# Patient Record
Sex: FEMALE | Race: WHITE | NOT HISPANIC OR LATINO | Employment: OTHER | URBAN - METROPOLITAN AREA
[De-identification: names, ages, dates, MRNs, and addresses within clinical notes are randomized per-mention and may not be internally consistent; named-entity substitution may affect disease eponyms.]

---

## 2017-01-09 RX ORDER — METOPROLOL TARTRATE 50 MG/1
TABLET ORAL
Qty: 90 TABLET | Refills: 3 | Status: SHIPPED | OUTPATIENT
Start: 2017-01-09 | End: 2018-02-02 | Stop reason: SDUPTHER

## 2017-01-11 ENCOUNTER — LAB VISIT (OUTPATIENT)
Dept: LAB | Facility: HOSPITAL | Age: 82
End: 2017-01-11
Attending: INTERNAL MEDICINE
Payer: MEDICARE

## 2017-01-11 DIAGNOSIS — Z79.01 ANTICOAGULATION MONITORING BY PHARMACIST: ICD-10-CM

## 2017-01-11 DIAGNOSIS — I48.91 ATRIAL FIBRILLATION: ICD-10-CM

## 2017-01-11 LAB
INR PPP: 2.1
PROTHROMBIN TIME: 20.9 SEC

## 2017-01-11 PROCEDURE — 85610 PROTHROMBIN TIME: CPT

## 2017-01-11 PROCEDURE — 36415 COLL VENOUS BLD VENIPUNCTURE: CPT | Mod: PO

## 2017-01-12 ENCOUNTER — ANTI-COAG VISIT (OUTPATIENT)
Dept: CARDIOLOGY | Facility: CLINIC | Age: 82
End: 2017-01-12

## 2017-01-12 DIAGNOSIS — Z79.01 ANTICOAGULATION MONITORING BY PHARMACIST: ICD-10-CM

## 2017-01-12 RX ORDER — VERAPAMIL HYDROCHLORIDE 120 MG/1
TABLET, FILM COATED, EXTENDED RELEASE ORAL
Qty: 90 TABLET | Refills: 3 | Status: SHIPPED | OUTPATIENT
Start: 2017-01-12 | End: 2018-02-02 | Stop reason: SDUPTHER

## 2017-01-19 RX ORDER — LEVOTHYROXINE SODIUM 25 UG/1
TABLET ORAL
Qty: 90 TABLET | Refills: 3 | Status: SHIPPED | OUTPATIENT
Start: 2017-01-19 | End: 2017-03-03

## 2017-02-03 DIAGNOSIS — E11.9 TYPE 2 DIABETES MELLITUS WITHOUT COMPLICATION: ICD-10-CM

## 2017-02-08 ENCOUNTER — PATIENT MESSAGE (OUTPATIENT)
Dept: CARDIOLOGY | Facility: CLINIC | Age: 82
End: 2017-02-08

## 2017-02-08 ENCOUNTER — TELEPHONE (OUTPATIENT)
Dept: ELECTROPHYSIOLOGY | Facility: CLINIC | Age: 82
End: 2017-02-08

## 2017-02-09 ENCOUNTER — LAB VISIT (OUTPATIENT)
Dept: LAB | Facility: HOSPITAL | Age: 82
End: 2017-02-09
Attending: FAMILY MEDICINE
Payer: MEDICARE

## 2017-02-09 DIAGNOSIS — I48.91 ATRIAL FIBRILLATION: ICD-10-CM

## 2017-02-09 DIAGNOSIS — Z79.01 ANTICOAGULATION MONITORING BY PHARMACIST: ICD-10-CM

## 2017-02-09 LAB
INR PPP: 1.6
PROTHROMBIN TIME: 16.2 SEC

## 2017-02-09 PROCEDURE — 85610 PROTHROMBIN TIME: CPT

## 2017-02-09 PROCEDURE — 36415 COLL VENOUS BLD VENIPUNCTURE: CPT | Mod: PO

## 2017-02-10 ENCOUNTER — ANTI-COAG VISIT (OUTPATIENT)
Dept: CARDIOLOGY | Facility: CLINIC | Age: 82
End: 2017-02-10

## 2017-02-10 DIAGNOSIS — Z79.01 ANTICOAGULATION MONITORING BY PHARMACIST: ICD-10-CM

## 2017-02-15 ENCOUNTER — PATIENT MESSAGE (OUTPATIENT)
Dept: FAMILY MEDICINE | Facility: CLINIC | Age: 82
End: 2017-02-15

## 2017-02-22 ENCOUNTER — HOSPITAL ENCOUNTER (OUTPATIENT)
Dept: RADIOLOGY | Facility: HOSPITAL | Age: 82
Discharge: HOME | End: 2017-02-22
Attending: NURSE PRACTITIONER
Payer: MEDICARE

## 2017-02-22 DIAGNOSIS — R92.8 ABNORMAL MAMMOGRAM: ICD-10-CM

## 2017-02-22 DIAGNOSIS — R92.8 ABNORMAL MAMMOGRAM OF RIGHT BREAST: ICD-10-CM

## 2017-02-22 DIAGNOSIS — D05.12 DUCTAL CARCINOMA IN SITU (DCIS) OF LEFT BREAST: ICD-10-CM

## 2017-02-22 PROCEDURE — 76642 ULTRASOUND BREAST LIMITED: CPT | Mod: TC,PO,RT

## 2017-02-22 PROCEDURE — 77065 DX MAMMO INCL CAD UNI: CPT | Mod: 26,,, | Performed by: RADIOLOGY

## 2017-02-22 PROCEDURE — 77061 BREAST TOMOSYNTHESIS UNI: CPT | Mod: 26,,, | Performed by: RADIOLOGY

## 2017-02-22 PROCEDURE — 77065 DX MAMMO INCL CAD UNI: CPT | Mod: TC,RT

## 2017-02-22 PROCEDURE — 77061 BREAST TOMOSYNTHESIS UNI: CPT | Mod: TC,RT

## 2017-02-22 PROCEDURE — 76642 ULTRASOUND BREAST LIMITED: CPT | Mod: 26,RT,, | Performed by: RADIOLOGY

## 2017-02-23 ENCOUNTER — ANTI-COAG VISIT (OUTPATIENT)
Dept: CARDIOLOGY | Facility: CLINIC | Age: 82
End: 2017-02-23

## 2017-02-23 DIAGNOSIS — Z79.01 ANTICOAGULATION MONITORING BY PHARMACIST: ICD-10-CM

## 2017-02-24 ENCOUNTER — TELEPHONE (OUTPATIENT)
Dept: OPHTHALMOLOGY | Facility: CLINIC | Age: 82
End: 2017-02-24

## 2017-02-24 DIAGNOSIS — I48.91 ATRIAL FIBRILLATION, UNSPECIFIED TYPE: ICD-10-CM

## 2017-02-24 DIAGNOSIS — Z95.0 CARDIAC PACEMAKER IN SITU: Primary | ICD-10-CM

## 2017-02-24 NOTE — TELEPHONE ENCOUNTER
----- Message from Umm Valencia sent at 2/24/2017  9:00 AM CST -----  Contact: Son n  - Jann Pastrana  The next available appointment is not until 03/21/2017. The patient's eyes needs to be checked to be approved for driving privileges with the DMV Department.  A form has to be filled out.  Please call [phone].  Thank you

## 2017-02-25 ENCOUNTER — OFFICE VISIT (OUTPATIENT)
Dept: OPTOMETRY | Facility: CLINIC | Age: 82
End: 2017-02-25
Payer: MEDICARE

## 2017-02-25 DIAGNOSIS — E11.9 TYPE 2 DIABETES MELLITUS WITHOUT RETINOPATHY: Primary | ICD-10-CM

## 2017-02-25 DIAGNOSIS — H52.4 MYOPIA WITH ASTIGMATISM AND PRESBYOPIA, BILATERAL: ICD-10-CM

## 2017-02-25 DIAGNOSIS — H35.363 DEGENERATIVE RETINAL DRUSEN OF BOTH EYES: ICD-10-CM

## 2017-02-25 DIAGNOSIS — H52.13 MYOPIA WITH ASTIGMATISM AND PRESBYOPIA, BILATERAL: ICD-10-CM

## 2017-02-25 DIAGNOSIS — H52.203 MYOPIA WITH ASTIGMATISM AND PRESBYOPIA, BILATERAL: ICD-10-CM

## 2017-02-25 PROCEDURE — 99999 PR PBB SHADOW E&M-EST. PATIENT-LVL II: CPT | Mod: PBBFAC,,, | Performed by: OPTOMETRIST

## 2017-02-25 PROCEDURE — 92015 DETERMINE REFRACTIVE STATE: CPT | Mod: S$GLB,,, | Performed by: OPTOMETRIST

## 2017-02-25 PROCEDURE — 99499 UNLISTED E&M SERVICE: CPT | Mod: S$GLB,,, | Performed by: OPTOMETRIST

## 2017-02-25 PROCEDURE — 92014 COMPRE OPH EXAM EST PT 1/>: CPT | Mod: S$GLB,,, | Performed by: OPTOMETRIST

## 2017-02-25 NOTE — PROGRESS NOTES
HPI     Eye Problem    Additional comments: Needs drivers license form filled ouy           Comments   No complaints  No eye drops  Used rx readers       Last edited by Praveen Nava, OD on 2/25/2017  8:52 AM. (History)        ROS     Negative for: Constitutional, Gastrointestinal, Neurological, Skin,   Genitourinary, Musculoskeletal, HENT, Endocrine, Cardiovascular, Eyes,   Respiratory, Psychiatric, Allergic/Imm, Heme/Lymph    Last edited by Praveen Nava, OD on 2/25/2017  8:33 AM. (History)        Assessment /Plan     For exam results, see Encounter Report.    Type 2 diabetes mellitus without retinopathy    Degenerative retinal drusen of both eyes    Myopia with astigmatism and presbyopia, bilateral      1. No diabetic retinopathy, no csme. Return in 1 year for dilated eye exam.  2. Monitor condition. Patient to report any changes. RTC 1 year recheck.  3. Spec Rx given. Different lens options discussed with patient. RTC 1 year full exam.

## 2017-03-03 ENCOUNTER — CLINICAL SUPPORT (OUTPATIENT)
Dept: ELECTROPHYSIOLOGY | Facility: CLINIC | Age: 82
End: 2017-03-03
Payer: MEDICARE

## 2017-03-03 ENCOUNTER — OFFICE VISIT (OUTPATIENT)
Dept: FAMILY MEDICINE | Facility: CLINIC | Age: 82
End: 2017-03-03
Payer: MEDICARE

## 2017-03-03 VITALS
BODY MASS INDEX: 22.31 KG/M2 | DIASTOLIC BLOOD PRESSURE: 70 MMHG | HEART RATE: 59 BPM | SYSTOLIC BLOOD PRESSURE: 137 MMHG | TEMPERATURE: 98 F | WEIGHT: 118.19 LBS | HEIGHT: 61 IN

## 2017-03-03 DIAGNOSIS — R29.6 RECURRENT FALLS: ICD-10-CM

## 2017-03-03 DIAGNOSIS — E11.40 TYPE 2 DIABETES MELLITUS WITH DIABETIC NEUROPATHY, WITHOUT LONG-TERM CURRENT USE OF INSULIN: ICD-10-CM

## 2017-03-03 DIAGNOSIS — R42 DIZZINESS: ICD-10-CM

## 2017-03-03 DIAGNOSIS — E11.22 DIABETES MELLITUS WITH STAGE 4 CHRONIC KIDNEY DISEASE GFR 15-29: ICD-10-CM

## 2017-03-03 DIAGNOSIS — Z97.4 HEARING AID WORN: ICD-10-CM

## 2017-03-03 DIAGNOSIS — N18.4 DIABETES MELLITUS WITH STAGE 4 CHRONIC KIDNEY DISEASE GFR 15-29: ICD-10-CM

## 2017-03-03 DIAGNOSIS — F33.1 MAJOR DEPRESSIVE DISORDER, RECURRENT EPISODE, MODERATE: ICD-10-CM

## 2017-03-03 DIAGNOSIS — N25.81 SECONDARY HYPERPARATHYROIDISM OF RENAL ORIGIN: ICD-10-CM

## 2017-03-03 DIAGNOSIS — Z91.89 DRIVING SAFETY ISSUE: ICD-10-CM

## 2017-03-03 DIAGNOSIS — I15.2 HYPERTENSION COMPLICATING DIABETES: ICD-10-CM

## 2017-03-03 DIAGNOSIS — E11.59 HYPERTENSION COMPLICATING DIABETES: ICD-10-CM

## 2017-03-03 DIAGNOSIS — I48.91 ATRIAL FIBRILLATION: ICD-10-CM

## 2017-03-03 DIAGNOSIS — Z95.0 CARDIAC PACEMAKER IN SITU: ICD-10-CM

## 2017-03-03 DIAGNOSIS — I48.91 ATRIAL FIBRILLATION, UNSPECIFIED TYPE: Chronic | ICD-10-CM

## 2017-03-03 DIAGNOSIS — R41.3 MEMORY LOSS OR IMPAIRMENT: Primary | ICD-10-CM

## 2017-03-03 PROCEDURE — 3078F DIAST BP <80 MM HG: CPT | Mod: S$GLB,,, | Performed by: FAMILY MEDICINE

## 2017-03-03 PROCEDURE — 99214 OFFICE O/P EST MOD 30 MIN: CPT | Mod: S$GLB,,, | Performed by: FAMILY MEDICINE

## 2017-03-03 PROCEDURE — 1159F MED LIST DOCD IN RCRD: CPT | Mod: S$GLB,,, | Performed by: FAMILY MEDICINE

## 2017-03-03 PROCEDURE — 1157F ADVNC CARE PLAN IN RCRD: CPT | Mod: S$GLB,,, | Performed by: FAMILY MEDICINE

## 2017-03-03 PROCEDURE — 93293 PM PHONE R-STRIP DEVICE EVAL: CPT | Mod: S$GLB,,, | Performed by: INTERNAL MEDICINE

## 2017-03-03 PROCEDURE — 99499 UNLISTED E&M SERVICE: CPT | Mod: S$GLB,,, | Performed by: FAMILY MEDICINE

## 2017-03-03 PROCEDURE — 1126F AMNT PAIN NOTED NONE PRSNT: CPT | Mod: S$GLB,,, | Performed by: FAMILY MEDICINE

## 2017-03-03 PROCEDURE — 3075F SYST BP GE 130 - 139MM HG: CPT | Mod: S$GLB,,, | Performed by: FAMILY MEDICINE

## 2017-03-03 PROCEDURE — 99999 PR PBB SHADOW E&M-EST. PATIENT-LVL III: CPT | Mod: PBBFAC,,, | Performed by: FAMILY MEDICINE

## 2017-03-03 NOTE — PROGRESS NOTES
Patient came in today with a form from the Department of Motor Vehicles regarding her physical and mental health as to whether not she could try.  She already saw the ophthalmologist and was cleared with regards to her vision.  She flooded back in August.  Lives with  who has medical issues.  Has been staying with children.  The children took car keys away from her because I'm concerned regarding her ability to drive.  She has significant memory issues which apparently have been progressive.  She has complained of dizziness in the past though she denies currently.  She's had falls intermittently though seems to be doing better now with using walker at home.  Previous hip replacement surgery.  She has atrial fibrillation followed by cardiology.  Hypertension and diabetes.  She has stage IV chronic kidney disease with secondary hyperparathyroidism followed by nephrology.  She does have hearing aids which work well enough.  She has depression currently controlled.  She apparently was given some type of medication for her memory in the past, but felt it made her worse and discontinued.  Her  takes care of the bills.  She no longer cooks because she was forgetting the recipes.  She apparently does eat meals that her children bring in for her.  Patient believes that she would be okay to drive.    Past Medical History:  Past Medical History:   Diagnosis Date    *Atrial fibrillation 4/19/2012    *Atrial flutter 4/19/2012    Allergy     Ankle fracture 4/19/2012    Anticoagulant long-term use     Anxiety     Arthritis     Bacterial pneumonia, unspecified 12/7/2012    Breast cancer     LEFT    Cataract     CKD (chronic kidney disease), stage III 5/11/2012    Depression 4/19/2012    Diabetes mellitus with stage 3 chronic kidney disease 2/1/2016    HEARING LOSS     wears hearing aides    Hip fracture, right 4/19/2012    HLD (hyperlipidemia) 4/19/2012    HTN (hypertension) 4/19/2012     Hypothyroidism 4/19/2012    LBBB (left bundle branch block) 10/19/2012    Osteopenia 4/19/2012    Osteoporosis     Otitis media     Pacemaker 11/2012    yo/chantell    Secondary hyperparathyroidism of renal origin     Simple renal cyst     Sinus node dysfunction     Urinary incontinence 4/19/2012     Past Surgical History:   Procedure Laterality Date    APPENDECTOMY      BREAST LUMPECTOMY Left     CARDIAC PACEMAKER PLACEMENT  12/3/12    Sinus node dysfunction    COLONOSCOPY      EYE SURGERY Bilateral     PHACO/IOL    FRACTURE SURGERY Right     ankle    HYSTERECTOMY      total    MASTECTOMY Left     TONSILLECTOMY      TOTAL HIP ARTHROPLASTY Right      Social History     Social History    Marital status:      Spouse name: N/A    Number of children: N/A    Years of education: N/A     Occupational History    Not on file.     Social History Main Topics    Smoking status: Never Smoker    Smokeless tobacco: Never Used    Alcohol use No    Drug use: No    Sexual activity: Not Currently     Other Topics Concern    Not on file     Social History Narrative     Family History   Problem Relation Age of Onset    Hypertension Mother     Heart disease Father     Coronary artery disease Brother     Coronary artery disease Brother     COPD Brother      Review of patient's allergies indicates:   Allergen Reactions    Amlodipine Edema     Pt stated this medication made her legs swell    Alendronate sodium Other (See Comments)     Reaction: Esophageal bleeding    Fosamax [alendronate] Other (See Comments)     Reaction: Esophageal bleeding  diarrhea    Sorbitol      Diarrhea     Augmentin [amoxicillin-pot clavulanate] Hives and Rash     Current Outpatient Prescriptions on File Prior to Visit   Medication Sig Dispense Refill    ACCU-CHEK SHELDON PLUS TEST STRP Strp CHECK TWICE A DAY  200 strip 4    benazepril (LOTENSIN) 20 MG tablet Take 1 tablet (20 mg total) by mouth once daily. 90 tablet 3     cyanocobalamin, vitamin B-12, 1,000 mcg TbSR One tab po daily 1 each 0    ERGOCALCIFEROL, VITAMIN D2, (VITAMIN D ORAL) Take by mouth once daily.      glipiZIDE (GLUCOTROL) 2.5 MG TR24 TAKE 1 TABLET BY MOUTH EVERY DAY WITH BREAKFAST 30 tablet 5    hydrochlorothiazide (HYDRODIURIL) 25 MG tablet TAKE 1 TABLET DAILY 90 tablet 3    levothyroxine (SYNTHROID) 75 MCG tablet Take 1 tablet (75 mcg total) by mouth once daily. 30 tablet 11    metoprolol tartrate (LOPRESSOR) 50 MG tablet TAKE 1/2 TABLET TWICE DAILY 90 tablet 3    mirtazapine (REMERON) 45 MG tablet Take 45 mg by mouth every evening.      MYRBETRIQ 50 mg Tb24 TAKE 1 TABLET ONE TIME DAILY 90 tablet 1    PYRIDOXINE HCL (VITAMIN B-6 ORAL) Take by mouth once daily.      trazodone (DESYREL) 50 MG tablet Take 50 mg by mouth nightly as needed.       venlafaxine (EFFEXOR XR) 150 MG Cp24 Take 150 mg by mouth.      verapamil (CALAN-SR) 120 MG CR tablet TAKE 1 TABLET EVERY NIGHT 90 tablet 3    warfarin (COUMADIN) 5 MG tablet TAKE 1 AND 1/2 TABLETS (7.5MG TOTAL) DAILY AS DIRECTED BY COUMADIN CLINIC (Patient taking differently: TAKE 1 AND 1/2 TABLETS (7.5MG TOTAL) DAILY AS DIRECTED BY COUMADIN CLINIC and Tuesday 5 mg only) 135 tablet 3    [DISCONTINUED] glipiZIDE (GLUCOTROL) 2.5 MG TR24 Take 1 tablet (2.5 mg total) by mouth daily with breakfast. 90 tablet 3    [DISCONTINUED] levothyroxine (SYNTHROID) 25 MCG tablet TAKE 1 TABLET ONE TIME DAILY 90 tablet 3    [DISCONTINUED] venlafaxine (EFFEXOR-XR) 150 MG Cp24 TAKE 1 CAPSULE (150 MG TOTAL) BY MOUTH DAILY.  1     No current facility-administered medications on file prior to visit.            ROS:  GENERAL: No fever, chills.  Positive weight loss   CARDIOVASCULAR: Denies chest pain, PND, orthopnea.  She does get some mild dyspnea on exertion-chronic  ABDOMEN: Appetite fine. Denies diarrhea, abdominal pain, hematemesis or blood in stool.  URINARY: No flank pain, dysuria or hematuria.      OBJECTIVE:     Vitals:  "   03/03/17 1032   BP: 137/70   Pulse: (!) 59   Temp: 98 °F (36.7 °C)   Weight: 53.6 kg (118 lb 2.7 oz)   Height: 5' 1" (1.549 m)     Wt Readings from Last 3 Encounters:   03/03/17 53.6 kg (118 lb 2.7 oz)   11/29/16 58.7 kg (129 lb 6.6 oz)   09/21/16 61.3 kg (135 lb 2.3 oz)     APPEARANCE: Well nourished, well developed, in no acute distress.    HEAD: Normocephalic.  Atraumatic.  No sinus tenderness.  EYES:   Right eye: Pupil reactive.  Conjunctiva clear.    Left eye: Pupil reactive.  Conjunctiva clear.     EOMI.    EARS: TM's intact. Light reflex normal. No retraction or perforation.    NOSE:  clear.  MOUTH & THROAT:  No pharyngeal erythema or exudate. No lesions.  NECK: Supple. No bruits.  No JVD.  No cervical lymphadenopathy.  No thyromegaly.    CHEST: Breath sounds clear bilaterally.  Normal respiratory effort  CARDIOVASCULAR: Normal rate.  Regular rhythm.  No murmurs.  No rub.  No gallops.  ABDOMEN: Bowel sounds normal.  Soft.  No tenderness.  No organomegaly.  PERIPHERAL VASCULAR: No cyanosis.  No clubbing.  No edema.  NEUROLOGIC: No focal weakness.  She is able to get up on the exam table with assistance.  She's able to walk slowly with her cane.  She does have decreased sensation in her feet to monofilament testing.  She has onychomycosis noted.  No foot lesions noted.  Pulses intact in the feet.  MENTAL STATUS: Alert.  Oriented x 3.  Mini-Mental Status exam 21/30        Diagnoses and all orders for this visit:    Memory loss or impairment    Driving safety issue    Diabetes mellitus with stage 4 chronic kidney disease GFR 15-29    Atrial fibrillation, unspecified type    Hypertension complicating diabetes    Secondary hyperparathyroidism of renal origin    Major depressive disorder, recurrent episode, moderate    Recurrent falls    Dizziness    Hearing aid worn    Type 2 diabetes mellitus with diabetic neuropathy, without long-term current use of insulin      Advised patient that I do have concerns " regarding her ability to drive due to medical conditions as well as her  Worsening memory loss and apparent neuropathy with loss of some of her protective sensation in the feet..  We completed the form for her today, but I recommended against driving for now on the form.  We'll have her see the neurologist and she'll schedule follow-up appointment with her cardiologist as well.

## 2017-03-03 NOTE — MR AVS SNAPSHOT
[facility]  [address] [address] [address]  Phone: [phone]  Fax: [phone]                  Kassi Skelton   3/3/2017 10:20 AM   Office Visit    Description:  Female : 1932   Provider:  Mor Cook MD   Department:  [facility]                To Do List           Future Appointments        Provider Department Dept Phone    3/3/2017 2:20 PM TELEPHONE CHECK, [address] - Arrhythmia [phone]    3/8/2017 10:10 AM LABORATORY, [place] [facility] [phone]    3/15/2017 8:20 AM Mor Cook MD [facility] [phone]    3/15/2017 10:10 AM LABORATORY, [place] [facility] [phone]    2017 12:45 PM Jaime Swanson MD [facility] Cardiology [phone]      Goals (5 Years of Data)     Record home blood pressure       [facility]     [facility] On Call Nurse Care Line -  Assistance  Registered nurses in the [facility] provide clinical advisement, health education, appointment booking, and other advisory services.  Call for this free service at [phone].             Medications           Message regarding Medications     Verify the changes and/or additions to your medication regime listed below are the same as discussed with your clinician today.  If any of these changes or additions are incorrect, please notify your healthcare provider.             Verify that the below list of medications is an accurate representation of the medications you are currently taking.  If none reported, the list may be blank. If incorrect, please contact your healthcare provider. Carry this list with you in case of emergency.           Current Medications     ACCU-CHEK SHELDON PLUS TEST STRP Strp CHECK TWICE A DAY     benazepril (LOTENSIN) 20 MG tablet Take 1 tablet (20 mg total) by mouth once daily.    cyanocobalamin, vitamin B-12, 1,000 mcg TbSR One tab po  "daily    ERGOCALCIFEROL, VITAMIN D2, (VITAMIN D ORAL) Take by mouth once daily.    glipiZIDE (GLUCOTROL) 2.5 MG TR24 TAKE 1 TABLET BY MOUTH EVERY DAY WITH BREAKFAST    hydrochlorothiazide (HYDRODIURIL) 25 MG tablet TAKE 1 TABLET DAILY    levothyroxine (SYNTHROID) 75 MCG tablet Take 1 tablet (75 mcg total) by mouth once daily.    metoprolol tartrate (LOPRESSOR) 50 MG tablet TAKE 1/2 TABLET TWICE DAILY    mirtazapine (REMERON) 45 MG tablet Take 45 mg by mouth every evening.    MYRBETRIQ 50 mg Tb24 TAKE 1 TABLET ONE TIME DAILY    PYRIDOXINE HCL (VITAMIN B-6 ORAL) Take by mouth once daily.    trazodone (DESYREL) 50 MG tablet Take 50 mg by mouth nightly as needed.     venlafaxine (EFFEXOR XR) 150 MG Cp24 Take 150 mg by mouth.    verapamil (CALAN-SR) 120 MG CR tablet TAKE 1 TABLET EVERY NIGHT    warfarin (COUMADIN) 5 MG tablet TAKE 1 AND 1/2 TABLETS (7.5MG TOTAL) DAILY AS DIRECTED BY COUMADIN CLINIC           Clinical Reference Information           Your Vitals Were     BP Pulse Temp Height Weight BMI    137/70 59 98 °F (36.7 °C) 5' 1" (1.549 m) 53.6 kg (118 lb 2.7 oz) 22.33 kg/m2      Blood Pressure          Most Recent Value    BP  137/70      Allergies as of 3/3/2017     Amlodipine    Alendronate Sodium    Fosamax [Alendronate]    Sorbitol    Augmentin [Amoxicillin-pot Clavulanate]      Immunizations Administered on Date of Encounter - 3/3/2017     None      Language Assistance Services     ATTENTION: Language assistance services are available, free of charge. Please call [phone].      ATENCIÓN: Si albert garciasmarco a, tiene a leone disposición servicios gratuitos de asistencia lingüística. Llkayla al [phone].     [facility] Ý: N?u b?n nói Ti?ng Vi?t, có các d?ch v? h? tr? ngôn ng? mi?n phí dành cho b?n. G?i s? [phone].         [facility] complies with applicable Federal civil rights laws and does not discriminate on the basis of race, color, national origin, age, disability, or sex.        "

## 2017-03-08 ENCOUNTER — LAB VISIT (OUTPATIENT)
Dept: LAB | Facility: HOSPITAL | Age: 82
End: 2017-03-08
Attending: FAMILY MEDICINE
Payer: MEDICARE

## 2017-03-08 DIAGNOSIS — I48.91 ATRIAL FIBRILLATION: ICD-10-CM

## 2017-03-08 DIAGNOSIS — Z79.01 ANTICOAGULATION MONITORING BY PHARMACIST: ICD-10-CM

## 2017-03-08 LAB
INR PPP: 1.7
PROTHROMBIN TIME: 16.8 SEC

## 2017-03-08 PROCEDURE — 36415 COLL VENOUS BLD VENIPUNCTURE: CPT | Mod: PO

## 2017-03-08 PROCEDURE — 85610 PROTHROMBIN TIME: CPT

## 2017-03-09 ENCOUNTER — ANTI-COAG VISIT (OUTPATIENT)
Dept: CARDIOLOGY | Facility: CLINIC | Age: 82
End: 2017-03-09

## 2017-03-09 DIAGNOSIS — Z79.01 ANTICOAGULATION MONITORING BY PHARMACIST: ICD-10-CM

## 2017-03-15 ENCOUNTER — LAB VISIT (OUTPATIENT)
Dept: LAB | Facility: HOSPITAL | Age: 82
End: 2017-03-15
Attending: NURSE PRACTITIONER
Payer: MEDICARE

## 2017-03-15 ENCOUNTER — OFFICE VISIT (OUTPATIENT)
Dept: FAMILY MEDICINE | Facility: CLINIC | Age: 82
End: 2017-03-15
Payer: MEDICARE

## 2017-03-15 VITALS
HEART RATE: 60 BPM | WEIGHT: 121.38 LBS | SYSTOLIC BLOOD PRESSURE: 125 MMHG | DIASTOLIC BLOOD PRESSURE: 65 MMHG | BODY MASS INDEX: 22.93 KG/M2

## 2017-03-15 DIAGNOSIS — R41.3 MEMORY LOSS OR IMPAIRMENT: ICD-10-CM

## 2017-03-15 DIAGNOSIS — E11.9 TYPE 2 DIABETES MELLITUS WITHOUT COMPLICATION: ICD-10-CM

## 2017-03-15 DIAGNOSIS — E11.22 DIABETES MELLITUS WITH STAGE 4 CHRONIC KIDNEY DISEASE GFR 15-29: ICD-10-CM

## 2017-03-15 DIAGNOSIS — I48.91 ATRIAL FIBRILLATION, UNSPECIFIED TYPE: ICD-10-CM

## 2017-03-15 DIAGNOSIS — R63.4 WEIGHT LOSS: Primary | ICD-10-CM

## 2017-03-15 DIAGNOSIS — N18.4 DIABETES MELLITUS WITH STAGE 4 CHRONIC KIDNEY DISEASE GFR 15-29: ICD-10-CM

## 2017-03-15 DIAGNOSIS — R73.09 HEMOGLOBIN A1C ABOVE REFERENCE RANGE: ICD-10-CM

## 2017-03-15 DIAGNOSIS — Z91.89 DRIVING SAFETY ISSUE: ICD-10-CM

## 2017-03-15 DIAGNOSIS — E11.59 HYPERTENSION COMPLICATING DIABETES: ICD-10-CM

## 2017-03-15 DIAGNOSIS — E03.9 HYPOTHYROIDISM: ICD-10-CM

## 2017-03-15 DIAGNOSIS — I15.2 HYPERTENSION COMPLICATING DIABETES: ICD-10-CM

## 2017-03-15 LAB
CHOLEST/HDLC SERPL: 3.1 {RATIO}
HDL/CHOLESTEROL RATIO: 31.8 %
HDLC SERPL-MCNC: 233 MG/DL
HDLC SERPL-MCNC: 74 MG/DL
LDLC SERPL CALC-MCNC: 137.6 MG/DL
NONHDLC SERPL-MCNC: 159 MG/DL
TRIGL SERPL-MCNC: 107 MG/DL
TSH SERPL DL<=0.005 MIU/L-ACNC: 1.24 UIU/ML

## 2017-03-15 PROCEDURE — 3078F DIAST BP <80 MM HG: CPT | Mod: S$GLB,,, | Performed by: FAMILY MEDICINE

## 2017-03-15 PROCEDURE — 36415 COLL VENOUS BLD VENIPUNCTURE: CPT | Mod: PO

## 2017-03-15 PROCEDURE — 1126F AMNT PAIN NOTED NONE PRSNT: CPT | Mod: S$GLB,,, | Performed by: FAMILY MEDICINE

## 2017-03-15 PROCEDURE — 83036 HEMOGLOBIN GLYCOSYLATED A1C: CPT

## 2017-03-15 PROCEDURE — 1159F MED LIST DOCD IN RCRD: CPT | Mod: S$GLB,,, | Performed by: FAMILY MEDICINE

## 2017-03-15 PROCEDURE — 99999 PR PBB SHADOW E&M-EST. PATIENT-LVL III: CPT | Mod: PBBFAC,,, | Performed by: FAMILY MEDICINE

## 2017-03-15 PROCEDURE — 1157F ADVNC CARE PLAN IN RCRD: CPT | Mod: S$GLB,,, | Performed by: FAMILY MEDICINE

## 2017-03-15 PROCEDURE — 99499 UNLISTED E&M SERVICE: CPT | Mod: S$GLB,,, | Performed by: FAMILY MEDICINE

## 2017-03-15 PROCEDURE — 99214 OFFICE O/P EST MOD 30 MIN: CPT | Mod: S$GLB,,, | Performed by: FAMILY MEDICINE

## 2017-03-15 PROCEDURE — 1160F RVW MEDS BY RX/DR IN RCRD: CPT | Mod: S$GLB,,, | Performed by: FAMILY MEDICINE

## 2017-03-15 PROCEDURE — 3074F SYST BP LT 130 MM HG: CPT | Mod: S$GLB,,, | Performed by: FAMILY MEDICINE

## 2017-03-15 PROCEDURE — 80061 LIPID PANEL: CPT

## 2017-03-15 PROCEDURE — 84443 ASSAY THYROID STIM HORMONE: CPT

## 2017-03-15 NOTE — MR AVS SNAPSHOT
[facility]  [address] [address] [address]  Phone: [phone]  Fax: [phone]                  Kassi Skelton   3/15/2017 8:20 AM   Office Visit    Description:  Female : 1932   Provider:  Mor Cook MD   Department:  [facility]           Reason for Visit     Annual Exam           Diagnoses this Visit        Comments    Weight loss    -  Primary            To Do List           Future Appointments        Provider Department Dept Phone    3/15/2017 10:10 AM LABORATORY, [place] [facility] [phone]    3/22/2017 9:55 AM LABORATORY, [place] [facility] [phone]    2017 12:45 PM Jaime Swanson MD [place] - Cardiology [phone]    2017 10:00 AM Zahra Davila MD [facility] - Neurology [phone]    2017 2:00 PM Star Freeman NP [facility] Hematology [phone]      Goals (5 Years of Data)     Record home blood pressure       [facility]     [facility] Care [facility] -  Assistance  Registered nurses in the [facility] provide clinical advisement, health education, appointment booking, and other advisory services.  Call for this free service at [phone].             Medications           Message regarding Medications     Verify the changes and/or additions to your medication regime listed below are the same as discussed with your clinician today.  If any of these changes or additions are incorrect, please notify your healthcare provider.        STOP taking these medications     trazodone (DESYREL) 50 MG tablet Take 50 mg by mouth nightly as needed.            Verify that the below list of medications is an accurate representation of the medications you are currently taking.  If none reported, the list may be blank. If incorrect, please contact your healthcare provider. Carry this list with you in case of emergency.           Current  Medications     ACCU-CHEK SHELDON PLUS TEST STRP Strp CHECK TWICE A DAY     benazepril (LOTENSIN) 20 MG tablet Take 1 tablet (20 mg total) by mouth once daily.    cyanocobalamin, vitamin B-12, 1,000 mcg TbSR One tab po daily    ERGOCALCIFEROL, VITAMIN D2, (VITAMIN D ORAL) Take by mouth once daily.    glipiZIDE (GLUCOTROL) 2.5 MG TR24 TAKE 1 TABLET BY MOUTH EVERY DAY WITH BREAKFAST    hydrochlorothiazide (HYDRODIURIL) 25 MG tablet TAKE 1 TABLET DAILY    levothyroxine (SYNTHROID) 75 MCG tablet Take 1 tablet (75 mcg total) by mouth once daily.    metoprolol tartrate (LOPRESSOR) 50 MG tablet TAKE 1/2 TABLET TWICE DAILY    mirtazapine (REMERON) 45 MG tablet Take 45 mg by mouth every evening.    MYRBETRIQ 50 mg Tb24 TAKE 1 TABLET ONE TIME DAILY    PYRIDOXINE HCL (VITAMIN B-6 ORAL) Take by mouth once daily.    venlafaxine (EFFEXOR XR) 150 MG Cp24 Take 150 mg by mouth.    verapamil (CALAN-SR) 120 MG CR tablet TAKE 1 TABLET EVERY NIGHT    warfarin (COUMADIN) 5 MG tablet TAKE 1 AND 1/2 TABLETS (7.5MG TOTAL) DAILY AS DIRECTED BY COUMADIN CLINIC           Clinical Reference Information           Your Vitals Were     BP Pulse Weight BMI       125/65 60 55 kg (121 lb 5.8 oz) 22.93 kg/m2       Blood Pressure          Most Recent Value    BP  125/65      Allergies as of 3/15/2017     Amlodipine    Alendronate Sodium    Fosamax [Alendronate]    Sorbitol    Augmentin [Amoxicillin-pot Clavulanate]      Immunizations Administered on Date of Encounter - 3/15/2017     None      Language Assistance Services     ATTENTION: Language assistance services are available, free of charge. Please call [phone].      ATENCIÓN: Si albert garciasmarco a, tiene a leone disposición servicios gratuitos de asistencia lingüística. Llame al [phone].     [facility] Ý: N?u b?n nói Ti?ng Vi?t, có các d?ch v? h? tr? ngôn ng? mi?n phí dành cho b?n. G?i s? [phone].         [facility] complies with applicable Federal civil rights laws and does not  discriminate on the basis of race, color, national origin, age, disability, or sex.

## 2017-03-16 ENCOUNTER — TELEPHONE (OUTPATIENT)
Dept: FAMILY MEDICINE | Facility: CLINIC | Age: 82
End: 2017-03-16

## 2017-03-16 DIAGNOSIS — E11.69 COMBINED HYPERLIPIDEMIA ASSOCIATED WITH TYPE 2 DIABETES MELLITUS: Primary | ICD-10-CM

## 2017-03-16 DIAGNOSIS — E78.2 COMBINED HYPERLIPIDEMIA ASSOCIATED WITH TYPE 2 DIABETES MELLITUS: Primary | ICD-10-CM

## 2017-03-16 LAB
ESTIMATED AVG GLUCOSE: 131 MG/DL
HBA1C MFR BLD HPLC: 6.2 %
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
## 2017-03-23 ENCOUNTER — ANTI-COAG VISIT (OUTPATIENT)
Dept: CARDIOLOGY | Facility: CLINIC | Age: 82
End: 2017-03-23

## 2017-03-23 ENCOUNTER — LAB VISIT (OUTPATIENT)
Dept: LAB | Facility: HOSPITAL | Age: 82
End: 2017-03-23
Attending: INTERNAL MEDICINE
Payer: MEDICARE

## 2017-03-23 DIAGNOSIS — Z79.01 ANTICOAGULATION MONITORING BY PHARMACIST: ICD-10-CM

## 2017-03-23 DIAGNOSIS — I48.91 ATRIAL FIBRILLATION: ICD-10-CM

## 2017-03-23 LAB
INR PPP: 2.4
PROTHROMBIN TIME: 23.7 SEC

## 2017-03-23 PROCEDURE — 85610 PROTHROMBIN TIME: CPT

## 2017-03-23 PROCEDURE — 36415 COLL VENOUS BLD VENIPUNCTURE: CPT | Mod: PO

## 2017-04-06 ENCOUNTER — LAB VISIT (OUTPATIENT)
Dept: LAB | Facility: HOSPITAL | Age: 82
End: 2017-04-06
Attending: INTERNAL MEDICINE
Payer: MEDICARE

## 2017-04-06 DIAGNOSIS — E78.5 HYPERLIPIDEMIA, UNSPECIFIED HYPERLIPIDEMIA TYPE: Primary | ICD-10-CM

## 2017-04-06 DIAGNOSIS — I48.91 ATRIAL FIBRILLATION: ICD-10-CM

## 2017-04-06 DIAGNOSIS — Z79.01 ANTICOAGULATION MONITORING BY PHARMACIST: ICD-10-CM

## 2017-04-06 LAB
INR PPP: 3.2
PROTHROMBIN TIME: 32.1 SEC

## 2017-04-06 PROCEDURE — 36415 COLL VENOUS BLD VENIPUNCTURE: CPT | Mod: PO

## 2017-04-06 PROCEDURE — 85610 PROTHROMBIN TIME: CPT

## 2017-04-06 NOTE — TELEPHONE ENCOUNTER
----- Message from Mor Cook MD sent at 4/2/2017  9:20 PM CDT -----  Cholesterol is elevated.  Recommend start pravastatin 40 mg daily.  Repeat lipid panel, ALT in 3 months. My nurse will contact you to arrange.  Thanks,  Dr. Cook    Results released on [facility]

## 2017-04-07 ENCOUNTER — ANTI-COAG VISIT (OUTPATIENT)
Dept: CARDIOLOGY | Facility: CLINIC | Age: 82
End: 2017-04-07

## 2017-04-07 DIAGNOSIS — Z79.01 ANTICOAGULATION MONITORING BY PHARMACIST: ICD-10-CM

## 2017-04-07 RX ORDER — PRAVASTATIN SODIUM 40 MG/1
40 TABLET ORAL DAILY
Qty: 90 TABLET | Refills: 3 | Status: SHIPPED | OUTPATIENT
Start: 2017-04-07 | End: 2017-04-12 | Stop reason: SDUPTHER

## 2017-04-11 ENCOUNTER — CLINICAL SUPPORT (OUTPATIENT)
Dept: CARDIOLOGY | Facility: CLINIC | Age: 82
End: 2017-04-11
Payer: MEDICARE

## 2017-04-11 ENCOUNTER — PATIENT MESSAGE (OUTPATIENT)
Dept: FAMILY MEDICINE | Facility: CLINIC | Age: 82
End: 2017-04-11

## 2017-04-11 ENCOUNTER — OFFICE VISIT (OUTPATIENT)
Dept: CARDIOLOGY | Facility: CLINIC | Age: 82
End: 2017-04-11
Payer: MEDICARE

## 2017-04-11 VITALS
SYSTOLIC BLOOD PRESSURE: 141 MMHG | WEIGHT: 121.25 LBS | DIASTOLIC BLOOD PRESSURE: 65 MMHG | HEIGHT: 62 IN | HEART RATE: 62 BPM | BODY MASS INDEX: 22.31 KG/M2

## 2017-04-11 DIAGNOSIS — Z95.0 CARDIAC PACEMAKER IN SITU: ICD-10-CM

## 2017-04-11 DIAGNOSIS — Z95.0 CARDIAC PACEMAKER IN SITU: Chronic | ICD-10-CM

## 2017-04-11 DIAGNOSIS — E11.59 HYPERTENSION COMPLICATING DIABETES: ICD-10-CM

## 2017-04-11 DIAGNOSIS — I12.9 BENIGN HYPERTENSIVE KIDNEY DISEASE WITH CHRONIC KIDNEY DISEASE, STAGE 1-4 OR UNSPECIFIED CHRONIC KIDNEY DISEASE: ICD-10-CM

## 2017-04-11 DIAGNOSIS — I48.0 PAROXYSMAL ATRIAL FIBRILLATION: Primary | Chronic | ICD-10-CM

## 2017-04-11 DIAGNOSIS — I15.2 HYPERTENSION COMPLICATING DIABETES: ICD-10-CM

## 2017-04-11 DIAGNOSIS — E78.5 HYPERLIPIDEMIA, UNSPECIFIED HYPERLIPIDEMIA TYPE: ICD-10-CM

## 2017-04-11 DIAGNOSIS — I48.91 ATRIAL FIBRILLATION, UNSPECIFIED TYPE: ICD-10-CM

## 2017-04-11 DIAGNOSIS — I49.5 SINUS NODE DYSFUNCTION: ICD-10-CM

## 2017-04-11 PROCEDURE — 3077F SYST BP >= 140 MM HG: CPT | Mod: S$GLB,,, | Performed by: INTERNAL MEDICINE

## 2017-04-11 PROCEDURE — 99499 UNLISTED E&M SERVICE: CPT | Mod: S$GLB,,, | Performed by: INTERNAL MEDICINE

## 2017-04-11 PROCEDURE — 3078F DIAST BP <80 MM HG: CPT | Mod: S$GLB,,, | Performed by: INTERNAL MEDICINE

## 2017-04-11 PROCEDURE — 93280 PM DEVICE PROGR EVAL DUAL: CPT | Mod: S$GLB,,, | Performed by: INTERNAL MEDICINE

## 2017-04-11 PROCEDURE — 1159F MED LIST DOCD IN RCRD: CPT | Mod: S$GLB,,, | Performed by: INTERNAL MEDICINE

## 2017-04-11 PROCEDURE — 99213 OFFICE O/P EST LOW 20 MIN: CPT | Mod: S$GLB,,, | Performed by: INTERNAL MEDICINE

## 2017-04-11 PROCEDURE — 99999 PR PBB SHADOW E&M-EST. PATIENT-LVL II: CPT | Mod: PBBFAC,,, | Performed by: INTERNAL MEDICINE

## 2017-04-11 PROCEDURE — 1157F ADVNC CARE PLAN IN RCRD: CPT | Mod: S$GLB,,, | Performed by: INTERNAL MEDICINE

## 2017-04-11 PROCEDURE — 1126F AMNT PAIN NOTED NONE PRSNT: CPT | Mod: S$GLB,,, | Performed by: INTERNAL MEDICINE

## 2017-04-11 PROCEDURE — 1160F RVW MEDS BY RX/DR IN RCRD: CPT | Mod: S$GLB,,, | Performed by: INTERNAL MEDICINE

## 2017-04-11 NOTE — PROGRESS NOTES
Subjective:    Patient ID:  Kassi Skelton is a 84 y.o. female who presents for follow-up of PPM    HPI  She comes with no complaints, no chest pain, no shortness of breath  FC II    Review of Systems   Constitution: Negative for decreased appetite, weakness, malaise/fatigue, weight gain and weight loss.   Cardiovascular: Negative for chest pain, dyspnea on exertion, leg swelling, palpitations and syncope.   Respiratory: Negative for cough and shortness of breath.    Gastrointestinal: Negative.    All other systems reviewed and are negative.       Objective:    Physical Exam   Constitutional: She is oriented to person, place, and time. She appears well-developed and well-nourished.   HENT:   Head: Normocephalic.   Eyes: Pupils are equal, round, and reactive to light.   Neck: Normal range of motion. Neck supple. No JVD present. Carotid bruit is not present. No thyromegaly present.   Cardiovascular: Normal rate, regular rhythm, normal heart sounds, intact distal pulses and normal pulses.  PMI is not displaced.  Exam reveals no gallop.    No murmur heard.  Pulmonary/Chest: Effort normal and breath sounds normal.   Abdominal: Soft. Normal appearance. She exhibits no mass. There is no hepatosplenomegaly. There is no tenderness.   Musculoskeletal: Normal range of motion. She exhibits no edema.   Neurological: She is alert and oriented to person, place, and time. She has normal strength and normal reflexes. No sensory deficit.   Skin: Skin is warm and intact.   Psychiatric: She has a normal mood and affect.   Nursing note and vitals reviewed.        Assessment:       1. Paroxysmal atrial fibrillation    2. Hypertension complicating diabetes    3. Sinus node dysfunction    4. Benign hypertensive kidney disease with chronic kidney disease, stage 1-4 or unspecified chronic kidney disease    5. Cardiac pacemaker in situ         Plan:     Continue all cardiac medications  Regular exercise program  1 yr f/u

## 2017-04-12 RX ORDER — PRAVASTATIN SODIUM 40 MG/1
40 TABLET ORAL DAILY
Qty: 90 TABLET | Refills: 3 | Status: SHIPPED | OUTPATIENT
Start: 2017-04-12 | End: 2018-03-30 | Stop reason: SDUPTHER

## 2017-04-19 RX ORDER — TRAZODONE HYDROCHLORIDE 50 MG/1
25-50 TABLET ORAL NIGHTLY PRN
Qty: 30 TABLET | Refills: 5 | Status: SHIPPED | OUTPATIENT
Start: 2017-04-19 | End: 2017-08-09 | Stop reason: ALTCHOICE

## 2017-04-21 ENCOUNTER — OFFICE VISIT (OUTPATIENT)
Dept: NEUROLOGY | Facility: CLINIC | Age: 82
End: 2017-04-21
Payer: MEDICARE

## 2017-04-21 VITALS
DIASTOLIC BLOOD PRESSURE: 66 MMHG | HEIGHT: 61 IN | BODY MASS INDEX: 22.6 KG/M2 | WEIGHT: 119.69 LBS | HEART RATE: 60 BPM | SYSTOLIC BLOOD PRESSURE: 104 MMHG

## 2017-04-21 DIAGNOSIS — G31.84 MCI (MILD COGNITIVE IMPAIRMENT): Primary | ICD-10-CM

## 2017-04-21 DIAGNOSIS — R41.3 MEMORY LOSS: ICD-10-CM

## 2017-04-21 PROCEDURE — 99205 OFFICE O/P NEW HI 60 MIN: CPT | Mod: S$GLB,,, | Performed by: PSYCHIATRY & NEUROLOGY

## 2017-04-21 PROCEDURE — 1160F RVW MEDS BY RX/DR IN RCRD: CPT | Mod: S$GLB,,, | Performed by: PSYCHIATRY & NEUROLOGY

## 2017-04-21 PROCEDURE — 1126F AMNT PAIN NOTED NONE PRSNT: CPT | Mod: S$GLB,,, | Performed by: PSYCHIATRY & NEUROLOGY

## 2017-04-21 PROCEDURE — 99499 UNLISTED E&M SERVICE: CPT | Mod: S$GLB,,, | Performed by: PSYCHIATRY & NEUROLOGY

## 2017-04-21 PROCEDURE — 99999 PR PBB SHADOW E&M-EST. PATIENT-LVL III: CPT | Mod: PBBFAC,,, | Performed by: PSYCHIATRY & NEUROLOGY

## 2017-04-21 PROCEDURE — 1159F MED LIST DOCD IN RCRD: CPT | Mod: S$GLB,,, | Performed by: PSYCHIATRY & NEUROLOGY

## 2017-04-21 PROCEDURE — 3078F DIAST BP <80 MM HG: CPT | Mod: S$GLB,,, | Performed by: PSYCHIATRY & NEUROLOGY

## 2017-04-21 PROCEDURE — 3074F SYST BP LT 130 MM HG: CPT | Mod: S$GLB,,, | Performed by: PSYCHIATRY & NEUROLOGY

## 2017-04-21 RX ORDER — DONEPEZIL HYDROCHLORIDE 5 MG/1
5 TABLET, FILM COATED ORAL NIGHTLY
Qty: 30 TABLET | Refills: 1 | Status: SHIPPED | OUTPATIENT
Start: 2017-04-21 | End: 2017-05-17

## 2017-04-21 NOTE — MR AVS SNAPSHOT
[facility]  [address] [facility] [address]  Phone: [phone]  Fax: [phone]                  Kassi Skelton   2017 10:00 AM   Office Visit    Description:  Female : 1932   Provider:  Zahra Davila MD   Department:  [facility]           Reason for Visit     Memory Loss           Diagnoses this Visit        Comments    MCI (mild cognitive impairment)    -  Primary     Memory loss                To Do List           Future Appointments        Provider Department Dept Phone    2017 9:10 AM LABORATORY, [place] [facility] [phone]    2017 11:20 AM Zahra Davila MD [facility] - Neurology [phone]    2017 2:00 PM Star Freeman NP [facility] - Hematology [phone]    6/15/2017 9:00 AM Mor Cook MD [place] [facility] - Family Medicine [phone]    2017 2:20 PM TELEPHONE CHECK, [address]EMAK[address] - Arrhythmia [phone]      Goals (5 Years of Data)     Record home blood pressure       Follow-Up and Disposition     Return in about 1 month (around 2017).       These Medications        Disp Refills Start End    donepezil (ARICEPT) 5 MG tablet 30 tablet 1 2017    Take 1 tablet (5 mg total) by mouth every evening. - Oral    Pharmacy: [id number] - [place] - [address] [place] [address] #: [phone]         [facility]     [facility] Nurse Care Line -  Assistance  Unless otherwise directed by your provider, please contact [facility], our nurse care line that is available for  assistance.     Registered nurses in the [facility] provide: appointment scheduling, clinical advisement, health education, and other advisory services.  Call: [phone] (toll free)               Medications           Message regarding Medications     Verify the changes and/or additions to your medication regime listed below are the same as discussed with your  clinician today.  If any of these changes or additions are incorrect, please notify your healthcare provider.        START taking these NEW medications        Refills    donepezil (ARICEPT) 5 MG tablet 1    Sig: Take 1 tablet (5 mg total) by mouth every evening.    Class: Normal    Route: Oral           Verify that the below list of medications is an accurate representation of the medications you are currently taking.  If none reported, the list may be blank. If incorrect, please contact your healthcare provider. Carry this list with you in case of emergency.           Current Medications     ACCU-CHEK SHELDON PLUS TEST STRP Strp CHECK TWICE A DAY     benazepril (LOTENSIN) 20 MG tablet Take 1 tablet (20 mg total) by mouth once daily.    cyanocobalamin, vitamin B-12, 1,000 mcg TbSR One tab po daily    ERGOCALCIFEROL, VITAMIN D2, (VITAMIN D ORAL) Take by mouth once daily.    glipiZIDE (GLUCOTROL) 2.5 MG TR24 TAKE 1 TABLET BY MOUTH EVERY DAY WITH BREAKFAST    hydrochlorothiazide (HYDRODIURIL) 25 MG tablet TAKE 1 TABLET DAILY    levothyroxine (SYNTHROID) 75 MCG tablet Take 1 tablet (75 mcg total) by mouth once daily.    metoprolol tartrate (LOPRESSOR) 50 MG tablet TAKE 1/2 TABLET TWICE DAILY    mirtazapine (REMERON) 45 MG tablet Take 45 mg by mouth every evening.    MYRBETRIQ 50 mg Tb24 TAKE 1 TABLET ONE TIME DAILY    pravastatin (PRAVACHOL) 40 MG tablet Take 1 tablet (40 mg total) by mouth once daily.    PYRIDOXINE HCL (VITAMIN B-6 ORAL) Take by mouth once daily.    trazodone (DESYREL) 50 MG tablet Take 0.5-1 tablets (25-50 mg total) by mouth nightly as needed for Insomnia.    venlafaxine (EFFEXOR XR) 150 MG Cp24 Take 75 mg by mouth 2 (two) times daily.     verapamil (CALAN-SR) 120 MG CR tablet TAKE 1 TABLET EVERY NIGHT    warfarin (COUMADIN) 5 MG tablet TAKE 1 AND 1/2 TABLETS (7.5MG TOTAL) DAILY AS DIRECTED BY COUMADIN CLINIC    donepezil (ARICEPT) 5 MG tablet Take 1 tablet (5 mg total) by mouth every evening.          "  Clinical Reference Information           Your Vitals Were     BP Pulse Height Weight BMI    104/66 60 5' 1" (1.549 m) 54.3 kg (119 lb 11.4 oz) 22.62 kg/m2      Blood Pressure          Most Recent Value    BP  104/66      Allergies as of 4/21/2017     Amlodipine    Alendronate Sodium    Fosamax [Alendronate]    Sorbitol    Augmentin [Amoxicillin-pot Clavulanate]      Immunizations Administered on Date of Encounter - 4/21/2017     None      Orders Placed During Today's Visit     Future Labs/Procedures Expected by Expires    Methylmalonic acid, serum  4/21/2017 6/20/2018    Vitamin B12  4/21/2017 6/20/2018      Language Assistance Services     ATTENTION: Language assistance services are available, free of charge. Please call [phone].      ATENCIÓN: Si albert comer, tiene a leone disposición servicios gratuitos de asistencia lingüística. Llame al [phone].     ABHISHEK Ý: N?u b?n nói Ti?ng Vi?t, có các d?ch v? h? tr? ngôn ng? mi?n phí dành cho b?n. G?i s? [phone].         [facility] complies with applicable Federal civil rights laws and does not discriminate on the basis of race, color, national origin, age, disability, or sex.        "
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
## 2017-04-21 NOTE — LETTER
April 21, 2017      Mor Cook MD  [address] [facility] [address]           [facility]  [address] [place] [address]  Phone: [phone]  Fax: [phone]          Patient: Kassi Skelton   MR Number: [id number]   YOB: 1932   Date of Visit: 4/21/2017       Dear Dr. Mor Cook:    Thank you for referring Kassi Skelton to me for evaluation. Attached you will find relevant portions of my assessment and plan of care.    If you have questions, please do not hesitate to call me. I look forward to following Kassi Skelton along with you.    Sincerely,    Zahra Davila MD    Enclosure  CC:  No Recipients    If you would like to receive this communication electronically, please contact [email or web address] or [phone] to request more information on Google Link access.    For providers and/or their staff who would like to refer a patient to [facility], please contact us through our one-stop-shop provider referral line, [facility], at [phone].    If you feel you have received this communication in error or would no longer like to receive these types of communications, please e-mail [email or web address]

## 2017-04-25 ENCOUNTER — LAB VISIT (OUTPATIENT)
Dept: LAB | Facility: HOSPITAL | Age: 82
End: 2017-04-25
Attending: INTERNAL MEDICINE
Payer: MEDICARE

## 2017-04-25 DIAGNOSIS — G31.84 MCI (MILD COGNITIVE IMPAIRMENT): ICD-10-CM

## 2017-04-25 DIAGNOSIS — R41.3 MEMORY LOSS: ICD-10-CM

## 2017-04-25 DIAGNOSIS — Z79.01 ANTICOAGULATION MONITORING BY PHARMACIST: ICD-10-CM

## 2017-04-25 DIAGNOSIS — I48.91 ATRIAL FIBRILLATION: ICD-10-CM

## 2017-04-25 LAB
INR PPP: 3.2
PROTHROMBIN TIME: 32.4 SEC
VIT B12 SERPL-MCNC: 551 PG/ML

## 2017-04-25 PROCEDURE — 85610 PROTHROMBIN TIME: CPT

## 2017-04-25 PROCEDURE — 82607 VITAMIN B-12: CPT

## 2017-04-25 PROCEDURE — 83921 ORGANIC ACID SINGLE QUANT: CPT

## 2017-04-25 PROCEDURE — 36415 COLL VENOUS BLD VENIPUNCTURE: CPT | Mod: PO

## 2017-04-26 ENCOUNTER — ANTI-COAG VISIT (OUTPATIENT)
Dept: CARDIOLOGY | Facility: CLINIC | Age: 82
End: 2017-04-26

## 2017-04-26 DIAGNOSIS — Z79.01 ANTICOAGULATION MONITORING BY PHARMACIST: ICD-10-CM

## 2017-04-29 LAB — METHYLMALONATE SERPL-SCNC: 0.31 UMOL/L

## 2017-05-02 ENCOUNTER — TELEPHONE (OUTPATIENT)
Dept: NEUROLOGY | Facility: CLINIC | Age: 82
End: 2017-05-02

## 2017-05-06 RX ORDER — MIRABEGRON 50 MG/1
TABLET, FILM COATED, EXTENDED RELEASE ORAL
Qty: 90 TABLET | Refills: 3 | Status: SHIPPED | OUTPATIENT
Start: 2017-05-06 | End: 2017-08-28 | Stop reason: SDUPTHER

## 2017-05-17 ENCOUNTER — OFFICE VISIT (OUTPATIENT)
Dept: NEUROLOGY | Facility: CLINIC | Age: 82
End: 2017-05-17
Payer: MEDICARE

## 2017-05-17 VITALS
DIASTOLIC BLOOD PRESSURE: 70 MMHG | WEIGHT: 120.38 LBS | BODY MASS INDEX: 22.73 KG/M2 | SYSTOLIC BLOOD PRESSURE: 140 MMHG | HEIGHT: 61 IN | HEART RATE: 56 BPM

## 2017-05-17 DIAGNOSIS — R41.3 MEMORY LOSS: Primary | ICD-10-CM

## 2017-05-17 DIAGNOSIS — G31.84 MCI (MILD COGNITIVE IMPAIRMENT): ICD-10-CM

## 2017-05-17 PROCEDURE — 3078F DIAST BP <80 MM HG: CPT | Mod: S$GLB,,, | Performed by: PSYCHIATRY & NEUROLOGY

## 2017-05-17 PROCEDURE — 99215 OFFICE O/P EST HI 40 MIN: CPT | Mod: S$GLB,,, | Performed by: PSYCHIATRY & NEUROLOGY

## 2017-05-17 PROCEDURE — 1126F AMNT PAIN NOTED NONE PRSNT: CPT | Mod: S$GLB,,, | Performed by: PSYCHIATRY & NEUROLOGY

## 2017-05-17 PROCEDURE — 1159F MED LIST DOCD IN RCRD: CPT | Mod: S$GLB,,, | Performed by: PSYCHIATRY & NEUROLOGY

## 2017-05-17 PROCEDURE — 99499 UNLISTED E&M SERVICE: CPT | Mod: S$GLB,,, | Performed by: PSYCHIATRY & NEUROLOGY

## 2017-05-17 PROCEDURE — 1160F RVW MEDS BY RX/DR IN RCRD: CPT | Mod: S$GLB,,, | Performed by: PSYCHIATRY & NEUROLOGY

## 2017-05-17 PROCEDURE — 99999 PR PBB SHADOW E&M-EST. PATIENT-LVL III: CPT | Mod: PBBFAC,,, | Performed by: PSYCHIATRY & NEUROLOGY

## 2017-05-17 PROCEDURE — 3077F SYST BP >= 140 MM HG: CPT | Mod: S$GLB,,, | Performed by: PSYCHIATRY & NEUROLOGY

## 2017-05-17 RX ORDER — DONEPEZIL HYDROCHLORIDE 10 MG/1
10 TABLET, FILM COATED ORAL NIGHTLY
Qty: 30 TABLET | Refills: 11 | Status: SHIPPED | OUTPATIENT
Start: 2017-05-17 | End: 2017-05-17 | Stop reason: SDUPTHER

## 2017-05-17 RX ORDER — DONEPEZIL HYDROCHLORIDE 10 MG/1
10 TABLET, FILM COATED ORAL NIGHTLY
Qty: 90 TABLET | Refills: 3 | Status: SHIPPED | OUTPATIENT
Start: 2017-05-17 | End: 2017-05-23

## 2017-05-17 NOTE — MR AVS SNAPSHOT
[facility]  [address] [facility] [address]  Phone: [phone]  Fax: [phone]                  Kassi Skelton   2017 11:20 AM   Office Visit    Description:  Female : 1932   Provider:  Zahra Davila MD   Department:  [facility]           Reason for Visit     Follow-up                To Do List           Future Appointments        Provider Department Dept Phone    2017 10:15 AM LABORATORY, [place] [facility] [phone]    2017 2:00 PM Star Freeman NP [facility] Hematology [phone]    6/15/2017 9:00 AM Mor Cook MD [facility] Family Medicine [phone]    2017 2:20 PM TELEPHONE CHECK, PACEMAKER [address] - Arrhythmia [phone]    2017 8:20 AM LABORATORY, [place] [facility] [phone]      Goals (5 Years of Data)     Record home blood pressure       Follow-Up and Disposition     Return in about 6 months (around 2017).       These Medications        Disp Refills Start End    donepezil (ARICEPT) 10 MG tablet 90 tablet 3 2017    Take 1 tablet (10 mg total) by mouth every evening. - Oral    Pharmacy: [facility] - [address] Ph #: [phone]         [facility]     [facility] Nurse Care Line -  Assistance  Unless otherwise directed by your provider, please contact [facility], our nurse care line that is available for  assistance.     Registered nurses in the [facility] provide: appointment scheduling, clinical advisement, health education, and other advisory services.  Call: [phone] (toll free)               Medications           Message regarding Medications     Verify the changes and/or additions to your medication regime listed below are the same as discussed with your clinician today.  If any of these changes or additions are incorrect, please notify  your healthcare provider.        START taking these NEW medications        Refills    donepezil (ARICEPT) 10 MG tablet 3    Sig: Take 1 tablet (10 mg total) by mouth every evening.    Class: Normal    Route: Oral           Verify that the below list of medications is an accurate representation of the medications you are currently taking.  If none reported, the list may be blank. If incorrect, please contact your healthcare provider. Carry this list with you in case of emergency.           Current Medications     ACCU-CHEK SHELDON PLUS TEST STRP Strp CHECK TWICE A DAY     benazepril (LOTENSIN) 20 MG tablet Take 1 tablet (20 mg total) by mouth once daily.    cyanocobalamin, vitamin B-12, 1,000 mcg TbSR One tab po daily    ERGOCALCIFEROL, VITAMIN D2, (VITAMIN D ORAL) Take by mouth once daily.    glipiZIDE (GLUCOTROL) 2.5 MG TR24 TAKE 1 TABLET BY MOUTH EVERY DAY WITH BREAKFAST    hydrochlorothiazide (HYDRODIURIL) 25 MG tablet TAKE 1 TABLET DAILY    levothyroxine (SYNTHROID) 75 MCG tablet Take 1 tablet (75 mcg total) by mouth once daily.    metoprolol tartrate (LOPRESSOR) 50 MG tablet TAKE 1/2 TABLET TWICE DAILY    mirtazapine (REMERON) 45 MG tablet Take 45 mg by mouth every evening.    MYRBETRIQ 50 mg Tb24 TAKE 1 TABLET ONE TIME DAILY    pravastatin (PRAVACHOL) 40 MG tablet Take 1 tablet (40 mg total) by mouth once daily.    PYRIDOXINE HCL (VITAMIN B-6 ORAL) Take by mouth once daily.    trazodone (DESYREL) 50 MG tablet Take 0.5-1 tablets (25-50 mg total) by mouth nightly as needed for Insomnia.    venlafaxine (EFFEXOR XR) 150 MG Cp24 Take 75 mg by mouth 2 (two) times daily.     verapamil (CALAN-SR) 120 MG CR tablet TAKE 1 TABLET EVERY NIGHT    warfarin (COUMADIN) 5 MG tablet TAKE 1 AND 1/2 TABLETS (7.5MG TOTAL) DAILY AS DIRECTED BY COUMADIN CLINIC    donepezil (ARICEPT) 10 MG tablet Take 1 tablet (10 mg total) by mouth every evening.           Clinical Reference Information           Your Vitals Were     BP Pulse Height  "Weight BMI    140/70 56 5' 1" (1.549 m) 54.6 kg (120 lb 5.9 oz) 22.74 kg/m2      Blood Pressure          Most Recent Value    BP  (!)  140/70      Allergies as of 5/17/2017     Amlodipine    Alendronate Sodium    Fosamax [Alendronate]    Sorbitol    Augmentin [Amoxicillin-pot Clavulanate]      Immunizations Administered on Date of Encounter - 5/17/2017     None      Language Assistance Services     ATTENTION: Language assistance services are available, free of charge. Please call [phone].      ATENCIÓN: Si habla nahomi, tiene a leone disposición servicios gratuitos de asistencia lingüística. Llame al [phone].     CHÚ Ý: N?u b?n nói Ti?ng Vi?t, có các d?ch v? h? tr? ngôn ng? mi?n phí dành cho b?n. G?i s? [phone].         [facility] complies with applicable Federal civil rights laws and does not discriminate on the basis of race, color, national origin, age, disability, or sex.        "
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
## 2017-05-18 ENCOUNTER — LAB VISIT (OUTPATIENT)
Dept: LAB | Facility: HOSPITAL | Age: 82
End: 2017-05-18
Attending: INTERNAL MEDICINE
Payer: MEDICARE

## 2017-05-18 DIAGNOSIS — Z79.01 ANTICOAGULATION MONITORING BY PHARMACIST: ICD-10-CM

## 2017-05-18 DIAGNOSIS — I48.91 ATRIAL FIBRILLATION: ICD-10-CM

## 2017-05-18 LAB
INR PPP: 2.6
PROTHROMBIN TIME: 26.3 SEC

## 2017-05-18 PROCEDURE — 36415 COLL VENOUS BLD VENIPUNCTURE: CPT | Mod: PO

## 2017-05-18 PROCEDURE — 85610 PROTHROMBIN TIME: CPT

## 2017-05-19 ENCOUNTER — ANTI-COAG VISIT (OUTPATIENT)
Dept: CARDIOLOGY | Facility: CLINIC | Age: 82
End: 2017-05-19

## 2017-05-19 DIAGNOSIS — Z79.01 ANTICOAGULATION MONITORING BY PHARMACIST: ICD-10-CM

## 2017-05-23 DIAGNOSIS — R41.3 MEMORY LOSS: ICD-10-CM

## 2017-05-23 DIAGNOSIS — G31.84 MCI (MILD COGNITIVE IMPAIRMENT): ICD-10-CM

## 2017-05-23 RX ORDER — DONEPEZIL HYDROCHLORIDE 5 MG/1
TABLET, FILM COATED ORAL
Qty: 30 TABLET | Refills: 0 | OUTPATIENT
Start: 2017-05-23

## 2017-05-23 RX ORDER — DONEPEZIL HYDROCHLORIDE 10 MG/1
10 TABLET, FILM COATED ORAL NIGHTLY
Qty: 30 TABLET | Refills: 11 | Status: SHIPPED | OUTPATIENT
Start: 2017-05-23 | End: 2017-05-26 | Stop reason: SDUPTHER

## 2017-05-24 ENCOUNTER — PATIENT MESSAGE (OUTPATIENT)
Dept: NEUROLOGY | Facility: CLINIC | Age: 82
End: 2017-05-24

## 2017-05-25 ENCOUNTER — TELEPHONE (OUTPATIENT)
Dept: NEUROLOGY | Facility: CLINIC | Age: 82
End: 2017-05-25

## 2017-05-26 RX ORDER — DONEPEZIL HYDROCHLORIDE 10 MG/1
10 TABLET, FILM COATED ORAL NIGHTLY
Qty: 30 TABLET | Refills: 11 | Status: SHIPPED | OUTPATIENT
Start: 2017-05-26 | End: 2017-06-15

## 2017-06-08 RX ORDER — WARFARIN SODIUM 5 MG/1
TABLET ORAL
Qty: 150 TABLET | Refills: 1 | Status: SHIPPED | OUTPATIENT
Start: 2017-06-08 | End: 2017-08-09

## 2017-06-14 RX ORDER — LEVOTHYROXINE SODIUM 75 UG/1
75 TABLET ORAL DAILY
Qty: 90 TABLET | Refills: 2 | Status: SHIPPED | OUTPATIENT
Start: 2017-06-14 | End: 2017-07-25 | Stop reason: SDUPTHER

## 2017-06-15 ENCOUNTER — LAB VISIT (OUTPATIENT)
Dept: LAB | Facility: HOSPITAL | Age: 82
End: 2017-06-15
Attending: FAMILY MEDICINE
Payer: MEDICARE

## 2017-06-15 ENCOUNTER — OFFICE VISIT (OUTPATIENT)
Dept: FAMILY MEDICINE | Facility: CLINIC | Age: 82
End: 2017-06-15
Payer: MEDICARE

## 2017-06-15 VITALS
SYSTOLIC BLOOD PRESSURE: 138 MMHG | DIASTOLIC BLOOD PRESSURE: 58 MMHG | HEART RATE: 60 BPM | BODY MASS INDEX: 23.25 KG/M2 | WEIGHT: 123.13 LBS | TEMPERATURE: 98 F | HEIGHT: 61 IN

## 2017-06-15 DIAGNOSIS — F33.1 MAJOR DEPRESSIVE DISORDER, RECURRENT EPISODE, MODERATE: ICD-10-CM

## 2017-06-15 DIAGNOSIS — N18.4 DIABETES MELLITUS WITH STAGE 4 CHRONIC KIDNEY DISEASE GFR 15-29: ICD-10-CM

## 2017-06-15 DIAGNOSIS — E11.22 DIABETES MELLITUS WITH STAGE 4 CHRONIC KIDNEY DISEASE GFR 15-29: ICD-10-CM

## 2017-06-15 DIAGNOSIS — Z79.01 ANTICOAGULATION MONITORING BY PHARMACIST: ICD-10-CM

## 2017-06-15 DIAGNOSIS — G31.84 MILD COGNITIVE IMPAIRMENT WITH MEMORY LOSS: Primary | ICD-10-CM

## 2017-06-15 DIAGNOSIS — I48.91 ATRIAL FIBRILLATION: ICD-10-CM

## 2017-06-15 DIAGNOSIS — L98.9 SKIN LESION: ICD-10-CM

## 2017-06-15 DIAGNOSIS — R63.4 WEIGHT LOSS: ICD-10-CM

## 2017-06-15 LAB
INR PPP: 1.9
PROTHROMBIN TIME: 19 SEC

## 2017-06-15 PROCEDURE — 99214 OFFICE O/P EST MOD 30 MIN: CPT | Mod: S$GLB,,, | Performed by: FAMILY MEDICINE

## 2017-06-15 PROCEDURE — 99499 UNLISTED E&M SERVICE: CPT | Mod: S$GLB,,, | Performed by: FAMILY MEDICINE

## 2017-06-15 PROCEDURE — 1159F MED LIST DOCD IN RCRD: CPT | Mod: S$GLB,,, | Performed by: FAMILY MEDICINE

## 2017-06-15 PROCEDURE — 99999 PR PBB SHADOW E&M-EST. PATIENT-LVL IV: CPT | Mod: PBBFAC,,, | Performed by: FAMILY MEDICINE

## 2017-06-15 RX ORDER — DONEPEZIL HYDROCHLORIDE 5 MG/1
5 TABLET, FILM COATED ORAL NIGHTLY
Qty: 30 TABLET | Refills: 0 | Status: SHIPPED | OUTPATIENT
Start: 2017-06-15 | End: 2017-08-31 | Stop reason: SDUPTHER

## 2017-06-15 NOTE — PROGRESS NOTES
Patient presents follow-up.  She did see the neurologist who felt she had mild cognitive impairment versus dementia.  She was started on Aricept.  She tolerated the 5 mg dose well, but did have one nightmare when she increased to 10 mg so she stopped taking the medication.  However, at the same time she had run out of her Effexor may have had withdrawal with this.  Weight stable since last visit.  Prior to this had noted some weight loss.  She has poor dentition with all of her teeth broken, but does not have dentures because she needs oral surgery to have this done.  She also complains of a skin lesion at the right forearm present for a few months not resolving    Lab Results   Component Value Date    HGBA1C 6.2 03/15/2017         Past Medical History:  Past Medical History:   Diagnosis Date    *Atrial fibrillation 4/19/2012    *Atrial flutter 4/19/2012    Allergy     Ankle fracture 4/19/2012    Anticoagulant long-term use     Anxiety     Arthritis     Bacterial pneumonia, unspecified 12/7/2012    Breast cancer     LEFT    Cataract     CKD (chronic kidney disease), stage III 5/11/2012    Depression 4/19/2012    Diabetes mellitus with stage 3 chronic kidney disease 2/1/2016    HEARING LOSS     wears hearing aides    Hip fracture, right 4/19/2012    HLD (hyperlipidemia) 4/19/2012    HTN (hypertension) 4/19/2012    Hypothyroidism 4/19/2012    LBBB (left bundle branch block) 10/19/2012    Mild cognitive impairment with memory loss 6/15/2017    Osteopenia 4/19/2012    Osteoporosis     Otitis media     Pacemaker 11/2012    yo/chantell    Secondary hyperparathyroidism of renal origin     Simple renal cyst     Sinus node dysfunction     Urinary incontinence 4/19/2012     Past Surgical History:   Procedure Laterality Date    APPENDECTOMY      BREAST LUMPECTOMY Left     CARDIAC PACEMAKER PLACEMENT  12/3/12    Sinus node dysfunction    COLONOSCOPY      EYE SURGERY Bilateral     PHACO/IOL     FRACTURE SURGERY Right     ankle    HYSTERECTOMY      total    MASTECTOMY Left     TONSILLECTOMY      TOTAL HIP ARTHROPLASTY Right      Social History     Social History    Marital status:      Spouse name: N/A    Number of children: N/A    Years of education: N/A     Occupational History    Not on file.     Social History Main Topics    Smoking status: Never Smoker    Smokeless tobacco: Never Used    Alcohol use No    Drug use: No    Sexual activity: Not Currently     Other Topics Concern    Not on file     Social History Narrative    No narrative on file     Family History   Problem Relation Age of Onset    Hypertension Mother     Heart disease Father     Coronary artery disease Brother     Coronary artery disease Brother     COPD Brother      Review of patient's allergies indicates:   Allergen Reactions    Amlodipine Edema     Pt stated this medication made her legs swell    Alendronate sodium Other (See Comments)     Reaction: Esophageal bleeding    Fosamax [alendronate] Other (See Comments)     Reaction: Esophageal bleeding  diarrhea    Sorbitol      Diarrhea     Augmentin [amoxicillin-pot clavulanate] Hives and Rash     Current Outpatient Prescriptions on File Prior to Visit   Medication Sig Dispense Refill    ACCU-CHEK SHELDON PLUS TEST STRP Strp CHECK TWICE A DAY  200 strip 4    benazepril (LOTENSIN) 20 MG tablet Take 1 tablet (20 mg total) by mouth once daily. 90 tablet 3    cyanocobalamin, vitamin B-12, 1,000 mcg TbSR One tab po daily 1 each 0    ERGOCALCIFEROL, VITAMIN D2, (VITAMIN D ORAL) Take by mouth once daily.      glipiZIDE (GLUCOTROL) 2.5 MG TR24 TAKE 1 TABLET BY MOUTH EVERY DAY WITH BREAKFAST 30 tablet 5    hydrochlorothiazide (HYDRODIURIL) 25 MG tablet TAKE 1 TABLET DAILY 90 tablet 3    levothyroxine (SYNTHROID) 75 MCG tablet Take 1 tablet (75 mcg total) by mouth once daily. 90 tablet 2    metoprolol tartrate (LOPRESSOR) 50 MG tablet TAKE 1/2 TABLET TWICE  "DAILY 90 tablet 3    mirtazapine (REMERON) 45 MG tablet Take 45 mg by mouth every evening.      MYRBETRIQ 50 mg Tb24 TAKE 1 TABLET ONE TIME DAILY 90 tablet 3    pravastatin (PRAVACHOL) 40 MG tablet Take 1 tablet (40 mg total) by mouth once daily. 90 tablet 3    PYRIDOXINE HCL (VITAMIN B-6 ORAL) Take by mouth once daily.      trazodone (DESYREL) 50 MG tablet Take 0.5-1 tablets (25-50 mg total) by mouth nightly as needed for Insomnia. 30 tablet 5    venlafaxine (EFFEXOR XR) 150 MG Cp24 Take 150 mg by mouth once daily.      verapamil (CALAN-SR) 120 MG CR tablet TAKE 1 TABLET EVERY NIGHT 90 tablet 3    warfarin (COUMADIN) 5 MG tablet Take as directed by Coumadin clinic 150 tablet 1    [DISCONTINUED] donepezil (ARICEPT) 10 MG tablet Take 1 tablet (10 mg total) by mouth every evening. 30 tablet 11     No current facility-administered medications on file prior to visit.            ROS:  GENERAL: No fever, chills.     CARDIOVASCULAR: Denies chest pain, PND, orthopnea.  She does get some mild dyspnea on exertion-chronic  ABDOMEN: Appetite fine. Denies diarrhea, abdominal pain, hematemesis or blood in stool.  URINARY: No flank pain, dysuria or hematuria.    OBJECTIVE:     Vitals:    06/15/17 0838 06/15/17 0902   BP: (!) 142/60 (!) 138/58   Pulse: 60    Temp: 98.4 °F (36.9 °C)    Weight: 55.8 kg (123 lb 2 oz)    Height: 5' 1" (1.549 m)      Wt Readings from Last 3 Encounters:   06/15/17 55.8 kg (123 lb 2 oz)   05/17/17 54.6 kg (120 lb 5.9 oz)   04/21/17 54.3 kg (119 lb 11.4 oz)     APPEARANCE: Well nourished, well developed, in no acute distress.    HEAD: Normocephalic.  Atraumatic.  No sinus tenderness.  EYES:   Right eye: Pupil reactive.  Conjunctiva clear.    Left eye: Pupil reactive.  Conjunctiva clear.    Both fundi:  Grossly normal to nondilated exam. EOMI.    EARS: TM's intact. Light reflex normal. No retraction or perforation.    NOSE:  clear.  MOUTH & THROAT:  No pharyngeal erythema or exudate. No " lesions.  NECK: Supple. No bruits.  No JVD.  No cervical lymphadenopathy.  No thyromegaly.    CHEST: Breath sounds clear bilaterally.  Normal respiratory effort  CARDIOVASCULAR: Normal rate.  Regular rhythm.  No murmurs.  No rub.  No gallops.  ABDOMEN: Bowel sounds normal.  Soft.  No tenderness.  No organomegaly.  PERIPHERAL VASCULAR: No cyanosis.  No clubbing.  No edema.  She has raised erythematous nodule with crusting at the right forearm as below            Diagnoses and all orders for this visit:    Mild cognitive impairment with memory loss    Weight loss    Diabetes mellitus with stage 4 chronic kidney disease GFR 15-29    Other orders  -     donepezil (ARICEPT) 5 MG tablet; Take 1 tablet (5 mg total) by mouth every evening. For 1 month then start 10 mg pills      We'll have her restart the Aricept.  If she has recurrent issues consider other medication with her neurologist.  Keep follow-up with her nephrologist.  Follow-up laboratory in September hemoglobin A1c

## 2017-06-16 ENCOUNTER — ANTI-COAG VISIT (OUTPATIENT)
Dept: CARDIOLOGY | Facility: CLINIC | Age: 82
End: 2017-06-16

## 2017-06-16 DIAGNOSIS — Z79.01 ANTICOAGULATION MONITORING BY PHARMACIST: ICD-10-CM

## 2017-06-30 ENCOUNTER — HOSPITAL ENCOUNTER (OUTPATIENT)
Dept: RADIOLOGY | Facility: HOSPITAL | Age: 82
Discharge: HOME | End: 2017-06-30
Attending: FAMILY MEDICINE
Payer: MEDICARE

## 2017-06-30 ENCOUNTER — OFFICE VISIT (OUTPATIENT)
Dept: FAMILY MEDICINE | Facility: CLINIC | Age: 82
End: 2017-06-30
Payer: MEDICARE

## 2017-06-30 VITALS
HEART RATE: 60 BPM | HEIGHT: 61 IN | SYSTOLIC BLOOD PRESSURE: 118 MMHG | TEMPERATURE: 99 F | DIASTOLIC BLOOD PRESSURE: 56 MMHG

## 2017-06-30 DIAGNOSIS — Z96.641 HISTORY OF RIGHT HIP REPLACEMENT: ICD-10-CM

## 2017-06-30 DIAGNOSIS — E11.22 DIABETES MELLITUS WITH STAGE 4 CHRONIC KIDNEY DISEASE GFR 15-29: ICD-10-CM

## 2017-06-30 DIAGNOSIS — N18.4 DIABETES MELLITUS WITH STAGE 4 CHRONIC KIDNEY DISEASE GFR 15-29: ICD-10-CM

## 2017-06-30 DIAGNOSIS — M25.551 HIP PAIN, ACUTE, RIGHT: ICD-10-CM
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
## 2017-06-30 DIAGNOSIS — M25.551 HIP PAIN, ACUTE, RIGHT: Primary | ICD-10-CM

## 2017-06-30 PROCEDURE — 1126F AMNT PAIN NOTED NONE PRSNT: CPT | Mod: S$GLB,,, | Performed by: FAMILY MEDICINE

## 2017-06-30 PROCEDURE — 1159F MED LIST DOCD IN RCRD: CPT | Mod: S$GLB,,, | Performed by: FAMILY MEDICINE

## 2017-06-30 PROCEDURE — 99499 UNLISTED E&M SERVICE: CPT | Mod: S$GLB,,, | Performed by: FAMILY MEDICINE

## 2017-06-30 PROCEDURE — 99214 OFFICE O/P EST MOD 30 MIN: CPT | Mod: S$GLB,,, | Performed by: FAMILY MEDICINE

## 2017-06-30 PROCEDURE — 73502 X-RAY EXAM HIP UNI 2-3 VIEWS: CPT | Mod: TC,PO,RT

## 2017-06-30 PROCEDURE — 99999 PR PBB SHADOW E&M-EST. PATIENT-LVL V: CPT | Mod: PBBFAC,,, | Performed by: FAMILY MEDICINE

## 2017-06-30 PROCEDURE — 73502 X-RAY EXAM HIP UNI 2-3 VIEWS: CPT | Mod: 26,RT,, | Performed by: RADIOLOGY

## 2017-06-30 RX ORDER — ACETAMINOPHEN 325 MG/1
650 TABLET ORAL EVERY 6 HOURS PRN
Refills: 0 | COMMUNITY
Start: 2017-06-30 | End: 2017-12-06

## 2017-06-30 NOTE — PROGRESS NOTES
Patient states that she had a fall wall working in her garden this past week.  Since then she's fallen several other times.  She was not using her walker or cane.  She developed some pain at the right groin area after one of the falls.  Appears the secondary falls are related to this.  She is able to bear weight, but difficulty with ambulation.  No current treatment.  She did have a previous hip fracture with hip replacement on the right.  This was approximately 10 years ago.  She denies other injury.  No focal weakness.  No syncope.  She is on anticoagulation, but states did not injure herself otherwise.    Past Medical History:  Past Medical History:   Diagnosis Date    *Atrial fibrillation 4/19/2012    *Atrial flutter 4/19/2012    Allergy     Ankle fracture 4/19/2012    Anticoagulant long-term use     Anxiety     Arthritis     Bacterial pneumonia, unspecified 12/7/2012    Breast cancer     LEFT    Cataract     CKD (chronic kidney disease), stage III 5/11/2012    Depression 4/19/2012    Diabetes mellitus with stage 3 chronic kidney disease 2/1/2016    HEARING LOSS     wears hearing aides    Hip fracture, right 4/19/2012    HLD (hyperlipidemia) 4/19/2012    HTN (hypertension) 4/19/2012    Hypothyroidism 4/19/2012    LBBB (left bundle branch block) 10/19/2012    Mild cognitive impairment with memory loss 6/15/2017    Osteopenia 4/19/2012    Osteoporosis     Otitis media     Pacemaker 11/2012    oy/chantell    Secondary hyperparathyroidism of renal origin     Simple renal cyst     Sinus node dysfunction     Urinary incontinence 4/19/2012     Past Surgical History:   Procedure Laterality Date    APPENDECTOMY      BREAST LUMPECTOMY Left     CARDIAC PACEMAKER PLACEMENT  12/3/12    Sinus node dysfunction    COLONOSCOPY      EYE SURGERY Bilateral     PHACO/IOL    FRACTURE SURGERY Right     ankle    HYSTERECTOMY      total    MASTECTOMY Left     TONSILLECTOMY      TOTAL HIP ARTHROPLASTY  Right      Social History     Social History    Marital status:      Spouse name: N/A    Number of children: N/A    Years of education: N/A     Occupational History    Not on file.     Social History Main Topics    Smoking status: Never Smoker    Smokeless tobacco: Never Used    Alcohol use No    Drug use: No    Sexual activity: Not Currently     Other Topics Concern    Not on file     Social History Narrative    No narrative on file     Family History   Problem Relation Age of Onset    Hypertension Mother     Heart disease Father     Coronary artery disease Brother     Coronary artery disease Brother     COPD Brother      Review of patient's allergies indicates:   Allergen Reactions    Amlodipine Edema     Pt stated this medication made her legs swell    Alendronate sodium Other (See Comments)     Reaction: Esophageal bleeding    Fosamax [alendronate] Other (See Comments)     Reaction: Esophageal bleeding  diarrhea    Sorbitol      Diarrhea     Augmentin [amoxicillin-pot clavulanate] Hives and Rash     Current Outpatient Prescriptions on File Prior to Visit   Medication Sig Dispense Refill    ACCU-CHEK SHELDON PLUS TEST STRP Strp CHECK TWICE A DAY  200 strip 4    benazepril (LOTENSIN) 20 MG tablet Take 1 tablet (20 mg total) by mouth once daily. 90 tablet 3    cyanocobalamin, vitamin B-12, 1,000 mcg TbSR One tab po daily 1 each 0    donepezil (ARICEPT) 5 MG tablet Take 1 tablet (5 mg total) by mouth every evening. For 1 month then start 10 mg pills 30 tablet 0    ERGOCALCIFEROL, VITAMIN D2, (VITAMIN D ORAL) Take by mouth once daily.      glipiZIDE (GLUCOTROL) 2.5 MG TR24 TAKE 1 TABLET BY MOUTH EVERY DAY WITH BREAKFAST 30 tablet 5    hydrochlorothiazide (HYDRODIURIL) 25 MG tablet TAKE 1 TABLET DAILY 90 tablet 3    levothyroxine (SYNTHROID) 75 MCG tablet Take 1 tablet (75 mcg total) by mouth once daily. 90 tablet 2    metoprolol tartrate (LOPRESSOR) 50 MG tablet TAKE 1/2 TABLET  "TWICE DAILY 90 tablet 3    mirtazapine (REMERON) 45 MG tablet Take 45 mg by mouth every evening.      MYRBETRIQ 50 mg Tb24 TAKE 1 TABLET ONE TIME DAILY 90 tablet 3    pravastatin (PRAVACHOL) 40 MG tablet Take 1 tablet (40 mg total) by mouth once daily. 90 tablet 3    PYRIDOXINE HCL (VITAMIN B-6 ORAL) Take by mouth once daily.      trazodone (DESYREL) 50 MG tablet Take 0.5-1 tablets (25-50 mg total) by mouth nightly as needed for Insomnia. 30 tablet 5    venlafaxine (EFFEXOR XR) 150 MG Cp24 Take 150 mg by mouth once daily.      verapamil (CALAN-SR) 120 MG CR tablet TAKE 1 TABLET EVERY NIGHT 90 tablet 3    warfarin (COUMADIN) 5 MG tablet Take as directed by Coumadin clinic 150 tablet 1     No current facility-administered medications on file prior to visit.            ROS:  GENERAL: No fever, chills,  or significant weight changes.   CARDIOVASCULAR: Denies chest pain, PND, orthopnea or reduced exercise tolerance.  ABDOMEN: Appetite fine. Denies diarrhea, abdominal pain, hematemesis or blood in stool.  URINARY: No flank pain, dysuria or hematuria.      OBJECTIVE:     Vitals:    06/30/17 1351   BP: (!) 118/56   Pulse: 60   Temp: 98.5 °F (36.9 °C)   Height: 5' 1" (1.549 m)     Wt Readings from Last 3 Encounters:   06/15/17 55.8 kg (123 lb 2 oz)   05/17/17 54.6 kg (120 lb 5.9 oz)   04/21/17 54.3 kg (119 lb 11.4 oz)     APPEARANCE: Well nourished, well developed, in no acute distress.    HEAD: Normocephalic.  Atraumatic.  No sinus tenderness.  EYES:   Right eye: Pupil reactive.  Conjunctiva clear.    Left eye: Pupil reactive.  Conjunctiva clear.     EOMI.    EARS: TM's intact. Light reflex normal. No retraction or perforation.    NOSE:  clear.  MOUTH & THROAT:  No pharyngeal erythema or exudate. No lesions.  NECK: Supple. No bruits.  No JVD.  No cervical lymphadenopathy.  No thyromegaly.    CHEST: Breath sounds clear bilaterally.  Normal respiratory effort  CARDIOVASCULAR: Normal rate.  Regular rhythm.  No murmurs. "  No rub.  No gallops.  ABDOMEN: Bowel sounds normal.  Soft.  No tenderness.  No organomegaly.  PERIPHERAL VASCULAR: No cyanosis.  No clubbing.  No edema.  NEUROLOGIC: No focal findings.  MENTAL STATUS: Alert.  Oriented x 3.  Right hip with mild decreased range of motion.  Not particularly tender at the groin though this is the area where she has her symptoms.  She does have some discomfort with extremes of internal and external rotation.  She is able to bear weight and walk a few steps    AP pelvis and two views right hip.    Findings: The bones are diffusely osteopenic.  There are postoperative changes of right total hip replacement.  Prosthesis position and alignment are satisfactory without radiographic evidence for loosening or other complicating process.  Left hip joint space is maintained.  Degenerative changes in lower lumbar spine.  No acute fracture or dislocation.   Impression      As above.      Electronically signed by: DALE JOHNSON MD  Date: 06/30/17  Time: 14:35      Kassi was seen today for fall and leg pain.    Diagnoses and all orders for this visit:    Hip pain, acute, right  -     X-Ray Hip 2 or 3 views Right; Future  -     Ambulatory referral to Orthopedics  -     Ambulatory Referral to Physical/Occupational Therapy    Fall, initial encounter  -     X-Ray Hip 2 or 3 views Right; Future  -     Ambulatory referral to Orthopedics  -     Ambulatory Referral to Physical/Occupational Therapy    History of right hip replacement  -     X-Ray Hip 2 or 3 views Right; Future  -     Ambulatory referral to Orthopedics  -     Ambulatory Referral to Physical/Occupational Therapy    Diabetes mellitus with stage 4 chronic kidney disease GFR 15-29    Other orders  -     acetaminophen (TYLENOL) 325 MG tablet; Take 2 tablets (650 mg total) by mouth every 6 (six) hours as needed for Pain.      Encouraged her to use her walker whenever she is getting up to walk around.  Avoid NSAIDs due to chronic kidney disease  with diabetes.  Follow-up if recurrent falls

## 2017-07-03 ENCOUNTER — TELEPHONE (OUTPATIENT)
Dept: FAMILY MEDICINE | Facility: CLINIC | Age: 82
End: 2017-07-03

## 2017-07-03 DIAGNOSIS — E11.22 DIABETES MELLITUS WITH STAGE 4 CHRONIC KIDNEY DISEASE GFR 15-29: ICD-10-CM

## 2017-07-03 DIAGNOSIS — M25.551 HIP PAIN, ACUTE, RIGHT: Primary | ICD-10-CM

## 2017-07-03 DIAGNOSIS — E11.40 TYPE 2 DIABETES MELLITUS WITH DIABETIC NEUROPATHY, WITHOUT LONG-TERM CURRENT USE OF INSULIN: ICD-10-CM

## 2017-07-03 DIAGNOSIS — G31.84 MILD COGNITIVE IMPAIRMENT WITH MEMORY LOSS: ICD-10-CM

## 2017-07-03 DIAGNOSIS — N18.4 DIABETES MELLITUS WITH STAGE 4 CHRONIC KIDNEY DISEASE GFR 15-29: ICD-10-CM

## 2017-07-03 DIAGNOSIS — I48.91 ATRIAL FIBRILLATION, UNSPECIFIED TYPE: Chronic | ICD-10-CM

## 2017-07-03 DIAGNOSIS — I15.2 HYPERTENSION COMPLICATING DIABETES: ICD-10-CM

## 2017-07-03 DIAGNOSIS — F33.1 MAJOR DEPRESSIVE DISORDER, RECURRENT EPISODE, MODERATE: ICD-10-CM

## 2017-07-03 DIAGNOSIS — E11.59 HYPERTENSION COMPLICATING DIABETES: ICD-10-CM

## 2017-07-03 NOTE — TELEPHONE ENCOUNTER
----- Message from Kelley Garcia sent at 7/3/2017  4:02 PM CDT -----  Contact: Patient  Patient called to speak with the nurse about her physical therapy. She stated she wants home therapy and she needs the orders to be changed. She can be contacted at [phone].    Thanks,  Kelley

## 2017-07-03 NOTE — TELEPHONE ENCOUNTER
----- Message from Kajal Menchaca sent at 7/3/2017  2:43 PM CDT -----  Contact: pt  Calling to get help to set up home health.

## 2017-07-06 ENCOUNTER — LAB VISIT (OUTPATIENT)
Dept: LAB | Facility: HOSPITAL | Age: 82
End: 2017-07-06
Attending: INTERNAL MEDICINE
Payer: MEDICARE

## 2017-07-06 DIAGNOSIS — Z79.01 ANTICOAGULATION MONITORING BY PHARMACIST: ICD-10-CM

## 2017-07-06 DIAGNOSIS — I48.91 ATRIAL FIBRILLATION: ICD-10-CM

## 2017-07-06 LAB
INR PPP: 2
PROTHROMBIN TIME: 19.6 SEC

## 2017-07-06 PROCEDURE — 85610 PROTHROMBIN TIME: CPT

## 2017-07-06 PROCEDURE — 36415 COLL VENOUS BLD VENIPUNCTURE: CPT | Mod: PO

## 2017-07-07 ENCOUNTER — TELEPHONE (OUTPATIENT)
Dept: FAMILY MEDICINE | Facility: CLINIC | Age: 82
End: 2017-07-07

## 2017-07-07 ENCOUNTER — ANTI-COAG VISIT (OUTPATIENT)
Dept: CARDIOLOGY | Facility: CLINIC | Age: 82
End: 2017-07-07

## 2017-07-07 DIAGNOSIS — I48.91 ATRIAL FIBRILLATION, UNSPECIFIED TYPE: ICD-10-CM

## 2017-07-07 DIAGNOSIS — Z79.01 ANTICOAGULATION MONITORING BY PHARMACIST: Primary | ICD-10-CM

## 2017-07-07 NOTE — PROGRESS NOTES
Spoke with pt gave dosing and next inr check date pt voiced understanding.   Can orders for pt inr be put in to be linked to appt

## 2017-07-07 NOTE — TELEPHONE ENCOUNTER
----- Message from Luz Avendano sent at 7/7/2017  4:11 PM CDT -----  Contact: Patient   Patient returned call, Please call her at [phone].    Thanks  td

## 2017-07-13 ENCOUNTER — CLINICAL SUPPORT (OUTPATIENT)
Dept: ELECTROPHYSIOLOGY | Facility: CLINIC | Age: 82
End: 2017-07-13
Payer: MEDICARE

## 2017-07-13 DIAGNOSIS — I48.91 ATRIAL FIBRILLATION: Chronic | ICD-10-CM

## 2017-07-13 DIAGNOSIS — I49.5 SINUS NODE DYSFUNCTION: ICD-10-CM

## 2017-07-13 DIAGNOSIS — Z95.0 CARDIAC PACEMAKER IN SITU: Primary | ICD-10-CM

## 2017-07-13 PROCEDURE — 93293 PM PHONE R-STRIP DEVICE EVAL: CPT | Mod: S$GLB,,, | Performed by: INTERNAL MEDICINE

## 2017-07-14 ENCOUNTER — LAB VISIT (OUTPATIENT)
Dept: LAB | Facility: HOSPITAL | Age: 82
End: 2017-07-14
Attending: FAMILY MEDICINE
Payer: MEDICARE

## 2017-07-14 DIAGNOSIS — E78.5 HYPERLIPIDEMIA, UNSPECIFIED HYPERLIPIDEMIA TYPE: ICD-10-CM

## 2017-07-14 LAB
ALT SERPL W/O P-5'-P-CCNC: 11 U/L
CHOLEST/HDLC SERPL: 2.4 {RATIO}
HDL/CHOLESTEROL RATIO: 41.9 %
HDLC SERPL-MCNC: 191 MG/DL
HDLC SERPL-MCNC: 80 MG/DL
LDLC SERPL CALC-MCNC: 95.2 MG/DL
NONHDLC SERPL-MCNC: 111 MG/DL
TRIGL SERPL-MCNC: 79 MG/DL

## 2017-07-14 PROCEDURE — 84460 ALANINE AMINO (ALT) (SGPT): CPT

## 2017-07-14 PROCEDURE — 36415 COLL VENOUS BLD VENIPUNCTURE: CPT | Mod: PO

## 2017-07-14 PROCEDURE — 80061 LIPID PANEL: CPT

## 2017-07-19 ENCOUNTER — TELEPHONE (OUTPATIENT)
Dept: FAMILY MEDICINE | Facility: CLINIC | Age: 82
End: 2017-07-19

## 2017-07-19 RX ORDER — GLIPIZIDE 2.5 MG/1
TABLET, EXTENDED RELEASE ORAL
Qty: 90 TABLET | Refills: 3 | Status: SHIPPED | OUTPATIENT
Start: 2017-07-19 | End: 2018-06-29 | Stop reason: SDUPTHER

## 2017-07-19 NOTE — TELEPHONE ENCOUNTER
Per HH nurse, patient eating a lot of sugar free products and having diarrhea.  Loose stool, watery, 3-4 times a day, off and on for past few days.  Afebrile, no recent antibiotics, please advise, patient wants med prescribed. Also wants increase in bladder medication Myrbetriq,

## 2017-07-19 NOTE — TELEPHONE ENCOUNTER
----- Message from Dolly Joyner sent at 7/19/2017 10:28 AM CDT -----  Contact: [facility]   [facility] is calling in regards to pt having diarrhea wants to know if something can be called in please. Also  Pt  wants to know if mycetriq dosage can be increased as well  Call back number is [phone]      ..  [facility]/pharm[facility] #[id number] - [place] - [address]  [place] [address]  Phone: [phone] Fax: [phone]

## 2017-07-19 NOTE — TELEPHONE ENCOUNTER
Recommend Imodium over-the-counter as directed with follow-up appointment if symptoms not improving.    She is already on the maximum dose of Myrbetriq

## 2017-07-25 ENCOUNTER — PATIENT MESSAGE (OUTPATIENT)
Dept: FAMILY MEDICINE | Facility: CLINIC | Age: 82
End: 2017-07-25

## 2017-07-25 RX ORDER — LEVOTHYROXINE SODIUM 75 UG/1
75 TABLET ORAL DAILY
Qty: 90 TABLET | Refills: 2 | Status: SHIPPED | OUTPATIENT
Start: 2017-07-25 | End: 2018-02-16

## 2017-07-26 ENCOUNTER — TELEPHONE (OUTPATIENT)
Dept: FAMILY MEDICINE | Facility: CLINIC | Age: 82
End: 2017-07-26

## 2017-07-26 ENCOUNTER — TELEPHONE (OUTPATIENT)
Dept: NEPHROLOGY | Facility: CLINIC | Age: 82
End: 2017-07-26

## 2017-07-26 DIAGNOSIS — N18.9 CHRONIC KIDNEY DISEASE, UNSPECIFIED: Primary | ICD-10-CM

## 2017-07-26 NOTE — TELEPHONE ENCOUNTER
----- Message from Go Nunez sent at 7/26/2017  4:01 PM CDT -----  Tova Hogue (Daughter) is requesting a call from nurse to review pt labs and questions.               Please call Tova Hogue (Daughter) back at [phone]

## 2017-07-26 NOTE — TELEPHONE ENCOUNTER
I spoke with Juliann and advised will link Dr. Kang labs to INR lab appointment already scheduled for 8.3.17.      In trying to help consolidate some lab appointments  for the patient I also linked her HA1C originally scheduled for 9.7.17 to the August 3rd appointment, before I found the note from Bertha that it should be repeated in 6 months.  (Last A1C   In 3.15.17 was  6.2).    They were also inquiring about repeat fasting labs for lipid panel, but I told her I didn't see an order.      I spoke with Kaley about the A1C and Lipid panel and will forward this message to her to be addressed. Thanks for your help.

## 2017-07-26 NOTE — TELEPHONE ENCOUNTER
----- Message from Umm Valencia sent at 7/26/2017 10:16 AM CDT -----  Contact: Daughter - Tova Hogue  States that the patient has an appointment for 08/09/2017 and would like to know if Dr Kang would want some labs ordered.  If so, can you please put the orders in the system and call the Tova back at [phone].  Thank you

## 2017-08-03 ENCOUNTER — LAB VISIT (OUTPATIENT)
Dept: LAB | Facility: HOSPITAL | Age: 82
End: 2017-08-03
Attending: FAMILY MEDICINE
Payer: MEDICARE

## 2017-08-03 DIAGNOSIS — N18.9 CHRONIC KIDNEY DISEASE, UNSPECIFIED: ICD-10-CM

## 2017-08-03 DIAGNOSIS — Z79.01 ANTICOAGULATION MONITORING BY PHARMACIST: ICD-10-CM

## 2017-08-03 DIAGNOSIS — I48.91 ATRIAL FIBRILLATION, UNSPECIFIED TYPE: ICD-10-CM

## 2017-08-03 LAB
ALBUMIN SERPL BCP-MCNC: 3.8 G/DL
ANION GAP SERPL CALC-SCNC: 10 MMOL/L
BUN SERPL-MCNC: 41 MG/DL
CALCIUM SERPL-MCNC: 9.3 MG/DL
CHLORIDE SERPL-SCNC: 99 MMOL/L
CO2 SERPL-SCNC: 25 MMOL/L
CREAT SERPL-MCNC: 1.7 MG/DL
EST. GFR  (AFRICAN AMERICAN): 31.3 ML/MIN/1.73 M^2
EST. GFR  (NON AFRICAN AMERICAN): 27.1 ML/MIN/1.73 M^2
GLUCOSE SERPL-MCNC: 76 MG/DL
INR PPP: 1.8
PHOSPHATE SERPL-MCNC: 4.2 MG/DL
POTASSIUM SERPL-SCNC: 5 MMOL/L
PROTHROMBIN TIME: 18.1 SEC
PTH-INTACT SERPL-MCNC: 142 PG/ML
SODIUM SERPL-SCNC: 134 MMOL/L

## 2017-08-03 PROCEDURE — 80069 RENAL FUNCTION PANEL: CPT

## 2017-08-03 PROCEDURE — 83970 ASSAY OF PARATHORMONE: CPT

## 2017-08-03 PROCEDURE — 85610 PROTHROMBIN TIME: CPT

## 2017-08-03 PROCEDURE — 36415 COLL VENOUS BLD VENIPUNCTURE: CPT | Mod: PO

## 2017-08-04 ENCOUNTER — TELEPHONE (OUTPATIENT)
Dept: CARDIOLOGY | Facility: CLINIC | Age: 82
End: 2017-08-04

## 2017-08-04 ENCOUNTER — ANTI-COAG VISIT (OUTPATIENT)
Dept: CARDIOLOGY | Facility: CLINIC | Age: 82
End: 2017-08-04

## 2017-08-04 DIAGNOSIS — Z79.01 ANTICOAGULATION MONITORING BY PHARMACIST: ICD-10-CM

## 2017-08-04 NOTE — TELEPHONE ENCOUNTER
----- Message from Sandra Freeman sent at 8/4/2017 10:43 AM CDT -----  Contact: Pt   States she's calling rg her coumadin results from her call this morning from nurse and can be reached at [phone]//thanks/dbfaby

## 2017-08-04 NOTE — PROGRESS NOTES
Spoke with pt gave dosing and next inr check date pt voiced understanding.   Pt reports she has been having some bruising

## 2017-08-09 ENCOUNTER — INITIAL CONSULT (OUTPATIENT)
Dept: DERMATOLOGY | Facility: CLINIC | Age: 82
End: 2017-08-09
Payer: MEDICARE

## 2017-08-09 ENCOUNTER — OFFICE VISIT (OUTPATIENT)
Dept: NEPHROLOGY | Facility: CLINIC | Age: 82
End: 2017-08-09
Payer: MEDICARE

## 2017-08-09 VITALS
WEIGHT: 122.13 LBS | DIASTOLIC BLOOD PRESSURE: 70 MMHG | BODY MASS INDEX: 23.03 KG/M2 | BODY MASS INDEX: 23.06 KG/M2 | HEIGHT: 61 IN | WEIGHT: 122 LBS | HEART RATE: 60 BPM | HEIGHT: 61 IN | SYSTOLIC BLOOD PRESSURE: 152 MMHG | OXYGEN SATURATION: 98 %

## 2017-08-09 DIAGNOSIS — I12.9 BENIGN HYPERTENSIVE KIDNEY DISEASE WITH CHRONIC KIDNEY DISEASE, STAGE 1-4 OR UNSPECIFIED CHRONIC KIDNEY DISEASE: ICD-10-CM

## 2017-08-09 DIAGNOSIS — N25.81 SECONDARY RENAL HYPERPARATHYROIDISM: ICD-10-CM

## 2017-08-09 DIAGNOSIS — L85.8 KERATOACANTHOMA: Primary | ICD-10-CM

## 2017-08-09 DIAGNOSIS — N18.30 CKD (CHRONIC KIDNEY DISEASE), STAGE III: Primary | ICD-10-CM

## 2017-08-09 DIAGNOSIS — N28.1 CYST OF KIDNEY, ACQUIRED: ICD-10-CM

## 2017-08-09 DIAGNOSIS — N39.45 CONTINUOUS LEAKAGE OF URINE: ICD-10-CM

## 2017-08-09 PROCEDURE — 1159F MED LIST DOCD IN RCRD: CPT | Mod: S$GLB,,, | Performed by: DERMATOLOGY

## 2017-08-09 PROCEDURE — 3077F SYST BP >= 140 MM HG: CPT | Mod: S$GLB,,, | Performed by: INTERNAL MEDICINE

## 2017-08-09 PROCEDURE — 99999 PR PBB SHADOW E&M-EST. PATIENT-LVL III: CPT | Mod: PBBFAC,,, | Performed by: INTERNAL MEDICINE

## 2017-08-09 PROCEDURE — 3008F BODY MASS INDEX DOCD: CPT | Mod: S$GLB,,, | Performed by: DERMATOLOGY

## 2017-08-09 PROCEDURE — 99202 OFFICE O/P NEW SF 15 MIN: CPT | Mod: S$GLB,,, | Performed by: DERMATOLOGY

## 2017-08-09 PROCEDURE — 3078F DIAST BP <80 MM HG: CPT | Mod: S$GLB,,, | Performed by: DERMATOLOGY

## 2017-08-09 PROCEDURE — 99214 OFFICE O/P EST MOD 30 MIN: CPT | Mod: S$GLB,,, | Performed by: INTERNAL MEDICINE

## 2017-08-09 PROCEDURE — 3078F DIAST BP <80 MM HG: CPT | Mod: S$GLB,,, | Performed by: INTERNAL MEDICINE

## 2017-08-09 PROCEDURE — 3077F SYST BP >= 140 MM HG: CPT | Mod: S$GLB,,, | Performed by: DERMATOLOGY

## 2017-08-09 PROCEDURE — 99999 PR PBB SHADOW E&M-EST. PATIENT-LVL II: CPT | Mod: PBBFAC,,, | Performed by: DERMATOLOGY

## 2017-08-09 PROCEDURE — 99499 UNLISTED E&M SERVICE: CPT | Mod: S$GLB,,, | Performed by: INTERNAL MEDICINE

## 2017-08-09 PROCEDURE — 1159F MED LIST DOCD IN RCRD: CPT | Mod: S$GLB,,, | Performed by: INTERNAL MEDICINE

## 2017-08-09 PROCEDURE — 1126F AMNT PAIN NOTED NONE PRSNT: CPT | Mod: S$GLB,,, | Performed by: DERMATOLOGY

## 2017-08-09 PROCEDURE — 1125F AMNT PAIN NOTED PAIN PRSNT: CPT | Mod: S$GLB,,, | Performed by: INTERNAL MEDICINE

## 2017-08-09 RX ORDER — WARFARIN SODIUM 5 MG/1
5 TABLET ORAL
COMMUNITY
End: 2017-10-13

## 2017-08-09 NOTE — PROGRESS NOTES
Subjective:       Patient ID:  Kassi Skelton is a 85 y.o. female who presents for   Chief Complaint   Patient presents with    Lesion     Pt here for initial visit. Last seen by a dermatologist many years ago   Lesion on right forearm for 4 months- tender to touch- using neosporin    No phx of skin cancer  No fhx of skin cancer  Pt takes coumadin 5mg             Review of Systems   Skin: Negative for rash, daily sunscreen use, activity-related sunscreen use and recent sunburn.   Hematologic/Lymphatic: Bruises/bleeds easily.        Objective:    Physical Exam   Constitutional: She appears well-developed and well-nourished. No distress.   Eyes: Lids are normal.  No conjunctival no injection.   Neurological: She is alert and oriented to person, place, and time. She is not disoriented.   Psychiatric: She has a normal mood and affect.   Skin:   Areas Examined (abnormalities noted in diagram):   Head / Face Inspection Performed  Neck Inspection Performed  RUE Inspected  LUE Inspection Performed              Diagram Legend     Erythematous scaling macule/papule c/w actinic keratosis       Vascular papule c/w angioma      Pigmented verrucoid papule/plaque c/w seborrheic keratosis      Yellow umbilicated papule c/w sebaceous hyperplasia      Irregularly shaped tan macule c/w lentigo     1-2 mm smooth white papules consistent with Milia      Movable subcutaneous cyst with punctum c/w epidermal inclusion cyst      Subcutaneous movable cyst c/w pilar cyst      Firm pink to brown papule c/w dermatofibroma      Pedunculated fleshy papule(s) c/w skin tag(s)      Evenly pigmented macule c/w junctional nevus     Mildly variegated pigmented, slightly irregular-bordered macule c/w mildly atypical nevus      Flesh colored to evenly pigmented papule c/w intradermal nevus       Pink pearly papule/plaque c/w basal cell carcinoma      Erythematous hyperkeratotic cursted plaque c/w SCC      Surgical scar with no sign of skin  cancer recurrence      Open and closed comedones      Inflammatory papules and pustules      Verrucoid papule consistent consistent with wart     Erythematous eczematous patches and plaques     Dystrophic onycholytic nail with subungual debris c/w onychomycosis     Umbilicated papule    Erythematous-base heme-crusted tan verrucoid plaque consistent with inflamed seborrheic keratosis     Erythematous Silvery Scaling Plaque c/w Psoriasis     See annotation      Assessment / Plan:        Keratoacanthoma    highly suggestive SCC  Plan for excisional biopsy Monday  Ok to continue coumadin  + pacemaker  SR in 2 weeks         Return in about 5 days (around 8/14/2017).

## 2017-08-09 NOTE — LETTER
August 9, 2017      Mor Cook MD  [id number] [facility] [address]           [facility]  [address] [facility] [address] [place] [address]  Phone: [phone]  Fax: [phone]          Patient: Kassi Skelton   MR Number: [id number]   YOB: 1932   Date of Visit: 8/9/2017       Dear Dr. Mor Cook:    Thank you for referring Kassi Skelton to me for evaluation. Attached you will find relevant portions of my assessment and plan of care.    If you have questions, please do not hesitate to call me. I look forward to following Kassi Skelton along with you.    Sincerely,    Sheryl Wolff MD    Enclosure  CC:  No Recipients    If you would like to receive this communication electronically, please contact [email or web address] or [phone] to request more information on [employer] Link access.    For providers and/or their staff who would like to refer a patient to [facility], please contact us through our one-stop-shop provider referral line, [facility] [place], at [phone].    If you feel you have received this communication in error or would no longer like to receive these types of communications, please e-mail [email or web address]

## 2017-08-09 NOTE — PROGRESS NOTES
"Subjective:       Patient ID: Kassi Skelton is a 85 y.o. White female who presents for return patient evaluation for chronic renal failure.    She is having trouble holding her urine now and her medication is not helping any longer.  There have been no recent illnesses, hospitalizations or procedures.        Review of Systems   Constitutional: Negative for appetite change, chills and fever.   Eyes: Negative for visual disturbance.   Respiratory: Negative for cough and shortness of breath.    Cardiovascular: Negative for chest pain and leg swelling.   Gastrointestinal: Negative for abdominal pain, diarrhea, nausea and vomiting.   Genitourinary: Positive for difficulty urinating (she cannot hold her urine). Negative for dysuria and hematuria.   Musculoskeletal: Positive for arthralgias and gait problem. Negative for myalgias.   Skin: Negative for rash.   Neurological: Negative for headaches.   Psychiatric/Behavioral: Negative for sleep disturbance.       The past medical, family and social histories were reviewed for this encounter.     BP (!) 152/70   Pulse 60   Ht 5' 1" (1.549 m)   Wt 55.4 kg (122 lb 2.2 oz)   SpO2 98%   BMI 23.08 kg/m²     Objective:      Physical Exam   Constitutional: She appears well-developed and well-nourished. No distress.   HENT:   Head: Normocephalic and atraumatic.   Eyes: Conjunctivae are normal. No scleral icterus.   Neck: Normal range of motion. No JVD present.   Cardiovascular: Normal rate, regular rhythm and normal heart sounds.  Exam reveals no gallop and no friction rub.    No murmur heard.  Pulmonary/Chest: Effort normal and breath sounds normal. No respiratory distress. She has no wheezes.   Abdominal: Soft. Bowel sounds are normal. She exhibits no distension. There is no tenderness.   Musculoskeletal: She exhibits no edema.   Skin: Skin is warm and dry. No rash noted.   Psychiatric: She has a normal mood and affect.   Vitals reviewed.      Assessment:       1. CKD " (chronic kidney disease), stage III    2. Benign hypertensive kidney disease with chronic kidney disease, stage 1-4 or unspecified chronic kidney disease    3. Cyst of kidney, acquired    4. Secondary renal hyperparathyroidism        Plan:   Return to clinic in 4 months in the afternoon please.  Labs for next visit include rp, pth.  Baseline creatinine is 1.1-1.5 since 2010.  She is not back to her baseline but is still in the upper 1.5-2.0 range.  Her medications were reviewed.  She wishes to continue conservative care.  PTH is 142 with a calcium of 9.3.  Continue current medications as prescribed and reviewed.   Blood pressure is controlled on the current regimen.  I will send a referral per her request to Urology to evaluate her change in urinary symptoms.

## 2017-08-10 ENCOUNTER — OFFICE VISIT (OUTPATIENT)
Dept: UROLOGY | Facility: CLINIC | Age: 82
End: 2017-08-10
Payer: MEDICARE

## 2017-08-10 VITALS
HEIGHT: 61 IN | DIASTOLIC BLOOD PRESSURE: 58 MMHG | BODY MASS INDEX: 23.47 KG/M2 | HEART RATE: 59 BPM | WEIGHT: 124.31 LBS | SYSTOLIC BLOOD PRESSURE: 157 MMHG

## 2017-08-10 DIAGNOSIS — R39.15 URINARY URGENCY: ICD-10-CM

## 2017-08-10 DIAGNOSIS — R35.0 INCREASED URINARY FREQUENCY: ICD-10-CM

## 2017-08-10 DIAGNOSIS — N39.41 URGE INCONTINENCE: Primary | ICD-10-CM

## 2017-08-10 PROCEDURE — 99204 OFFICE O/P NEW MOD 45 MIN: CPT | Mod: S$GLB,,, | Performed by: UROLOGY

## 2017-08-10 PROCEDURE — 99999 PR PBB SHADOW E&M-EST. PATIENT-LVL III: CPT | Mod: PBBFAC,,, | Performed by: UROLOGY

## 2017-08-10 PROCEDURE — 3077F SYST BP >= 140 MM HG: CPT | Mod: S$GLB,,, | Performed by: UROLOGY

## 2017-08-10 PROCEDURE — 1159F MED LIST DOCD IN RCRD: CPT | Mod: S$GLB,,, | Performed by: UROLOGY

## 2017-08-10 PROCEDURE — 3078F DIAST BP <80 MM HG: CPT | Mod: S$GLB,,, | Performed by: UROLOGY

## 2017-08-10 PROCEDURE — 1125F AMNT PAIN NOTED PAIN PRSNT: CPT | Mod: S$GLB,,, | Performed by: UROLOGY

## 2017-08-10 RX ORDER — OXYBUTYNIN CHLORIDE 5 MG/1
5 TABLET, EXTENDED RELEASE ORAL DAILY
Qty: 30 TABLET | Refills: 11 | Status: SHIPPED | OUTPATIENT
Start: 2017-08-10 | End: 2017-08-10 | Stop reason: SDUPTHER

## 2017-08-10 RX ORDER — OXYBUTYNIN CHLORIDE 5 MG/1
5 TABLET, EXTENDED RELEASE ORAL DAILY
Qty: 90 TABLET | Refills: 3 | Status: SHIPPED | OUTPATIENT
Start: 2017-08-10 | End: 2018-01-03

## 2017-08-10 NOTE — LETTER
August 10, 2017      Errol Kang MD  [address] [facility] [address]  [address]  [facility] [address]           [facility]  [address] [facility] [address] [place] [address]  Phone: [phone]          Patient: Kassi Skelton   MR Number: [id number]   YOB: 1932   Date of Visit: 8/10/2017       Dear Dr. Errol Kang:    Thank you for referring Kassi Skelton to me for evaluation. Attached you will find relevant portions of my assessment and plan of care.    If you have questions, please do not hesitate to call me. I look forward to following Kassi Skelton along with you.    Sincerely,    RUPAL Louis MD    Enclosure  CC:  No Recipients    If you would like to receive this communication electronically, please contact [email or web address] or [phone] to request more information on [employer] Link access.    For providers and/or their staff who would like to refer a patient to [facility], please contact us through our one-stop-shop provider referral line, [facility], at [phone].    If you feel you have received this communication in error or would no longer like to receive these types of communications, please e-mail [email or web address]

## 2017-08-10 NOTE — PROGRESS NOTES
Subjective:       Patient ID: Kassi Skelton is a 85 y.o. female.    Chief Complaint: Urinary Incontinence    HPI     85 year old with incontinence beginning years ago.  She is wearing multiple pads per day.  She complains of urgency and urge incontinence.  She denies dysuria and gross hematuria and she rarely gets urinary tract infections.  She is currently taking Myrbetriq 50mg but she feels it is no longer effective.  She has previously been treated by Dr. Santana in [place] and had some sort of procedure but the family knows no details.  At night she does well,  She is dry at night and wakes only one time.    Urine dipstick shows negative for nitrites, glucose, protein, positive for trace leukocytes, trace blood.  post void residual 0    Past Medical History:   Diagnosis Date    *Atrial fibrillation 4/19/2012    *Atrial flutter 4/19/2012    Allergy     Ankle fracture 4/19/2012    Anticoagulant long-term use     Anxiety     Arthritis     Bacterial pneumonia, unspecified 12/7/2012    Breast cancer     LEFT    Cataract     CKD (chronic kidney disease), stage III 5/11/2012    Followed by Dr. Errol Kang    Depression 4/19/2012    Diabetes mellitus with stage 3 chronic kidney disease 2/1/2016    HEARING LOSS     wears hearing aides    Hip fracture, right 4/19/2012    HLD (hyperlipidemia) 4/19/2012    HTN (hypertension) 4/19/2012    Hypothyroidism 4/19/2012    LBBB (left bundle branch block) 10/19/2012    Mild cognitive impairment with memory loss 6/15/2017    Osteopenia 4/19/2012    Osteoporosis     Otitis media     Pacemaker 11/2012    yo/chantell    Secondary hyperparathyroidism of renal origin     Simple renal cyst     Sinus node dysfunction     Urinary incontinence 4/19/2012     Past Surgical History:   Procedure Laterality Date    APPENDECTOMY      BREAST LUMPECTOMY Left     CARDIAC PACEMAKER PLACEMENT  12/3/12    Sinus node dysfunction    COLONOSCOPY      EYE SURGERY  Bilateral     PHACO/IOL    FRACTURE SURGERY Right     ankle    HYSTERECTOMY      total    MASTECTOMY Left     TONSILLECTOMY      TOTAL HIP ARTHROPLASTY Right        Current Outpatient Prescriptions:     ACCU-CHEK SHELDON PLUS TEST STRP Strp, CHECK TWICE A DAY , Disp: 200 strip, Rfl: 4    acetaminophen (TYLENOL) 325 MG tablet, Take 2 tablets (650 mg total) by mouth every 6 (six) hours as needed for Pain., Disp: , Rfl: 0    benazepril (LOTENSIN) 20 MG tablet, Take 1 tablet (20 mg total) by mouth once daily., Disp: 90 tablet, Rfl: 3    cyanocobalamin, vitamin B-12, 1,000 mcg TbSR, One tab po daily, Disp: 1 each, Rfl: 0    donepezil (ARICEPT) 5 MG tablet, Take 1 tablet (5 mg total) by mouth every evening. For 1 month then start 10 mg pills (Patient taking differently: Take 10 mg by mouth every evening. For 1 month then start 10 mg pills), Disp: 30 tablet, Rfl: 0    ERGOCALCIFEROL, VITAMIN D2, (VITAMIN D ORAL), Take by mouth once daily., Disp: , Rfl:     glipiZIDE (GLUCOTROL) 2.5 MG TR24, TAKE 1 TABLET EVERY DAY WITH BREAKFAST, Disp: 90 tablet, Rfl: 3    hydrochlorothiazide (HYDRODIURIL) 25 MG tablet, TAKE 1 TABLET DAILY, Disp: 90 tablet, Rfl: 3    levothyroxine (SYNTHROID) 75 MCG tablet, Take 1 tablet (75 mcg total) by mouth once daily., Disp: 90 tablet, Rfl: 2    metoprolol tartrate (LOPRESSOR) 50 MG tablet, TAKE 1/2 TABLET TWICE DAILY, Disp: 90 tablet, Rfl: 3    mirtazapine (REMERON) 45 MG tablet, Take 45 mg by mouth every evening., Disp: , Rfl:     MYRBETRIQ 50 mg Tb24, TAKE 1 TABLET ONE TIME DAILY, Disp: 90 tablet, Rfl: 3    pravastatin (PRAVACHOL) 40 MG tablet, Take 1 tablet (40 mg total) by mouth once daily., Disp: 90 tablet, Rfl: 3    venlafaxine (EFFEXOR XR) 150 MG Cp24, Take 150 mg by mouth once daily., Disp: , Rfl:     verapamil (CALAN-SR) 120 MG CR tablet, TAKE 1 TABLET EVERY NIGHT, Disp: 90 tablet, Rfl: 3    warfarin (COUMADIN) 5 MG tablet, Take 5 mg by mouth. As direct by coumadin clinic,  Disp: , Rfl:     oxybutynin (DITROPAN-XL) 5 MG TR24, Take 1 tablet (5 mg total) by mouth once daily., Disp: 90 tablet, Rfl: 3    PYRIDOXINE HCL (VITAMIN B-6 ORAL), Take by mouth once daily., Disp: , Rfl:     Review of Systems   Constitutional: Negative for fever.   Eyes: Negative for visual disturbance.   Respiratory: Negative for shortness of breath.    Cardiovascular: Negative for chest pain.   Gastrointestinal: Negative for nausea.   Genitourinary: Negative for dysuria, flank pain and hematuria.   Musculoskeletal: Negative for gait problem.   Skin: Negative for rash.   Neurological: Negative for seizures.   Psychiatric/Behavioral: Negative for confusion.       Objective:      Physical Exam   Constitutional: She is oriented to person, place, and time. She appears well-developed and well-nourished.   HENT:   Head: Normocephalic.   Eyes: Conjunctivae and EOM are normal.   Neck: Normal range of motion.   Cardiovascular: Normal rate.    Pulmonary/Chest: Effort normal.   Abdominal: Soft. She exhibits no distension and no mass. There is no tenderness.   Genitourinary:   Genitourinary Comments: Bladder non-tender and nondistended  No CVA tenderness   Musculoskeletal: She exhibits no edema.   Neurological: She is alert and oriented to person, place, and time.   Skin: Skin is warm and dry. No rash noted. No erythema.   Psychiatric: She has a normal mood and affect. Her behavior is normal.   Vitals reviewed.      Assessment:       1. Urge incontinence    2. Increased urinary frequency    3. Urinary urgency        Plan:       Urge incontinence  -     POCT URINE DIPSTICK WITHOUT MICROSCOPE    Increased urinary frequency    Urinary urgency    Other orders  -     Discontinue: oxybutynin (DITROPAN-XL) 5 MG TR24; Take 1 tablet (5 mg total) by mouth once daily.  Dispense: 30 tablet; Refill: 11  -     oxybutynin (DITROPAN-XL) 5 MG TR24; Take 1 tablet (5 mg total) by mouth once daily.  Dispense: 90 tablet; Refill: 3      Add  oxybutynin.  Continue Myrbetriq.  RTC 3 months

## 2017-08-14 ENCOUNTER — PROCEDURE VISIT (OUTPATIENT)
Dept: DERMATOLOGY | Facility: CLINIC | Age: 82
End: 2017-08-14
Payer: MEDICARE

## 2017-08-14 VITALS — HEIGHT: 61 IN | WEIGHT: 124 LBS | BODY MASS INDEX: 23.41 KG/M2 | RESPIRATION RATE: 16 BRPM

## 2017-08-14 DIAGNOSIS — C44.622 SQUAMOUS CELL CARCINOMA OF ARM, RIGHT: Primary | ICD-10-CM

## 2017-08-14 PROCEDURE — 99499 UNLISTED E&M SERVICE: CPT | Mod: S$GLB,,, | Performed by: DERMATOLOGY

## 2017-08-14 PROCEDURE — 88305 TISSUE EXAM BY PATHOLOGIST: CPT | Performed by: PATHOLOGY

## 2017-08-14 PROCEDURE — 11603 EXC TR-EXT MAL+MARG 2.1-3 CM: CPT | Mod: 51,S$GLB,, | Performed by: DERMATOLOGY

## 2017-08-14 PROCEDURE — 12032 INTMD RPR S/A/T/EXT 2.6-7.5: CPT | Mod: S$GLB,,, | Performed by: DERMATOLOGY

## 2017-08-14 NOTE — PROGRESS NOTES
Subjective:       Patient ID:  Kassi Skelton is a 85 y.o. female who presents for   Chief Complaint   Patient presents with    Follow-up     Excision suspected SCC  R forearm           Review of Systems   Skin: Positive for dry skin. Negative for sensitivity to antibiotic ointment, sensitivity to bandage adhesive and tendency to form keloidal scars.   Hematologic/Lymphatic: Bruises/bleeds easily.        Objective:    Physical Exam       Diagram Legend     Erythematous scaling macule/papule c/w actinic keratosis       Vascular papule c/w angioma      Pigmented verrucoid papule/plaque c/w seborrheic keratosis      Yellow umbilicated papule c/w sebaceous hyperplasia      Irregularly shaped tan macule c/w lentigo     1-2 mm smooth white papules consistent with Milia      Movable subcutaneous cyst with punctum c/w epidermal inclusion cyst      Subcutaneous movable cyst c/w pilar cyst      Firm pink to brown papule c/w dermatofibroma      Pedunculated fleshy papule(s) c/w skin tag(s)      Evenly pigmented macule c/w junctional nevus     Mildly variegated pigmented, slightly irregular-bordered macule c/w mildly atypical nevus      Flesh colored to evenly pigmented papule c/w intradermal nevus       Pink pearly papule/plaque c/w basal cell carcinoma      Erythematous hyperkeratotic cursted plaque c/w SCC      Surgical scar with no sign of skin cancer recurrence      Open and closed comedones      Inflammatory papules and pustules      Verrucoid papule consistent consistent with wart     Erythematous eczematous patches and plaques     Dystrophic onycholytic nail with subungual debris c/w onychomycosis     Umbilicated papule    Erythematous-base heme-crusted tan verrucoid plaque consistent with inflamed seborrheic keratosis     Erythematous Silvery Scaling Plaque c/w Psoriasis     See annotation      Assessment / Plan:      Pathology Orders:      Normal Orders This Visit    Tissue Specimen To Pathology, Dermatology      Questions:    Directional Terms:  Other(comment)    Clinical information:  scc vs other s/p excision check margins    Specific Site:  right forearm        Squamous cell carcinoma of arm, right  -     Tissue Specimen To Pathology, Dermatology    PROCEDURE: Elliptical excision with intermediate layered repair in order to decrease dead space and close large gap.    ANESTHETIC: 12 cc 1% Xylocaine with Epinephrine 1:100,000, buffered    SURGEON: Sheryl Wolff MD      ASSISTANTS: Olimpia Avery MA      PREOPERATIVE DIAGNOSIS:  Squamous Cell Carcinoma    POSTOPERATIVE DIAGNOSIS:  Same as preoperative diagnosis    PATHOLOGIC DIAGNOSIS: Pending    LOCATION: right forearm    INITIAL LESION SIZE: 1.8 cm    EXCISED DIAMETER: 2.6 cm    PREPARATION: The diagnosis, procedure, alternatives, benefits and risks, including but not limited to: infection, bleeding/bruising, drug reactions, pain, scar or cosmetic defect, local sensation disturbances, wound dehiscence (separation of wound edges after sutures removed) and/or recurrence of present condition were explained to the patient. The patient elected to proceed.  Patient's identity was verified using 2 patient identifiers and the side and site was verified.  Time out period with surgeon, assistant and patient in surgical suite was taken.    PROCEDURE: The location noted above was prepped and draped by Olimpia Avery MA in the usual sterile fashion. The area was anesthetized by myself. Lesional tissue was carefully marked with at least 4 mm margins of clinically normal skin in all directions. A fusiform elliptical excision was done with #15 blade carried down completely through the dermis into the deep subcutaneous tissues to the level of the non-muscle fascia, and dissection was carried out in that plane.  Electrocoagulation was used to obtain hemostasis. Blood loss was minimal. The wound was then approximated in a layered fashion with subcutaneous and intradermal sutures  of 4.0 Monocryl, approximately 5 in number, and the wound was then superficially closed with single running sutures of 4.0 Prolene.    The patient tolerated the procedure well.    The area was cleaned and dressed appropriately- including mastisol and steri strips- and the patient was given wound care instructions, as well as an appointment for follow-up evaluation.    LENGTH OF REPAIR: 3.6 cm             Return in about 2 weeks (around 8/28/2017) for nurse visit for suture removal.

## 2017-08-15 ENCOUNTER — TELEPHONE (OUTPATIENT)
Dept: UROLOGY | Facility: CLINIC | Age: 82
End: 2017-08-15

## 2017-08-15 NOTE — TELEPHONE ENCOUNTER
patient is having increased confusion and issues with her memory while taking the Oxybutynin. Instructed home health nurse to have patient stop the medication and messaged Dr. Louis to change the medication.

## 2017-08-15 NOTE — TELEPHONE ENCOUNTER
----- Message from Keisha Grimes sent at 8/15/2017  1:47 PM CDT -----  Charisma delvalle [facility] calling concerning new bladder medication given causing patient increased confusion/please call back at [phone] to advise.

## 2017-08-17 NOTE — TELEPHONE ENCOUNTER
Already taking Myrbetriq.  If oxybutynin caused confusion, I would not recommend any other anticholinergics.

## 2017-08-17 NOTE — TELEPHONE ENCOUNTER
A call was placed to [facility] with no success, v/m is full. Another call was placed to [facility] for her to call us back for updates.

## 2017-08-18 ENCOUNTER — TELEPHONE (OUTPATIENT)
Dept: DERMATOLOGY | Facility: CLINIC | Age: 82
End: 2017-08-18

## 2017-08-18 NOTE — TELEPHONE ENCOUNTER
"----- Message from Sheryl Wolff MD sent at 8/17/2017  2:17 PM CDT -----  Please call with results. Lesion was a SCC, completely excised. No further treatment. F/u for suture removal.     FINAL PATHOLOGIC DIAGNOSIS  1. Skin, right forearm, excision:  - INVASIVE SQUAMOUS CELL CARCINOMA, WELL-DIFFERENTIATED, CRATERIFORM  (KERATOACANTHOMATOUS TYPE).  - ALL MARGINS ARE NEGATIVE.  MICROSCOPIC DESCRIPTION: Sections show a crateriform squamous proliferation exhibiting minimal atypia.  Diagnosed by: Tyson Brand M.D.  (Electronically Signed: 2017-08-17 14:13:20)  Gross Description  C # [id number]  Received in formalin in the specimen container labeled with the patient's name, "R forearm", is a tan-gray skin  ellipse measuring 2.9 x 1.8 x 0.8 cm. The ellipse was previously oriented by a double stitch on one of the tips. I  will assume that the double stitch on the tip is a 12:00. On the skin surface there is a centrally located tan-gray  nodule measuring 1.5 cm in circumference and 0.6 cm in height, within 0.3 cm in nearest resection margin. On  the center of the nodule there is a tan-brown flat macule measuring 0.7 cm in circumference. No other lesions are  grossly identified. The resection margin is inked in black from 12:00 to 3:00 to 9:00 and inked in blue from 3:00 to  6:00 to 9:00. The specimen was serially sectioned from 12:00 to 6:00 to reveal discolored tan-gray to tan-brown  solid cut surfaces. The specimen was entirely submitted as follows:  A. Tips (12:00 and 6:00)  B E. Ellipse body  Milagro Ortiz  Page 1  "

## 2017-08-24 ENCOUNTER — PATIENT MESSAGE (OUTPATIENT)
Dept: DERMATOLOGY | Facility: CLINIC | Age: 82
End: 2017-08-24

## 2017-08-28 ENCOUNTER — PATIENT MESSAGE (OUTPATIENT)
Dept: FAMILY MEDICINE | Facility: CLINIC | Age: 82
End: 2017-08-28

## 2017-08-31 ENCOUNTER — LAB VISIT (OUTPATIENT)
Dept: LAB | Facility: HOSPITAL | Age: 82
End: 2017-08-31
Attending: FAMILY MEDICINE
Payer: MEDICARE

## 2017-08-31 ENCOUNTER — OFFICE VISIT (OUTPATIENT)
Dept: FAMILY MEDICINE | Facility: CLINIC | Age: 82
End: 2017-08-31
Payer: MEDICARE

## 2017-08-31 VITALS
BODY MASS INDEX: 23.68 KG/M2 | HEIGHT: 61 IN | TEMPERATURE: 98 F | DIASTOLIC BLOOD PRESSURE: 80 MMHG | WEIGHT: 125.44 LBS | HEART RATE: 58 BPM | SYSTOLIC BLOOD PRESSURE: 138 MMHG

## 2017-08-31 DIAGNOSIS — I48.91 ATRIAL FIBRILLATION, UNSPECIFIED TYPE: ICD-10-CM

## 2017-08-31 DIAGNOSIS — G31.84 MILD COGNITIVE IMPAIRMENT WITH MEMORY LOSS: Primary | ICD-10-CM

## 2017-08-31 DIAGNOSIS — E78.2 COMBINED HYPERLIPIDEMIA ASSOCIATED WITH TYPE 2 DIABETES MELLITUS: ICD-10-CM

## 2017-08-31 DIAGNOSIS — Z79.01 ANTICOAGULATION MONITORING BY PHARMACIST: ICD-10-CM

## 2017-08-31 DIAGNOSIS — E11.69 COMBINED HYPERLIPIDEMIA ASSOCIATED WITH TYPE 2 DIABETES MELLITUS: ICD-10-CM

## 2017-08-31 DIAGNOSIS — C44.92 KERATOACANTHOMA TYPE SQUAMOUS CELL CARCINOMA OF SKIN: ICD-10-CM

## 2017-08-31 LAB
ESTIMATED AVG GLUCOSE: 123 MG/DL
HBA1C MFR BLD HPLC: 5.9 %
INR PPP: 1.8
PROTHROMBIN TIME: 18.1 SEC

## 2017-08-31 PROCEDURE — 1159F MED LIST DOCD IN RCRD: CPT | Mod: S$GLB,,, | Performed by: FAMILY MEDICINE

## 2017-08-31 PROCEDURE — 99213 OFFICE O/P EST LOW 20 MIN: CPT | Mod: S$GLB,,, | Performed by: FAMILY MEDICINE

## 2017-08-31 PROCEDURE — 85610 PROTHROMBIN TIME: CPT

## 2017-08-31 PROCEDURE — 3075F SYST BP GE 130 - 139MM HG: CPT | Mod: S$GLB,,, | Performed by: FAMILY MEDICINE

## 2017-08-31 PROCEDURE — 99999 PR PBB SHADOW E&M-EST. PATIENT-LVL IV: CPT | Mod: PBBFAC,,, | Performed by: FAMILY MEDICINE

## 2017-08-31 PROCEDURE — 3008F BODY MASS INDEX DOCD: CPT | Mod: S$GLB,,, | Performed by: FAMILY MEDICINE

## 2017-08-31 PROCEDURE — 3079F DIAST BP 80-89 MM HG: CPT | Mod: S$GLB,,, | Performed by: FAMILY MEDICINE

## 2017-08-31 PROCEDURE — 83036 HEMOGLOBIN GLYCOSYLATED A1C: CPT

## 2017-08-31 RX ORDER — DONEPEZIL HYDROCHLORIDE 5 MG/1
5 TABLET, FILM COATED ORAL NIGHTLY
Qty: 30 TABLET | Refills: 5 | Status: SHIPPED | OUTPATIENT
Start: 2017-08-31 | End: 2017-10-11 | Stop reason: SDUPTHER

## 2017-09-01 NOTE — PROGRESS NOTES
Mild cognitive impairment.  She seemed to have side effects after we increased Aricept and went back to lower dose.  Seems to be tolerating this well.  She also wanted me to remove sutures from recent excision of keratoacanthoma done by dermatology at the right forearm.  She had to cancel her appointment with them to have this done.  The lesion was completely excised.    Past Medical History:  Past Medical History:   Diagnosis Date    *Atrial fibrillation 4/19/2012    *Atrial flutter 4/19/2012    Allergy     Ankle fracture 4/19/2012    Anticoagulant long-term use     Anxiety     Arthritis     Bacterial pneumonia, unspecified 12/7/2012    Breast cancer     LEFT    Cataract     CKD (chronic kidney disease), stage III 5/11/2012    Followed by Dr. 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    LBBB (left bundle branch block) 10/19/2012    Mild cognitive impairment with memory loss 6/15/2017    Osteopenia 4/19/2012    Osteoporosis     Otitis media     Pacemaker 11/2012    yo/chantell    Secondary hyperparathyroidism of renal origin     Simple renal cyst     Sinus node dysfunction     Urinary incontinence 4/19/2012     Past Surgical History:   Procedure Laterality Date    APPENDECTOMY      BREAST LUMPECTOMY Left     CARDIAC PACEMAKER PLACEMENT  12/3/12    Sinus node dysfunction    COLONOSCOPY      EYE SURGERY Bilateral     PHACO/IOL    FRACTURE SURGERY Right     ankle    HYSTERECTOMY      total    MASTECTOMY Left     TONSILLECTOMY      TOTAL HIP ARTHROPLASTY Right      Social History     Social History    Marital status:      Spouse name: N/A    Number of children: N/A    Years of  education: N/A     Occupational History    Not on file.     Social History Main Topics    Smoking status: Never Smoker    Smokeless tobacco: Never Used    Alcohol use No    Drug use: No    Sexual activity: Not Currently     Other Topics Concern    Not on file     Social History Narrative    No narrative on file     Family History   Problem Relation Age of Onset    Hypertension Mother     Heart disease Father     Coronary artery disease Brother     Coronary artery disease Brother     COPD Brother      Review of patient's allergies indicates:   Allergen Reactions    Amlodipine Edema     Pt stated this medication made her legs swell    Alendronate sodium Other (See Comments)     Reaction: Esophageal bleeding    Fosamax [alendronate] Other (See Comments)     Reaction: Esophageal bleeding  diarrhea    Sorbitol      Diarrhea     Augmentin [amoxicillin-pot clavulanate] Hives and Rash     Current Outpatient Prescriptions on File Prior to Visit   Medication Sig Dispense Refill    ACCU-CHEK SHELDON PLUS TEST STRP Strp CHECK TWICE A DAY  200 strip 4    acetaminophen (TYLENOL) 325 MG tablet Take 2 tablets (650 mg total) by mouth every 6 (six) hours as needed for Pain.  0    benazepril (LOTENSIN) 20 MG tablet Take 1 tablet (20 mg total) by mouth once daily. 90 tablet 3    cyanocobalamin, vitamin B-12, 1,000 mcg TbSR One tab po daily 1 each 0    ERGOCALCIFEROL, VITAMIN D2, (VITAMIN D ORAL) Take by mouth once daily.      glipiZIDE (GLUCOTROL) 2.5 MG TR24 TAKE 1 TABLET EVERY DAY WITH BREAKFAST 90 tablet 3    hydrochlorothiazide (HYDRODIURIL) 25 MG tablet TAKE 1 TABLET DAILY 90 tablet 3    levothyroxine (SYNTHROID) 75 MCG tablet Take 1 tablet (75 mcg total) by mouth once daily. 90 tablet 2    metoprolol tartrate (LOPRESSOR) 50 MG tablet TAKE 1/2 TABLET TWICE DAILY 90 tablet 3    mirabegron (MYRBETRIQ) 50 mg Tb24 Take 1 tablet (50 mg total) by mouth once daily. 90 tablet 1    mirtazapine (REMERON) 45 MG  "tablet Take 45 mg by mouth every evening.      oxybutynin (DITROPAN-XL) 5 MG TR24 Take 1 tablet (5 mg total) by mouth once daily. 90 tablet 3    pravastatin (PRAVACHOL) 40 MG tablet Take 1 tablet (40 mg total) by mouth once daily. 90 tablet 3    PYRIDOXINE HCL (VITAMIN B-6 ORAL) Take by mouth once daily.      venlafaxine (EFFEXOR XR) 150 MG Cp24 Take 150 mg by mouth once daily.      verapamil (CALAN-SR) 120 MG CR tablet TAKE 1 TABLET EVERY NIGHT 90 tablet 3    warfarin (COUMADIN) 5 MG tablet Take 5 mg by mouth. As direct by coumadin clinic      [DISCONTINUED] donepezil (ARICEPT) 5 MG tablet Take 1 tablet (5 mg total) by mouth every evening. For 1 month then start 10 mg pills (Patient taking differently: Take 10 mg by mouth every evening. For 1 month then start 10 mg pills) 30 tablet 0     No current facility-administered medications on file prior to visit.            OBJECTIVE:     Vitals:    08/31/17 1059   BP: 138/80   Pulse: (!) 58   Temp: 98.3 °F (36.8 °C)   TempSrc: Oral   Weight: 56.9 kg (125 lb 7.1 oz)   Height: 5' 1" (1.549 m)     Wt Readings from Last 3 Encounters:   08/31/17 56.9 kg (125 lb 7.1 oz)   08/14/17 56.2 kg (124 lb)   08/10/17 56.4 kg (124 lb 5.4 oz)     APPEARANCE: Well nourished, well developed, in no acute distress.    MENTAL STATUS: Alert.  Oriented x 3.  Healing excision site right forearm.  Suture removed dressing placed.    Kassi was seen today for suture / staple removal.    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     pending hemoglobin A1c today    "

## 2017-09-14 ENCOUNTER — LAB VISIT (OUTPATIENT)
Dept: LAB | Facility: HOSPITAL | Age: 82
End: 2017-09-14
Attending: INTERNAL MEDICINE
Payer: MEDICARE

## 2017-09-14 DIAGNOSIS — Z79.01 ANTICOAGULATION MONITORING BY PHARMACIST: ICD-10-CM

## 2017-09-14 DIAGNOSIS — I48.91 ATRIAL FIBRILLATION, UNSPECIFIED TYPE: ICD-10-CM

## 2017-09-14 LAB
INR PPP: 5.2
PROTHROMBIN TIME: 53 SEC

## 2017-09-14 PROCEDURE — 36415 COLL VENOUS BLD VENIPUNCTURE: CPT | Mod: PO

## 2017-09-14 PROCEDURE — 85610 PROTHROMBIN TIME: CPT

## 2017-09-15 ENCOUNTER — ANTI-COAG VISIT (OUTPATIENT)
Dept: CARDIOLOGY | Facility: CLINIC | Age: 82
End: 2017-09-15

## 2017-09-15 DIAGNOSIS — Z79.01 ANTICOAGULATION MONITORING BY PHARMACIST: ICD-10-CM

## 2017-09-16 DIAGNOSIS — Z95.0 CARDIAC PACEMAKER IN SITU: Primary | ICD-10-CM

## 2017-09-16 DIAGNOSIS — I49.5 SINUS NODE DYSFUNCTION: ICD-10-CM

## 2017-09-18 ENCOUNTER — LAB VISIT (OUTPATIENT)
Dept: LAB | Facility: HOSPITAL | Age: 82
End: 2017-09-18
Attending: INTERNAL MEDICINE
Payer: MEDICARE

## 2017-09-18 DIAGNOSIS — Z79.01 ANTICOAGULATION MONITORING BY PHARMACIST: ICD-10-CM

## 2017-09-18 DIAGNOSIS — I48.91 ATRIAL FIBRILLATION, UNSPECIFIED TYPE: ICD-10-CM

## 2017-09-18 LAB
INR PPP: 1.5
PROTHROMBIN TIME: 15.5 SEC

## 2017-09-18 PROCEDURE — 85610 PROTHROMBIN TIME: CPT

## 2017-09-18 PROCEDURE — 36415 COLL VENOUS BLD VENIPUNCTURE: CPT | Mod: PO

## 2017-09-25 ENCOUNTER — LAB VISIT (OUTPATIENT)
Dept: LAB | Facility: HOSPITAL | Age: 82
End: 2017-09-25
Attending: INTERNAL MEDICINE
Payer: MEDICARE

## 2017-09-25 DIAGNOSIS — I48.91 ATRIAL FIBRILLATION, UNSPECIFIED TYPE: ICD-10-CM

## 2017-09-25 DIAGNOSIS — Z79.01 ANTICOAGULATION MONITORING BY PHARMACIST: ICD-10-CM

## 2017-09-25 LAB
INR PPP: 4.3
PROTHROMBIN TIME: 43.3 SEC

## 2017-09-25 PROCEDURE — 85610 PROTHROMBIN TIME: CPT

## 2017-09-25 PROCEDURE — 36415 COLL VENOUS BLD VENIPUNCTURE: CPT | Mod: PO

## 2017-09-26 ENCOUNTER — ANTI-COAG VISIT (OUTPATIENT)
Dept: CARDIOLOGY | Facility: CLINIC | Age: 82
End: 2017-09-26

## 2017-09-26 DIAGNOSIS — Z79.01 ANTICOAGULATION MONITORING BY PHARMACIST: ICD-10-CM

## 2017-10-02 ENCOUNTER — LAB VISIT (OUTPATIENT)
Dept: LAB | Facility: HOSPITAL | Age: 82
End: 2017-10-02
Attending: INTERNAL MEDICINE
Payer: MEDICARE

## 2017-10-02 DIAGNOSIS — Z79.01 ANTICOAGULATION MONITORING BY PHARMACIST: ICD-10-CM

## 2017-10-02 DIAGNOSIS — I48.91 ATRIAL FIBRILLATION, UNSPECIFIED TYPE: ICD-10-CM

## 2017-10-02 LAB
INR PPP: 3.7
PROTHROMBIN TIME: 36.9 SEC

## 2017-10-02 PROCEDURE — 36415 COLL VENOUS BLD VENIPUNCTURE: CPT | Mod: PO

## 2017-10-02 PROCEDURE — 85610 PROTHROMBIN TIME: CPT

## 2017-10-03 ENCOUNTER — ANTI-COAG VISIT (OUTPATIENT)
Dept: CARDIOLOGY | Facility: CLINIC | Age: 82
End: 2017-10-03

## 2017-10-03 DIAGNOSIS — Z79.01 ANTICOAGULATION MONITORING BY PHARMACIST: ICD-10-CM

## 2017-10-10 ENCOUNTER — PATIENT MESSAGE (OUTPATIENT)
Dept: FAMILY MEDICINE | Facility: CLINIC | Age: 82
End: 2017-10-10

## 2017-10-11 ENCOUNTER — TELEPHONE (OUTPATIENT)
Dept: FAMILY MEDICINE | Facility: CLINIC | Age: 82
End: 2017-10-11

## 2017-10-11 ENCOUNTER — OFFICE VISIT (OUTPATIENT)
Dept: FAMILY MEDICINE | Facility: CLINIC | Age: 82
End: 2017-10-11
Payer: MEDICARE

## 2017-10-11 ENCOUNTER — LAB VISIT (OUTPATIENT)
Dept: LAB | Facility: HOSPITAL | Age: 82
End: 2017-10-11
Attending: NURSE PRACTITIONER
Payer: MEDICARE

## 2017-10-11 VITALS
HEART RATE: 66 BPM | SYSTOLIC BLOOD PRESSURE: 138 MMHG | HEIGHT: 61 IN | DIASTOLIC BLOOD PRESSURE: 62 MMHG | WEIGHT: 127 LBS | BODY MASS INDEX: 23.98 KG/M2

## 2017-10-11 DIAGNOSIS — I48.91 ATRIAL FIBRILLATION, UNSPECIFIED TYPE: Chronic | ICD-10-CM

## 2017-10-11 DIAGNOSIS — F33.1 MAJOR DEPRESSIVE DISORDER, RECURRENT EPISODE, MODERATE: ICD-10-CM

## 2017-10-11 DIAGNOSIS — I49.5 SINUS NODE DYSFUNCTION: ICD-10-CM

## 2017-10-11 DIAGNOSIS — D05.12 DUCTAL CARCINOMA IN SITU (DCIS) OF LEFT BREAST: ICD-10-CM

## 2017-10-11 DIAGNOSIS — I95.1 ORTHOSTATIC HYPOTENSION: ICD-10-CM

## 2017-10-11 DIAGNOSIS — Z95.0 CARDIAC PACEMAKER IN SITU: Chronic | ICD-10-CM

## 2017-10-11 DIAGNOSIS — E11.69 COMBINED HYPERLIPIDEMIA ASSOCIATED WITH TYPE 2 DIABETES MELLITUS: ICD-10-CM

## 2017-10-11 DIAGNOSIS — N25.81 SECONDARY HYPERPARATHYROIDISM OF RENAL ORIGIN: ICD-10-CM

## 2017-10-11 DIAGNOSIS — Z23 IMMUNIZATION DUE: Primary | ICD-10-CM

## 2017-10-11 DIAGNOSIS — E11.22 DIABETES MELLITUS WITH STAGE 4 CHRONIC KIDNEY DISEASE GFR 15-29: ICD-10-CM

## 2017-10-11 DIAGNOSIS — E78.2 COMBINED HYPERLIPIDEMIA ASSOCIATED WITH TYPE 2 DIABETES MELLITUS: ICD-10-CM

## 2017-10-11 DIAGNOSIS — G31.84 MILD COGNITIVE IMPAIRMENT WITH MEMORY LOSS: ICD-10-CM

## 2017-10-11 DIAGNOSIS — Z79.01 ANTICOAGULATION MONITORING BY PHARMACIST: Chronic | ICD-10-CM

## 2017-10-11 DIAGNOSIS — I12.9 BENIGN HYPERTENSIVE KIDNEY DISEASE WITH CHRONIC KIDNEY DISEASE STAGE I THROUGH STAGE IV, OR UNSPECIFIED: ICD-10-CM

## 2017-10-11 DIAGNOSIS — Z97.4 HEARING AID WORN: ICD-10-CM

## 2017-10-11 DIAGNOSIS — Z79.01 ANTICOAGULATION MONITORING BY PHARMACIST: ICD-10-CM

## 2017-10-11 DIAGNOSIS — E03.9 HYPOTHYROIDISM, UNSPECIFIED TYPE: Chronic | ICD-10-CM

## 2017-10-11 DIAGNOSIS — N39.46 URINARY INCONTINENCE, MIXED: Chronic | ICD-10-CM

## 2017-10-11 DIAGNOSIS — Z95.0 PACEMAKER: ICD-10-CM

## 2017-10-11 DIAGNOSIS — N18.4 DIABETES MELLITUS WITH STAGE 4 CHRONIC KIDNEY DISEASE GFR 15-29: ICD-10-CM

## 2017-10-11 DIAGNOSIS — I15.2 HYPERTENSION COMPLICATING DIABETES: ICD-10-CM

## 2017-10-11 DIAGNOSIS — Z86.79 HISTORY OF ATRIAL FLUTTER: ICD-10-CM

## 2017-10-11 DIAGNOSIS — N28.1 CYST OF KIDNEY, ACQUIRED: ICD-10-CM

## 2017-10-11 DIAGNOSIS — I44.7 LBBB (LEFT BUNDLE BRANCH BLOCK): ICD-10-CM

## 2017-10-11 DIAGNOSIS — M81.0 OSTEOPOROSIS, UNSPECIFIED OSTEOPOROSIS TYPE, UNSPECIFIED PATHOLOGICAL FRACTURE PRESENCE: ICD-10-CM

## 2017-10-11 DIAGNOSIS — E11.59 HYPERTENSION COMPLICATING DIABETES: ICD-10-CM

## 2017-10-11 DIAGNOSIS — E11.40 TYPE 2 DIABETES MELLITUS WITH DIABETIC NEUROPATHY, WITHOUT LONG-TERM CURRENT USE OF INSULIN: ICD-10-CM

## 2017-10-11 DIAGNOSIS — I48.91 ATRIAL FIBRILLATION, UNSPECIFIED TYPE: ICD-10-CM

## 2017-10-11 DIAGNOSIS — C44.92 KERATOACANTHOMA TYPE SQUAMOUS CELL CARCINOMA OF SKIN: ICD-10-CM

## 2017-10-11 LAB
INR PPP: 2.3
PROTHROMBIN TIME: 23.5 SEC

## 2017-10-11 PROCEDURE — 90662 IIV NO PRSV INCREASED AG IM: CPT | Mod: S$GLB,,, | Performed by: NURSE PRACTITIONER

## 2017-10-11 PROCEDURE — 99999 PR PBB SHADOW E&M-EST. PATIENT-LVL IV: CPT | Mod: PBBFAC,,, | Performed by: NURSE PRACTITIONER

## 2017-10-11 PROCEDURE — 36415 COLL VENOUS BLD VENIPUNCTURE: CPT | Mod: PO

## 2017-10-11 PROCEDURE — 99499 UNLISTED E&M SERVICE: CPT | Mod: S$GLB,,, | Performed by: NURSE PRACTITIONER

## 2017-10-11 PROCEDURE — G0008 ADMIN INFLUENZA VIRUS VAC: HCPCS | Mod: S$GLB,,, | Performed by: NURSE PRACTITIONER

## 2017-10-11 PROCEDURE — 96160 PT-FOCUSED HLTH RISK ASSMT: CPT | Mod: S$GLB,,, | Performed by: NURSE PRACTITIONER

## 2017-10-11 PROCEDURE — 99499 UNLISTED E&M SERVICE: CPT | Mod: S$GLB,,, | Performed by: FAMILY MEDICINE

## 2017-10-11 PROCEDURE — 85610 PROTHROMBIN TIME: CPT

## 2017-10-11 RX ORDER — DONEPEZIL HYDROCHLORIDE 5 MG/1
5 TABLET, FILM COATED ORAL NIGHTLY
Qty: 90 TABLET | Refills: 1 | Status: SHIPPED | OUTPATIENT
Start: 2017-10-11 | End: 2018-02-08 | Stop reason: SDUPTHER

## 2017-10-11 NOTE — ASSESSMENT & PLAN NOTE
Stable and controlled on Coumadin  Continue medication as prescribed  Continue current treatment plan as previously prescribed with your PCP, cardiologist, and [facility]madin Clinic

## 2017-10-11 NOTE — ASSESSMENT & PLAN NOTE
Stable and controlled on Synthroid  Continue medication as prescribed  Continue current treatment plan as previously prescribed with your PCP

## 2017-10-11 NOTE — ASSESSMENT & PLAN NOTE
Stable and controlled on Myrbetriq, Ditropan-XL  Continue medication as prescribed  Continue current treatment plan as previously prescribed with your PCP and urologist, Dr. Louis

## 2017-10-11 NOTE — ASSESSMENT & PLAN NOTE
Stable and controlled on Aricept  Continue medication as prescribed  Continue current treatment plan as previously prescribed with your PCP and neurologist, Dr. Davila

## 2017-10-11 NOTE — ASSESSMENT & PLAN NOTE
Stable   Continue current treatment plan as previously prescribed with your PCP and nephrologist, Dr. Kang

## 2017-10-11 NOTE — PROGRESS NOTES
"Kassi Skelton presented for a  Medicare AWV and comprehensive Health Risk Assessment today. The following components were reviewed and updated:    · Medical history  · Family History  · Social history  · Allergies and Current Medications  · Health Risk Assessment  · Health Maintenance  · Care Team     ** See Completed Assessments for Annual Wellness Visit within the encounter summary.**       The following assessments were completed:  · Living Situation  · CAGE  · Depression Screening  · Timed Get Up and Go  · Whisper Test  · Cognitive Function Screening  · Nutrition Screening  · ADL Screening  · PAQ Screening    Vitals:    10/11/17 1408   BP: 138/62   Pulse: 66   Weight: 57.6 kg (127 lb)   Height: 5' 1" (1.549 m)     Body mass index is 24 kg/m².  Physical Exam   Constitutional: She is oriented to person, place, and time. She appears well-developed and well-nourished.   HENT:   Head: Normocephalic.   Eyes: Pupils are equal, round, and reactive to light.   Cardiovascular: Normal rate, regular rhythm and normal heart sounds.    Pulses:       Dorsalis pedis pulses are 2+ on the right side, and 2+ on the left side.        Posterior tibial pulses are 2+ on the right side, and 2+ on the left side.   Pulmonary/Chest: Effort normal and breath sounds normal. No respiratory distress. She has no wheezes. She has no rales.   Musculoskeletal:        Right foot: There is normal range of motion and no deformity.        Left foot: There is normal range of motion and no deformity.   Feet:   Right Foot:   Protective Sensation: 10 sites tested. 1 site sensed.  Skin Integrity: Negative for ulcer, blister, skin breakdown, erythema, warmth, callus or dry skin.   Left Foot:   Protective Sensation: 10 sites tested. 2 sites sensed.   Skin Integrity: Negative for ulcer, blister, skin breakdown, erythema, warmth, callus or dry skin.   Neurological: She is alert and oriented to person, place, and time.   Skin: Skin is warm and dry. No " rash noted.        Psychiatric: She has a normal mood and affect. Her behavior is normal. Judgment and thought content normal.   Vitals reviewed.        Diagnoses and health risks identified today and associated recommendations/orders:    Mild cognitive impairment with memory loss  Stable and controlled on Aricept  Continue medication as prescribed  Continue current treatment plan as previously prescribed with your PCP and neurologist, Dr. Davila      Major depressive disorder, recurrent episode, moderate  Stable and controlled on Effexor XR  Continue medication as prescribed  Continue current treatment plan as previously prescribed with your PCP      Hearing aid worn  Continue current treatment plan as previously prescribed with your PCP      Atrial fibrillation  Stable and controlled on Lotensin, HCTZ, Lopressor, and verapamil  Continue medication as prescribed  Continue current treatment plan as previously prescribed with your PCP and cardiologist, Dr. Swanson      Cardiac pacemaker in situ  Stable and controlled on Lotensin, HCTZ, Lopressor, and verapamil  Continue medication as prescribed  Continue current treatment plan as previously prescribed with your PCP and cardiologist, Dr. Swanson    Combined hyperlipidemia associated with type 2 diabetes mellitus  Stable and controlled on pravastatin  Continue medication as prescribed  Continue current treatment plan as previously prescribed with your PCP and cardiologist, Dr. Swanson      LBBB (left bundle branch block)  Stable and controlled on Lotensin, HCTZ, Lopressor, and verapamil  Continue medication as prescribed  Continue current treatment plan as previously prescribed with your PCP and cardiologist, Dr. Swanson    Sinus node dysfunction  Stable and controlled on Lotensin, HCTZ, Lopressor, and verapamil  Continue medication as prescribed  Continue current treatment plan as previously prescribed with your PCP and cardiologist,   Kiki    Pacemaker  Stable and controlled on Lotensin, HCTZ, Lopressor, and verapamil  Continue medication as prescribed  Continue current treatment plan as previously prescribed with your PCP and cardiologist, Dr. Swanson    Orthostatic hypotension  Stable and controlled   Continue current treatment plan as previously prescribed with your PCP and cardiologist, Dr. Swanson      Hypertension complicating diabetes  Stable and controlled on Lotensin, HCTZ, Lopressor, and verapamil  Continue medication as prescribed  Continue current treatment plan as previously prescribed with your PCP and cardiologist, Dr. Swanson    Urinary incontinence, mixed  Stable and controlled on Myrbetriq, Ditropan-XL  Continue medication as prescribed  Continue current treatment plan as previously prescribed with your PCP and urologist, Dr. Louis      Benign hypertensive kidney disease with chronic kidney disease  Stable and controlled and taking verapamil  Continue medication as prescribed  Continue current treatment plan as previously prescribed with your PCP and nephrologist, Dr. Kang      Cyst of kidney, acquired  Stable   Continue current treatment plan as previously prescribed with your PCP and nephrologist, Dr. 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 Wolff      Hypothyroidism  Stable and controlled on  Synthroid  Continue medication as prescribed  Continue current treatment plan as previously prescribed with your PCP      Diabetes mellitus with stage 4 chronic kidney disease GFR 15-29  Stable and controlled on glipizide  Continue medication as prescribed  Continue current treatment plan as previously prescribed with your PCP      Secondary hyperparathyroidism of renal origin  Continue current treatment plan as previously prescribed with your PCP and Dr. Kang      Diabetic neuropathy, type II diabetes mellitus  Stable   Continue current treatment plan as previously prescribed with your PCP      History of atrial flutter  Stable and controlled on Lotensin, HCTZ, Lopressor, and verapamil-Pacemaker in situ  Continue medication as prescribed  Continue current treatment plan as previously prescribed with your PCP and cardiologist, Dr. Swanson      Provided Kassi with a 5-10 year written screening schedule and personal prevention plan. Recommendations were developed using the USPSTF age appropriate recommendations. Education, counseling, and referrals were provided as needed. After Visit Summary printed and given to patient which includes a list of additional screenings\tests needed.    Return if symptoms worsen or fail to improve.    Francisco Swenson NP

## 2017-10-11 NOTE — ASSESSMENT & PLAN NOTE
Stable and controlled on glipizide  Continue medication as prescribed  Continue current treatment plan as previously prescribed with your PCP

## 2017-10-11 NOTE — ASSESSMENT & PLAN NOTE
Stable and controlled   Continue current treatment plan as previously prescribed with your PCP and cardiologist, Dr. Swanson

## 2017-10-11 NOTE — ASSESSMENT & PLAN NOTE
Stable and controlled on Lotensin, HCTZ, Lopressor, and verapamil-Pacemaker in situ  Continue medication as prescribed  Continue current treatment plan as previously prescribed with your PCP and cardiologist, Dr. Swansno

## 2017-10-11 NOTE — ASSESSMENT & PLAN NOTE
Stable   Continue current treatment plan as previously prescribed with your PCP and dermatologist, Dr. Wolff

## 2017-10-11 NOTE — ASSESSMENT & PLAN NOTE
Stable and controlled on Lotensin, HCTZ, Lopressor, and verapamil  Continue medication as prescribed  Continue current treatment plan as previously prescribed with your PCP and cardiologist, Dr. Sawnson

## 2017-10-11 NOTE — ASSESSMENT & PLAN NOTE
S/p left mastectomy  Stable   Continue current treatment plan as previously prescribed with your PCP and hem/onc, Dr. Rain

## 2017-10-11 NOTE — ASSESSMENT & PLAN NOTE
Stable and controlled on Lotensin, HCTZ, Lopressor, and verapamil  Continue medication as prescribed  Continue current treatment plan as previously prescribed with your PCP and cardiologist, Dr. Swanson

## 2017-10-11 NOTE — ASSESSMENT & PLAN NOTE
Stable and controlled on pravastatin  Continue medication as prescribed  Continue current treatment plan as previously prescribed with your PCP and cardiologist, Dr. Swanson

## 2017-10-11 NOTE — ASSESSMENT & PLAN NOTE
Stable and controlled and taking verapamil  Continue medication as prescribed  Continue current treatment plan as previously prescribed with your PCP and nephrologist, Dr. Kang

## 2017-10-11 NOTE — ASSESSMENT & PLAN NOTE
Stable and controlled on Effexor XR  Continue medication as prescribed  Continue current treatment plan as previously prescribed with your PCP

## 2017-10-12 ENCOUNTER — ANTI-COAG VISIT (OUTPATIENT)
Dept: CARDIOLOGY | Facility: CLINIC | Age: 82
End: 2017-10-12

## 2017-10-12 DIAGNOSIS — Z79.01 ANTICOAGULATION MONITORING BY PHARMACIST: ICD-10-CM

## 2017-10-13 ENCOUNTER — CLINICAL SUPPORT (OUTPATIENT)
Dept: CARDIOLOGY | Facility: CLINIC | Age: 82
End: 2017-10-13
Payer: MEDICARE

## 2017-10-13 ENCOUNTER — OFFICE VISIT (OUTPATIENT)
Dept: CARDIOLOGY | Facility: CLINIC | Age: 82
End: 2017-10-13
Payer: MEDICARE

## 2017-10-13 VITALS
DIASTOLIC BLOOD PRESSURE: 70 MMHG | HEIGHT: 61 IN | HEART RATE: 52 BPM | BODY MASS INDEX: 23.9 KG/M2 | SYSTOLIC BLOOD PRESSURE: 148 MMHG | WEIGHT: 126.56 LBS

## 2017-10-13 DIAGNOSIS — Z95.0 CARDIAC PACEMAKER IN SITU: Primary | ICD-10-CM

## 2017-10-13 DIAGNOSIS — I49.5 SINUS NODE DYSFUNCTION: ICD-10-CM

## 2017-10-13 DIAGNOSIS — Z95.0 CARDIAC PACEMAKER IN SITU: ICD-10-CM

## 2017-10-13 DIAGNOSIS — I48.0 PAROXYSMAL ATRIAL FIBRILLATION: Primary | Chronic | ICD-10-CM

## 2017-10-13 DIAGNOSIS — E78.5 HYPERLIPIDEMIA, UNSPECIFIED HYPERLIPIDEMIA TYPE: ICD-10-CM

## 2017-10-13 DIAGNOSIS — Z95.0 CARDIAC PACEMAKER IN SITU: Chronic | ICD-10-CM

## 2017-10-13 DIAGNOSIS — I44.7 LBBB (LEFT BUNDLE BRANCH BLOCK): ICD-10-CM

## 2017-10-13 PROCEDURE — 93280 PM DEVICE PROGR EVAL DUAL: CPT | Mod: S$GLB,,, | Performed by: INTERNAL MEDICINE

## 2017-10-13 PROCEDURE — 99499 UNLISTED E&M SERVICE: CPT | Mod: S$GLB,,, | Performed by: INTERNAL MEDICINE

## 2017-10-13 PROCEDURE — 99999 PR PBB SHADOW E&M-EST. PATIENT-LVL II: CPT | Mod: PBBFAC,,, | Performed by: INTERNAL MEDICINE

## 2017-10-13 PROCEDURE — 99214 OFFICE O/P EST MOD 30 MIN: CPT | Mod: S$GLB,,, | Performed by: INTERNAL MEDICINE

## 2017-10-13 NOTE — PROGRESS NOTES
Subjective:    Patient ID:  Kassi Skelton is a 85 y.o. female who presents for follow-up of PAF    HPI  She comes with no complaints, no chest pain, no shortness of breath  Wants to switch to NOAC    Review of Systems   Constitution: Negative for decreased appetite, weakness, malaise/fatigue, weight gain and weight loss.   Cardiovascular: Negative for chest pain, dyspnea on exertion, leg swelling, palpitations and syncope.   Respiratory: Negative for cough and shortness of breath.    Gastrointestinal: Negative.    All other systems reviewed and are negative.       Objective:    Physical Exam   Constitutional: She is oriented to person, place, and time. She appears well-developed and well-nourished.   HENT:   Head: Normocephalic.   Eyes: Pupils are equal, round, and reactive to light.   Neck: Normal range of motion. Neck supple. No JVD present. Carotid bruit is not present. No thyromegaly present.   Cardiovascular: Normal rate, regular rhythm, normal heart sounds, intact distal pulses and normal pulses.  PMI is not displaced.  Exam reveals no gallop.    No murmur heard.  Pulmonary/Chest: Effort normal and breath sounds normal.   Abdominal: Soft. Normal appearance. She exhibits no mass. There is no hepatosplenomegaly. There is no tenderness.   Musculoskeletal: Normal range of motion. She exhibits no edema.   Neurological: She is alert and oriented to person, place, and time. She has normal strength and normal reflexes. No sensory deficit.   Skin: Skin is warm and intact.   Psychiatric: She has a normal mood and affect.   Nursing note and vitals reviewed.        Assessment:       1. Paroxysmal atrial fibrillation    2. Cardiac pacemaker in situ    3. LBBB (left bundle branch block)    4. Hyperlipidemia, unspecified hyperlipidemia type         Plan:   Stop coumadin  Start eliquis 2.5 bid  Continue all other cardiac medications  9 m f/u

## 2017-10-16 ENCOUNTER — OFFICE VISIT (OUTPATIENT)
Dept: UROLOGY | Facility: CLINIC | Age: 82
End: 2017-10-16
Payer: MEDICARE

## 2017-10-16 VITALS
HEART RATE: 60 BPM | DIASTOLIC BLOOD PRESSURE: 66 MMHG | WEIGHT: 125.69 LBS | SYSTOLIC BLOOD PRESSURE: 159 MMHG | BODY MASS INDEX: 23.73 KG/M2 | HEIGHT: 61 IN

## 2017-10-16 DIAGNOSIS — E11.9 TYPE 2 DIABETES MELLITUS WITHOUT COMPLICATION, WITHOUT LONG-TERM CURRENT USE OF INSULIN: Primary | ICD-10-CM

## 2017-10-16 DIAGNOSIS — R32 URINARY INCONTINENCE IN FEMALE: Primary | ICD-10-CM

## 2017-10-16 LAB
BILIRUB SERPL-MCNC: NORMAL MG/DL
BLOOD URINE, POC: NORMAL
COLOR, POC UA: YELLOW
GLUCOSE UR QL STRIP: NORMAL
KETONES UR QL STRIP: NORMAL
LEUKOCYTE ESTERASE URINE, POC: NORMAL
NITRITE, POC UA: NORMAL
PH, POC UA: 5
PROTEIN, POC: NORMAL
SPECIFIC GRAVITY, POC UA: 1.01
UROBILINOGEN, POC UA: NORMAL

## 2017-10-16 PROCEDURE — 99999 PR PBB SHADOW E&M-EST. PATIENT-LVL III: CPT | Mod: PBBFAC,,, | Performed by: UROLOGY

## 2017-10-16 PROCEDURE — 99213 OFFICE O/P EST LOW 20 MIN: CPT | Mod: 25,S$GLB,, | Performed by: UROLOGY

## 2017-10-16 PROCEDURE — 81002 URINALYSIS NONAUTO W/O SCOPE: CPT | Mod: S$GLB,,, | Performed by: UROLOGY

## 2017-10-16 RX ORDER — ISOPROPYL ALCOHOL 70 ML/100ML
1 SWAB TOPICAL
Qty: 200 EACH | Status: SHIPPED | OUTPATIENT
Start: 2017-10-16 | End: 2017-10-19 | Stop reason: SDUPTHER

## 2017-10-16 RX ORDER — LANCETS
EACH MISCELLANEOUS
Qty: 200 EACH | Refills: 0 | Status: SHIPPED | OUTPATIENT
Start: 2017-10-16 | End: 2017-10-19 | Stop reason: SDUPTHER

## 2017-10-16 RX ORDER — ISOPROPYL ALCOHOL 70 ML/100ML
1 SWAB TOPICAL
COMMUNITY
End: 2017-10-16 | Stop reason: SDUPTHER

## 2017-10-16 NOTE — PROGRESS NOTES
Subjective:       Patient ID: Kassi Skelton is a 85 y.o. female.    Chief Complaint: Follow-up    HPI     85 year old with urinary frequency, urgency and urge incontinence.  She is taking Myrbetriq 50mg and we added oxybutynin 5 mg ER for the last 3 months.  She feels there has been an improvement in her symptoms.  She is using less pads.  She is overall satisfied for now.    Urine dipstick shows negative for all components.  post void residual 0 ml    Review of Systems   Constitutional: Negative for fever.   Genitourinary: Negative for dysuria and hematuria.       Objective:      Physical Exam   Constitutional: She is oriented to person, place, and time. She appears well-developed and well-nourished.   Pulmonary/Chest: Effort normal. No respiratory distress.   Neurological: She is alert and oriented to person, place, and time.   Skin: Skin is warm.   Psychiatric: She has a normal mood and affect. Her behavior is normal.   Vitals reviewed.      Assessment:       1. Urinary incontinence in female        Plan:       Urinary incontinence in female  -     POCT URINE DIPSTICK WITHOUT MICROSCOPE    Other orders  -     mirabegron (MYRBETRIQ) 50 mg Tb24; Take 1 tablet (50 mg total) by mouth once daily.  Dispense: 90 tablet; Refill: 3      Continue Current meds.  RTC 12 months

## 2017-10-19 DIAGNOSIS — E11.9 TYPE 2 DIABETES MELLITUS WITHOUT COMPLICATION, WITHOUT LONG-TERM CURRENT USE OF INSULIN: ICD-10-CM

## 2017-10-19 RX ORDER — LANCETS
EACH MISCELLANEOUS
Qty: 200 EACH | Refills: 3 | Status: SHIPPED | OUTPATIENT
Start: 2017-10-19 | End: 2018-08-29 | Stop reason: SDUPTHER

## 2017-10-19 RX ORDER — ISOPROPYL ALCOHOL 70 ML/100ML
1 SWAB TOPICAL
Qty: 200 EACH | Refills: 3 | Status: SHIPPED | OUTPATIENT
Start: 2017-10-19 | End: 2018-08-29 | Stop reason: SDUPTHER

## 2017-10-19 NOTE — TELEPHONE ENCOUNTER
Needs supplies sent to [facility], not [facility]      ----- Message from Trish Monet sent at 10/19/2017  8:56 AM CDT -----  Contact: Jaime hobbs/[facility]   He's calling to get a authorization for pts diabetic supplies; AcuCheck test strips, AcuCheck soft click lansets, BD single use swaps, and AcuCheck solutions, please advise [phone]

## 2017-10-19 NOTE — TELEPHONE ENCOUNTER
----- Message from Trish Monet sent at 10/19/2017  8:56 AM CDT -----  Contact: Jaime hobbs/[facility]   He's calling to get a authorization for pts diabetic supplies; AcuCheck test strips, AcuCheck soft click lansets, BD single use swaps, and AcuCheck solutions, please advise [phone]

## 2017-10-22 RX ORDER — HYDROCHLOROTHIAZIDE 25 MG/1
TABLET ORAL
Qty: 90 TABLET | Refills: 3 | Status: SHIPPED | OUTPATIENT
Start: 2017-10-22 | End: 2018-08-29 | Stop reason: SDUPTHER

## 2017-11-01 ENCOUNTER — ANTI-COAG VISIT (OUTPATIENT)
Dept: CARDIOLOGY | Facility: CLINIC | Age: 82
End: 2017-11-01

## 2017-11-01 DIAGNOSIS — Z79.01 ANTICOAGULATION MONITORING BY PHARMACIST: ICD-10-CM

## 2017-11-02 ENCOUNTER — PATIENT OUTREACH (OUTPATIENT)
Dept: ADMINISTRATIVE | Facility: HOSPITAL | Age: 82
End: 2017-11-02

## 2017-11-07 ENCOUNTER — CLINICAL SUPPORT (OUTPATIENT)
Dept: FAMILY MEDICINE | Facility: CLINIC | Age: 82
End: 2017-11-07
Payer: MEDICARE

## 2017-11-07 VITALS — HEART RATE: 59 BPM | SYSTOLIC BLOOD PRESSURE: 127 MMHG | DIASTOLIC BLOOD PRESSURE: 59 MMHG

## 2017-11-07 DIAGNOSIS — I15.2 HYPERTENSION COMPLICATING DIABETES: Primary | ICD-10-CM

## 2017-11-07 DIAGNOSIS — E11.59 HYPERTENSION COMPLICATING DIABETES: Primary | ICD-10-CM

## 2017-11-07 PROCEDURE — 99499 UNLISTED E&M SERVICE: CPT | Mod: S$GLB,,, | Performed by: FAMILY MEDICINE

## 2017-11-07 PROCEDURE — 99999 PR PBB SHADOW E&M-EST. PATIENT-LVL III: CPT | Mod: PBBFAC,,,

## 2017-11-12 ENCOUNTER — PATIENT MESSAGE (OUTPATIENT)
Dept: CARDIOLOGY | Facility: CLINIC | Age: 82
End: 2017-11-12

## 2017-11-20 RX ORDER — APIXABAN 2.5 MG/1
TABLET, FILM COATED ORAL
Qty: 30 TABLET | Refills: 0 | Status: SHIPPED | OUTPATIENT
Start: 2017-11-20 | End: 2017-11-21 | Stop reason: SDUPTHER

## 2017-11-21 NOTE — TELEPHONE ENCOUNTER
----- Message from Marty Garcia sent at 11/21/2017  3:25 PM CST -----  Contact: Daughter, Kelly Mendoza want to speak with a nurse regarding patient ELIQUIS 2.5 mg please call back at [phone]

## 2017-11-27 RX ORDER — BENAZEPRIL HYDROCHLORIDE 20 MG/1
TABLET ORAL
Qty: 90 TABLET | Refills: 0 | Status: SHIPPED | OUTPATIENT
Start: 2017-11-27 | End: 2018-01-30 | Stop reason: SDUPTHER

## 2017-11-30 ENCOUNTER — LAB VISIT (OUTPATIENT)
Dept: LAB | Facility: HOSPITAL | Age: 82
End: 2017-11-30
Attending: INTERNAL MEDICINE
Payer: MEDICARE

## 2017-11-30 DIAGNOSIS — N28.1 CYST OF KIDNEY, ACQUIRED: ICD-10-CM

## 2017-11-30 DIAGNOSIS — N25.81 SECONDARY RENAL HYPERPARATHYROIDISM: ICD-10-CM

## 2017-11-30 DIAGNOSIS — I12.9 BENIGN HYPERTENSIVE KIDNEY DISEASE WITH CHRONIC KIDNEY DISEASE, STAGE 1-4 OR UNSPECIFIED CHRONIC KIDNEY DISEASE: ICD-10-CM

## 2017-11-30 DIAGNOSIS — N18.30 CKD (CHRONIC KIDNEY DISEASE), STAGE III: ICD-10-CM

## 2017-11-30 LAB
ALBUMIN SERPL BCP-MCNC: 3.4 G/DL
ANION GAP SERPL CALC-SCNC: 10 MMOL/L
BUN SERPL-MCNC: 42 MG/DL
CALCIUM SERPL-MCNC: 9.3 MG/DL
CHLORIDE SERPL-SCNC: 103 MMOL/L
CO2 SERPL-SCNC: 27 MMOL/L
CREAT SERPL-MCNC: 1.9 MG/DL
EST. GFR  (AFRICAN AMERICAN): 27.3 ML/MIN/1.73 M^2
EST. GFR  (NON AFRICAN AMERICAN): 23.7 ML/MIN/1.73 M^2
GLUCOSE SERPL-MCNC: 108 MG/DL
PHOSPHATE SERPL-MCNC: 4.6 MG/DL
POTASSIUM SERPL-SCNC: 4.9 MMOL/L
PTH-INTACT SERPL-MCNC: 126 PG/ML
SODIUM SERPL-SCNC: 140 MMOL/L

## 2017-11-30 PROCEDURE — 83970 ASSAY OF PARATHORMONE: CPT

## 2017-11-30 PROCEDURE — 80069 RENAL FUNCTION PANEL: CPT

## 2017-11-30 PROCEDURE — 36415 COLL VENOUS BLD VENIPUNCTURE: CPT | Mod: PO

## 2017-12-02 ENCOUNTER — NURSE TRIAGE (OUTPATIENT)
Dept: ADMINISTRATIVE | Facility: CLINIC | Age: 82
End: 2017-12-02

## 2017-12-02 NOTE — TELEPHONE ENCOUNTER
Family called re refill. Ran out of eliquis thurs am. [facility] mail order not received. [facility] gave 4 pills recently. [facility] states [facility] not [facility]. Refused offer to call on call MD for add'l refill. Refused offer to call [facility] re refill. Family states that he will talk with sib and call back if refill requested. Request to ask for emerg refill from [facility] for enough med to get through the weekend. Call back with questions.     Reason for Disposition   Caller has NON-URGENT medication question about med that PCP prescribed and triager unable to answer question    Protocols used: ST MEDICATION QUESTION CALL-A-

## 2017-12-04 NOTE — TELEPHONE ENCOUNTER
Spoke with the patient she stated that she received her medication and does not need a refill right now.

## 2017-12-06 ENCOUNTER — OFFICE VISIT (OUTPATIENT)
Dept: NEPHROLOGY | Facility: CLINIC | Age: 82
End: 2017-12-06
Payer: MEDICARE

## 2017-12-06 VITALS
OXYGEN SATURATION: 96 % | HEART RATE: 60 BPM | DIASTOLIC BLOOD PRESSURE: 64 MMHG | WEIGHT: 125 LBS | SYSTOLIC BLOOD PRESSURE: 158 MMHG | BODY MASS INDEX: 23.6 KG/M2 | HEIGHT: 61 IN

## 2017-12-06 DIAGNOSIS — N28.1 CYST OF KIDNEY, ACQUIRED: ICD-10-CM

## 2017-12-06 DIAGNOSIS — N18.30 CKD (CHRONIC KIDNEY DISEASE), STAGE III: Primary | ICD-10-CM

## 2017-12-06 DIAGNOSIS — I12.9 BENIGN HYPERTENSIVE KIDNEY DISEASE WITH CHRONIC KIDNEY DISEASE, STAGE 1-4 OR UNSPECIFIED CHRONIC KIDNEY DISEASE: ICD-10-CM

## 2017-12-06 DIAGNOSIS — N25.81 SECONDARY RENAL HYPERPARATHYROIDISM: ICD-10-CM

## 2017-12-06 PROCEDURE — 99214 OFFICE O/P EST MOD 30 MIN: CPT | Mod: S$GLB,,, | Performed by: INTERNAL MEDICINE

## 2017-12-06 PROCEDURE — 99499 UNLISTED E&M SERVICE: CPT | Mod: S$GLB,,, | Performed by: INTERNAL MEDICINE

## 2017-12-06 PROCEDURE — 99999 PR PBB SHADOW E&M-EST. PATIENT-LVL III: CPT | Mod: PBBFAC,,, | Performed by: INTERNAL MEDICINE

## 2017-12-06 RX ORDER — BLOOD GLUCOSE CONTROL HIGH,LOW
EACH MISCELLANEOUS
COMMUNITY
Start: 2017-10-23 | End: 2017-12-06 | Stop reason: SDUPTHER

## 2017-12-06 NOTE — PROGRESS NOTES
"Subjective:       Patient ID: Kassi Skelton is a 85 y.o. White female who presents for return patient evaluation for chronic renal failure.    Her urinary symptoms are improved now on her jitendra medications.  She has chronic dry mouth with occasional dizziness but this does not seem to have been affected by her medications.  There have been no recent illnesses, hospitalizations or procedures.        Review of Systems   Constitutional: Negative for appetite change, chills and fever.   HENT:        Dry mouth-chronic   Eyes: Negative for visual disturbance.   Respiratory: Negative for cough and shortness of breath.    Cardiovascular: Negative for chest pain and leg swelling.   Gastrointestinal: Negative for abdominal pain, diarrhea, nausea and vomiting.   Genitourinary: Negative for difficulty urinating, dysuria and hematuria.   Musculoskeletal: Positive for arthralgias and gait problem. Negative for myalgias.   Skin: Negative for rash.   Neurological: Positive for dizziness (occasional). Negative for headaches.   Psychiatric/Behavioral: Negative for sleep disturbance.       The past medical, family and social histories were reviewed for this encounter.     BP (!) 158/64 (BP Location: Right arm, Patient Position: Sitting)   Pulse 60   Ht 5' 1" (1.549 m)   Wt 56.7 kg (125 lb)   SpO2 96%   BMI 23.62 kg/m²     Objective:      Physical Exam   Constitutional: She appears well-developed and well-nourished. No distress.   HENT:   Head: Normocephalic and atraumatic.   Eyes: Conjunctivae are normal. No scleral icterus.   Neck: Normal range of motion. No JVD present.   Cardiovascular: Normal rate, regular rhythm and normal heart sounds.  Exam reveals no gallop and no friction rub.    No murmur heard.  Pulmonary/Chest: Effort normal and breath sounds normal. No respiratory distress. She has no wheezes.   Abdominal: Soft. Bowel sounds are normal. She exhibits no distension. There is no tenderness.   Musculoskeletal: She " exhibits no edema.   Skin: Skin is warm and dry. No rash noted.   Psychiatric: She has a normal mood and affect.   Vitals reviewed.      Assessment:       1. CKD (chronic kidney disease), stage III    2. Benign hypertensive kidney disease with chronic kidney disease, stage 1-4 or unspecified chronic kidney disease    3. Cyst of kidney, acquired    4. Secondary renal hyperparathyroidism        Plan:   Return to clinic on April 17th in the afternoon please.  Labs for next visit include rp, pth.  Baseline creatinine is 1.1-1.5 since 2010 but is still in the upper 1.5-2.0 range for the past three years.  She wishes to continue conservative care.  PTH is 126 with a calcium of 9.3.  Continue current medications as prescribed and reviewed.   Blood pressure is controlled on the current regimen.

## 2018-01-03 ENCOUNTER — OFFICE VISIT (OUTPATIENT)
Dept: FAMILY MEDICINE | Facility: CLINIC | Age: 83
End: 2018-01-03
Payer: MEDICARE

## 2018-01-03 ENCOUNTER — TELEPHONE (OUTPATIENT)
Dept: FAMILY MEDICINE | Facility: CLINIC | Age: 83
End: 2018-01-03

## 2018-01-03 ENCOUNTER — HOSPITAL ENCOUNTER (OUTPATIENT)
Dept: RADIOLOGY | Facility: HOSPITAL | Age: 83
Discharge: HOME | End: 2018-01-03
Attending: FAMILY MEDICINE
Payer: MEDICARE

## 2018-01-03 VITALS
WEIGHT: 129 LBS | HEIGHT: 61 IN | DIASTOLIC BLOOD PRESSURE: 70 MMHG | TEMPERATURE: 98 F | BODY MASS INDEX: 24.35 KG/M2 | HEART RATE: 59 BPM | SYSTOLIC BLOOD PRESSURE: 119 MMHG

## 2018-01-03 DIAGNOSIS — E11.22 DIABETES MELLITUS WITH STAGE 4 CHRONIC KIDNEY DISEASE GFR 15-29: ICD-10-CM

## 2018-01-03 DIAGNOSIS — E03.9 HYPOTHYROIDISM, UNSPECIFIED TYPE: Chronic | ICD-10-CM

## 2018-01-03 DIAGNOSIS — I15.2 HYPERTENSION ASSOCIATED WITH DIABETES: ICD-10-CM

## 2018-01-03 DIAGNOSIS — Z95.0 PACEMAKER: ICD-10-CM

## 2018-01-03 DIAGNOSIS — E11.59 HYPERTENSION ASSOCIATED WITH DIABETES: ICD-10-CM

## 2018-01-03 DIAGNOSIS — N25.81 SECONDARY HYPERPARATHYROIDISM OF RENAL ORIGIN: ICD-10-CM

## 2018-01-03 DIAGNOSIS — N18.4 DIABETES MELLITUS WITH STAGE 4 CHRONIC KIDNEY DISEASE GFR 15-29: ICD-10-CM

## 2018-01-03 DIAGNOSIS — I48.91 ATRIAL FIBRILLATION, UNSPECIFIED TYPE: Chronic | ICD-10-CM

## 2018-01-03 DIAGNOSIS — M19.049 HAND ARTHRITIS: ICD-10-CM

## 2018-01-03 DIAGNOSIS — F33.1 MAJOR DEPRESSIVE DISORDER, RECURRENT EPISODE, MODERATE: ICD-10-CM

## 2018-01-03 DIAGNOSIS — I49.5 SINUS NODE DYSFUNCTION: ICD-10-CM

## 2018-01-03 DIAGNOSIS — M19.049 HAND ARTHRITIS: Primary | ICD-10-CM

## 2018-01-03 PROCEDURE — 73130 X-RAY EXAM OF HAND: CPT | Mod: 26,50,, | Performed by: RADIOLOGY

## 2018-01-03 PROCEDURE — 99214 OFFICE O/P EST MOD 30 MIN: CPT | Mod: S$GLB,,, | Performed by: FAMILY MEDICINE

## 2018-01-03 PROCEDURE — 73130 X-RAY EXAM OF HAND: CPT | Mod: 50,TC,PO

## 2018-01-03 PROCEDURE — 99499 UNLISTED E&M SERVICE: CPT | Mod: S$GLB,,, | Performed by: FAMILY MEDICINE

## 2018-01-03 PROCEDURE — 99999 PR PBB SHADOW E&M-EST. PATIENT-LVL IV: CPT | Mod: PBBFAC,,, | Performed by: FAMILY MEDICINE

## 2018-01-03 RX ORDER — METHYLPREDNISOLONE 4 MG/1
TABLET ORAL
Qty: 21 TABLET | Refills: 0 | Status: SHIPPED | OUTPATIENT
Start: 2018-01-03 | End: 2018-01-24

## 2018-01-03 RX ORDER — ACETAMINOPHEN 325 MG/1
650 TABLET ORAL EVERY 6 HOURS PRN
Refills: 0 | COMMUNITY
Start: 2018-01-03 | End: 2019-03-26

## 2018-01-03 NOTE — TELEPHONE ENCOUNTER
----- Message from Sandra Freeman sent at 1/3/2018 11:28 AM CST -----  Contact: pt   States she's calling to be worked in to see someone today for pain/swelling right index finger. Pt disconnected the call but the number on file to be reached is [phone]//thanks/dbw

## 2018-01-04 NOTE — PROGRESS NOTES
"Patient presents with a complaint of 3-4 days of increased swelling and tenderness decreased range of motion at the right third DIP joint.  She has significant osteoarthritis on previous imaging.  She denies any injury.  No current treatment.  Not able to take NSAIDs due to chronic kidney disease stage IV with diabetes.  Sees nephrology regarding this as well as secondary hyperparathyroidism.  Blood pressure is controlled.  Sees cardiology regarding atrial fibrillation and has pacemaker due to previous sinus node dysfunction.  Depression well controlled with current medication.  Hypothyroidism controlled.    Diabetes Management Status    Statin: Taking  ACE/ARB: Taking    Screening or Prevention Patient's value Goal Complete/Controlled?   HgA1C Testing and Control   Lab Results   Component Value Date    HGBA1C 5.9 (H) 08/31/2017      Annually/Less than 8% Yes   Lipid profile : 07/14/2017 Annually Yes   LDL control Lab Results   Component Value Date    LDLCALC 95.2 07/14/2017    Annually/Less than 100 mg/dl  Yes   Nephropathy screening No results found for: LABMICR  Lab Results   Component Value Date    PROTEINUA Negative 02/01/2016    Annually No   Blood pressure BP Readings from Last 1 Encounters:   01/03/18 119/70    Less than 140/90 Yes   Dilated retinal exam : 02/25/2017 Annually Yes   Foot exam   : 10/11/2017 Annually Yes         ROS:  GENERAL: No fever, chills,  or significant weight changes.   CARDIOVASCULAR: Denies chest pain.  ABDOMEN: Appetite fine. Denies diarrhea, abdominal pain, hematemesis or blood in stool.  URINARY: No flank pain, dysuria or hematuria.      OBJECTIVE:     Vitals:    01/03/18 1352   BP: 119/70   Pulse: (!) 59   Temp: 98.3 °F (36.8 °C)   Weight: 58.5 kg (128 lb 15.5 oz)   Height: 5' 1" (1.549 m)     Wt Readings from Last 3 Encounters:   01/03/18 58.5 kg (128 lb 15.5 oz)   12/06/17 56.7 kg (125 lb)   10/16/17 57 kg (125 lb 10.6 oz)     APPEARANCE: Well nourished, well developed, in no " acute distress.    HEAD: Normocephalic.  Atraumatic.    EYES:   Right eye: Pupil reactive.  Conjunctiva clear.    Left eye: Pupil reactive.  Conjunctiva clear.     EOMI.    NECK: Supple.    CHEST: Breath sounds clear bilaterally.  Normal respiratory effort  CARDIOVASCULAR: Normal rate.  Regular rhythm.  No murmurs.  No rub.  No gallops.  PERIPHERAL VASCULAR: No cyanosis.  No clubbing.  No edema.  MENTAL STATUS: Alert.  Oriented x 3.  She has some swelling and tenderness with decreased range of motion at the right index finger DIP joint.  She has multiple other IP joints with some swelling and some  deformity.        Kassi was seen today for hand pain.    Diagnoses and all orders for this visit:    Hand arthritis  -     X-Ray Hand 3 View Bilateral; Future  -     Ambulatory referral to Rheumatology    Diabetes mellitus with stage 4 chronic kidney disease GFR 15-29  -     Hemoglobin A1c; Standing    Secondary hyperparathyroidism of renal origin    Hypertension associated with diabetes    Atrial fibrillation, unspecified type    Pacemaker    Sinus node dysfunction    Major depressive disorder, recurrent episode, moderate    Hypothyroidism, unspecified type    Other orders  -     methylPREDNISolone (MEDROL DOSEPACK) 4 mg tablet; follow package directions  -     acetaminophen (TYLENOL) 325 MG tablet; Take 2 tablets (650 mg total) by mouth every 6 (six) hours as needed for Pain or Temperature greater than.      Check hemoglobin A1c and TSH in mid February.  Keep follow-up with specialists as she is doing.

## 2018-01-12 ENCOUNTER — PATIENT MESSAGE (OUTPATIENT)
Dept: FAMILY MEDICINE | Facility: CLINIC | Age: 83
End: 2018-01-12

## 2018-01-16 ENCOUNTER — TELEPHONE (OUTPATIENT)
Dept: FAMILY MEDICINE | Facility: CLINIC | Age: 83
End: 2018-01-16

## 2018-01-16 ENCOUNTER — TELEPHONE (OUTPATIENT)
Dept: RHEUMATOLOGY | Facility: CLINIC | Age: 83
End: 2018-01-16

## 2018-01-16 NOTE — TELEPHONE ENCOUNTER
----- Message from Yuly Payne LPN sent at 1/16/2018  1:10 PM CST -----      ----- Message -----  From: Bruna Hart LPN  Sent: 1/16/2018  12:10 PM  To: Joey Wilks    Patient has not been seen by rheumatology yet, but is complaining of hand pain increasing in spite of recent steroids. Patient asked for our office to contact yours for another appointment or steroid injection. She is on our wait list.     Sincerely,  Bruna Hart LPN  Rheumatology/[place]

## 2018-01-16 NOTE — TELEPHONE ENCOUNTER
----- Message from Stephanie Artis sent at 1/16/2018 11:03 AM CST -----  Patient states that she need to see the doctor this week stating that her hand is getting worse.  Patient is scheduled for 5-15 as a new patient.  Please call patient at [phone].    Patient is notified she is on our wait list for a sooner appointment. As a favor to the patient, PCP office is notified that her hand pain is increasing in spite of recent oral steroids. CAMI

## 2018-01-16 NOTE — TELEPHONE ENCOUNTER
See if we can get her something sooner in [place] or with [place] rheumatology.  Otherwise follow-up appointment with me so we can look at it again

## 2018-01-24 ENCOUNTER — INITIAL CONSULT (OUTPATIENT)
Dept: RHEUMATOLOGY | Facility: CLINIC | Age: 83
End: 2018-01-24
Payer: MEDICARE

## 2018-01-24 VITALS
DIASTOLIC BLOOD PRESSURE: 70 MMHG | HEIGHT: 61 IN | WEIGHT: 127.75 LBS | HEART RATE: 64 BPM | BODY MASS INDEX: 24.12 KG/M2 | SYSTOLIC BLOOD PRESSURE: 120 MMHG

## 2018-01-24 DIAGNOSIS — M19.91 PRIMARY OSTEOARTHRITIS, UNSPECIFIED SITE: Primary | ICD-10-CM

## 2018-01-24 DIAGNOSIS — M87.180 OSTEONECROSIS DUE TO DRUGS, JAW: ICD-10-CM

## 2018-01-24 PROCEDURE — 1126F AMNT PAIN NOTED NONE PRSNT: CPT | Mod: S$GLB,,, | Performed by: INTERNAL MEDICINE

## 2018-01-24 PROCEDURE — 99204 OFFICE O/P NEW MOD 45 MIN: CPT | Mod: S$GLB,,, | Performed by: INTERNAL MEDICINE

## 2018-01-24 PROCEDURE — 99499 UNLISTED E&M SERVICE: CPT | Mod: S$GLB,,, | Performed by: INTERNAL MEDICINE

## 2018-01-24 PROCEDURE — 99999 PR PBB SHADOW E&M-EST. PATIENT-LVL III: CPT | Mod: PBBFAC,,, | Performed by: INTERNAL MEDICINE

## 2018-01-24 PROCEDURE — 1159F MED LIST DOCD IN RCRD: CPT | Mod: S$GLB,,, | Performed by: INTERNAL MEDICINE

## 2018-01-24 PROCEDURE — 3008F BODY MASS INDEX DOCD: CPT | Mod: S$GLB,,, | Performed by: INTERNAL MEDICINE

## 2018-01-24 RX ORDER — METHYLPREDNISOLONE 4 MG/1
TABLET ORAL
Qty: 1 PACKAGE | Refills: 1 | Status: SHIPPED | OUTPATIENT
Start: 2018-01-24 | End: 2018-07-17

## 2018-01-24 RX ORDER — OXYBUTYNIN CHLORIDE 5 MG/1
5 TABLET ORAL DAILY
COMMUNITY
End: 2020-02-12

## 2018-01-24 ASSESSMENT — ROUTINE ASSESSMENT OF PATIENT INDEX DATA (RAPID3)
PATIENT GLOBAL ASSESSMENT SCORE: 8
PSYCHOLOGICAL DISTRESS SCORE: 2.2
PAIN SCORE: 5
MDHAQ FUNCTION SCORE: .3
TOTAL RAPID3 SCORE: 4.67

## 2018-01-24 NOTE — PATIENT INSTRUCTIONS
Tylenol 2 tablets of 325mg Tylenol ok to do this 2-3 times daily (every 8 hours)    Medrol is the steroid is for flares of joints with swelling.

## 2018-01-24 NOTE — PROGRESS NOTES
Subjective:          Chief Complaint: Kassi Skelton is a 85 y.o. female who had concerns including Disease Management.    HPI:    Patient is an 85-year-old female she has a history of CK D unable to take any nonsteroidal anti-inflammatories.  She is had a worsening swelling and pain of her hands tenderness seen with her primary care physician on January 3 this year showed no prior injury no similar previous episodes.  Is is a history of atrial fibrillation and a permanent pacemaker.    The Medrol dose pack as well as Tylenol with her PCP  Imaging did show advanced CMC arthritis as well as changes consistent with erosive osteoarthritis.  No mention of chondrocalcinosis.  They've been relatively stable in the left hand from a prior imaging from 2013 on the right did show some progression I've reviewed these myself.    REVIEW OF SYSTEMS:    Review of Systems   Constitutional: Negative for fever, malaise/fatigue and weight loss.   HENT: Negative for sore throat.    Eyes: Negative for double vision, photophobia and redness.   Respiratory: Negative for cough, shortness of breath and wheezing.    Cardiovascular: Negative for chest pain, palpitations and orthopnea.   Gastrointestinal: Negative for abdominal pain, constipation and diarrhea.   Genitourinary: Negative for dysuria, hematuria and urgency.   Musculoskeletal: Negative for back pain, joint pain and myalgias.   Skin: Negative for rash.   Neurological: Negative for dizziness, tingling, focal weakness and headaches.   Endo/Heme/Allergies: Does not bruise/bleed easily.   Psychiatric/Behavioral: Negative for depression, hallucinations and suicidal ideas.               Objective:            Past Medical History:   Diagnosis Date    *Atrial fibrillation 4/19/2012    *Atrial flutter 4/19/2012    Allergy     Ankle fracture 4/19/2012    Anticoagulant long-term use     Anxiety     Arthritis     Bacterial pneumonia, unspecified 12/7/2012    Breast cancer      LEFT    Cataract     CKD (chronic kidney disease), stage III 5/11/2012    Followed by Dr. 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    LBBB (left bundle branch block) 10/19/2012    Mild cognitive impairment with memory loss 6/15/2017    Osteopenia 4/19/2012    Osteoporosis     Otitis media     Pacemaker 11/2012    yo/chantell    Secondary hyperparathyroidism of renal origin     Simple renal cyst     Sinus node dysfunction     Urinary incontinence 4/19/2012     Family History   Problem Relation Age of Onset    Hypertension Mother     Heart disease Father     Coronary artery disease Brother     Coronary artery disease Brother     COPD Brother     Mitral valve prolapse Daughter     Peripheral vascular disease Son      Social History   Substance Use Topics    Smoking status: Never Smoker    Smokeless tobacco: Never Used    Alcohol use No         Current Outpatient Prescriptions on File Prior to Visit   Medication Sig Dispense Refill    acetaminophen (TYLENOL) 325 MG tablet Take 2 tablets (650 mg total) by mouth every 6 (six) hours as needed for Pain or Temperature greater than.  0    alcohol swabs PadM Apply 1 each topically as needed. 200 each 3    apixaban (ELIQUIS) 2.5 mg Tab Take 1 tablet (2.5 mg total) by mouth 2 (two) times daily. 180 tablet 3    benazepril (LOTENSIN) 20 MG tablet TAKE 1 TABLET ONE TIME DAILY 90 tablet 0    blood glucose control, high Soln Use as directed, for acucheck glucometer 3 each 3    blood glucose control, low Soln Use as directed, for acucheck glucometer 3 each 3    blood sugar diagnostic (ACCU-CHEK SHELDON PLUS TEST STRP) Strp Test glucose once daily 200 strip 3     cyanocobalamin, vitamin B-12, 1,000 mcg TbSR One tab po daily 1 each 0    donepezil (ARICEPT) 5 MG tablet Take 1 tablet (5 mg total) by mouth every evening. 90 tablet 1    glipiZIDE (GLUCOTROL) 2.5 MG TR24 TAKE 1 TABLET EVERY DAY WITH BREAKFAST 90 tablet 3    hydrochlorothiazide (HYDRODIURIL) 25 MG tablet TAKE 1 TABLET DAILY 90 tablet 3    levothyroxine (SYNTHROID) 75 MCG tablet Take 1 tablet (75 mcg total) by mouth once daily. 90 tablet 2    metoprolol tartrate (LOPRESSOR) 50 MG tablet TAKE 1/2 TABLET TWICE DAILY 90 tablet 3    mirabegron (MYRBETRIQ) 50 mg Tb24 Take 1 tablet (50 mg total) by mouth once daily. 90 tablet 3    mirtazapine (REMERON) 45 MG tablet Take 45 mg by mouth every evening.      pravastatin (PRAVACHOL) 40 MG tablet Take 1 tablet (40 mg total) by mouth once daily. 90 tablet 3    venlafaxine (EFFEXOR XR) 150 MG Cp24 Take 150 mg by mouth once daily.      verapamil (CALAN-SR) 120 MG CR tablet TAKE 1 TABLET EVERY NIGHT 90 tablet 3    ERGOCALCIFEROL, VITAMIN D2, (VITAMIN D ORAL) Take by mouth once daily.      lancets (ACCU-CHEK SOFTCLIX LANCETS) Misc CHECK TWICE A  each 3    [DISCONTINUED] methylPREDNISolone (MEDROL DOSEPACK) 4 mg tablet follow package directions 21 tablet 0     No current facility-administered medications on file prior to visit.        Vitals:    01/24/18 0942   BP: 120/70   Pulse: 64       Physical Exam:    Physical Exam   Constitutional: She is oriented to person, place, and time. She appears well-developed and well-nourished.   HENT:   Head: Normocephalic and atraumatic.   Mouth/Throat: Oropharynx is clear and moist.   Eyes: EOM are normal. Pupils are equal, round, and reactive to light.   Neck: Normal range of motion.   Cardiovascular: Normal rate, regular rhythm and normal heart sounds.    Pulmonary/Chest: Effort normal and breath sounds normal.   Musculoskeletal:        Right shoulder: She exhibits normal range of motion, no tenderness and no swelling.         Left shoulder: She exhibits normal range of motion, no tenderness and no swelling.        Right elbow: She exhibits normal range of motion and no swelling. No tenderness found.        Left elbow: She exhibits normal range of motion and no swelling. No tenderness found.        Right wrist: She exhibits normal range of motion, no tenderness and no swelling.        Left wrist: She exhibits normal range of motion, no tenderness and no swelling.        Right knee: She exhibits normal range of motion and no swelling. No tenderness found.        Left knee: She exhibits normal range of motion and no swelling. No tenderness found.        Right hand: She exhibits normal range of motion, no tenderness and no swelling.        Left hand: She exhibits normal range of motion, no tenderness and no swelling.        Right foot: There is normal range of motion, no tenderness and no swelling.        Left foot: There is normal range of motion, no tenderness and no swelling.   Neurological: She is alert and oriented to person, place, and time.   Skin: Skin is warm and dry.   Psychiatric: She has a normal mood and affect. Her behavior is normal.             Assessment:       Encounter Diagnoses   Name Primary?   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 high risk for any antiresorptive.     Other orders  -     methylPREDNISolone (MEDROL DOSEPACK) 4 mg tablet; use as directed  Dispense: 1 Package; Refill: 1      EOA   -agree with previous assessment. Currently with flare medrol. Discussed natural progression of OA and management strategies including exercises, medrol and tylenol PRN pain or flares.   Contraindication for anti-resorptive therapy.     Follow-up if symptoms worsen or fail to improve.        45min consultation with greater than 50% spent in counseling, chart  review and coordination of care. All questions answered.

## 2018-01-24 NOTE — LETTER
February 1, 2018      Mor Cook MD  [id number] [facility] [id number]           [place] [facility] [address] [facility] [address] [place] [address]  Phone: [phone]  Fax: [phone]          Patient: Kassi Skelton   MR Number: [id number]   YOB: 1932   Date of Visit: 1/24/2018       Dear Dr. Mor Cook:    Thank you for referring Kassi Skelton to me for evaluation. Attached you will find relevant portions of my assessment and plan of care.    If you have questions, please do not hesitate to call me. I look forward to following Kassi Skelton along with you.    Sincerely,    Nasim Stover  CC:  No Recipients    If you would like to receive this communication electronically, please contact [email or web address] or [phone] to request more information on [employer] Link access.    For providers and/or their staff who would like to refer a patient to [facility], please contact us through our one-stop-shop provider referral line, Shruti Riggs, at [phone].    If you feel you have received this communication in error or would no longer like to receive these types of communications, please e-mail [email or web address]

## 2018-01-30 RX ORDER — BENAZEPRIL HYDROCHLORIDE 20 MG/1
TABLET ORAL
Qty: 90 TABLET | Refills: 3 | Status: SHIPPED | OUTPATIENT
Start: 2018-01-30 | End: 2019-02-22 | Stop reason: SDUPTHER

## 2018-02-05 RX ORDER — METOPROLOL TARTRATE 50 MG/1
TABLET ORAL
Qty: 90 TABLET | Refills: 3 | Status: SHIPPED | OUTPATIENT
Start: 2018-02-05 | End: 2019-02-22 | Stop reason: SDUPTHER

## 2018-02-05 RX ORDER — VERAPAMIL HYDROCHLORIDE 120 MG/1
TABLET, FILM COATED, EXTENDED RELEASE ORAL
Qty: 90 TABLET | Refills: 3 | Status: SHIPPED | OUTPATIENT
Start: 2018-02-05 | End: 2018-02-08 | Stop reason: SDUPTHER

## 2018-02-08 RX ORDER — VERAPAMIL HYDROCHLORIDE 120 MG/1
120 TABLET, FILM COATED, EXTENDED RELEASE ORAL NIGHTLY
Qty: 90 TABLET | Refills: 0 | Status: SHIPPED | OUTPATIENT
Start: 2018-02-08 | End: 2019-02-22 | Stop reason: SDUPTHER

## 2018-02-08 RX ORDER — DONEPEZIL HYDROCHLORIDE 5 MG/1
5 TABLET, FILM COATED ORAL NIGHTLY
Qty: 90 TABLET | Refills: 3 | Status: SHIPPED | OUTPATIENT
Start: 2018-02-08 | End: 2018-09-29 | Stop reason: SDUPTHER

## 2018-02-15 ENCOUNTER — LAB VISIT (OUTPATIENT)
Dept: LAB | Facility: HOSPITAL | Age: 83
End: 2018-02-15
Attending: FAMILY MEDICINE
Payer: MEDICARE

## 2018-02-15 DIAGNOSIS — N18.4 DIABETES MELLITUS WITH STAGE 4 CHRONIC KIDNEY DISEASE GFR 15-29: ICD-10-CM

## 2018-02-15 DIAGNOSIS — E11.22 DIABETES MELLITUS WITH STAGE 4 CHRONIC KIDNEY DISEASE GFR 15-29: ICD-10-CM

## 2018-02-15 DIAGNOSIS — E03.9 HYPOTHYROIDISM: ICD-10-CM

## 2018-02-15 LAB
T4 FREE SERPL-MCNC: 1.03 NG/DL
TSH SERPL DL<=0.005 MIU/L-ACNC: 0.05 UIU/ML

## 2018-02-15 PROCEDURE — 36415 COLL VENOUS BLD VENIPUNCTURE: CPT | Mod: PO

## 2018-02-15 PROCEDURE — 84443 ASSAY THYROID STIM HORMONE: CPT

## 2018-02-15 PROCEDURE — 83036 HEMOGLOBIN GLYCOSYLATED A1C: CPT

## 2018-02-15 PROCEDURE — 84439 ASSAY OF FREE THYROXINE: CPT

## 2018-02-16 DIAGNOSIS — E11.69 COMBINED HYPERLIPIDEMIA ASSOCIATED WITH TYPE 2 DIABETES MELLITUS: ICD-10-CM

## 2018-02-16 DIAGNOSIS — E03.9 HYPOTHYROIDISM, UNSPECIFIED TYPE: Primary | ICD-10-CM

## 2018-02-16 DIAGNOSIS — E78.2 COMBINED HYPERLIPIDEMIA ASSOCIATED WITH TYPE 2 DIABETES MELLITUS: ICD-10-CM

## 2018-02-16 LAB
ESTIMATED AVG GLUCOSE: 128 MG/DL
HBA1C MFR BLD HPLC: 6.1 %

## 2018-02-16 RX ORDER — LEVOTHYROXINE SODIUM 50 UG/1
50 TABLET ORAL DAILY
Qty: 90 TABLET | Refills: 0 | Status: SHIPPED | OUTPATIENT
Start: 2018-02-16 | End: 2018-04-02 | Stop reason: SDUPTHER

## 2018-03-01 ENCOUNTER — HOSPITAL ENCOUNTER (OUTPATIENT)
Dept: RADIOLOGY | Facility: HOSPITAL | Age: 83
Discharge: HOME | End: 2018-03-01
Attending: FAMILY MEDICINE
Payer: MEDICARE

## 2018-03-01 VITALS — BODY MASS INDEX: 24.15 KG/M2 | WEIGHT: 127.88 LBS | HEIGHT: 61 IN

## 2018-03-01 DIAGNOSIS — Z85.3 HISTORY OF BREAST CANCER: ICD-10-CM

## 2018-03-01 DIAGNOSIS — Z12.39 ENCOUNTER FOR SPECIAL SCREENING EXAMINATION FOR NEOPLASM OF BREAST: ICD-10-CM

## 2018-03-01 PROCEDURE — 77061 BREAST TOMOSYNTHESIS UNI: CPT | Mod: 26,,, | Performed by: RADIOLOGY

## 2018-03-01 PROCEDURE — 77065 DX MAMMO INCL CAD UNI: CPT | Mod: TC,PO

## 2018-03-01 PROCEDURE — 77061 BREAST TOMOSYNTHESIS UNI: CPT | Mod: TC,PO

## 2018-03-01 PROCEDURE — 77065 DX MAMMO INCL CAD UNI: CPT | Mod: 26,,, | Performed by: RADIOLOGY

## 2018-03-30 DIAGNOSIS — E78.5 HYPERLIPIDEMIA, UNSPECIFIED HYPERLIPIDEMIA TYPE: ICD-10-CM

## 2018-04-02 RX ORDER — PRAVASTATIN SODIUM 40 MG/1
TABLET ORAL
Qty: 90 TABLET | Refills: 0 | Status: SHIPPED | OUTPATIENT
Start: 2018-04-02 | End: 2018-06-29 | Stop reason: SDUPTHER

## 2018-04-02 RX ORDER — LEVOTHYROXINE SODIUM 50 UG/1
50 TABLET ORAL DAILY
Qty: 90 TABLET | Refills: 0 | Status: SHIPPED | OUTPATIENT
Start: 2018-04-02 | End: 2018-04-05 | Stop reason: SDUPTHER

## 2018-04-05 RX ORDER — LEVOTHYROXINE SODIUM 50 UG/1
50 TABLET ORAL DAILY
Qty: 90 TABLET | Refills: 3 | Status: SHIPPED | OUTPATIENT
Start: 2018-04-05 | End: 2019-01-14 | Stop reason: SDUPTHER

## 2018-04-23 ENCOUNTER — OFFICE VISIT (OUTPATIENT)
Dept: CARDIOLOGY | Facility: CLINIC | Age: 83
End: 2018-04-23
Payer: MEDICARE

## 2018-04-23 ENCOUNTER — CLINICAL SUPPORT (OUTPATIENT)
Dept: CARDIOLOGY | Facility: CLINIC | Age: 83
End: 2018-04-23
Attending: INTERNAL MEDICINE
Payer: MEDICARE

## 2018-04-23 ENCOUNTER — OFFICE VISIT (OUTPATIENT)
Dept: NEPHROLOGY | Facility: CLINIC | Age: 83
End: 2018-04-23
Payer: MEDICARE

## 2018-04-23 VITALS
BODY MASS INDEX: 24.69 KG/M2 | OXYGEN SATURATION: 97 % | SYSTOLIC BLOOD PRESSURE: 130 MMHG | HEART RATE: 60 BPM | DIASTOLIC BLOOD PRESSURE: 66 MMHG | HEIGHT: 61 IN | WEIGHT: 130.75 LBS

## 2018-04-23 VITALS
HEART RATE: 59 BPM | WEIGHT: 130 LBS | BODY MASS INDEX: 24.55 KG/M2 | HEIGHT: 61 IN | SYSTOLIC BLOOD PRESSURE: 149 MMHG | DIASTOLIC BLOOD PRESSURE: 65 MMHG | RESPIRATION RATE: 16 BRPM

## 2018-04-23 DIAGNOSIS — E78.5 HYPERLIPIDEMIA, UNSPECIFIED HYPERLIPIDEMIA TYPE: ICD-10-CM

## 2018-04-23 DIAGNOSIS — Z95.0 CARDIAC PACEMAKER IN SITU: ICD-10-CM

## 2018-04-23 DIAGNOSIS — I44.7 LBBB (LEFT BUNDLE BRANCH BLOCK): ICD-10-CM

## 2018-04-23 DIAGNOSIS — Z95.0 PACEMAKER: ICD-10-CM

## 2018-04-23 DIAGNOSIS — N18.30 CKD (CHRONIC KIDNEY DISEASE), STAGE III: Primary | ICD-10-CM

## 2018-04-23 DIAGNOSIS — Z86.79 HISTORY OF ATRIAL FLUTTER: Primary | ICD-10-CM

## 2018-04-23 DIAGNOSIS — Z95.0 CARDIAC PACEMAKER IN SITU: Primary | ICD-10-CM

## 2018-04-23 DIAGNOSIS — N25.81 SECONDARY RENAL HYPERPARATHYROIDISM: ICD-10-CM

## 2018-04-23 DIAGNOSIS — I49.5 SINUS NODE DYSFUNCTION: ICD-10-CM

## 2018-04-23 DIAGNOSIS — N28.1 CYST OF KIDNEY, ACQUIRED: ICD-10-CM

## 2018-04-23 DIAGNOSIS — I12.9 BENIGN HYPERTENSIVE KIDNEY DISEASE WITH CHRONIC KIDNEY DISEASE, STAGE 1-4 OR UNSPECIFIED CHRONIC KIDNEY DISEASE: ICD-10-CM

## 2018-04-23 PROCEDURE — 99214 OFFICE O/P EST MOD 30 MIN: CPT | Mod: S$GLB,,, | Performed by: INTERNAL MEDICINE

## 2018-04-23 PROCEDURE — 93280 PM DEVICE PROGR EVAL DUAL: CPT | Mod: S$GLB,,, | Performed by: INTERNAL MEDICINE

## 2018-04-23 PROCEDURE — 99499 UNLISTED E&M SERVICE: CPT | Mod: S$GLB,,, | Performed by: INTERNAL MEDICINE

## 2018-04-23 PROCEDURE — 99999 PR PBB SHADOW E&M-EST. PATIENT-LVL III: CPT | Mod: PBBFAC,,, | Performed by: INTERNAL MEDICINE

## 2018-04-23 NOTE — PROGRESS NOTES
"Subjective:       Patient ID: Kassi Skelton is a 86 y.o. White female who presents for return patient evaluation for chronic renal failure.    She has no uremic or urinary symptoms and is in her usual state of health.  There have been no recent illnesses, hospitalizations or procedures.        Review of Systems   Constitutional: Negative for appetite change, chills and fever.   HENT:        Dry mouth-chronic   Eyes: Negative for visual disturbance.   Respiratory: Negative for cough and shortness of breath.    Cardiovascular: Negative for chest pain and leg swelling.   Gastrointestinal: Negative for abdominal pain, diarrhea, nausea and vomiting.   Genitourinary: Negative for difficulty urinating, dysuria and hematuria.   Musculoskeletal: Positive for arthralgias and gait problem. Negative for myalgias.   Skin: Negative for rash.   Neurological: Negative for dizziness and headaches.   Psychiatric/Behavioral: Negative for sleep disturbance.       The past medical, family and social histories were reviewed for this encounter.     /66   Pulse 60   Ht 5' 0.98" (1.549 m)   Wt 59.3 kg (130 lb 11.7 oz)   SpO2 97%   BMI 24.72 kg/m²     Objective:      Physical Exam   Constitutional: She appears well-developed and well-nourished. No distress.   HENT:   Head: Normocephalic and atraumatic.   Eyes: Conjunctivae are normal. No scleral icterus.   Neck: Normal range of motion. No JVD present.   Cardiovascular: Normal rate, regular rhythm and normal heart sounds.  Exam reveals no gallop and no friction rub.    No murmur heard.  Pulmonary/Chest: Effort normal and breath sounds normal. No respiratory distress. She has no wheezes.   Abdominal: Soft. Bowel sounds are normal. She exhibits no distension. There is no tenderness.   Musculoskeletal: She exhibits no edema.   Skin: Skin is warm and dry. No rash noted.   Psychiatric: She has a normal mood and affect.   Vitals reviewed.      Assessment:       1. CKD (chronic " kidney disease), stage III    2. Benign hypertensive kidney disease with chronic kidney disease, stage 1-4 or unspecified chronic kidney disease    3. Secondary renal hyperparathyroidism    4. Cyst of kidney, acquired        Plan:   Return to clinic in 6 months in the afternoon please.  Labs for next visit include rp, pth, upc.  Baseline creatinine is 1.1-1.5 since 2010 but is still in the upper 1.5-2.0 range for the past three years.  She wishes to continue conservative care.  PTH is 126 with a calcium of 9.3.  Continue current medications as prescribed and reviewed.   Blood pressure is controlled on the current regimen.  Her daughter Tova's cell is [phone].

## 2018-04-23 NOTE — PROGRESS NOTES
Subjective:    Patient ID:  Kassi Skelton is a 86 y.o. female who presents for follow-up of Atrial Fibrillation      HPI  She comes with no complaints, no chest pain, no shortness of breath  FC II    Review of Systems   Constitution: Negative for decreased appetite, weakness, malaise/fatigue, weight gain and weight loss.   Cardiovascular: Negative for chest pain, dyspnea on exertion, leg swelling, palpitations and syncope.   Respiratory: Negative for cough and shortness of breath.    Gastrointestinal: Negative.    All other systems reviewed and are negative.       Objective:    Physical Exam   Constitutional: She is oriented to person, place, and time. She appears well-developed and well-nourished.   HENT:   Head: Normocephalic.   Eyes: Pupils are equal, round, and reactive to light.   Neck: Normal range of motion. Neck supple. No JVD present. Carotid bruit is not present. No thyromegaly present.   Cardiovascular: Normal rate, regular rhythm, normal heart sounds, intact distal pulses and normal pulses.  PMI is not displaced.  Exam reveals no gallop.    No murmur heard.  Pulmonary/Chest: Effort normal and breath sounds normal.   Abdominal: Soft. Normal appearance. She exhibits no mass. There is no hepatosplenomegaly. There is no tenderness.   Musculoskeletal: Normal range of motion. She exhibits no edema.   Neurological: She is alert and oriented to person, place, and time. She has normal strength and normal reflexes. No sensory deficit.   Skin: Skin is warm and intact.   Psychiatric: She has a normal mood and affect.   Nursing note and vitals reviewed.        Assessment:       1. History of atrial flutter    2. LBBB (left bundle branch block)    3. Pacemaker    4. Hyperlipidemia, unspecified hyperlipidemia type         Plan:     Continue all cardiac medications  Regular exercise program  1 yr f/u

## 2018-04-25 ENCOUNTER — LAB VISIT (OUTPATIENT)
Dept: LAB | Facility: HOSPITAL | Age: 83
End: 2018-04-25
Attending: FAMILY MEDICINE
Payer: MEDICARE

## 2018-04-25 DIAGNOSIS — E03.9 HYPOTHYROIDISM: ICD-10-CM

## 2018-04-25 DIAGNOSIS — N18.30 CKD (CHRONIC KIDNEY DISEASE), STAGE III: ICD-10-CM

## 2018-04-25 LAB
ALBUMIN SERPL BCP-MCNC: 3.8 G/DL
ANION GAP SERPL CALC-SCNC: 6 MMOL/L
BUN SERPL-MCNC: 21 MG/DL
CALCIUM SERPL-MCNC: 9.5 MG/DL
CHLORIDE SERPL-SCNC: 100 MMOL/L
CO2 SERPL-SCNC: 31 MMOL/L
CREAT SERPL-MCNC: 1.5 MG/DL
EST. GFR  (AFRICAN AMERICAN): 36.1 ML/MIN/1.73 M^2
EST. GFR  (NON AFRICAN AMERICAN): 31.3 ML/MIN/1.73 M^2
GLUCOSE SERPL-MCNC: 95 MG/DL
PHOSPHATE SERPL-MCNC: 4.4 MG/DL
POTASSIUM SERPL-SCNC: 5 MMOL/L
SODIUM SERPL-SCNC: 137 MMOL/L
TSH SERPL DL<=0.005 MIU/L-ACNC: 1.49 UIU/ML

## 2018-04-25 PROCEDURE — 36415 COLL VENOUS BLD VENIPUNCTURE: CPT | Mod: PO

## 2018-04-25 PROCEDURE — 84443 ASSAY THYROID STIM HORMONE: CPT

## 2018-04-25 PROCEDURE — 80069 RENAL FUNCTION PANEL: CPT

## 2018-06-18 ENCOUNTER — PES CALL (OUTPATIENT)
Dept: ADMINISTRATIVE | Facility: CLINIC | Age: 83
End: 2018-06-18

## 2018-06-29 DIAGNOSIS — E78.5 HYPERLIPIDEMIA, UNSPECIFIED HYPERLIPIDEMIA TYPE: ICD-10-CM

## 2018-06-29 RX ORDER — PRAVASTATIN SODIUM 40 MG/1
TABLET ORAL
Qty: 90 TABLET | Refills: 1 | Status: SHIPPED | OUTPATIENT
Start: 2018-06-29 | End: 2019-02-22 | Stop reason: SDUPTHER

## 2018-07-02 RX ORDER — GLIPIZIDE 2.5 MG/1
TABLET, EXTENDED RELEASE ORAL
Qty: 90 TABLET | Refills: 0 | Status: SHIPPED | OUTPATIENT
Start: 2018-07-02 | End: 2018-09-11 | Stop reason: SDUPTHER

## 2018-07-17 ENCOUNTER — OFFICE VISIT (OUTPATIENT)
Dept: FAMILY MEDICINE | Facility: CLINIC | Age: 83
End: 2018-07-17
Payer: MEDICARE

## 2018-07-17 VITALS
SYSTOLIC BLOOD PRESSURE: 126 MMHG | HEART RATE: 60 BPM | TEMPERATURE: 98 F | HEIGHT: 61 IN | BODY MASS INDEX: 25.11 KG/M2 | WEIGHT: 133 LBS | DIASTOLIC BLOOD PRESSURE: 61 MMHG

## 2018-07-17 DIAGNOSIS — Z95.0 PACEMAKER: ICD-10-CM

## 2018-07-17 DIAGNOSIS — I44.7 LBBB (LEFT BUNDLE BRANCH BLOCK): ICD-10-CM

## 2018-07-17 DIAGNOSIS — Z86.79 HISTORY OF ATRIAL FLUTTER: ICD-10-CM

## 2018-07-17 DIAGNOSIS — I48.91 ATRIAL FIBRILLATION, UNSPECIFIED TYPE: Chronic | ICD-10-CM

## 2018-07-17 DIAGNOSIS — N39.46 URINARY INCONTINENCE, MIXED: Chronic | ICD-10-CM

## 2018-07-17 DIAGNOSIS — E11.40 TYPE 2 DIABETES MELLITUS WITH DIABETIC NEUROPATHY, WITHOUT LONG-TERM CURRENT USE OF INSULIN: ICD-10-CM

## 2018-07-17 DIAGNOSIS — E11.59 HYPERTENSION ASSOCIATED WITH DIABETES: ICD-10-CM

## 2018-07-17 DIAGNOSIS — E78.2 COMBINED HYPERLIPIDEMIA ASSOCIATED WITH TYPE 2 DIABETES MELLITUS: ICD-10-CM

## 2018-07-17 DIAGNOSIS — G31.84 MILD COGNITIVE IMPAIRMENT WITH MEMORY LOSS: ICD-10-CM

## 2018-07-17 DIAGNOSIS — M19.91 PRIMARY OSTEOARTHRITIS, UNSPECIFIED SITE: ICD-10-CM

## 2018-07-17 DIAGNOSIS — N28.1 CYST OF KIDNEY, ACQUIRED: ICD-10-CM

## 2018-07-17 DIAGNOSIS — N25.81 SECONDARY HYPERPARATHYROIDISM OF RENAL ORIGIN: ICD-10-CM

## 2018-07-17 DIAGNOSIS — N18.4 DIABETES MELLITUS WITH STAGE 4 CHRONIC KIDNEY DISEASE GFR 15-29: ICD-10-CM

## 2018-07-17 DIAGNOSIS — F33.1 MAJOR DEPRESSIVE DISORDER, RECURRENT EPISODE, MODERATE: ICD-10-CM

## 2018-07-17 DIAGNOSIS — I95.1 ORTHOSTATIC HYPOTENSION: ICD-10-CM

## 2018-07-17 DIAGNOSIS — Z95.0 CARDIAC PACEMAKER IN SITU: Chronic | ICD-10-CM

## 2018-07-17 DIAGNOSIS — I15.2 HYPERTENSION ASSOCIATED WITH DIABETES: ICD-10-CM

## 2018-07-17 DIAGNOSIS — M81.0 OSTEOPOROSIS, UNSPECIFIED OSTEOPOROSIS TYPE, UNSPECIFIED PATHOLOGICAL FRACTURE PRESENCE: ICD-10-CM

## 2018-07-17 DIAGNOSIS — I49.5 SINUS NODE DYSFUNCTION: ICD-10-CM

## 2018-07-17 DIAGNOSIS — E11.69 COMBINED HYPERLIPIDEMIA ASSOCIATED WITH TYPE 2 DIABETES MELLITUS: ICD-10-CM

## 2018-07-17 DIAGNOSIS — C44.92 KERATOACANTHOMA TYPE SQUAMOUS CELL CARCINOMA OF SKIN: ICD-10-CM

## 2018-07-17 DIAGNOSIS — E11.22 DIABETES MELLITUS WITH STAGE 4 CHRONIC KIDNEY DISEASE GFR 15-29: ICD-10-CM

## 2018-07-17 DIAGNOSIS — E03.9 HYPOTHYROIDISM, UNSPECIFIED TYPE: Chronic | ICD-10-CM

## 2018-07-17 DIAGNOSIS — Z97.4 HEARING AID WORN: ICD-10-CM

## 2018-07-17 DIAGNOSIS — D05.12 DUCTAL CARCINOMA IN SITU (DCIS) OF LEFT BREAST: ICD-10-CM

## 2018-07-17 DIAGNOSIS — I12.9 BENIGN HYPERTENSIVE KIDNEY DISEASE WITH CHRONIC KIDNEY DISEASE STAGE I THROUGH STAGE IV, OR UNSPECIFIED: ICD-10-CM

## 2018-07-17 PROCEDURE — 99999 PR PBB SHADOW E&M-EST. PATIENT-LVL III: CPT | Mod: PBBFAC,,, | Performed by: NURSE PRACTITIONER

## 2018-07-17 PROCEDURE — G0439 PPPS, SUBSEQ VISIT: HCPCS | Mod: S$GLB,,, | Performed by: NURSE PRACTITIONER

## 2018-07-17 RX ORDER — BLOOD GLUCOSE CONTROL HIGH,LOW
EACH MISCELLANEOUS
COMMUNITY
Start: 2018-05-22 | End: 2019-01-15 | Stop reason: SDUPTHER

## 2018-07-17 RX ORDER — VENLAFAXINE HYDROCHLORIDE 150 MG/1
150 CAPSULE, EXTENDED RELEASE ORAL DAILY
Qty: 90 CAPSULE | Refills: 1 | Status: SHIPPED | OUTPATIENT
Start: 2018-07-17 | End: 2019-02-19 | Stop reason: SDUPTHER

## 2018-07-17 NOTE — ASSESSMENT & PLAN NOTE
Stable  Continue medication as prescribed  Continue current treatment plan as previously prescribed with your PCP and rheumatology, Dr. Wallace

## 2018-07-17 NOTE — ASSESSMENT & PLAN NOTE
Stable and controlled, taking verapamil  Continue medication as prescribed  Continue current treatment plan as previously prescribed with your PCP and nephrologist, Dr. Kang

## 2018-07-17 NOTE — PROGRESS NOTES
"Kassi Skelton presented for a  Medicare AWV and comprehensive Health Risk Assessment today. The following components were reviewed and updated:    · Medical history  · Family History  · Social history  · Allergies and Current Medications  · Health Risk Assessment  · Health Maintenance  · Care Team     ** See Completed Assessments for Annual Wellness Visit within the encounter summary.**       The following assessments were completed:  · Living Situation  · CAGE  · Depression Screening  · Timed Get Up and Go  · Whisper Test  · Cognitive Function Screening  · Nutrition Screening  · ADL Screening  · PAQ Screening    Vitals:    07/17/18 1408   BP: 126/61   Pulse: 60   Temp: 98.2 °F (36.8 °C)   TempSrc: Oral   Weight: 60.3 kg (133 lb)   Height: 5' 1" (1.549 m)     Body mass index is 25.13 kg/m².  Physical Exam   Constitutional: She is oriented to person, place, and time. She appears well-developed and well-nourished.   HENT:   Head: Normocephalic.   Eyes: Pupils are equal, round, and reactive to light.   Neck: Normal range of motion. Neck supple.   Cardiovascular: Normal rate, regular rhythm and normal heart sounds.    Pulmonary/Chest: Effort normal and breath sounds normal. No respiratory distress. She has no decreased breath sounds. She has no wheezes. She has no rhonchi. She has no rales.   Neurological: She is alert and oriented to person, place, and time. GCS eye subscore is 4. GCS verbal subscore is 5. GCS motor subscore is 6.   Skin: Skin is warm and dry. No rash noted.   Psychiatric: She has a normal mood and affect. Her speech is normal and behavior is normal. Judgment and thought content normal. Cognition and memory are impaired.   Vitals reviewed.        Diagnoses and health risks identified today and associated recommendations/orders:    Mild cognitive impairment with memory loss  Stable and controlled on Aricept  Continue medication as prescribed  Continue current treatment plan as previously " prescribed with your PCP and neurologist, Dr. Davila      Major depressive disorder, recurrent episode, moderate  Stable and controlled on Effexor XR  Continue medication as prescribed  Continue current treatment plan as previously prescribed with your PCP      Hearing aid worn  Continue current treatment plan as previously prescribed with your PCP      Atrial fibrillation  Stable and controlled on Lotensin, HCTZ, Lopressor, Eliquis, and verapamil  Continue medication as prescribed  Continue current treatment plan as previously prescribed with your PCP and cardiologist, Dr. Swanson      Cardiac pacemaker in situ  Stable and controlled on Lotensin, HCTZ, Lopressor, Eliquis, and verapamil  Continue medication as prescribed  Continue current treatment plan as previously prescribed with your PCP and cardiologist, Dr. Swanson    History of atrial flutter  Stable and controlled on Lotensin, HCTZ, Lopressor, Eliquis, and verapamil-Pacemaker in situ  Continue medication as prescribed  Continue current treatment plan as previously prescribed with your PCP and cardiologist, Dr. Swanson    Combined hyperlipidemia associated with type 2 diabetes mellitus  Stable and controlled on pravastatin  Due for lipid panel  Continue medication as prescribed  Continue current treatment plan as previously prescribed with your PCP and cardiologist, Dr. Swanson      LBBB (left bundle branch block)  Stable and controlled on Lotensin, HCTZ, Lopressor, Eliquis, and verapamil  Continue medication as prescribed  Continue current treatment plan as previously prescribed with your PCP and cardiologist, Dr. Swanson    Sinus node dysfunction  Stable and controlled on Lotensin, HCTZ, Lopressor, Eliquis, and verapamil  Continue medication as prescribed  Continue current treatment plan as previously prescribed with your PCP and cardiologist, Dr. Swanson    Pacemaker  Stable and controlled on Lotensin, HCTZ, Lopressor, Eliquis, and  verapamil  Continue medication as prescribed  Continue current treatment plan as previously prescribed with your PCP and cardiologist, Dr. Swanson    Orthostatic hypotension  Stable and controlled   Continue medication as prescribed  Continue current treatment plan as previously prescribed with your PCP and cardiologist, Dr. Swanson    Hypertension associated with diabetes  Stable and controlled on Lotensin, HCTZ, Lopressor, and verapamil  Continue medication as prescribed  Continue current treatment plan as previously prescribed with your PCP and cardiologist, Dr. Swanson    Urinary incontinence, mixed  Stable and controlled on Myrbetriq and Ditropan-XL  Continue medication as prescribed  Continue current treatment plan as previously prescribed with your PCP and urologist, Dr. Louis      Benign hypertensive kidney disease with chronic kidney disease  Stable and controlled, taking verapamil  Continue medication as prescribed  Continue current treatment plan as previously prescribed with your PCP and nephrologist, Dr. Kang      Cyst of kidney, acquired  Stable   Continue medication as prescribed  Continue current treatment plan as previously prescribed with your PCP and nephrologist, Dr. Kang    DCIS (ductal carcinoma in situ)  Stable s/p right mastectomy  Continue current treatment plan as previously prescribed with your PCP and hem/onc, Dr. 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 Wolff      Hypothyroidism  Stable and controlled on Synthroid  Continue medication as prescribed  Continue current treatment plan as previously prescribed with your PCP      Diabetes mellitus with stage 4 chronic kidney disease GFR 15-29  Stable and controlled on glipizide  Continue medication as prescribed  Continue current treatment plan as previously prescribed with your PCP      Secondary  hyperparathyroidism of renal origin  Continue current treatment plan as previously prescribed with your PCP and Dr. Kang      Diabetic neuropathy, type II diabetes mellitus  Continue current treatment plan as previously prescribed with your PCP      Osteoporosis  Stable   Continue current treatment plan as previously prescribed with your PCP      Primary osteoarthritis  Stable  Continue medication as prescribed  Continue current treatment plan as previously prescribed with your PCP and rheumatology, Dr. Wallace      Patient is scheduled to have her labs drawn next month.  She has agreed to an eye exam.  Provided Kassi with a 5-10 year written screening schedule and personal prevention plan. Recommendations were developed using the USPSTF age appropriate recommendations. Education, counseling, and referrals were provided as needed. After Visit Summary printed and given to patient which includes a list of additional screenings\tests needed.    Follow-up if symptoms worsen or fail to improve.    Francisco Swenson NP

## 2018-07-17 NOTE — ASSESSMENT & PLAN NOTE
Stable and controlled on Lotensin, HCTZ, Lopressor, Eliquis, and verapamil  Continue medication as prescribed  Continue current treatment plan as previously prescribed with your PCP and cardiologist, Dr. Swanson

## 2018-07-17 NOTE — ASSESSMENT & PLAN NOTE
Stable and controlled on Lotensin, HCTZ, Lopressor, Eliquis, and verapamil-Pacemaker in situ  Continue medication as prescribed  Continue current treatment plan as previously prescribed with your PCP and cardiologist, Dr. Swanson

## 2018-07-17 NOTE — ASSESSMENT & PLAN NOTE
Stable and controlled   Continue medication as prescribed  Continue current treatment plan as previously prescribed with your PCP and cardiologist, Dr. Swanson

## 2018-07-17 NOTE — ASSESSMENT & PLAN NOTE
Stable and controlled on pravastatin  Due for lipid panel  Continue medication as prescribed  Continue current treatment plan as previously prescribed with your PCP and cardiologist, Dr. Swanson

## 2018-07-17 NOTE — ASSESSMENT & PLAN NOTE
Stable   Continue medication as prescribed  Continue current treatment plan as previously prescribed with your PCP and nephrologist, Dr. Kang

## 2018-07-17 NOTE — ASSESSMENT & PLAN NOTE
Stable s/p right mastectomy  Continue current treatment plan as previously prescribed with your PCP and hem/onc, Dr. Rain

## 2018-07-17 NOTE — ASSESSMENT & PLAN NOTE
Stable and controlled on Myrbetriq and Ditropan-XL  Continue medication as prescribed  Continue current treatment plan as previously prescribed with your PCP and urologist, Dr. Louis

## 2018-08-01 ENCOUNTER — OFFICE VISIT (OUTPATIENT)
Dept: FAMILY MEDICINE | Facility: CLINIC | Age: 83
End: 2018-08-01
Payer: MEDICARE

## 2018-08-01 ENCOUNTER — HOSPITAL ENCOUNTER (OUTPATIENT)
Dept: RADIOLOGY | Facility: HOSPITAL | Age: 83
Discharge: HOME | End: 2018-08-01
Attending: NURSE PRACTITIONER
Payer: MEDICARE

## 2018-08-01 VITALS
DIASTOLIC BLOOD PRESSURE: 60 MMHG | WEIGHT: 129 LBS | TEMPERATURE: 98 F | HEIGHT: 61 IN | BODY MASS INDEX: 24.35 KG/M2 | SYSTOLIC BLOOD PRESSURE: 105 MMHG | HEART RATE: 59 BPM

## 2018-08-01 DIAGNOSIS — M79.641 RIGHT HAND PAIN: ICD-10-CM

## 2018-08-01 DIAGNOSIS — M79.641 RIGHT HAND PAIN: Primary | ICD-10-CM

## 2018-08-01 PROCEDURE — 73130 X-RAY EXAM OF HAND: CPT | Mod: 26,RT,, | Performed by: RADIOLOGY

## 2018-08-01 PROCEDURE — 73130 X-RAY EXAM OF HAND: CPT | Mod: TC,PO,RT

## 2018-08-01 PROCEDURE — 99213 OFFICE O/P EST LOW 20 MIN: CPT | Mod: S$GLB,,, | Performed by: NURSE PRACTITIONER

## 2018-08-01 PROCEDURE — 99999 PR PBB SHADOW E&M-EST. PATIENT-LVL V: CPT | Mod: PBBFAC,,, | Performed by: NURSE PRACTITIONER

## 2018-08-01 NOTE — PROGRESS NOTES
"Subjective:       Patient ID: Kassi Skelton is a 86 y.o. female.    Chief Complaint: Edema (right hand )    Hand Pain    The incident occurred 12 to 24 hours ago (pt states felt "pop" to 3rd knuckle of right hand while washing dishes yesterday). The incident occurred at home. The injury mechanism is unknown. The pain is present in the right hand. The quality of the pain is described as aching. The pain does not radiate. The pain is moderate. The pain has been intermittent since the incident. Pertinent negatives include no chest pain, muscle weakness, numbness or tingling. The symptoms are aggravated by palpation and movement. She has tried acetaminophen for the symptoms. The treatment provided moderate (Declines ultram at present) relief.     Past Medical History:   Diagnosis Date    *Atrial fibrillation 4/19/2012    *Atrial flutter 4/19/2012    Allergy     Ankle fracture 4/19/2012    Anticoagulant long-term use     Anxiety     Arthritis     Bacterial pneumonia, unspecified 12/7/2012    Breast cancer     LEFT    Cataract     CKD (chronic kidney disease), stage III 5/11/2012    Followed by Dr. 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     Social History Main Topics    Smoking status: Never Smoker    Smokeless tobacco: Never Used    Alcohol use No    Drug use: No    Sexual activity: Not Currently     Social History Narrative    No narrative on file     Past Surgical History:   Procedure Laterality Date    APPENDECTOMY      BREAST LUMPECTOMY Right 10/21/2015    CARDIAC PACEMAKER PLACEMENT  12/3/12    Sinus node dysfunction    COLONOSCOPY      excision of squamous cell carcinoma Right 2017    EYE SURGERY Bilateral     PHACO/IOL    FRACTURE SURGERY Right     ankle    HYSTERECTOMY      total 1970's    MASTECTOMY Right 11/2015    OOPHORECTOMY      TONSILLECTOMY      TOTAL HIP ARTHROPLASTY Right 2007       Review of Systems   Constitutional: Negative.    HENT: Negative.    Eyes: Negative.    Respiratory: Negative.    Cardiovascular: Negative.  Negative for chest pain.   Gastrointestinal: Negative.    Endocrine: Negative.    Genitourinary: Negative.    Musculoskeletal:        Right hand pain   Skin: Negative.    Allergic/Immunologic: Negative.    Neurological: Negative.  Negative for tingling and numbness.   Psychiatric/Behavioral: Negative.        Objective:      Physical Exam   Constitutional: She is oriented to person, place, and time. She appears well-developed and well-nourished.   HENT:   Head: Normocephalic.   Right Ear: External ear normal.   Left Ear: External ear normal.   Nose: Nose normal.   Mouth/Throat: Oropharynx is clear and moist.   Eyes: Conjunctivae are normal. Pupils are equal, round, and reactive to light.   Neck: Normal range of motion. Neck supple.   Cardiovascular: Normal rate, regular rhythm and normal heart sounds.    Pulmonary/Chest: Effort normal and breath sounds normal.   Abdominal: Soft. Bowel sounds are normal.   Musculoskeletal: Normal range of motion.        Hands:  Neurological: She is alert and oriented to  person, place, and time.   Skin: Skin is warm and dry. Capillary refill takes 2 to 3 seconds.   Psychiatric: She has a normal mood and affect. Her behavior is normal. Judgment and thought content normal.   Nursing note and vitals reviewed.      Assessment:       1. Right hand pain        Plan:           Kassi was seen today for edema.    Diagnoses and all orders for this visit:    Right hand pain  -     X-Ray Hand Complete Right; Future  Tylenol OTC as directed  Report to ER immediately if symptoms worsen

## 2018-08-14 ENCOUNTER — OFFICE VISIT (OUTPATIENT)
Dept: PODIATRY | Facility: CLINIC | Age: 83
End: 2018-08-14
Payer: MEDICARE

## 2018-08-14 VITALS
HEIGHT: 61 IN | SYSTOLIC BLOOD PRESSURE: 138 MMHG | HEART RATE: 61 BPM | BODY MASS INDEX: 24.47 KG/M2 | DIASTOLIC BLOOD PRESSURE: 62 MMHG | WEIGHT: 129.63 LBS

## 2018-08-14 DIAGNOSIS — L84 PRE-ULCERATIVE CALLUSES: ICD-10-CM

## 2018-08-14 DIAGNOSIS — B35.1 ONYCHOMYCOSIS DUE TO DERMATOPHYTE: ICD-10-CM

## 2018-08-14 DIAGNOSIS — E11.49 TYPE II DIABETES MELLITUS WITH NEUROLOGICAL MANIFESTATIONS: Primary | ICD-10-CM

## 2018-08-14 PROCEDURE — 11721 DEBRIDE NAIL 6 OR MORE: CPT | Mod: 59,Q9,S$GLB, | Performed by: PODIATRIST

## 2018-08-14 PROCEDURE — 99213 OFFICE O/P EST LOW 20 MIN: CPT | Mod: 25,S$GLB,, | Performed by: PODIATRIST

## 2018-08-14 PROCEDURE — 99999 PR PBB SHADOW E&M-EST. PATIENT-LVL IV: CPT | Mod: PBBFAC,,, | Performed by: PODIATRIST

## 2018-08-14 PROCEDURE — 11056 PARNG/CUTG B9 HYPRKR LES 2-4: CPT | Mod: Q9,S$GLB,, | Performed by: PODIATRIST

## 2018-08-14 NOTE — PROGRESS NOTES
Subjective:     Patient ID: Kassi Skelton is a 86 y.o. female.    Chief Complaint: Diabetic Foot Exam (JUSTINE Cook 1/3/2018 )    Kassi is a 86 y.o. female who presents to the clinic for evaluation and treatment of high risk feet. 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 The patient's chief complaint is long, thick toenails. This patient has documented high risk feet requiring routine maintenance secondary to diabetes mellitis and those secondary complications of diabetes, as mentioned..    PCP: Mor Cook MD    Date Last Seen by PCP: 8/1/18    Current shoe gear:  Affected Foot: Tennis shoes     Unaffected Foot: Tennis shoes    Hemoglobin A1C   Date Value Ref Range Status   08/01/2018 6.3 (H) 4.0 - 5.6 % Final     Comment:     ADA Screening Guidelines:  5.7-6.4%  Consistent with prediabetes  >or=6.5%  Consistent with diabetes  High levels of fetal hemoglobin interfere with the HbA1C  assay. Heterozygous hemoglobin variants (HbS, HgC, etc)do  not significantly interfere with this assay.   However, presence of  multiple variants may affect accuracy.     02/15/2018 6.1 (H) 4.0 - 5.6 % Final     Comment:     According to ADA guidelines, hemoglobin A1c <7.0% represents  optimal control in non-pregnant diabetic patients. Different  metrics may apply to specific patient populations.   Standards of Medical Care in Diabetes-2016.  For the purpose of screening for the presence of diabetes:  <5.7%     Consistent with the absence of diabetes  5.7-6.4%  Consistent with increasing risk for diabetes   (prediabetes)  >or=6.5%  Consistent with diabetes  Currently, no consensus exists for use of hemoglobin A1c  for diagnosis of diabetes for children.  This Hemoglobin A1c assay has significant interference with fetal   hemoglobin   (HbF). The results are invalid for patients with abnormal amounts of   HbF,   including those with known Hereditary Persistence   of Fetal Hemoglobin. Heterozygous hemoglobin variants (HbAS, HbAC,   HbAD, HbAE, HbA2) do not significantly interfere with this assay;   however, presence of multiple variants in a sample may impact the %   interference.     08/31/2017 5.9 (H) 4.0 - 5.6 % Final     Comment:     According to ADA guidelines, hemoglobin A1c <7.0% represents  optimal control in non-pregnant diabetic patients. Different  metrics may apply to specific patient populations.   Standards of Medical Care in Diabetes-2016.  For the purpose of screening for the presence of diabetes:  <5.7%     Consistent with the absence of diabetes  5.7-6.4%  Consistent with increasing risk for diabetes   (prediabetes)  >or=6.5%  Consistent with diabetes  Currently, no consensus exists for use of hemoglobin A1c  for diagnosis of diabetes for children.  This Hemoglobin A1c assay has significant interference with fetal   hemoglobin   (HbF). The results are invalid for patients with abnormal amounts of   HbF,   including those with known Hereditary Persistence   of Fetal Hemoglobin. Heterozygous hemoglobin variants (HbAS, HbAC,    HbAD, HbAE, HbA2) do not significantly interfere with this assay;   however, presence of multiple variants in a sample may impact the %   interference.         Patient Active Problem List   Diagnosis    History of atrial flutter    Combined hyperlipidemia associated with type 2 diabetes mellitus    Hypothyroidism    Major depressive disorder, recurrent episode, moderate    Osteoporosis    Benign hypertensive kidney disease with chronic kidney disease    Cyst of kidney, acquired    Atrial fibrillation    Anticoagulation monitoring by pharmacist    LBBB (left bundle branch block)    Sinus node dysfunction    Cardiac pacemaker in situ    Urinary incontinence, mixed    Pacemaker    Orthostatic hypotension    DCIS (ductal carcinoma in situ)    Hypertension associated with diabetes    Diabetes mellitus with stage 4 chronic kidney disease GFR 15-29    Dizziness    Secondary hyperparathyroidism of renal origin    Hearing aid worn    Diabetic neuropathy, type II diabetes mellitus    Mild cognitive impairment with memory loss    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    ALCOHOL SWABS PADM    Apply 1 each topically as needed.    APIXABAN (ELIQUIS) 2.5 MG TAB    Take 1 tablet (2.5 mg total) by mouth 2 (two) times daily.    BENAZEPRIL (LOTENSIN) 20 MG TABLET    TAKE 1 TABLET EVERY DAY    BLOOD GLUCOSE CONTROL, HIGH SOLN    Use as directed, for acucheck glucometer    BLOOD GLUCOSE CONTROL, LOW SOLN    Use as directed, for acucheck glucometer    BLOOD SUGAR DIAGNOSTIC (ACCU-CHEK SHELDON PLUS TEST STRP) STRP    Test glucose once daily    CYANOCOBALAMIN, VITAMIN B-12, 1,000 MCG TBSR    One  tab po daily    DONEPEZIL (ARICEPT) 5 MG TABLET    Take 1 tablet (5 mg total) by mouth every evening.    ERGOCALCIFEROL, VITAMIN D2, (VITAMIN D ORAL)    Take by mouth once daily.    GLIPIZIDE (GLUCOTROL) 2.5 MG TR24    TAKE 1 TABLET EVERY DAY WITH BREAKFAST    HYDROCHLOROTHIAZIDE (HYDRODIURIL) 25 MG TABLET    TAKE 1 TABLET DAILY    LANCETS (ACCU-CHEK SOFTCLIX LANCETS) MISC    CHECK TWICE A DAY    LEVOTHYROXINE (SYNTHROID) 50 MCG TABLET    Take 1 tablet (50 mcg total) by mouth once daily.    METOPROLOL TARTRATE (LOPRESSOR) 50 MG TABLET    TAKE 1/2 TABLET TWICE DAILY (LAST REFILL UNTIL CLINIC VISIT MADE)    MIRABEGRON (MYRBETRIQ) 50 MG TB24    Take 1 tablet (50 mg total) by mouth once daily.    MIRTAZAPINE (REMERON) 45 MG TABLET    Take 45 mg by mouth every evening.    OXYBUTYNIN (DITROPAN) 5 MG TAB    Take 5 mg by mouth once daily.    PRAVASTATIN (PRAVACHOL) 40 MG TABLET    TAKE 1 TABLET EVERY DAY    PYRIDOXINE HCL, VITAMIN B6, (VITAMIN B-6 ORAL)    Take by mouth.    VENLAFAXINE (EFFEXOR XR) 150 MG CP24    Take 1 capsule (150 mg total) by mouth once daily.    VERAPAMIL (CALAN-SR) 120 MG CR TABLET    Take 1 tablet (120 mg total) by mouth nightly.       Review of patient's allergies indicates:   Allergen Reactions    Amlodipine Edema     Pt stated this medication made her legs swell    Alendronate sodium Other (See Comments)     Reaction: Esophageal bleeding    Fosamax [alendronate] Other (See Comments)     Reaction: Esophageal bleeding  diarrhea    Sorbitol      Diarrhea     Augmentin [amoxicillin-pot clavulanate] Hives and Rash       Past Surgical History:   Procedure Laterality Date    APPENDECTOMY      BREAST LUMPECTOMY Right 10/21/2015    CARDIAC PACEMAKER PLACEMENT  12/3/12    Sinus node dysfunction    COLONOSCOPY      excision of squamous cell carcinoma Right 2017    EYE SURGERY Bilateral     PHACO/IOL    FRACTURE SURGERY Right     ankle    HYSTERECTOMY      total 1970's    MASTECTOMY Right 11/2015  "   OOPHORECTOMY      TONSILLECTOMY      TOTAL HIP ARTHROPLASTY Right 2007       Family History   Problem Relation Age of Onset    Hypertension Mother     Heart disease Father     Stroke Father         cerebral hemorrhage    Coronary artery disease Brother     Heart disease Brother     Coronary artery disease Brother     COPD Brother     Heart disease Brother     Mitral valve prolapse Daughter     Peripheral vascular disease Son        Social History     Socioeconomic History    Marital status:      Spouse name: Not on file    Number of children: Not on file    Years of education: Not on file    Highest education level: Not on file   Social Needs    Financial resource strain: Not on file    Food insecurity - worry: Not on file    Food insecurity - inability: Not on file    Transportation needs - medical: Not on file    Transportation needs - non-medical: Not on file   Occupational History    Not on file   Tobacco Use    Smoking status: Never Smoker    Smokeless tobacco: Never Used   Substance and Sexual Activity    Alcohol use: No    Drug use: No    Sexual activity: Not Currently   Other Topics Concern    Not on file   Social History Narrative    Not on file       Vitals:    08/14/18 1444   BP: 138/62   Pulse: 61   Weight: 58.8 kg (129 lb 9.6 oz)   Height: 5' 1" (1.549 m)   PainSc: 0-No pain       Hemoglobin A1C   Date Value Ref Range Status   08/01/2018 6.3 (H) 4.0 - 5.6 % Final     Comment:     ADA Screening Guidelines:  5.7-6.4%  Consistent with prediabetes  >or=6.5%  Consistent with diabetes  High levels of fetal hemoglobin interfere with the HbA1C  assay. Heterozygous hemoglobin variants (HbS, HgC, etc)do  not significantly interfere with this assay.   However, presence of multiple variants may affect accuracy.     02/15/2018 6.1 (H) 4.0 - 5.6 % Final     Comment:     According to ADA guidelines, hemoglobin A1c <7.0% represents  optimal control in non-pregnant diabetic " patients. Different  metrics may apply to specific patient populations.   Standards of Medical Care in Diabetes-2016.  For the purpose of screening for the presence of diabetes:  <5.7%     Consistent with the absence of diabetes  5.7-6.4%  Consistent with increasing risk for diabetes   (prediabetes)  >or=6.5%  Consistent with diabetes  Currently, no consensus exists for use of hemoglobin A1c  for diagnosis of diabetes for children.  This Hemoglobin A1c assay has significant interference with fetal   hemoglobin   (HbF). The results are invalid for patients with abnormal amounts of   HbF,   including those with known Hereditary Persistence   of Fetal Hemoglobin. Heterozygous hemoglobin variants (HbAS, HbAC,   HbAD, HbAE, HbA2) do not significantly interfere with this assay;   however, presence of multiple variants in a sample may impact the %   interference.     08/31/2017 5.9 (H) 4.0 - 5.6 % Final     Comment:     According to ADA guidelines, hemoglobin A1c <7.0% represents  optimal control in non-pregnant diabetic patients. Different  metrics may apply to specific patient populations.   Standards of Medical Care in Diabetes-2016.  For the purpose of screening for the presence of diabetes:  <5.7%     Consistent with the absence of diabetes  5.7-6.4%  Consistent with increasing risk for diabetes   (prediabetes)  >or=6.5%  Consistent with diabetes  Currently, no consensus exists for use of hemoglobin A1c  for diagnosis of diabetes for children.  This Hemoglobin A1c assay has significant interference with fetal   hemoglobin   (HbF). The results are invalid for patients with abnormal amounts of   HbF,   including those with known Hereditary Persistence   of Fetal Hemoglobin. Heterozygous hemoglobin variants (HbAS, HbAC,   HbAD, HbAE, HbA2) do not significantly interfere with this assay;   however, presence of multiple variants in a sample may impact the %   interference.         Review of Systems   Constitutional:  Negative for chills and fever.   Respiratory: Negative for shortness of breath.    Cardiovascular: Negative for chest pain, palpitations, orthopnea, claudication and leg swelling.   Gastrointestinal: Negative for diarrhea, nausea and vomiting.   Musculoskeletal: Negative for joint pain.   Skin: Negative for rash.   Neurological: Positive for sensory change. Negative for dizziness, tingling, focal weakness and weakness.   Psychiatric/Behavioral: Negative.              Objective:       PHYSICAL EXAM: Apperance: Alert and orient in no distress,well developed, and with good attention to grooming and body habits  Lower Extremity Exam  VASCULAR: Dorsalis pedis pulses 1/4 bilateral and Posterior Tibial pulses 1/4 bilateral. Capillary fill time <4 seconds bilateral. No edema observed bilateral. Varicosities present bilateral. Skin temperature of the lower extremities is warm to warm, proximal to distal. Hair growth absent bilateral. (--) lymphangitis or (--) cellulitis noted right.  DERMATOLOGICAL: No skin rashes, subcutaneous nodules, lesions, or ulcers observed bilateral. The dorsum surface of the feet are soft and supple.  The plantar aspects of both feet are dry and scaly. Nails 1,2,3,4,5 bilateral elongated, thickened, and discolored with subungual debris. Webspaces 1,2,3,4 clean, dry and without evidence of break in skin integrity bilateral. Thin hyperkeratotic lesions noted to left plantar 1st submetatarsal and bilateral medial hallux.   NEUROLOGICAL: Light touch, sharp-dull, proprioception all present and equal bilaterally.   MUSCULOSKELETAL: Muscle strength is 5/5 for foot inverters, everters, plantarflexors, and dorsiflexors. Muscle tone is normal. Fat pad atrophy noted bilateral.           Assessment:       Encounter Diagnoses   Name Primary?    Type II diabetes mellitus with neurological manifestations Yes    Pre-ulcerative calluses     Onychomycosis due to dermatophyte          Plan:   Type II diabetes  mellitus with neurological manifestations    Pre-ulcerative calluses    Onychomycosis due to dermatophyte      I counseled the patient on her conditions, regarding findings of my examination, my impressions, and usual treatment plan.   Greater than 50% of this visit spent on counseling and coordination of care.  Greater than 15 minutes of a 20 minute appointment spent on education about the diabetic foot, neuropathy, and prevention of limb loss.  Shoe inspection. Diabetic Foot Education. Patient reminded of the importance of good nutrition and blood sugar control to help prevent podiatric complications of diabetes. Patient instructed on proper foot hygeine. We discussed wearing proper shoe gear, daily foot inspections, never walking without protective shoe gear, never putting sharp instruments to feet.    With patient's permission, nails 1-5 bilateral were debrided/trimmed in length and thickness aggressively to their soft tissue attachment mechanically and with electric , removing all offending nail and debris. Patient relates relief following the procedure.  With patient's permission, bilateral callus trimmed in thickness with #15 blade in thickness without incident.   Patient  will continue to monitor the areas daily, inspect feet, wear protective shoe gear when ambulatory, moisturizer to maintain skin integrity. Patient reminded of the importance of good nutrition and blood sugar control to help prevent podiatric complications of diabetes.  Patient to return 3 months or sooner if needed.                 Luzmaria Valle DPM  [facility]

## 2018-08-14 NOTE — LETTER
August 24, 2018      Francisco Swenson NP  [address] [address]           [place]  [address] [address]  Phone: [phone]  Fax: [phone]          Patient: Kassi Skelton   MR Number: [id number]   YOB: 1932   Date of Visit: 8/14/2018       Dear Francisco Swenson:    Thank you for referring Kassi Skelton to me for evaluation. Attached you will find relevant portions of my assessment and plan of care.    If you have questions, please do not hesitate to call me. I look forward to following Kassi Skelton along with you.    Sincerely,    Nick Stover  CC:  No Recipients    If you would like to receive this communication electronically, please contact [email or web address] or [phone] to request more information on [email or web address] Link access.    For providers and/or their staff who would like to refer a patient to [facility], please contact us through our one-stop-shop provider referral line, Shruti Riggs, at [phone].    If you feel you have received this communication in error or would no longer like to receive these types of communications, please e-mail [email or web address]

## 2018-08-29 DIAGNOSIS — E11.9 TYPE 2 DIABETES MELLITUS WITHOUT COMPLICATION, WITHOUT LONG-TERM CURRENT USE OF INSULIN: ICD-10-CM

## 2018-08-29 RX ORDER — LANCETS
EACH MISCELLANEOUS
Qty: 200 EACH | Refills: 3 | Status: SHIPPED | OUTPATIENT
Start: 2018-08-29 | End: 2019-01-15 | Stop reason: SDUPTHER

## 2018-08-29 RX ORDER — BLOOD SUGAR DIAGNOSTIC
STRIP MISCELLANEOUS
Qty: 200 STRIP | Refills: 3 | Status: SHIPPED | OUTPATIENT
Start: 2018-08-29 | End: 2019-01-15 | Stop reason: SDUPTHER

## 2018-08-29 RX ORDER — ISOPROPYL ALCOHOL 0.75 G/1
SWAB TOPICAL
Qty: 1 EACH | Refills: 3 | Status: SHIPPED | OUTPATIENT
Start: 2018-08-29 | End: 2023-06-01 | Stop reason: SDUPTHER

## 2018-08-30 RX ORDER — HYDROCHLOROTHIAZIDE 25 MG/1
TABLET ORAL
Qty: 90 TABLET | Refills: 3 | Status: SHIPPED | OUTPATIENT
Start: 2018-08-30 | End: 2019-09-22 | Stop reason: SDUPTHER

## 2018-09-05 RX ORDER — APIXABAN 2.5 MG/1
TABLET, FILM COATED ORAL
Qty: 180 TABLET | Refills: 3 | Status: SHIPPED | OUTPATIENT
Start: 2018-09-05 | End: 2019-09-22 | Stop reason: SDUPTHER

## 2018-09-11 RX ORDER — GLIPIZIDE 2.5 MG/1
TABLET, EXTENDED RELEASE ORAL
Qty: 90 TABLET | Refills: 3 | Status: SHIPPED | OUTPATIENT
Start: 2018-09-11 | End: 2019-09-22 | Stop reason: SDUPTHER

## 2018-09-13 ENCOUNTER — PATIENT MESSAGE (OUTPATIENT)
Dept: HEMATOLOGY/ONCOLOGY | Facility: CLINIC | Age: 83
End: 2018-09-13

## 2018-10-01 RX ORDER — DONEPEZIL HYDROCHLORIDE 5 MG/1
TABLET, FILM COATED ORAL
Qty: 90 TABLET | Refills: 3 | Status: SHIPPED | OUTPATIENT
Start: 2018-10-01 | End: 2019-09-22 | Stop reason: SDUPTHER

## 2018-10-15 ENCOUNTER — IMMUNIZATION (OUTPATIENT)
Dept: PHARMACY | Facility: CLINIC | Age: 83
End: 2018-10-15
Payer: MEDICARE

## 2018-10-17 ENCOUNTER — LAB VISIT (OUTPATIENT)
Dept: LAB | Facility: HOSPITAL | Age: 83
End: 2018-10-17
Attending: FAMILY MEDICINE
Payer: MEDICARE

## 2018-10-17 DIAGNOSIS — N18.30 CKD (CHRONIC KIDNEY DISEASE), STAGE III: ICD-10-CM

## 2018-10-17 LAB
ALBUMIN SERPL BCP-MCNC: 4.1 G/DL
ANION GAP SERPL CALC-SCNC: 9 MMOL/L
BUN SERPL-MCNC: 58 MG/DL
CALCIUM SERPL-MCNC: 9.4 MG/DL
CHLORIDE SERPL-SCNC: 107 MMOL/L
CO2 SERPL-SCNC: 22 MMOL/L
CREAT SERPL-MCNC: 1.9 MG/DL
EST. GFR  (AFRICAN AMERICAN): 27.1 ML/MIN/1.73 M^2
EST. GFR  (NON AFRICAN AMERICAN): 23.5 ML/MIN/1.73 M^2
GLUCOSE SERPL-MCNC: 126 MG/DL
PHOSPHATE SERPL-MCNC: 4.1 MG/DL
POTASSIUM SERPL-SCNC: 4.7 MMOL/L
PTH-INTACT SERPL-MCNC: 145 PG/ML
SODIUM SERPL-SCNC: 138 MMOL/L

## 2018-10-17 PROCEDURE — 83970 ASSAY OF PARATHORMONE: CPT

## 2018-10-17 PROCEDURE — 36415 COLL VENOUS BLD VENIPUNCTURE: CPT | Mod: PO

## 2018-10-17 PROCEDURE — 80069 RENAL FUNCTION PANEL: CPT

## 2018-10-19 ENCOUNTER — TELEPHONE (OUTPATIENT)
Dept: NEPHROLOGY | Facility: CLINIC | Age: 83
End: 2018-10-19

## 2018-10-19 ENCOUNTER — OFFICE VISIT (OUTPATIENT)
Dept: NEPHROLOGY | Facility: CLINIC | Age: 83
End: 2018-10-19
Payer: MEDICARE

## 2018-10-19 ENCOUNTER — CLINICAL SUPPORT (OUTPATIENT)
Dept: CARDIOLOGY | Facility: CLINIC | Age: 83
End: 2018-10-19
Attending: INTERNAL MEDICINE
Payer: MEDICARE

## 2018-10-19 VITALS
HEART RATE: 60 BPM | WEIGHT: 134.5 LBS | HEIGHT: 61 IN | DIASTOLIC BLOOD PRESSURE: 58 MMHG | SYSTOLIC BLOOD PRESSURE: 128 MMHG | BODY MASS INDEX: 25.39 KG/M2 | OXYGEN SATURATION: 100 %

## 2018-10-19 DIAGNOSIS — Z95.0 CARDIAC PACEMAKER IN SITU: ICD-10-CM

## 2018-10-19 DIAGNOSIS — N28.1 CYST OF KIDNEY, ACQUIRED: ICD-10-CM

## 2018-10-19 DIAGNOSIS — N18.30 CKD (CHRONIC KIDNEY DISEASE), STAGE III: Primary | ICD-10-CM

## 2018-10-19 DIAGNOSIS — Z95.0 CARDIAC PACEMAKER IN SITU: Primary | ICD-10-CM

## 2018-10-19 DIAGNOSIS — I49.5 SINUS NODE DYSFUNCTION: ICD-10-CM

## 2018-10-19 DIAGNOSIS — I12.9 BENIGN HYPERTENSIVE KIDNEY DISEASE WITH CHRONIC KIDNEY DISEASE, STAGE 1-4 OR UNSPECIFIED CHRONIC KIDNEY DISEASE: ICD-10-CM

## 2018-10-19 DIAGNOSIS — N25.81 SECONDARY RENAL HYPERPARATHYROIDISM: ICD-10-CM

## 2018-10-19 PROCEDURE — 93280 PM DEVICE PROGR EVAL DUAL: CPT | Mod: PBBFAC,PO | Performed by: INTERNAL MEDICINE

## 2018-10-19 PROCEDURE — 99213 OFFICE O/P EST LOW 20 MIN: CPT | Mod: PBBFAC,PO | Performed by: INTERNAL MEDICINE

## 2018-10-19 PROCEDURE — 99214 OFFICE O/P EST MOD 30 MIN: CPT | Mod: S$PBB,,, | Performed by: INTERNAL MEDICINE

## 2018-10-19 PROCEDURE — 1101F PT FALLS ASSESS-DOCD LE1/YR: CPT | Mod: CPTII,,, | Performed by: INTERNAL MEDICINE

## 2018-10-19 PROCEDURE — 99999 PR PBB SHADOW E&M-EST. PATIENT-LVL III: CPT | Mod: PBBFAC,,, | Performed by: INTERNAL MEDICINE

## 2018-10-19 NOTE — PROGRESS NOTES
"Subjective:       Patient ID: Kassi Skelton is a 86 y.o. White female who presents for return patient evaluation for chronic renal failure.    She has no uremic or urinary symptoms and is in her usual state of health.  There have been no recent illnesses, hospitalizations or procedures.        Review of Systems   Constitutional: Negative for appetite change, chills and fever.   HENT:        Dry mouth-chronic   Eyes: Negative for visual disturbance.   Respiratory: Negative for cough and shortness of breath.    Cardiovascular: Negative for chest pain and leg swelling.   Gastrointestinal: Negative for abdominal pain, diarrhea, nausea and vomiting.   Genitourinary: Negative for difficulty urinating, dysuria and hematuria.   Musculoskeletal: Positive for arthralgias and gait problem. Negative for myalgias.   Skin: Negative for rash.   Neurological: Negative for dizziness and headaches.   Psychiatric/Behavioral: Negative for sleep disturbance.       The past medical, family and social histories were reviewed for this encounter.     BP (!) 128/58   Pulse 60   Ht 5' 1" (1.549 m)   Wt 61 kg (134 lb 7.7 oz)   SpO2 100%   BMI 25.41 kg/m²     Objective:      Physical Exam   Constitutional: She appears well-developed and well-nourished. No distress.   HENT:   Head: Normocephalic and atraumatic.   Eyes: Conjunctivae are normal. No scleral icterus.   Neck: Normal range of motion. No JVD present.   Cardiovascular: Normal rate, regular rhythm and normal heart sounds. Exam reveals no gallop and no friction rub.   No murmur heard.  Pulmonary/Chest: Effort normal and breath sounds normal. No respiratory distress. She has no wheezes.   Abdominal: Soft. Bowel sounds are normal. She exhibits no distension. There is no tenderness.   Musculoskeletal: She exhibits no edema.   Skin: Skin is warm and dry. No rash noted.   Psychiatric: She has a normal mood and affect.   Vitals reviewed.      Assessment:       1. CKD (chronic " kidney disease), stage III    2. Benign hypertensive kidney disease with chronic kidney disease, stage 1-4 or unspecified chronic kidney disease    3. Secondary renal hyperparathyroidism    4. Cyst of kidney, acquired        Plan:   Return to clinic in 6 months in the afternoon please.  Labs for next visit include rp, pth, upc.  Baseline creatinine is 1.1-1.5 since 2010 but is still in the upper 1.5-2.0 range for the past three years.  She wishes to continue conservative care.  PTH is 145 with a calcium of 9.4.  Continue current medications as prescribed and reviewed.   Blood pressure is controlled on the current regimen.  Her daughter Tova's cell is [phone].

## 2018-10-22 LAB
AV DELAY - FIXED: 170 MSEC
ERI (PPM): 80
IMPEDANCE RA LEAD (NATIVE): 494 OHMS
IMPEDANCE RA LEAD: 449 OHMS
P/R-WAVE RA LEAD: NORMAL MV
PV DELAY - FIXED: 170 MSEC
THRESHOLD MS RA LEAD (NATIVE): 0.5 MS
THRESHOLD MS RA LEAD: 0.5 MS
THRESHOLD V RA LEAD (NATIVE): 0.75 V
THRESHOLD V RA LEAD: 0.5 V

## 2018-11-19 ENCOUNTER — PATIENT MESSAGE (OUTPATIENT)
Dept: NEPHROLOGY | Facility: CLINIC | Age: 83
End: 2018-11-19

## 2018-11-20 ENCOUNTER — OFFICE VISIT (OUTPATIENT)
Dept: PODIATRY | Facility: CLINIC | Age: 83
End: 2018-11-20
Payer: MEDICARE

## 2018-11-20 VITALS
DIASTOLIC BLOOD PRESSURE: 64 MMHG | OXYGEN SATURATION: 97 % | BODY MASS INDEX: 25.68 KG/M2 | SYSTOLIC BLOOD PRESSURE: 132 MMHG | HEIGHT: 61 IN | TEMPERATURE: 98 F | WEIGHT: 136 LBS | HEART RATE: 61 BPM

## 2018-11-20 DIAGNOSIS — B35.1 ONYCHOMYCOSIS DUE TO DERMATOPHYTE: ICD-10-CM

## 2018-11-20 DIAGNOSIS — L84 PRE-ULCERATIVE CALLUSES: ICD-10-CM

## 2018-11-20 DIAGNOSIS — E11.49 TYPE II DIABETES MELLITUS WITH NEUROLOGICAL MANIFESTATIONS: Primary | ICD-10-CM

## 2018-11-20 PROCEDURE — 11721 DEBRIDE NAIL 6 OR MORE: CPT | Mod: 59,Q9,S$GLB, | Performed by: PODIATRIST

## 2018-11-20 PROCEDURE — 11056 PARNG/CUTG B9 HYPRKR LES 2-4: CPT | Mod: Q9,S$GLB,, | Performed by: PODIATRIST

## 2018-11-20 PROCEDURE — 99999 PR PBB SHADOW E&M-EST. PATIENT-LVL III: CPT | Mod: PBBFAC,,, | Performed by: PODIATRIST

## 2018-11-20 PROCEDURE — 99499 UNLISTED E&M SERVICE: CPT | Mod: S$GLB,,, | Performed by: PODIATRIST

## 2018-11-20 NOTE — TELEPHONE ENCOUNTER
I cannot rescheduled the pacemaker appointment.  Do you have anything in the afternoon the week of April 29?

## 2018-12-03 NOTE — PROGRESS NOTES
Subjective:     Patient ID: Kassi Skelton is a 86 y.o. female.    Chief Complaint: Follow-up (3mo) and Nail Care    Kassi is a 86 y.o. female who presents to the clinic for evaluation and treatment of high risk feet. 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 The patient's chief complaint is long, thick toenails. This patient has documented high risk feet requiring routine maintenance secondary to diabetes mellitis and those secondary complications of diabetes, as mentioned..    PCP: Mor Cook MD    Date Last Seen by PCP: 8/1/18    Current shoe gear:  Affected Foot: Tennis shoes     Unaffected Foot: Tennis shoes    Hemoglobin A1C   Date Value Ref Range Status   08/01/2018 6.3 (H) 4.0 - 5.6 % Final     Comment:     ADA Screening Guidelines:  5.7-6.4%  Consistent with prediabetes  >or=6.5%  Consistent with diabetes  High levels of fetal hemoglobin interfere with the HbA1C  assay. Heterozygous hemoglobin variants (HbS, HgC, etc)do  not significantly interfere with this assay.   However, presence of multiple  variants may affect accuracy.     02/15/2018 6.1 (H) 4.0 - 5.6 % Final     Comment:     According to ADA guidelines, hemoglobin A1c <7.0% represents  optimal control in non-pregnant diabetic patients. Different  metrics may apply to specific patient populations.   Standards of Medical Care in Diabetes-2016.  For the purpose of screening for the presence of diabetes:  <5.7%     Consistent with the absence of diabetes  5.7-6.4%  Consistent with increasing risk for diabetes   (prediabetes)  >or=6.5%  Consistent with diabetes  Currently, no consensus exists for use of hemoglobin A1c  for diagnosis of diabetes for children.  This Hemoglobin A1c assay has significant interference with fetal   hemoglobin   (HbF). The results are invalid for patients with abnormal amounts of   HbF,   including those with known Hereditary Persistence   of Fetal Hemoglobin. Heterozygous hemoglobin variants (HbAS, HbAC,   HbAD, HbAE, HbA2) do not significantly interfere with this assay;   however, presence of multiple variants in a sample may impact the %   interference.     08/31/2017 5.9 (H) 4.0 - 5.6 % Final     Comment:     According to ADA guidelines, hemoglobin A1c <7.0% represents  optimal control in non-pregnant diabetic patients. Different  metrics may apply to specific patient populations.   Standards of Medical Care in Diabetes-2016.  For the purpose of screening for the presence of diabetes:  <5.7%     Consistent with the absence of diabetes  5.7-6.4%  Consistent with increasing risk for diabetes   (prediabetes)  >or=6.5%  Consistent with diabetes  Currently, no consensus exists for use of hemoglobin A1c  for diagnosis of diabetes for children.  This Hemoglobin A1c assay has significant interference with fetal   hemoglobin   (HbF). The results are invalid for patients with abnormal amounts of   HbF,   including those with known Hereditary Persistence   of Fetal Hemoglobin. Heterozygous hemoglobin variants (HbAS, HbAC,   HbAD, HbAE,  HbA2) do not significantly interfere with this assay;   however, presence of multiple variants in a sample may impact the %   interference.         Patient Active Problem List   Diagnosis    History of atrial flutter    Combined hyperlipidemia associated with type 2 diabetes mellitus    Hypothyroidism    Major depressive disorder, recurrent episode, moderate    Osteoporosis    Benign hypertensive kidney disease with chronic kidney disease    Cyst of kidney, acquired    Atrial fibrillation    Anticoagulation monitoring by pharmacist    LBBB (left bundle branch block)    Sinus node dysfunction    Cardiac pacemaker in situ    Urinary incontinence, mixed    Pacemaker    Orthostatic hypotension    DCIS (ductal carcinoma in situ)    Hypertension associated with diabetes    Diabetes mellitus with stage 4 chronic kidney disease GFR 15-29    Dizziness    Secondary hyperparathyroidism of renal origin    Hearing aid worn    Diabetic neuropathy, type II diabetes mellitus    Mild cognitive impairment with memory loss    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 If you have any questions, ask your nurse or doctor.               CONTINUE taking these medications    ACCU-CHEK SHELDON CONTROL SOLN Soln  Generic drug:  blood glucose control high,low     ACCU-CHEK SHELDON PLUS TEST STRP Strp  Generic drug:  blood sugar diagnostic  TEST TWO TIMES DAILY     acetaminophen 325 MG tablet  Commonly known as:  TYLENOL  Take 2 tablets (650 mg total) by mouth every 6 (six) hours as needed for Pain or Temperature greater than.     BD ALCOHOL SWABS Padm  Generic drug:  alcohol swabs  USE AS NEEDED     benazepril 20 MG tablet  Commonly known as:  LOTENSIN  TAKE 1 TABLET EVERY DAY     blood glucose control, high Soln  Use as directed, for acucheck glucometer     blood glucose control, low Soln  Use  as directed, for acucheck glucometer     cyanocobalamin (vitamin B-12) 1,000 mcg Tbsr  One tab po daily     donepezil 5 MG tablet  Commonly known as:  ARICEPT  TAKE 1 TABLET EVERY EVENING     ELIQUIS 2.5 mg Tab  Generic drug:  apixaban  TAKE 1 TABLET TWICE DAILY     glipiZIDE 2.5 MG Tr24  Commonly known as:  GLUCOTROL  TAKE 1 TABLET EVERY DAY WITH BREAKFAST     hydroCHLOROthiazide 25 MG tablet  Commonly known as:  HYDRODIURIL  TAKE 1 TABLET EVERY DAY     lancets Misc  Commonly known as:  ACCU-CHEK SOFTCLIX LANCETS  TEST TWO TIMES DAILY     levothyroxine 50 MCG tablet  Commonly known as:  SYNTHROID  Take 1 tablet (50 mcg total) by mouth once daily.     metoprolol tartrate 50 MG tablet  Commonly known as:  LOPRESSOR  TAKE 1/2 TABLET TWICE DAILY (LAST REFILL UNTIL CLINIC VISIT MADE)     mirabegron 50 mg Tb24  Commonly known as:  MYRBETRIQ  Take 1 tablet (50 mg total) by mouth once daily.     mirtazapine 45 MG tablet  Commonly known as:  REMERON     oxybutynin 5 MG Tab  Commonly known as:  DITROPAN     pravastatin 40 MG tablet  Commonly known as:  PRAVACHOL  TAKE 1 TABLET EVERY DAY     venlafaxine 150 MG Cp24  Commonly known as:  EFFEXOR XR  Take 1 capsule (150 mg total) by mouth once daily.     verapamil 120 MG CR tablet  Commonly known as:  CALAN-SR  Take 1 tablet (120 mg total) by mouth nightly.     VITAMIN B-6 ORAL     * VITAMIN D ORAL     * VITAMIN D ORAL         * This list has 2 medication(s) that are the same as other medications prescribed for you. Read the directions carefully, and ask your doctor or other care provider to review them with you.                Review of patient's allergies indicates:   Allergen Reactions    Amlodipine Edema     Pt stated this medication made her legs swell    Alendronate sodium Other (See Comments)     Reaction: Esophageal bleeding    Fosamax [alendronate] Other (See Comments)     Reaction: Esophageal bleeding  diarrhea    Sorbitol      Diarrhea     Augmentin  [amoxicillin-pot clavulanate] Hives and Rash       Past Surgical History:   Procedure Laterality Date    APPENDECTOMY      BIOPSY-SENTINEL NODE Left 10/21/2015    [facility]    BREAST LUMPECTOMY Right 10/21/2015    CARDIAC PACEMAKER PLACEMENT  12/3/12    Sinus node dysfunction    COLONOSCOPY      excision of squamous cell carcinoma Right 2017    EYE SURGERY Bilateral     PHACO/IOL    FRACTURE SURGERY Right     ankle    HYSTERECTOMY      total 1970's    LOCALIZATION-NEEDLE - in Radiology Left 10/21/2015    [facility]    LUMPECTOMY-BREAST Left 10/21/2015    [facility]    MASTECTOMY Right 11/2015    MASTECTOMY Left 11/30/2015    [facility]    OOPHORECTOMY      TONSILLECTOMY      TOTAL HIP ARTHROPLASTY Right 2007       Family History   Problem Relation Age of Onset    Hypertension Mother     Heart disease Father     Stroke Father         cerebral hemorrhage    Coronary artery disease Brother     Heart disease Brother     Coronary artery disease Brother     COPD Brother     Heart disease Brother     Mitral valve prolapse Daughter     Peripheral vascular disease Son        Social History     Socioeconomic History    Marital status:      Spouse name: Not on file    Number of children: Not on file    Years of education: Not on file    Highest education level: Not on file   Social Needs    Financial resource strain: Not on file    Food insecurity - worry: Not on file    Food insecurity - inability: Not on file    Transportation needs - medical: Not on file    Transportation needs - non-medical: Not on file   Occupational History    Not on file   Tobacco Use    Smoking status: Never Smoker    Smokeless tobacco: Never Used   Substance and Sexual Activity    Alcohol use: No    Drug use: No    Sexual activity: Not Currently   Other Topics Concern     "Not on file   Social History Narrative    Not on file       Vitals:    11/20/18 1352   BP: 132/64   Pulse: 61   Temp: 97.8 °F (36.6 °C)   SpO2: 97%   Weight: 61.7 kg (136 lb)   Height: 5' 1" (1.549 m)   PainSc: 0-No pain       Hemoglobin A1C   Date Value Ref Range Status   08/01/2018 6.3 (H) 4.0 - 5.6 % Final     Comment:     ADA Screening Guidelines:  5.7-6.4%  Consistent with prediabetes  >or=6.5%  Consistent with diabetes  High levels of fetal hemoglobin interfere with the HbA1C  assay. Heterozygous hemoglobin variants (HbS, HgC, etc)do  not significantly interfere with this assay.   However, presence of multiple variants may affect accuracy.     02/15/2018 6.1 (H) 4.0 - 5.6 % Final     Comment:     According to ADA guidelines, hemoglobin A1c <7.0% represents  optimal control in non-pregnant diabetic patients. Different  metrics may apply to specific patient populations.   Standards of Medical Care in Diabetes-2016.  For the purpose of screening for the presence of diabetes:  <5.7%     Consistent with the absence of diabetes  5.7-6.4%  Consistent with increasing risk for diabetes   (prediabetes)  >or=6.5%  Consistent with diabetes  Currently, no consensus exists for use of hemoglobin A1c  for diagnosis of diabetes for children.  This Hemoglobin A1c assay has significant interference with fetal   hemoglobin   (HbF). The results are invalid for patients with abnormal amounts of   HbF,   including those with known Hereditary Persistence   of Fetal Hemoglobin. Heterozygous hemoglobin variants (HbAS, HbAC,   HbAD, HbAE, HbA2) do not significantly interfere with this assay;   however, presence of multiple variants in a sample may impact the %   interference.     08/31/2017 5.9 (H) 4.0 - 5.6 % Final     Comment:     According to ADA guidelines, hemoglobin A1c <7.0% represents  optimal control in non-pregnant diabetic patients. Different  metrics may apply to specific patient populations.   Standards of Medical Care in " Diabetes-2016.  For the purpose of screening for the presence of diabetes:  <5.7%     Consistent with the absence of diabetes  5.7-6.4%  Consistent with increasing risk for diabetes   (prediabetes)  >or=6.5%  Consistent with diabetes  Currently, no consensus exists for use of hemoglobin A1c  for diagnosis of diabetes for children.  This Hemoglobin A1c assay has significant interference with fetal   hemoglobin   (HbF). The results are invalid for patients with abnormal amounts of   HbF,   including those with known Hereditary Persistence   of Fetal Hemoglobin. Heterozygous hemoglobin variants (HbAS, HbAC,   HbAD, HbAE, HbA2) do not significantly interfere with this assay;   however, presence of multiple variants in a sample may impact the %   interference.         Review of Systems   Constitutional: Negative for chills and fever.   Respiratory: Negative for shortness of breath.    Cardiovascular: Negative for chest pain, palpitations, orthopnea, claudication and leg swelling.   Gastrointestinal: Negative for diarrhea, nausea and vomiting.   Musculoskeletal: Negative for joint pain.   Skin: Negative for rash.   Neurological: Positive for sensory change. Negative for dizziness, tingling, focal weakness and weakness.   Psychiatric/Behavioral: Negative.              Objective:       PHYSICAL EXAM: Apperance: Alert and orient in no distress,well developed, and with good attention to grooming and body habits  Lower Extremity Exam  VASCULAR: Dorsalis pedis pulses 1/4 bilateral and Posterior Tibial pulses 1/4 bilateral. Capillary fill time <4 seconds bilateral. No edema observed bilateral. Varicosities present bilateral. Skin temperature of the lower extremities is warm to warm, proximal to distal. Hair growth absent bilateral. (--) lymphangitis or (--) cellulitis noted right.  DERMATOLOGICAL: No skin rashes, subcutaneous nodules, lesions, or ulcers observed bilateral. The dorsum surface of the feet are soft and supple.  The  plantar aspects of both feet are dry and scaly. Nails 1,2,3,4,5 bilateral elongated, thickened, and discolored with subungual debris. Webspaces 1,2,3,4 clean, dry and without evidence of break in skin integrity bilateral. Thin hyperkeratotic lesions noted to left plantar 1st submetatarsal and bilateral medial hallux.   NEUROLOGICAL: Light touch, sharp-dull, proprioception all present and equal bilaterally.   MUSCULOSKELETAL: Muscle strength is 5/5 for foot inverters, everters, plantarflexors, and dorsiflexors. Muscle tone is normal. Fat pad atrophy noted bilateral.           Assessment:       Encounter Diagnoses   Name Primary?    Type II diabetes mellitus with neurological manifestations Yes    Pre-ulcerative calluses     Onychomycosis due to dermatophyte          Plan:   Type II diabetes mellitus with neurological manifestations    Pre-ulcerative calluses    Onychomycosis due to dermatophyte      I counseled the patient on her conditions, regarding findings of my examination, my impressions, and usual treatment plan.   Greater than 15 minutes of a 20 minute appointment spent on education about the diabetic foot, neuropathy, and prevention of limb loss.  Shoe inspection. Diabetic Foot Education. Patient reminded of the importance of good nutrition and blood sugar control to help prevent podiatric complications of diabetes. Patient instructed on proper foot hygeine. We discussed wearing proper shoe gear, daily foot inspections, never walking without protective shoe gear, never putting sharp instruments to feet.    With patient's permission, nails 1-5 bilateral were debrided/trimmed in length and thickness aggressively to their soft tissue attachment mechanically and with electric , removing all offending nail and debris. Patient relates relief following the procedure.  With patient's permission, bilateral callus trimmed in thickness with #15 blade in thickness without incident.   Patient  will continue to  monitor the areas daily, inspect feet, wear protective shoe gear when ambulatory, moisturizer to maintain skin integrity. Patient reminded of the importance of good nutrition and blood sugar control to help prevent podiatric complications of diabetes.  Patient to return 3 months or sooner if needed.                 Luzmaria Valle DPM  [facility]

## 2019-01-14 RX ORDER — LEVOTHYROXINE SODIUM 50 UG/1
TABLET ORAL
Qty: 90 TABLET | Refills: 0 | Status: SHIPPED | OUTPATIENT
Start: 2019-01-14 | End: 2019-05-09 | Stop reason: DRUGHIGH

## 2019-01-15 DIAGNOSIS — E11.9 TYPE 2 DIABETES MELLITUS WITHOUT COMPLICATION, WITHOUT LONG-TERM CURRENT USE OF INSULIN: ICD-10-CM

## 2019-01-15 NOTE — TELEPHONE ENCOUNTER
Since she is not on insulin we would need a diagnosis to use to be able to have Medicare cover more than 1 check per day.  This would typically be poor control which she does not have or hypoglycemia which is where she would be getting sugar readings below 80.  Please see if she is having either of these otherwise we would only be able to approve this for once a day.

## 2019-01-16 ENCOUNTER — PATIENT MESSAGE (OUTPATIENT)
Dept: FAMILY MEDICINE | Facility: CLINIC | Age: 84
End: 2019-01-16

## 2019-01-16 RX ORDER — BLOOD GLUCOSE CONTROL HIGH,LOW
EACH MISCELLANEOUS
Qty: 3 EACH | Refills: 3 | Status: SHIPPED | OUTPATIENT
Start: 2019-01-16 | End: 2023-10-11

## 2019-01-16 RX ORDER — LANCETS
EACH MISCELLANEOUS
Qty: 100 EACH | Refills: 3 | Status: SHIPPED | OUTPATIENT
Start: 2019-01-16 | End: 2020-07-17 | Stop reason: SDUPTHER

## 2019-01-16 NOTE — TELEPHONE ENCOUNTER
Spoke to patient, no hypoglycemia, feels her sugars run high sometimes, she will schedule an office visit, in the near future, to discuss.  Informed we would send for once daily and if she comes in and it gets changed, we can resend, verbalized understanding.

## 2019-01-17 ENCOUNTER — PATIENT MESSAGE (OUTPATIENT)
Dept: NEPHROLOGY | Facility: CLINIC | Age: 84
End: 2019-01-17

## 2019-01-17 ENCOUNTER — PATIENT OUTREACH (OUTPATIENT)
Dept: ADMINISTRATIVE | Facility: HOSPITAL | Age: 84
End: 2019-01-17

## 2019-01-17 NOTE — LETTER
January 17, 2019        Kassi Skelton  [address] [address] [address]      Dear Mrs. Skelton,    You have an upcoming appointment with Mor Cook MD on 1/31/19 @ 8:40 am.      Your chart is indicating you may be due for the following and I will be happy to assist you in scheduling any needed appointments:  Health Maintenance Due   Topic    TETANUS VACCINE     Eye Exam      If you have had any of the above done at another facility, please bring the records or information with you so that your record at [facility] will be complete.    We will be happy to assist you with scheduling any necessary appointments or you may contact the [facility] appointment desk at [phone] to schedule at your convenience.     Thank you for choosing [facility] for your healthcare needs,      MOOK Boone  Care Coordination Department  [facility]  [phone]

## 2019-01-18 ENCOUNTER — PATIENT MESSAGE (OUTPATIENT)
Dept: NEPHROLOGY | Facility: CLINIC | Age: 84
End: 2019-01-18

## 2019-01-31 ENCOUNTER — LAB VISIT (OUTPATIENT)
Dept: LAB | Facility: HOSPITAL | Age: 84
End: 2019-01-31
Attending: FAMILY MEDICINE
Payer: MEDICARE

## 2019-01-31 ENCOUNTER — OFFICE VISIT (OUTPATIENT)
Dept: FAMILY MEDICINE | Facility: CLINIC | Age: 84
End: 2019-01-31
Payer: MEDICARE

## 2019-01-31 VITALS
TEMPERATURE: 98 F | HEART RATE: 60 BPM | DIASTOLIC BLOOD PRESSURE: 62 MMHG | BODY MASS INDEX: 26.06 KG/M2 | HEIGHT: 61 IN | SYSTOLIC BLOOD PRESSURE: 130 MMHG | WEIGHT: 138 LBS

## 2019-01-31 DIAGNOSIS — G31.84 MILD COGNITIVE IMPAIRMENT WITH MEMORY LOSS: ICD-10-CM

## 2019-01-31 DIAGNOSIS — F33.1 MAJOR DEPRESSIVE DISORDER, RECURRENT EPISODE, MODERATE: ICD-10-CM

## 2019-01-31 DIAGNOSIS — N18.4 DIABETES MELLITUS WITH STAGE 4 CHRONIC KIDNEY DISEASE GFR 15-29: Primary | ICD-10-CM

## 2019-01-31 DIAGNOSIS — N18.4 DIABETES MELLITUS WITH STAGE 4 CHRONIC KIDNEY DISEASE GFR 15-29: ICD-10-CM

## 2019-01-31 DIAGNOSIS — N25.81 SECONDARY HYPERPARATHYROIDISM OF RENAL ORIGIN: ICD-10-CM

## 2019-01-31 DIAGNOSIS — E11.22 DIABETES MELLITUS WITH STAGE 4 CHRONIC KIDNEY DISEASE GFR 15-29: Primary | ICD-10-CM

## 2019-01-31 DIAGNOSIS — E11.22 DIABETES MELLITUS WITH STAGE 4 CHRONIC KIDNEY DISEASE GFR 15-29: ICD-10-CM

## 2019-01-31 DIAGNOSIS — E03.9 HYPOTHYROIDISM, UNSPECIFIED TYPE: Chronic | ICD-10-CM

## 2019-01-31 DIAGNOSIS — I48.91 ATRIAL FIBRILLATION, UNSPECIFIED TYPE: Chronic | ICD-10-CM

## 2019-01-31 LAB
ESTIMATED AVG GLUCOSE: 143 MG/DL
HBA1C MFR BLD HPLC: 6.6 %

## 2019-01-31 PROCEDURE — 1101F PT FALLS ASSESS-DOCD LE1/YR: CPT | Mod: CPTII,S$GLB,, | Performed by: FAMILY MEDICINE

## 2019-01-31 PROCEDURE — 99999 PR PBB SHADOW E&M-EST. PATIENT-LVL IV: ICD-10-PCS | Mod: PBBFAC,,, | Performed by: FAMILY MEDICINE

## 2019-01-31 PROCEDURE — 99999 PR PBB SHADOW E&M-EST. PATIENT-LVL IV: CPT | Mod: PBBFAC,,, | Performed by: FAMILY MEDICINE

## 2019-01-31 PROCEDURE — 36415 COLL VENOUS BLD VENIPUNCTURE: CPT | Mod: PO

## 2019-01-31 PROCEDURE — 1101F PR PT FALLS ASSESS DOC 0-1 FALLS W/OUT INJ PAST YR: ICD-10-PCS | Mod: CPTII,S$GLB,, | Performed by: FAMILY MEDICINE

## 2019-01-31 PROCEDURE — 83036 HEMOGLOBIN GLYCOSYLATED A1C: CPT

## 2019-01-31 PROCEDURE — 99499 RISK ADDL DX/OHS AUDIT: ICD-10-PCS | Mod: S$GLB,,, | Performed by: FAMILY MEDICINE

## 2019-01-31 PROCEDURE — 99214 OFFICE O/P EST MOD 30 MIN: CPT | Mod: S$GLB,,, | Performed by: FAMILY MEDICINE

## 2019-01-31 PROCEDURE — 99499 UNLISTED E&M SERVICE: CPT | Mod: S$GLB,,, | Performed by: FAMILY MEDICINE

## 2019-01-31 PROCEDURE — 99214 PR OFFICE/OUTPT VISIT, EST, LEVL IV, 30-39 MIN: ICD-10-PCS | Mod: S$GLB,,, | Performed by: FAMILY MEDICINE

## 2019-01-31 NOTE — PROGRESS NOTES
Patient presents for follow-up.  Diabetes acceptably controlled.  Atrial fibrillation followed by Cardiology on anticoagulation.  Depression stable with current medication.  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  Hypothyroidism due for laboratory.    Diabetes Management Status    Statin: Taking  ACE/ARB: Not taking    Screening or Prevention Patient's value Goal Complete/Controlled?   HgA1C Testing and Control   Lab Results   Component Value Date    HGBA1C 6.3 (H) 08/01/2018      Annually/Less than 8% Yes   Lipid profile : 08/01/2018 Annually Yes   LDL control Lab Results   Component Value Date    LDLCALC 101.6 08/01/2018    Annually/Less than 100 mg/dl  No   Nephropathy screening No results found for: LABMICR  Lab Results   Component Value Date    PROTEINUA Negative 02/01/2016    Annually No   Blood pressure BP Readings from Last 1 Encounters:   01/31/19 130/62    Less than 140/90 Yes   Dilated retinal exam : 02/25/2017 Annually Yes   Foot exam   : 08/14/2018 Annually Yes     Past Medical History:  Past Medical History:   Diagnosis Date    *Atrial fibrillation 4/19/2012    *Atrial flutter 4/19/2012    Allergy     Ankle fracture 4/19/2012    Anticoagulant long-term use     Anxiety     Arthritis     Bacterial pneumonia, unspecified 12/7/2012    Breast cancer     LEFT    Cataract     CKD (chronic kidney disease), stage III 5/11/2012    Followed by Dr. 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    BREAST LUMPECTOMY Right 10/21/2015    CARDIAC PACEMAKER PLACEMENT  12/3/12    Sinus node dysfunction    COLONOSCOPY      excision of squamous cell carcinoma Right 2017    EYE SURGERY Bilateral     PHACO/IOL    FRACTURE SURGERY Right     ankle    HYSTERECTOMY      total 1970's    LOCALIZATION-NEEDLE - in Radiology Left 10/21/2015    [facility] OR    LUMPECTOMY-BREAST Left 10/21/2015    Performed [facility]    MASTECTOMY Right 11/2015    MASTECTOMY Left 11/30/2015    [facility]    OOPHORECTOMY      TONSILLECTOMY      TOTAL HIP ARTHROPLASTY Right 2007     Social History     Socioeconomic History    Marital status:      Spouse name: Not on file    Number of children: Not on file    Years of education: Not on file    Highest education level: Not on file   Social Needs    Financial resource strain: Not on file    Food insecurity - worry: Not on file    Food insecurity - inability: Not on file    Transportation needs - medical: Not on file    Transportation needs - non-medical: Not on file   Occupational History    Not on file   Tobacco Use    Smoking status: Never Smoker    Smokeless tobacco: Never Used   Substance and Sexual Activity    Alcohol use: No    Drug use: No    Sexual activity: Not Currently   Other Topics Concern    Not on file   Social History Narrative    Not on file     Family History   Problem Relation Age of Onset    Hypertension Mother     Heart disease Father     Stroke Father          cerebral hemorrhage    Coronary artery disease Brother     Heart disease Brother     Coronary artery disease Brother     COPD Brother     Heart disease Brother     Mitral valve prolapse Daughter     Peripheral vascular disease Son      Review of patient's allergies indicates:   Allergen Reactions    Amlodipine Edema     Pt stated this medication made her legs swell    Alendronate sodium Other (See Comments)     Reaction: Esophageal bleeding    Fosamax [alendronate] Other (See Comments)     Reaction: Esophageal bleeding  diarrhea    Sorbitol      Diarrhea     Augmentin [amoxicillin-pot clavulanate] Hives and Rash     Current Outpatient Medications on File Prior to Visit   Medication Sig Dispense Refill    ACCU-CHEK SHELDON CONTROL SOLN Soln Use as directed 3 each 3    acetaminophen (TYLENOL) 325 MG tablet Take 2 tablets (650 mg total) by mouth every 6 (six) hours as needed for Pain or Temperature greater than.  0    BD ALCOHOL SWABS PadM USE AS NEEDED 1 each 3    benazepril (LOTENSIN) 20 MG tablet TAKE 1 TABLET EVERY DAY 90 tablet 3    blood sugar diagnostic (ACCU-CHEK SHELDON PLUS TEST STRP) Strp Test glucose once daily 100 strip 3    cyanocobalamin, vitamin B-12, 1,000 mcg TbSR One tab po daily 1 each 0    donepezil (ARICEPT) 5 MG tablet TAKE 1 TABLET EVERY EVENING 90 tablet 3    ELIQUIS 2.5 mg Tab TAKE 1 TABLET TWICE DAILY 180 tablet 3    ERGOCALCIFEROL, VITAMIN D2, (VITAMIN D ORAL) Take by mouth once daily.      glipiZIDE (GLUCOTROL) 2.5 MG TR24 TAKE 1 TABLET EVERY DAY WITH BREAKFAST 90 tablet 3    hydroCHLOROthiazide (HYDRODIURIL) 25 MG tablet TAKE 1 TABLET EVERY DAY 90 tablet 3    lancets (ACCU-CHEK SOFTCLIX LANCETS) Misc Test glucose once daily 100 each 3    levothyroxine (SYNTHROID) 50 MCG tablet TAKE 1 TABLET DAILY  90 tablet 0    metoprolol tartrate (LOPRESSOR) 50 MG tablet TAKE 1/2 TABLET TWICE DAILY (LAST REFILL UNTIL CLINIC VISIT MADE) 90 tablet 3    mirabegron (MYRBETRIQ)  "50 mg Tb24 Take 1 tablet (50 mg total) by mouth once daily. 90 tablet 1    mirtazapine (REMERON) 45 MG tablet Take 45 mg by mouth every evening.      oxybutynin (DITROPAN) 5 MG Tab Take 5 mg by mouth once daily.      pravastatin (PRAVACHOL) 40 MG tablet TAKE 1 TABLET EVERY DAY 90 tablet 1    pyridoxine HCl, vitamin B6, (VITAMIN B-6 ORAL) Take by mouth.      venlafaxine (EFFEXOR XR) 150 MG Cp24 Take 1 capsule (150 mg total) by mouth once daily. 90 capsule 1    verapamil (CALAN-SR) 120 MG CR tablet Take 1 tablet (120 mg total) by mouth nightly. 90 tablet 0    [DISCONTINUED] ergocalciferol, vitamin D2, (VITAMIN D ORAL) Take by mouth.       No current facility-administered medications on file prior to visit.            ROS:  GENERAL: No fever, chills,  or significant weight changes.   CARDIOVASCULAR: Denies chest pain, PND, orthopnea or reduced exercise tolerance.  ABDOMEN: Appetite fine. Denies diarrhea, abdominal pain, hematemesis or blood in stool.  URINARY: No flank pain, dysuria or hematuria.      OBJECTIVE:     Vitals:    01/31/19 0843   BP: 130/62   Pulse: 60   Temp: 98.1 °F (36.7 °C)   Weight: 62.6 kg (138 lb)   Height: 5' 1" (1.549 m)     Wt Readings from Last 3 Encounters:   01/31/19 62.6 kg (138 lb)   11/20/18 61.7 kg (136 lb)   10/19/18 61 kg (134 lb 7.7 oz)     APPEARANCE: Well nourished, well developed, in no acute distress.    HEAD: Normocephalic.  Atraumatic.  No sinus tenderness.  EYES:   Right eye: Pupil reactive.  Conjunctiva clear.    Left eye: Pupil reactive.  Conjunctiva clear.    NOSE:  clear.  MOUTH & THROAT:  No pharyngeal erythema or exudate. No lesions.  NECK: Supple. No bruits.  No JVD.  No cervical lymphadenopathy.  No thyromegaly.    CHEST: Breath sounds clear bilaterally.  Normal respiratory effort  CARDIOVASCULAR: Normal rate.  Regular rhythm.  No murmurs.  No rub.  No gallops.  ABDOMEN: Bowel sounds normal.  Soft.  No tenderness.  No organomegaly.  PERIPHERAL VASCULAR: No " cyanosis.  No clubbing.  No edema.  NEUROLOGIC: No focal findings.  MENTAL STATUS: Alert.  Oriented x 3.        Diagnoses and all orders for this visit:    Diabetes mellitus with stage 4 chronic kidney disease GFR 15-29  -     Ambulatory referral to Optometry    Atrial fibrillation, unspecified type    Major depressive disorder, recurrent episode, moderate    Mild cognitive impairment with memory loss    Secondary hyperparathyroidism of renal origin    Hypothyroidism, unspecified type  -     TSH; Future     Continue current medication.  Follow-up laboratory as scheduled with addition of TSH.  Keep follow-up with specialists as she has been doing.

## 2019-02-06 ENCOUNTER — OFFICE VISIT (OUTPATIENT)
Dept: OPTOMETRY | Facility: CLINIC | Age: 84
End: 2019-02-06
Payer: MEDICARE

## 2019-02-06 DIAGNOSIS — H35.3131 NONEXUDATIVE AGE-RELATED MACULAR DEGENERATION, BILATERAL, EARLY DRY STAGE: ICD-10-CM

## 2019-02-06 DIAGNOSIS — E11.9 TYPE 2 DIABETES MELLITUS WITHOUT RETINOPATHY: Primary | ICD-10-CM

## 2019-02-06 DIAGNOSIS — H52.13 MYOPIA WITH ASTIGMATISM AND PRESBYOPIA, BILATERAL: ICD-10-CM

## 2019-02-06 DIAGNOSIS — Z13.5 GLAUCOMA SCREENING: ICD-10-CM

## 2019-02-06 DIAGNOSIS — H52.203 MYOPIA WITH ASTIGMATISM AND PRESBYOPIA, BILATERAL: ICD-10-CM

## 2019-02-06 DIAGNOSIS — H52.4 MYOPIA WITH ASTIGMATISM AND PRESBYOPIA, BILATERAL: ICD-10-CM

## 2019-02-06 DIAGNOSIS — Z96.1 BILATERAL PSEUDOPHAKIA: ICD-10-CM

## 2019-02-06 PROCEDURE — 92014 PR EYE EXAM, EST PATIENT,COMPREHESV: ICD-10-PCS | Mod: S$GLB,,, | Performed by: OPTOMETRIST

## 2019-02-06 PROCEDURE — 92015 DETERMINE REFRACTIVE STATE: CPT | Mod: S$GLB,,, | Performed by: OPTOMETRIST

## 2019-02-06 PROCEDURE — 99999 PR PBB SHADOW E&M-EST. PATIENT-LVL III: ICD-10-PCS | Mod: PBBFAC,,, | Performed by: OPTOMETRIST

## 2019-02-06 PROCEDURE — 92134 POSTERIOR SEGMENT OCT RETINA (OCULAR COHERENCE TOMOGRAPHY)-BOTH EYES: ICD-10-PCS | Mod: S$GLB,,, | Performed by: OPTOMETRIST

## 2019-02-06 PROCEDURE — 92014 COMPRE OPH EXAM EST PT 1/>: CPT | Mod: S$GLB,,, | Performed by: OPTOMETRIST

## 2019-02-06 PROCEDURE — 99499 RISK ADDL DX/OHS AUDIT: ICD-10-PCS | Mod: S$GLB,,, | Performed by: OPTOMETRIST

## 2019-02-06 PROCEDURE — 99999 PR PBB SHADOW E&M-EST. PATIENT-LVL III: CPT | Mod: PBBFAC,,, | Performed by: OPTOMETRIST

## 2019-02-06 PROCEDURE — 99499 UNLISTED E&M SERVICE: CPT | Mod: S$GLB,,, | Performed by: OPTOMETRIST

## 2019-02-06 PROCEDURE — 92134 CPTRZ OPH DX IMG PST SGM RTA: CPT | Mod: S$GLB,,, | Performed by: OPTOMETRIST

## 2019-02-06 PROCEDURE — 92015 PR REFRACTION: ICD-10-PCS | Mod: S$GLB,,, | Performed by: OPTOMETRIST

## 2019-02-06 NOTE — LETTER
February 6, 2019      Mor Cook MD  [id number] [facility] [address]           [place] [facility]  [address] [facility] [address] [place] [address]  Phone: [phone]  Fax: [phone]          Patient: Kassi Skelton   MR Number: [id number]   YOB: 1932   Date of Visit: 2/6/2019       Dear Dr. Mor Cook:    Thank you for referring Kassi Skelton to me for evaluation. Attached you will find relevant portions of my assessment and plan of care.    If you have questions, please do not hesitate to call me. I look forward to following Kassi Skelton along with you.    Sincerely,    JUANIS Morales, OD    Enclosure  CC:  No Recipients    If you would like to receive this communication electronically, please contact [email or web address] or [phone] to request more information on [employer] access.    For providers and/or their staff who would like to refer a patient to [facility], please contact us through our one-stop-shop provider referral line, [facility], at [phone].    If you feel you have received this communication in error or would no longer like to receive these types of communications, please e-mail [email or web address]

## 2019-02-06 NOTE — PATIENT INSTRUCTIONS
"FLASHES / FLOATERS / POSTERIOR VITREOUS DETACHMENT    Call the clinic if you have any further changes in symptoms.  Including:  Increased numbers of floaters or flashing lights, dimness or darkness that moves through or stays constant in your vision, or any pain in the eye (s).            DRY EYES:  Use Over The Counter artificial tears as needed for dry eye symptoms.  Some common brands include:  Systane, Optive, and Refresh.  These drops can be used as frequently as desired, but may be most helpful use during long periods of concentrated work.  For example, reading / working at the computer.  Avoid drops that "get redness out", as these contain medication that may further irritate the eyes.    ALLERGY EYES / SYMPTOMS:    Over the counter medications include--Zaditor and Alaway  Use as directed 1-2 drops daily for symptoms of itching / watering eyes.  These drops will not help for dry eye or exposure symptoms.    Using the Amsler Grid  If you are at risk for vision loss, you may be told to check your eyesight regularly using the Amsler grid. Below is the grid and instructions for using it.         The Amsler grid helps you track changes in your vision.    How to Use the Amsler Grid  1. Use the grid in a well-lighted area.  2. Wear glasses or contact lenses if you usually wear them.  3. Hold the grid at your normal reading distance (about 16 inches).  4. Cover your left eye.  5. With your right eye, look at the dot in the center of the grid.  6. While looking at the dot, notice if any of the lines look wavy, if any lines disappear, or if the boxes change shape.  7. Write down on a piece of paper any vision changes from the last time you used the grid.  8. Now repeat the exercise, this time covering your right eye.  9. Call your doctor right away if you notice any vision changes.  How Often Should I Check My Vision?  Use the Amsler grid as often as your eye doctor suggests. Keep the grid where youll remember to use " it. Call your eye doctor right away if you notice any changes with your eyesight. This includes if your vision improves.  © 0773-0192 Starla Rondon, [address] [place] [address], [place] [address]. All rights reserved. This information is not intended as a substitute for professional medical care. Always follow your healthcare professional's instructions.

## 2019-02-06 NOTE — PROGRESS NOTES
HPI     Routine diabetic eye exam--dle--2/25/17 Brandy    Pt denies any blurred vision. Does not wear glasses very often. Feels lens   needs to be increased. Denies any floaters or flashes. No eye pain. BSL   controlled    Hemoglobin A1C       Date                     Value               Ref Range             Status                01/31/2019               6.6 (H)             4.0 - 5.6 %           Final              Comment:    ADA Screening Guidelines:  5.7-6.4%  Consistent with   prediabetes  >or=6.5%  Consistent with diabetes  High levels of fetal   hemoglobin interfere with the HbA1C  assay. Heterozygous hemoglobin   variants (HbS, HgC, etc)do  not significantly interfere with this assay.     However, presence of multiple variants may affect accuracy.         08/01/2018               6.3 (H)             4.0 - 5.6 %           Final              Comment:    ADA Screening Guidelines:  5.7-6.4%  Consistent with   prediabetes  >or=6.5%  Consistent with diabetes  High levels of fetal   hemoglobin interfere with the HbA1C  assay. Heterozygous hemoglobin   variants (HbS, HgC, etc)do  not significantly interfere with this assay.     However, presence of multiple variants may affect accuracy.         02/15/2018               6.1 (H)             4.0 - 5.6 %           Final              Comment:    According to ADA guidelines, hemoglobin A1c <7.0% represents  optimal   control in non-pregnant diabetic patients. Different  metrics may apply to   specific patient populations.   Standards of Medical Care in   Diabetes-2016.  For the purpose of screening for the presence of   diabetes:  <5.7%     Consistent with the absence of diabetes  5.7-6.4%    Consistent with increasing risk for diabetes   (prediabetes)  >or=6.5%    Consistent with diabetes  Currently, no consensus exists for use of   hemoglobin A1c  for diagnosis of diabetes for children.  This Hemoglobin   A1c assay has significant interference with fetal   hemoglobin    (HbF).   The results are invalid for patients with abnormal amounts of   HbF,     including those with known Hereditary Persistence   of Fetal Hemoglobin.   Heterozygous hemoglobin variants (HbAS, HbAC,   HbAD, HbAE, HbA2) do not   significantly interfere with this assay;   however, presence of multiple   variants in a sample may impact the %   interference.    ----------      Last edited by Polly De Los Santos on 2/6/2019  2:27 PM. (History)        ROS     Positive for: Eyes    Negative for: Constitutional, Gastrointestinal, Neurological, Skin,   Genitourinary, Musculoskeletal, HENT, Endocrine, Cardiovascular,   Respiratory, Psychiatric, Allergic/Imm, Heme/Lymph    Last edited by JUANIS Morales, OD on 2/6/2019  2:36 PM. (History)        Assessment /Plan     For exam results, see Encounter Report.    Type 2 diabetes mellitus without retinopathy    Nonexudative age-related macular degeneration, bilateral, early dry stage  -     Posterior Segment OCT Retina-Both eyes    Glaucoma screening    Bilateral pseudophakia    Myopia with astigmatism and presbyopia, bilateral      1. No ret/ no csme, gave info, control glucose, annual DFE  2. Mild mottling / hard drusen OS>OD  OCT today  Very early hard drusen changes OS>OD  areds or similar  Home amsler  3. Not suspect  4. Stable OU  5. Updated specs rx, gave copy, fill prn  Discussed that new specs improve vs uncorrected, but not grossly different than last glasses    Discussed and educated patient on current findings /plan.  RTC 1 year, prn if any changes / issues

## 2019-02-19 RX ORDER — VENLAFAXINE HYDROCHLORIDE 150 MG/1
CAPSULE, EXTENDED RELEASE ORAL
Qty: 90 CAPSULE | Refills: 1 | Status: SHIPPED | OUTPATIENT
Start: 2019-02-19 | End: 2020-11-17

## 2019-02-20 DIAGNOSIS — E13.9 DIABETES 1.5, MANAGED AS TYPE 2: Primary | ICD-10-CM

## 2019-02-22 DIAGNOSIS — E78.5 HYPERLIPIDEMIA, UNSPECIFIED HYPERLIPIDEMIA TYPE: ICD-10-CM

## 2019-02-22 RX ORDER — METOPROLOL TARTRATE 50 MG/1
TABLET ORAL
Qty: 90 TABLET | Refills: 3 | Status: SHIPPED | OUTPATIENT
Start: 2019-02-22 | End: 2019-10-18 | Stop reason: SDUPTHER

## 2019-02-22 RX ORDER — PRAVASTATIN SODIUM 40 MG/1
TABLET ORAL
Qty: 90 TABLET | Refills: 3 | Status: SHIPPED | OUTPATIENT
Start: 2019-02-22 | End: 2019-10-18 | Stop reason: SDUPTHER

## 2019-02-22 RX ORDER — VERAPAMIL HYDROCHLORIDE 120 MG/1
TABLET, FILM COATED, EXTENDED RELEASE ORAL
Qty: 90 TABLET | Refills: 3 | Status: SHIPPED | OUTPATIENT
Start: 2019-02-22 | End: 2019-10-18 | Stop reason: SDUPTHER

## 2019-02-25 RX ORDER — BENAZEPRIL HYDROCHLORIDE 20 MG/1
TABLET ORAL
Qty: 90 TABLET | Refills: 3 | Status: SHIPPED | OUTPATIENT
Start: 2019-02-25 | End: 2019-10-18 | Stop reason: SDUPTHER

## 2019-03-26 ENCOUNTER — OFFICE VISIT (OUTPATIENT)
Dept: FAMILY MEDICINE | Facility: CLINIC | Age: 84
End: 2019-03-26
Payer: MEDICARE

## 2019-03-26 VITALS
DIASTOLIC BLOOD PRESSURE: 63 MMHG | SYSTOLIC BLOOD PRESSURE: 105 MMHG | BODY MASS INDEX: 25.49 KG/M2 | TEMPERATURE: 98 F | HEIGHT: 61 IN | WEIGHT: 135 LBS | HEART RATE: 60 BPM

## 2019-03-26 DIAGNOSIS — N18.4 DIABETES MELLITUS WITH STAGE 4 CHRONIC KIDNEY DISEASE GFR 15-29: ICD-10-CM

## 2019-03-26 DIAGNOSIS — E11.622 DIABETIC ULCER OF RIGHT LOWER LEG: Primary | ICD-10-CM

## 2019-03-26 DIAGNOSIS — E11.40 TYPE 2 DIABETES MELLITUS WITH DIABETIC NEUROPATHY, WITHOUT LONG-TERM CURRENT USE OF INSULIN: ICD-10-CM

## 2019-03-26 DIAGNOSIS — L97.919 DIABETIC ULCER OF RIGHT LOWER LEG: Primary | ICD-10-CM

## 2019-03-26 DIAGNOSIS — E11.22 DIABETES MELLITUS WITH STAGE 4 CHRONIC KIDNEY DISEASE GFR 15-29: ICD-10-CM

## 2019-03-26 PROCEDURE — 99214 OFFICE O/P EST MOD 30 MIN: CPT | Mod: S$GLB,,, | Performed by: FAMILY MEDICINE

## 2019-03-26 PROCEDURE — 99999 PR PBB SHADOW E&M-EST. PATIENT-LVL IV: CPT | Mod: PBBFAC,,, | Performed by: FAMILY MEDICINE

## 2019-03-26 PROCEDURE — 99214 PR OFFICE/OUTPT VISIT, EST, LEVL IV, 30-39 MIN: ICD-10-PCS | Mod: S$GLB,,, | Performed by: FAMILY MEDICINE

## 2019-03-26 PROCEDURE — 1101F PR PT FALLS ASSESS DOC 0-1 FALLS W/OUT INJ PAST YR: ICD-10-PCS | Mod: CPTII,S$GLB,, | Performed by: FAMILY MEDICINE

## 2019-03-26 PROCEDURE — 1101F PT FALLS ASSESS-DOCD LE1/YR: CPT | Mod: CPTII,S$GLB,, | Performed by: FAMILY MEDICINE

## 2019-03-26 PROCEDURE — 99499 UNLISTED E&M SERVICE: CPT | Mod: S$GLB,,, | Performed by: FAMILY MEDICINE

## 2019-03-26 PROCEDURE — 99499 RISK ADDL DX/OHS AUDIT: ICD-10-PCS | Mod: S$GLB,,, | Performed by: FAMILY MEDICINE

## 2019-03-26 PROCEDURE — 99999 PR PBB SHADOW E&M-EST. PATIENT-LVL IV: ICD-10-PCS | Mod: PBBFAC,,, | Performed by: FAMILY MEDICINE

## 2019-03-26 NOTE — PROGRESS NOTES
The patient possibly scraped her leg about 6 weeks ago when she was walking by brush [place].  Family member noted that the area does not seem to have healed, but is slowly improving.  She does have diabetes and has decreased sensation in the legs.  No fever chills.  No claudication, but limited activity.    Diabetes Management Status    Statin: Taking  ACE/ARB: Taking    Screening or Prevention Patient's value Goal Complete/Controlled?   HgA1C Testing and Control   Lab Results   Component Value Date    HGBA1C 6.6 (H) 01/31/2019      Annually/Less than 8% Yes   Lipid profile : 08/01/2018 Annually Yes   LDL control Lab Results   Component Value Date    LDLCALC 101.6 08/01/2018    Annually/Less than 100 mg/dl  No   Nephropathy screening No results found for: LABMICR  Lab Results   Component Value Date    PROTEINUA Negative 02/01/2016    Annually No   Blood pressure BP Readings from Last 1 Encounters:   03/26/19 105/63    Less than 140/90 Yes   Dilated retinal exam : 02/06/2019 Annually Yes   Foot exam   : 03/26/2019 Annually Yes       Past Medical History:  Past Medical History:   Diagnosis Date    *Atrial fibrillation 4/19/2012    *Atrial flutter 4/19/2012    Allergy     Ankle fracture 4/19/2012    Anticoagulant long-term use     Anxiety     Arthritis     Bacterial pneumonia, unspecified 12/7/2012    Breast cancer     LEFT    Cataract     CKD (chronic kidney disease), stage III 5/11/2012    Followed by Dr. 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    BREAST LUMPECTOMY Right 10/21/2015    CARDIAC PACEMAKER PLACEMENT  12/3/12    Sinus node dysfunction    COLONOSCOPY      excision of squamous cell carcinoma Right 2017    EYE SURGERY Bilateral     PHACO/IOL    FRACTURE SURGERY Right     ankle    HYSTERECTOMY      total 1970's    LOCALIZATION-NEEDLE - in Radiology Left 10/21/2015    [facility] at [facility] OR    LUMPECTOMY-BREAST Left 10/21/2015    Performed by [facility]    MASTECTOMY Right 11/2015    MASTECTOMY Left 11/30/2015    [facility]    OOPHORECTOMY      TONSILLECTOMY      TOTAL HIP ARTHROPLASTY Right 2007     Social History     Socioeconomic History    Marital status:      Spouse name: Not on file    Number of children: Not on file    Years of education: Not on file    Highest education level: Not on file   Occupational History    Not on file   Social Needs    Financial resource strain: Not on file    Food insecurity:     Worry: Not on file     Inability: Not on file    Transportation needs:     Medical: Not on file     Non-medical: Not on file   Tobacco Use    Smoking status: Never Smoker    Smokeless tobacco: Never Used   Substance and Sexual Activity    Alcohol use: No    Drug use: No    Sexual activity: Not Currently   Lifestyle    Physical activity:     Days per week: Not on file     Minutes per session: Not on file    Stress: Not on file   Relationships    Social connections:     Talks on phone: Not on file     Gets together: Not on file     Attends Yarsanism service:  Not on file     Active member of club or organization: Not on file     Attends meetings of clubs or organizations: Not on file     Relationship status: Not on file    Intimate partner violence:     Fear of current or ex partner: Not on file     Emotionally abused: Not on file     Physically abused: Not on file     Forced sexual activity: Not on file   Other Topics Concern    Not on file   Social History Narrative    Not on file     Family History   Problem Relation Age of Onset    Hypertension Mother     Heart disease Father     Stroke Father         cerebral hemorrhage    Coronary artery disease Brother     Heart disease Brother     Coronary artery disease Brother     COPD Brother     Heart disease Brother     Mitral valve prolapse Daughter     Peripheral vascular disease Son      Review of patient's allergies indicates:   Allergen Reactions    Amlodipine Edema     Pt stated this medication made her legs swell    Alendronate sodium Other (See Comments)     Reaction: Esophageal bleeding    Fosamax [alendronate] Other (See Comments)     Reaction: Esophageal bleeding  diarrhea    Sorbitol      Diarrhea     Augmentin [amoxicillin-pot clavulanate] Hives and Rash     Current Outpatient Medications on File Prior to Visit   Medication Sig Dispense Refill    ACCU-CHEK SHELDON CONTROL SOLN Soln Use as directed 3 each 3    BD ALCOHOL SWABS PadM USE AS NEEDED 1 each 3    benazepril (LOTENSIN) 20 MG tablet TAKE 1 TABLET EVERY DAY 90 tablet 3    blood sugar diagnostic (ACCU-CHEK SHELDON PLUS TEST STRP) Strp Test glucose once daily 100 strip 3    cyanocobalamin, vitamin B-12, 1,000 mcg TbSR One tab po daily 1 each 0    donepezil (ARICEPT) 5 MG tablet TAKE 1 TABLET EVERY EVENING 90 tablet 3    ELIQUIS 2.5 mg Tab TAKE 1 TABLET TWICE DAILY 180 tablet 3    ERGOCALCIFEROL, VITAMIN D2, (VITAMIN D ORAL) Take by mouth once daily.      glipiZIDE (GLUCOTROL) 2.5 MG TR24 TAKE 1 TABLET EVERY DAY WITH BREAKFAST 90  "tablet 3    hydroCHLOROthiazide (HYDRODIURIL) 25 MG tablet TAKE 1 TABLET EVERY DAY 90 tablet 3    lancets (ACCU-CHEK SOFTCLIX LANCETS) Misc Test glucose once daily 100 each 3    levothyroxine (SYNTHROID) 50 MCG tablet TAKE 1 TABLET DAILY  90 tablet 0    metoprolol tartrate (LOPRESSOR) 50 MG tablet TAKE 1/2 TABLET TWICE DAILY (LAST REFILL UNTIL CLINIC VISIT MADE) 90 tablet 3    mirtazapine (REMERON) 45 MG tablet Take 45 mg by mouth every evening.      oxybutynin (DITROPAN) 5 MG Tab Take 5 mg by mouth once daily.      pravastatin (PRAVACHOL) 40 MG tablet TAKE 1 TABLET EVERY DAY 90 tablet 3    pyridoxine HCl, vitamin B6, (VITAMIN B-6 ORAL) Take by mouth.      venlafaxine (EFFEXOR-XR) 150 MG Cp24 TAKE 1 CAPSULE ONE TIME DAILY 90 capsule 1    verapamil (CALAN-SR) 120 MG CR tablet TAKE 1 TABLET EVERY NIGHT 90 tablet 3    mirabegron (MYRBETRIQ) 50 mg Tb24 Take 1 tablet (50 mg total) by mouth once daily. 90 tablet 1    [DISCONTINUED] acetaminophen (TYLENOL) 325 MG tablet Take 2 tablets (650 mg total) by mouth every 6 (six) hours as needed for Pain or Temperature greater than.  0     No current facility-administered medications on file prior to visit.            ROS:  GENERAL: No fever, chills,  or significant weight changes.   CARDIOVASCULAR: Denies chest pain, PND, orthopnea or reduced exercise tolerance.  ABDOMEN: Appetite fine. Denies diarrhea, abdominal pain, hematemesis or blood in stool.  URINARY: No flank pain, dysuria or hematuria.    Vitals:    03/26/19 1452   BP: 105/63   Pulse: 60   Temp: 98.1 °F (36.7 °C)   Weight: 61.2 kg (135 lb)   Height: 5' 1" (1.549 m)       Wt Readings from Last 3 Encounters:   03/26/19 61.2 kg (135 lb)   01/31/19 62.6 kg (138 lb)   11/20/18 61.7 kg (136 lb)       APPEARANCE: Well nourished, well developed, in no acute distress.    HEAD: Normocephalic.  Atraumatic.  EYES:   Right eye: Pupil reactive.  Conjunctiva clear.    Left eye: Pupil reactive.  Conjunctiva clear.    NECK: " Supple. No bruits.  No JVD.  No cervical lymphadenopathy.  No thyromegaly.    CHEST: Breath sounds clear bilaterally.  Normal respiratory effort  CARDIOVASCULAR: Normal rate.  Regular rhythm.  No murmurs.  No rub.  No gallops.   No edema.  MENTAL STATUS: Alert.  Oriented x 3.  Protective Sensation (w/ 10 gram monofilament):  Right: Decreased  Left: Decreased    Visual Inspection:  Onychomycosis -  Bilateral    Pedal Pulses:   Right: Diminshed  Left: Diminshed    Posterior tibialis:   Right:Absent  Left: Absent  She has approximately 2 cm scab at the right lower leg laterally.  It does appear to be shrinking in size based on appearance.  There is no fluctuance or tenderness.            Kassi was seen today for sore on leg.    Diagnoses and all orders for this visit:    Diabetic ulcer of right lower leg    Type 2 diabetes mellitus with diabetic neuropathy, without long-term current use of insulin    Diabetes mellitus with stage 4 chronic kidney disease GFR 15-29      Continue current medication.  Keep the area clean with soap and water wash daily.  It does appear to be slowly improving.  She has follow-up scheduled with nurse practitioner in a month.  If not continuing to show improvement or if symptoms worsen consider vascular evaluation at that point.  Follow-up laboratory as already scheduled.

## 2019-04-23 ENCOUNTER — TELEPHONE (OUTPATIENT)
Dept: CARDIOLOGY | Facility: CLINIC | Age: 84
End: 2019-04-23

## 2019-04-23 NOTE — TELEPHONE ENCOUNTER
----- Message from Katelynn Begum sent at 4/23/2019 11:33 AM CDT -----  Contact: Tova daughter  Type: Needs Medical Advice    Who Called:  Tova   Best Call Back Number: [phone]  Additional Information: Tova rescheduled mother's appts from 04/30/19 to 06/05/19. Can you please reschedule pacemaker check for same date and time. Pls cancel appt on 04/30/19. Pls call Tova to adv

## 2019-04-25 ENCOUNTER — OFFICE VISIT (OUTPATIENT)
Dept: FAMILY MEDICINE | Facility: CLINIC | Age: 84
End: 2019-04-25
Payer: MEDICARE

## 2019-04-25 VITALS
HEIGHT: 61 IN | WEIGHT: 135 LBS | BODY MASS INDEX: 25.49 KG/M2 | DIASTOLIC BLOOD PRESSURE: 61 MMHG | HEART RATE: 60 BPM | SYSTOLIC BLOOD PRESSURE: 113 MMHG | TEMPERATURE: 98 F

## 2019-04-25 DIAGNOSIS — E11.69 COMBINED HYPERLIPIDEMIA ASSOCIATED WITH TYPE 2 DIABETES MELLITUS: ICD-10-CM

## 2019-04-25 DIAGNOSIS — E11.40 TYPE 2 DIABETES MELLITUS WITH DIABETIC NEUROPATHY, WITHOUT LONG-TERM CURRENT USE OF INSULIN: ICD-10-CM

## 2019-04-25 DIAGNOSIS — N39.46 URINARY INCONTINENCE, MIXED: Chronic | ICD-10-CM

## 2019-04-25 DIAGNOSIS — E11.22 DIABETES MELLITUS WITH STAGE 4 CHRONIC KIDNEY DISEASE GFR 15-29: ICD-10-CM

## 2019-04-25 DIAGNOSIS — E78.2 COMBINED HYPERLIPIDEMIA ASSOCIATED WITH TYPE 2 DIABETES MELLITUS: ICD-10-CM

## 2019-04-25 DIAGNOSIS — I15.2 HYPERTENSION ASSOCIATED WITH DIABETES: ICD-10-CM

## 2019-04-25 DIAGNOSIS — C44.92 KERATOACANTHOMA TYPE SQUAMOUS CELL CARCINOMA OF SKIN: ICD-10-CM

## 2019-04-25 DIAGNOSIS — M19.91 PRIMARY OSTEOARTHRITIS, UNSPECIFIED SITE: ICD-10-CM

## 2019-04-25 DIAGNOSIS — I95.1 ORTHOSTATIC HYPOTENSION: ICD-10-CM

## 2019-04-25 DIAGNOSIS — M81.0 OSTEOPOROSIS, UNSPECIFIED OSTEOPOROSIS TYPE, UNSPECIFIED PATHOLOGICAL FRACTURE PRESENCE: ICD-10-CM

## 2019-04-25 DIAGNOSIS — D05.12 DUCTAL CARCINOMA IN SITU (DCIS) OF LEFT BREAST: ICD-10-CM

## 2019-04-25 DIAGNOSIS — N28.1 CYST OF KIDNEY, ACQUIRED: ICD-10-CM

## 2019-04-25 DIAGNOSIS — I12.9 BENIGN HYPERTENSIVE KIDNEY DISEASE WITH CHRONIC KIDNEY DISEASE STAGE I THROUGH STAGE IV, OR UNSPECIFIED: ICD-10-CM

## 2019-04-25 DIAGNOSIS — F33.1 MAJOR DEPRESSIVE DISORDER, RECURRENT EPISODE, MODERATE: ICD-10-CM

## 2019-04-25 DIAGNOSIS — Z95.0 CARDIAC PACEMAKER IN SITU: Chronic | ICD-10-CM

## 2019-04-25 DIAGNOSIS — E11.59 HYPERTENSION ASSOCIATED WITH DIABETES: ICD-10-CM

## 2019-04-25 DIAGNOSIS — Z86.79 HISTORY OF ATRIAL FLUTTER: ICD-10-CM

## 2019-04-25 DIAGNOSIS — I48.91 ATRIAL FIBRILLATION, UNSPECIFIED TYPE: Chronic | ICD-10-CM

## 2019-04-25 DIAGNOSIS — I49.5 SINUS NODE DYSFUNCTION: ICD-10-CM

## 2019-04-25 DIAGNOSIS — I44.7 LBBB (LEFT BUNDLE BRANCH BLOCK): ICD-10-CM

## 2019-04-25 DIAGNOSIS — N18.4 DIABETES MELLITUS WITH STAGE 4 CHRONIC KIDNEY DISEASE GFR 15-29: ICD-10-CM

## 2019-04-25 DIAGNOSIS — Z97.4 HEARING AID WORN: ICD-10-CM

## 2019-04-25 DIAGNOSIS — G31.84 MILD COGNITIVE IMPAIRMENT WITH MEMORY LOSS: Primary | ICD-10-CM

## 2019-04-25 DIAGNOSIS — Z79.01 ANTICOAGULATION MONITORING BY PHARMACIST: Chronic | ICD-10-CM

## 2019-04-25 DIAGNOSIS — N25.81 SECONDARY HYPERPARATHYROIDISM OF RENAL ORIGIN: ICD-10-CM

## 2019-04-25 DIAGNOSIS — E03.9 HYPOTHYROIDISM, UNSPECIFIED TYPE: Chronic | ICD-10-CM

## 2019-04-25 PROCEDURE — G0439 PR MEDICARE ANNUAL WELLNESS SUBSEQUENT VISIT: ICD-10-PCS | Mod: S$GLB,,, | Performed by: NURSE PRACTITIONER

## 2019-04-25 PROCEDURE — 99999 PR PBB SHADOW E&M-EST. PATIENT-LVL III: ICD-10-PCS | Mod: PBBFAC,,, | Performed by: NURSE PRACTITIONER

## 2019-04-25 PROCEDURE — G0439 PPPS, SUBSEQ VISIT: HCPCS | Mod: S$GLB,,, | Performed by: NURSE PRACTITIONER

## 2019-04-25 PROCEDURE — 99999 PR PBB SHADOW E&M-EST. PATIENT-LVL III: CPT | Mod: PBBFAC,,, | Performed by: NURSE PRACTITIONER

## 2019-04-29 RX ORDER — MIRABEGRON 50 MG/1
TABLET, FILM COATED, EXTENDED RELEASE ORAL
Qty: 90 TABLET | Refills: 3 | Status: SHIPPED | OUTPATIENT
Start: 2019-04-29 | End: 2019-08-26 | Stop reason: SDUPTHER

## 2019-05-07 ENCOUNTER — OFFICE VISIT (OUTPATIENT)
Dept: PODIATRY | Facility: CLINIC | Age: 84
End: 2019-05-07
Payer: MEDICARE

## 2019-05-07 VITALS
DIASTOLIC BLOOD PRESSURE: 62 MMHG | BODY MASS INDEX: 25.51 KG/M2 | SYSTOLIC BLOOD PRESSURE: 140 MMHG | HEART RATE: 69 BPM | HEIGHT: 61 IN

## 2019-05-07 DIAGNOSIS — T14.8XXA ABRASION OF SKIN: ICD-10-CM

## 2019-05-07 DIAGNOSIS — B35.1 ONYCHOMYCOSIS DUE TO DERMATOPHYTE: ICD-10-CM

## 2019-05-07 DIAGNOSIS — L84 PRE-ULCERATIVE CALLUSES: ICD-10-CM

## 2019-05-07 DIAGNOSIS — E11.49 TYPE II DIABETES MELLITUS WITH NEUROLOGICAL MANIFESTATIONS: Primary | ICD-10-CM

## 2019-05-07 PROCEDURE — 1101F PR PT FALLS ASSESS DOC 0-1 FALLS W/OUT INJ PAST YR: ICD-10-PCS | Mod: CPTII,S$GLB,, | Performed by: PODIATRIST

## 2019-05-07 PROCEDURE — 99213 PR OFFICE/OUTPT VISIT, EST, LEVL III, 20-29 MIN: ICD-10-PCS | Mod: 25,S$GLB,, | Performed by: PODIATRIST

## 2019-05-07 PROCEDURE — 11721 PR DEBRIDEMENT OF NAILS, 6 OR MORE: ICD-10-PCS | Mod: 59,Q9,S$GLB, | Performed by: PODIATRIST

## 2019-05-07 PROCEDURE — 99999 PR PBB SHADOW E&M-EST. PATIENT-LVL III: CPT | Mod: PBBFAC,,, | Performed by: PODIATRIST

## 2019-05-07 PROCEDURE — 99999 PR PBB SHADOW E&M-EST. PATIENT-LVL III: ICD-10-PCS | Mod: PBBFAC,,, | Performed by: PODIATRIST

## 2019-05-07 PROCEDURE — 99499 UNLISTED E&M SERVICE: CPT | Mod: HCNC,S$GLB,, | Performed by: PODIATRIST

## 2019-05-07 PROCEDURE — 11055 PARING/CUTG B9 HYPRKER LES 1: CPT | Mod: Q9,S$GLB,, | Performed by: PODIATRIST

## 2019-05-07 PROCEDURE — 11055 PR TRIM HYPERKERATOTIC SKIN LESION, ONE: ICD-10-PCS | Mod: Q9,S$GLB,, | Performed by: PODIATRIST

## 2019-05-07 PROCEDURE — 99499 RISK ADDL DX/OHS AUDIT: ICD-10-PCS | Mod: HCNC,S$GLB,, | Performed by: PODIATRIST

## 2019-05-07 PROCEDURE — 1101F PT FALLS ASSESS-DOCD LE1/YR: CPT | Mod: CPTII,S$GLB,, | Performed by: PODIATRIST

## 2019-05-07 PROCEDURE — 11721 DEBRIDE NAIL 6 OR MORE: CPT | Mod: 59,Q9,S$GLB, | Performed by: PODIATRIST

## 2019-05-07 PROCEDURE — 99213 OFFICE O/P EST LOW 20 MIN: CPT | Mod: 25,S$GLB,, | Performed by: PODIATRIST

## 2019-05-07 NOTE — PROGRESS NOTES
Subjective:     Patient ID: Kassi Skelton is a 87 y.o. female.    Chief Complaint: Nail Problem (no c/o pain. wears causal shoes w/o socks. diabetic Pt. last seen on 03/29/19 with PCP Dr. Cook.)    Kassi is a 87 y.o. female who presents to the clinic for evaluation and treatment of high risk feet. 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 The patient's chief complaint is long, thick toenails. This patient has documented high risk feet requiring routine maintenance secondary to diabetes mellitis and those secondary complications of diabetes, as mentioned..    PCP: Mor Cook MD    Date Last Seen by PCP: 03/29/19    Current shoe gear:  Affected Foot: Tennis shoes     Unaffected Foot: Tennis shoes    Hemoglobin A1C   Date Value Ref Range Status   01/31/2019 6.6 (H) 4.0 - 5.6 % Final     Comment:     ADA Screening Guidelines:  5.7-6.4%  Consistent with prediabetes  >or=6.5%  Consistent with diabetes  High levels of fetal hemoglobin interfere with the HbA1C  assay. Heterozygous hemoglobin variants (HbS, HgC,  etc)do  not significantly interfere with this assay.   However, presence of multiple variants may affect accuracy.     08/01/2018 6.3 (H) 4.0 - 5.6 % Final     Comment:     ADA Screening Guidelines:  5.7-6.4%  Consistent with prediabetes  >or=6.5%  Consistent with diabetes  High levels of fetal hemoglobin interfere with the HbA1C  assay. Heterozygous hemoglobin variants (HbS, HgC, etc)do  not significantly interfere with this assay.   However, presence of multiple variants may affect accuracy.     02/15/2018 6.1 (H) 4.0 - 5.6 % Final     Comment:     According to ADA guidelines, hemoglobin A1c <7.0% represents  optimal control in non-pregnant diabetic patients. Different  metrics may apply to specific patient populations.   Standards of Medical Care in Diabetes-2016.  For the purpose of screening for the presence of diabetes:  <5.7%     Consistent with the absence of diabetes  5.7-6.4%  Consistent with increasing risk for diabetes   (prediabetes)  >or=6.5%  Consistent with diabetes  Currently, no consensus exists for use of hemoglobin A1c  for diagnosis of diabetes for children.  This Hemoglobin A1c assay has significant interference with fetal   hemoglobin   (HbF). The results are invalid for patients with abnormal amounts of   HbF,   including those with known Hereditary Persistence   of Fetal Hemoglobin. Heterozygous hemoglobin variants (HbAS, HbAC,   HbAD, HbAE, HbA2) do not significantly interfere with this assay;   however, presence of multiple variants in a sample may impact the %   interference.         Patient Active Problem List   Diagnosis    History of atrial flutter    Combined hyperlipidemia associated with type 2 diabetes mellitus    Hypothyroidism    Major depressive disorder, recurrent episode, moderate    Osteoporosis    Benign hypertensive kidney disease with chronic kidney disease    Cyst of kidney, acquired    Atrial fibrillation    Anticoagulation monitoring by pharmacist    BRAYAN  (left bundle branch block)    Sinus node dysfunction    Cardiac pacemaker in situ    Urinary incontinence, mixed    Pacemaker    Orthostatic hypotension    DCIS (ductal carcinoma in situ)    Hypertension associated with diabetes    Diabetes mellitus with stage 4 chronic kidney disease GFR 15-29    Dizziness    Secondary hyperparathyroidism of renal origin    Hearing aid worn    Diabetic neuropathy, type II diabetes mellitus    Mild cognitive impairment with memory loss    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   Current Medications    ACCU-CHEK SHELDON CONTROL SOLN SOLN    Use as directed    BD ALCOHOL SWABS PADM    USE AS NEEDED    BENAZEPRIL (LOTENSIN) 20 MG TABLET    TAKE 1 TABLET EVERY DAY    BLOOD SUGAR DIAGNOSTIC (ACCU-CHEK SHELDON PLUS TEST STRP) STRP    Test glucose once daily    CYANOCOBALAMIN, VITAMIN B-12, 1,000 MCG TBSR    One tab po daily    DONEPEZIL (ARICEPT) 5 MG TABLET    TAKE 1 TABLET EVERY EVENING    ELIQUIS 2.5 MG TAB    TAKE 1 TABLET TWICE DAILY    ERGOCALCIFEROL, VITAMIN D2, (VITAMIN D ORAL)    Take by mouth once daily.    GLIPIZIDE (GLUCOTROL) 2.5 MG TR24    TAKE 1 TABLET EVERY DAY WITH BREAKFAST    HYDROCHLOROTHIAZIDE (HYDRODIURIL) 25 MG TABLET    TAKE 1 TABLET EVERY DAY    LANCETS (ACCU-CHEK SOFTCLIX LANCETS) MISC    Test glucose once daily    METOPROLOL TARTRATE (LOPRESSOR) 50 MG TABLET    TAKE 1/2 TABLET TWICE DAILY (LAST REFILL UNTIL CLINIC VISIT MADE)    MIRTAZAPINE (REMERON) 45 MG TABLET    Take 45 mg by mouth every evening.    MYRBETRIQ 50 MG TB24    TAKE 1 TABLET ONE TIME DAILY    OXYBUTYNIN (DITROPAN) 5 MG TAB    Take 5 mg by mouth once daily.    PRAVASTATIN (PRAVACHOL) 40 MG TABLET    TAKE 1 TABLET EVERY DAY    PYRIDOXINE HCL, VITAMIN B6, (VITAMIN B-6 ORAL)    Take by mouth.     VENLAFAXINE (EFFEXOR-XR) 150 MG CP24    TAKE 1 CAPSULE ONE TIME DAILY    VERAPAMIL (CALAN-SR) 120 MG CR TABLET    TAKE 1 TABLET EVERY NIGHT   Discontinued Medications    LEVOTHYROXINE (SYNTHROID) 50 MCG TABLET    TAKE 1 TABLET DAILY        Review of patient's allergies indicates:   Allergen Reactions    Amlodipine Edema     Pt stated this medication made her legs swell    Alendronate sodium Other (See Comments)     Reaction: Esophageal bleeding    Fosamax [alendronate] Other (See Comments)     Reaction: Esophageal bleeding  diarrhea    Sorbitol      Diarrhea     Augmentin [amoxicillin-pot clavulanate] Hives and Rash       Past Surgical History:   Procedure Laterality Date    APPENDECTOMY      BIOPSY-SENTINEL NODE Left 10/21/2015    Performed [facility] at [facility]    BREAST LUMPECTOMY Right 10/21/2015    CARDIAC PACEMAKER PLACEMENT  12/3/12    Sinus node dysfunction    COLONOSCOPY      excision of squamous cell carcinoma Right 2017    EYE SURGERY Bilateral     PHACO/IOL    FRACTURE SURGERY Right     ankle    HYSTERECTOMY      total 1970's    LOCALIZATION-NEEDLE - in Radiology Left 10/21/2015    Performed [facility] at [facility] OR    LUMPECTOMY-BREAST Left 10/21/2015    Performed [facility] OR    MASTECTOMY Right 11/2015    MASTECTOMY Left 11/30/2015    [facility]    OOPHORECTOMY      TONSILLECTOMY      TOTAL HIP ARTHROPLASTY Right 2007       Family History   Problem Relation Age of Onset    Hypertension Mother     Heart disease Father     Stroke Father         cerebral hemorrhage    Coronary artery disease Brother     Heart disease Brother     Coronary artery disease Brother     COPD Brother     Heart disease Brother     Mitral valve prolapse Daughter     Peripheral vascular disease Son        Social History     Socioeconomic History    Marital status:      Spouse name: Not on file    Number of children:  "Not on file    Years of education: Not on file    Highest education level: Not on file   Occupational History    Not on file   Social Needs    Financial resource strain: Not on file    Food insecurity:     Worry: Not on file     Inability: Not on file    Transportation needs:     Medical: Not on file     Non-medical: Not on file   Tobacco Use    Smoking status: Never Smoker    Smokeless tobacco: Never Used   Substance and Sexual Activity    Alcohol use: No    Drug use: No    Sexual activity: Not Currently   Lifestyle    Physical activity:     Days per week: Not on file     Minutes per session: Not on file    Stress: Not on file   Relationships    Social connections:     Talks on phone: Not on file     Gets together: Not on file     Attends Christianity service: Not on file     Active member of club or organization: Not on file     Attends meetings of clubs or organizations: Not on file     Relationship status: Not on file   Other Topics Concern    Not on file   Social History Narrative    Not on file       Vitals:    05/07/19 1520   BP: (!) 140/62   Pulse: 69   Height: 5' 1" (1.549 m)   PainSc: 0-No pain   PainLoc: Foot       Hemoglobin A1C   Date Value Ref Range Status   01/31/2019 6.6 (H) 4.0 - 5.6 % Final     Comment:     ADA Screening Guidelines:  5.7-6.4%  Consistent with prediabetes  >or=6.5%  Consistent with diabetes  High levels of fetal hemoglobin interfere with the HbA1C  assay. Heterozygous hemoglobin variants (HbS, HgC, etc)do  not significantly interfere with this assay.   However, presence of multiple variants may affect accuracy.     08/01/2018 6.3 (H) 4.0 - 5.6 % Final     Comment:     ADA Screening Guidelines:  5.7-6.4%  Consistent with prediabetes  >or=6.5%  Consistent with diabetes  High levels of fetal hemoglobin interfere with the HbA1C  assay. Heterozygous hemoglobin variants (HbS, HgC, etc)do  not significantly interfere with this assay.   However, presence of multiple variants " may affect accuracy.     02/15/2018 6.1 (H) 4.0 - 5.6 % Final     Comment:     According to ADA guidelines, hemoglobin A1c <7.0% represents  optimal control in non-pregnant diabetic patients. Different  metrics may apply to specific patient populations.   Standards of Medical Care in Diabetes-2016.  For the purpose of screening for the presence of diabetes:  <5.7%     Consistent with the absence of diabetes  5.7-6.4%  Consistent with increasing risk for diabetes   (prediabetes)  >or=6.5%  Consistent with diabetes  Currently, no consensus exists for use of hemoglobin A1c  for diagnosis of diabetes for children.  This Hemoglobin A1c assay has significant interference with fetal   hemoglobin   (HbF). The results are invalid for patients with abnormal amounts of   HbF,   including those with known Hereditary Persistence   of Fetal Hemoglobin. Heterozygous hemoglobin variants (HbAS, HbAC,   HbAD, HbAE, HbA2) do not significantly interfere with this assay;   however, presence of multiple variants in a sample may impact the %   interference.         Review of Systems   Constitutional: Negative for chills and fever.   Respiratory: Negative for shortness of breath.    Cardiovascular: Negative for chest pain, palpitations, orthopnea, claudication and leg swelling.   Gastrointestinal: Negative for diarrhea, nausea and vomiting.   Musculoskeletal: Negative for joint pain.   Skin: Negative for rash.   Neurological: Positive for sensory change. Negative for dizziness, tingling, focal weakness and weakness.   Psychiatric/Behavioral: Negative.              Objective:       PHYSICAL EXAM: Apperance: Alert and orient in no distress,well developed, and with good attention to grooming and body habits  Lower Extremity Exam  VASCULAR: Dorsalis pedis pulses 1/4 bilateral and Posterior Tibial pulses 1/4 bilateral. Capillary fill time <4 seconds bilateral. No edema observed bilateral. Varicosities present bilateral. Skin temperature of the  lower extremities is warm to warm, proximal to distal. Hair growth absent bilateral. (--) lymphangitis or (--) cellulitis noted right.  DERMATOLOGICAL: No skin rashes, subcutaneous nodules, lesions, or ulcers observed bilateral. The dorsum surface of the feet are soft and supple.  The plantar aspects of both feet are dry and scaly. Nails 1,2,3,4,5 bilateral elongated, thickened, and discolored with subungual debris. Webspaces 1,2,3,4 clean, dry and without evidence of break in skin integrity bilateral. Thin hyperkeratotic lesions noted to left plantar 1st submetatarsal.  NEUROLOGICAL: Light touch, sharp-dull, proprioception all present and equal bilaterally.   MUSCULOSKELETAL: Muscle strength is 5/5 for foot inverters, everters, plantarflexors, and dorsiflexors. Muscle tone is normal. Fat pad atrophy noted bilateral.           Assessment:       Encounter Diagnoses   Name Primary?    Type II diabetes mellitus with neurological manifestations Yes    Pre-ulcerative calluses - Left Foot     Onychomycosis due to dermatophyte     Abrasion of skin - Right Foot          Plan:   Type II diabetes mellitus with neurological manifestations    Pre-ulcerative calluses - Left Foot    Onychomycosis due to dermatophyte    Abrasion of skin - Right Foot      I counseled the patient on her conditions, regarding findings of my examination, my impressions, and usual treatment plan.   Greater than 50% of this visit spent on counseling and coordination of care.  Greater than 15 minutes of a 20 minute appointment spent on education about the diabetic foot, neuropathy, and prevention of limb loss.  Shoe inspection. Diabetic Foot Education. Patient reminded of the importance of good nutrition and blood sugar control to help prevent podiatric complications of diabetes. Patient instructed on proper foot hygeine. We discussed wearing proper shoe gear, daily foot inspections, never walking without protective shoe gear, never putting sharp  instruments to feet.    With patient's permission, nails 1-5 bilateral were debrided/trimmed in length and thickness aggressively to their soft tissue attachment mechanically and with electric , removing all offending nail and debris. Patient relates relief following the procedure.  With patient's permission, left callus trimmed in thickness with #15 blade in thickness without incident.   Applied Betadine to right foot skin abrasion and covered with band-aid.   Patient  will continue to monitor the areas daily, inspect feet, wear protective shoe gear when ambulatory, moisturizer to maintain skin integrity. Patient reminded of the importance of good nutrition and blood sugar control to help prevent podiatric complications of diabetes.  Patient to return 3 months or sooner if needed.                 Luzmaria Valle DPM  [facility]

## 2019-05-09 RX ORDER — LEVOTHYROXINE SODIUM 25 UG/1
25 TABLET ORAL DAILY
Qty: 90 TABLET | Refills: 0 | Status: SHIPPED | OUTPATIENT
Start: 2019-05-09 | End: 2019-05-20 | Stop reason: SDUPTHER

## 2019-05-09 NOTE — TELEPHONE ENCOUNTER
MD NAOMY Bright. Staff             Thyroid test Shows hyperthyroidism..  Please decrease Levothyroxine  25 mcg daily.  Recheck TSH in 2 months.     My nurse will contact you to arrange.   Thanks,   Dr. Cook

## 2019-05-19 ENCOUNTER — PATIENT MESSAGE (OUTPATIENT)
Dept: FAMILY MEDICINE | Facility: CLINIC | Age: 84
End: 2019-05-19

## 2019-05-19 NOTE — PROGRESS NOTES
"Kassi Skelton presented for a  Medicare AWV and comprehensive Health Risk Assessment today. The following components were reviewed and updated:    · Medical history  · Family History  · Social history  · Allergies and Current Medications  · Health Risk Assessment  · Health Maintenance  · Care Team     ** See Completed Assessments for Annual Wellness Visit within the encounter summary.**       The following assessments were completed:  · Living Situation  · CAGE  · Depression Screening  · Timed Get Up and Go  · Whisper Test  · Cognitive Function Screening  · Nutrition Screening  · ADL Screening  · PAQ Screening    Vitals:    04/25/19 1255   BP: 113/61   Pulse: 60   Temp: 98.1 °F (36.7 °C)   TempSrc: Oral   Weight: 61.2 kg (135 lb)   Height: 5' 1" (1.549 m)     Body mass index is 25.51 kg/m².  Physical Exam   Constitutional: She is oriented to person, place, and time. She appears well-developed and well-nourished. No distress.   HENT:   Head: Normocephalic and atraumatic.   Eyes: Pupils are equal, round, and reactive to light. EOM are normal.   Neck: Normal range of motion. Neck supple.   Cardiovascular: Normal rate and regular rhythm.   Pulmonary/Chest: Effort normal and breath sounds normal.   Musculoskeletal: Normal range of motion.   Neurological: She is alert and oriented to person, place, and time.   Skin: Skin is warm and dry. No rash noted.   Psychiatric: She has a normal mood and affect. Judgment normal.   Nursing note and vitals reviewed.        Diagnoses and health risks identified today and associated recommendations/orders:    1. Mild cognitive impairment with memory loss  Stable and controlled on Aricept  Continue medication as prescribed  Continue current treatment plan as previously prescribed with your PCP      2. Major depressive disorder, recurrent episode, moderate  Stable and controlled on Effexor  Continue medication as prescribed  Continue current treatment plan as previously prescribed " with your PCP      3. Hearing aid worn  Stable-follow up for worsening symptoms    4. Atrial fibrillation, unspecified type  Stable and controlled on metoprolol, Eliquis  Continue medication as prescribed  Continue current treatment plan as previously prescribed with your PCP      5. Cardiac pacemaker in situ  Stable-routine follow-up monitoring    6. History of atrial flutter  Stable and controlled on metoprolol  Continue medication as prescribed  Continue current treatment plan as previously prescribed with your PCP      7. Combined hyperlipidemia associated with type 2 diabetes mellitus  Stable and controlled on pravastatin  Continue medication as prescribed  Continue current treatment plan as previously prescribed with your PCP      8. LBBB (left bundle branch block)  Stable-routine follow-up with Cardiology    9. Sinus node dysfunction  Stable-routine follow-up with Cardiology    10. Orthostatic hypotension  Stable-follow up for worsening symptoms    11. Hypertension associated with diabetes  Stable and controlled on benazepril, metoprolol  Continue medication as prescribed  Continue current treatment plan as previously prescribed with your PCP      12. Diabetes mellitus with stage 4 chronic kidney disease GFR 15-29  Stable and controlled on glipizide  Continue medication as prescribed  Continue current treatment plan as previously prescribed with your PCP      13. Secondary hyperparathyroidism of renal origin  Stable-routine follow-up and labs    14. Type 2 diabetes mellitus with diabetic neuropathy, without long-term current use of insulin  Stable and controlled on glipizide  Continue medication as prescribed  Continue current treatment plan as previously prescribed with your PCP      15. Hypothyroidism, unspecified type  Stable and controlled on Synthroid  Continue medication as prescribed  Continue current treatment plan as previously prescribed with your PCP      16. Osteoporosis, unspecified osteoporosis  type, unspecified pathological fracture presence  Stable and controlled on calcium and vitamin-D  Continue medication as prescribed  Continue current treatment plan as previously prescribed with your PCP      17. Primary osteoarthritis, unspecified site  Stable-follow up for worsening symptoms    18. 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 Ductal carcinoma in situ (DCIS) of left breast  Stable-routine follow-up with Oncology    20. Anticoagulation monitoring by pharmacist  Stable-routine monitoring    21. Benign hypertensive kidney disease with chronic kidney disease stage I through stage IV, or unspecified  Stable and controlled on verapamil, metoprolol, hydrochlorothiazide, benazepril  Continue medication as prescribed  Continue current treatment plan as previously prescribed with your PCP      22. Urinary incontinence, mixed  Stable and controlled on Myrbetriq  Continue medication as prescribed  Continue current treatment plan as previously prescribed with your PCP      23. Cyst of kidney, acquired  Stable-routine follow-up and monitoring      Provided Kassi with a 5-10 year written screening schedule and personal prevention plan. Recommendations were developed using the USPSTF age appropriate recommendations. Education, counseling, and referrals were provided as needed. After Visit Summary printed and given to patient which includes a list of additional screenings\tests needed.    Follow up for Routine scheduled appointment.    Pascual Perez NP

## 2019-05-20 RX ORDER — LEVOTHYROXINE SODIUM 25 UG/1
25 TABLET ORAL DAILY
Qty: 90 TABLET | Refills: 1 | Status: SHIPPED | OUTPATIENT
Start: 2019-05-20 | End: 2019-09-22 | Stop reason: SDUPTHER

## 2019-05-21 RX ORDER — LEVOTHYROXINE SODIUM 50 UG/1
TABLET ORAL
Qty: 90 TABLET | Refills: 3 | OUTPATIENT
Start: 2019-05-21

## 2019-05-24 ENCOUNTER — PATIENT MESSAGE (OUTPATIENT)
Dept: FAMILY MEDICINE | Facility: CLINIC | Age: 84
End: 2019-05-24

## 2019-06-05 ENCOUNTER — OFFICE VISIT (OUTPATIENT)
Dept: NEPHROLOGY | Facility: CLINIC | Age: 84
End: 2019-06-05
Payer: MEDICARE

## 2019-06-05 ENCOUNTER — CLINICAL SUPPORT (OUTPATIENT)
Dept: CARDIOLOGY | Facility: CLINIC | Age: 84
End: 2019-06-05
Attending: INTERNAL MEDICINE
Payer: MEDICARE

## 2019-06-05 ENCOUNTER — OFFICE VISIT (OUTPATIENT)
Dept: CARDIOLOGY | Facility: CLINIC | Age: 84
End: 2019-06-05
Payer: MEDICARE

## 2019-06-05 VITALS
HEIGHT: 61 IN | DIASTOLIC BLOOD PRESSURE: 64 MMHG | OXYGEN SATURATION: 88 % | HEART RATE: 69 BPM | WEIGHT: 137.38 LBS | BODY MASS INDEX: 25.94 KG/M2 | SYSTOLIC BLOOD PRESSURE: 126 MMHG

## 2019-06-05 VITALS
BODY MASS INDEX: 25.51 KG/M2 | SYSTOLIC BLOOD PRESSURE: 106 MMHG | HEART RATE: 60 BPM | DIASTOLIC BLOOD PRESSURE: 64 MMHG | HEIGHT: 61 IN

## 2019-06-05 DIAGNOSIS — I12.9 BENIGN HYPERTENSIVE KIDNEY DISEASE WITH CHRONIC KIDNEY DISEASE, STAGE 1-4 OR UNSPECIFIED CHRONIC KIDNEY DISEASE: ICD-10-CM

## 2019-06-05 DIAGNOSIS — N25.81 SECONDARY RENAL HYPERPARATHYROIDISM: ICD-10-CM

## 2019-06-05 DIAGNOSIS — N28.1 CYST OF KIDNEY, ACQUIRED: ICD-10-CM

## 2019-06-05 DIAGNOSIS — I49.5 SINUS NODE DYSFUNCTION: ICD-10-CM

## 2019-06-05 DIAGNOSIS — Z95.0 PACEMAKER: ICD-10-CM

## 2019-06-05 DIAGNOSIS — N18.30 CKD (CHRONIC KIDNEY DISEASE), STAGE III: Primary | ICD-10-CM

## 2019-06-05 DIAGNOSIS — I48.91 ATRIAL FIBRILLATION, UNSPECIFIED TYPE: Chronic | ICD-10-CM

## 2019-06-05 DIAGNOSIS — Z95.0 CARDIAC PACEMAKER IN SITU: ICD-10-CM

## 2019-06-05 DIAGNOSIS — I44.7 LBBB (LEFT BUNDLE BRANCH BLOCK): Primary | ICD-10-CM

## 2019-06-05 PROCEDURE — 99999 PR PBB SHADOW E&M-EST. PATIENT-LVL III: ICD-10-PCS | Mod: PBBFAC,,, | Performed by: INTERNAL MEDICINE

## 2019-06-05 PROCEDURE — 99499 UNLISTED E&M SERVICE: CPT | Mod: HCNC,S$GLB,, | Performed by: INTERNAL MEDICINE

## 2019-06-05 PROCEDURE — 99999 PR PBB SHADOW E&M-EST. PATIENT-LVL I: ICD-10-PCS | Mod: PBBFAC,,,

## 2019-06-05 PROCEDURE — 99214 OFFICE O/P EST MOD 30 MIN: CPT | Mod: S$GLB,,, | Performed by: INTERNAL MEDICINE

## 2019-06-05 PROCEDURE — 99999 PR PBB SHADOW E&M-EST. PATIENT-LVL III: CPT | Mod: PBBFAC,,, | Performed by: INTERNAL MEDICINE

## 2019-06-05 PROCEDURE — 99214 PR OFFICE/OUTPT VISIT, EST, LEVL IV, 30-39 MIN: ICD-10-PCS | Mod: S$GLB,,, | Performed by: INTERNAL MEDICINE

## 2019-06-05 PROCEDURE — 99499 RISK ADDL DX/OHS AUDIT: ICD-10-PCS | Mod: HCNC,S$GLB,, | Performed by: INTERNAL MEDICINE

## 2019-06-05 PROCEDURE — 1101F PR PT FALLS ASSESS DOC 0-1 FALLS W/OUT INJ PAST YR: ICD-10-PCS | Mod: CPTII,S$GLB,, | Performed by: INTERNAL MEDICINE

## 2019-06-05 PROCEDURE — 1101F PT FALLS ASSESS-DOCD LE1/YR: CPT | Mod: CPTII,S$GLB,, | Performed by: INTERNAL MEDICINE

## 2019-06-05 PROCEDURE — 99999 PR PBB SHADOW E&M-EST. PATIENT-LVL I: CPT | Mod: PBBFAC,,,

## 2019-06-05 NOTE — PROGRESS NOTES
"Subjective:       Patient ID: Kassi Skelton is a 87 y.o. White female who presents for return patient evaluation for chronic renal failure.    She has no uremic or urinary symptoms and is in her usual state of health.  There have been no recent illnesses, hospitalizations or procedures.        Review of Systems   Constitutional: Negative for appetite change, chills and fever.   HENT:        Dry mouth-chronic   Eyes: Negative for visual disturbance.   Respiratory: Negative for cough and shortness of breath.    Cardiovascular: Negative for chest pain and leg swelling.   Gastrointestinal: Negative for abdominal pain, diarrhea, nausea and vomiting.   Genitourinary: Negative for difficulty urinating, dysuria and hematuria.   Musculoskeletal: Positive for arthralgias and gait problem. Negative for myalgias.   Skin: Negative for rash.   Neurological: Negative for dizziness and headaches.   Psychiatric/Behavioral: Negative for sleep disturbance.       The past medical, family and social histories were reviewed for this encounter.     /64 (BP Location: Right arm, Patient Position: Sitting)   Pulse 69   Ht 5' 1" (1.549 m)   Wt 62.3 kg (137 lb 5.6 oz)   SpO2 (!) 88%   BMI 25.95 kg/m²     Objective:      Physical Exam   Constitutional: She appears well-developed and well-nourished. No distress.   HENT:   Head: Normocephalic and atraumatic.   Eyes: Conjunctivae are normal. No scleral icterus.   Neck: Normal range of motion. No JVD present.   Cardiovascular: Normal rate, regular rhythm and normal heart sounds. Exam reveals no gallop and no friction rub.   No murmur heard.  Pulmonary/Chest: Effort normal and breath sounds normal. No respiratory distress. She has no wheezes.   Abdominal: Soft. Bowel sounds are normal. She exhibits no distension. There is no tenderness.   Musculoskeletal: She exhibits no edema.   Skin: Skin is warm and dry. No rash noted.   Psychiatric: She has a normal mood and affect.   Vitals " reviewed.      Assessment:       1. CKD (chronic kidney disease), stage III    2. Benign hypertensive kidney disease with chronic kidney disease, stage 1-4 or unspecified chronic kidney disease    3. Secondary renal hyperparathyroidism    4. Cyst of kidney, acquired        Plan:   Return to clinic in 6 months in the afternoon please.  Labs for next visit include rp, pth.  Baseline creatinine is 1.1-1.5 since 2010 but is still in the upper 1.5-2.0 range for the past four years.  She wishes to continue conservative care.  PTH is 147 with a calcium of 9.6.  UPC is negative.  Continue current medications as prescribed and reviewed.   Blood pressure is controlled on the current regimen.  Her daughter Tova's cell is [phone].

## 2019-06-05 NOTE — PROGRESS NOTES
Subjective:    Patient ID:  Kassi Skelton is a 87 y.o. female who presents for follow-up of PAF    HPI  She comes with no complaints, no chest pain, no shortness of breath  FC II    Review of Systems   Constitution: Negative for decreased appetite, malaise/fatigue, weight gain and weight loss.   Cardiovascular: Negative for chest pain, dyspnea on exertion, leg swelling, palpitations and syncope.   Respiratory: Negative for cough and shortness of breath.    Gastrointestinal: Negative.    Neurological: Negative for weakness.   All other systems reviewed and are negative.       Objective:      Physical Exam   Constitutional: She is oriented to person, place, and time. She appears well-developed and well-nourished.   HENT:   Head: Normocephalic.   Eyes: Pupils are equal, round, and reactive to light.   Neck: Normal range of motion. Neck supple. No JVD present. Carotid bruit is not present. No thyromegaly present.   Cardiovascular: Normal rate, regular rhythm, normal heart sounds, intact distal pulses and normal pulses. PMI is not displaced. Exam reveals no gallop.   No murmur heard.  Pulmonary/Chest: Effort normal and breath sounds normal.   Abdominal: Soft. Normal appearance. She exhibits no mass. There is no hepatosplenomegaly. There is no tenderness.   Musculoskeletal: Normal range of motion. She exhibits no edema.   Neurological: She is alert and oriented to person, place, and time. She has normal strength and normal reflexes. No sensory deficit.   Skin: Skin is warm and intact.   Psychiatric: She has a normal mood and affect.   Nursing note and vitals reviewed.    PPM check shows brief episodes of NSVT      Assessment:       1. LBBB (left bundle branch block)    2. Atrial fibrillation, unspecified type    3. Pacemaker         Plan:   Echo; call with results  Continue all cardiac medications  Regular exercise program  6 m f/u if echo unchanged

## 2019-06-13 ENCOUNTER — CLINICAL SUPPORT (OUTPATIENT)
Dept: CARDIOLOGY | Facility: CLINIC | Age: 84
End: 2019-06-13
Attending: INTERNAL MEDICINE
Payer: MEDICARE

## 2019-06-13 VITALS
DIASTOLIC BLOOD PRESSURE: 60 MMHG | HEIGHT: 61 IN | SYSTOLIC BLOOD PRESSURE: 120 MMHG | HEART RATE: 60 BPM | WEIGHT: 137 LBS | BODY MASS INDEX: 25.86 KG/M2

## 2019-06-13 DIAGNOSIS — I44.7 LBBB (LEFT BUNDLE BRANCH BLOCK): ICD-10-CM

## 2019-06-13 DIAGNOSIS — Z95.0 PACEMAKER: ICD-10-CM

## 2019-06-13 DIAGNOSIS — I48.91 ATRIAL FIBRILLATION, UNSPECIFIED TYPE: ICD-10-CM

## 2019-06-13 PROCEDURE — 99999 PR PBB SHADOW E&M-EST. PATIENT-LVL II: ICD-10-PCS | Mod: PBBFAC,,,

## 2019-06-13 PROCEDURE — 99999 PR PBB SHADOW E&M-EST. PATIENT-LVL II: CPT | Mod: PBBFAC,,,

## 2019-06-13 PROCEDURE — 93306 TTE W/DOPPLER COMPLETE: CPT | Mod: S$GLB,,, | Performed by: INTERNAL MEDICINE

## 2019-06-13 PROCEDURE — 93306 TRANSTHORACIC ECHO (TTE) COMPLETE (CUPID ONLY): ICD-10-PCS | Mod: S$GLB,,, | Performed by: INTERNAL MEDICINE

## 2019-06-18 LAB
ASCENDING AORTA: 2.22 CM
AV INDEX (PROSTH): 0.77
AV MEAN GRADIENT: 4.85 MMHG
AV PEAK GRADIENT: 9.12 MMHG
AV VALVE AREA: 2.4 CM2
AV VELOCITY RATIO: 0.74
BSA FOR ECHO PROCEDURE: 1.64 M2
CV ECHO LV RWT: 0.46 CM
DOP CALC AO PEAK VEL: 1.51 M/S
DOP CALC AO VTI: 35.6 CM
DOP CALC LVOT AREA: 3.14 CM2
DOP CALC LVOT DIAMETER: 2 CM
DOP CALC LVOT PEAK VEL: 1.12 M/S
DOP CALC LVOT STROKE VOLUME: 85.53 CM3
DOP CALCLVOT PEAK VEL VTI: 27.24 CM
E WAVE DECELERATION TIME: 341.31 MSEC
E/A RATIO: 0.64
E/E' RATIO: 11.82
ECHO LV POSTERIOR WALL: 0.83 CM (ref 0.6–1.1)
FRACTIONAL SHORTENING: 25 % (ref 28–44)
INTERVENTRICULAR SEPTUM: 1.02 CM (ref 0.6–1.1)
IVRT: 0.14 MSEC
LA MAJOR: 5.23 CM
LA MINOR: 5.18 CM
LA WIDTH: 3.52 CM
LEFT ATRIUM SIZE: 4.56 CM
LEFT ATRIUM VOLUME INDEX: 44.1 ML/M2
LEFT ATRIUM VOLUME: 71.01 CM3
LEFT INTERNAL DIMENSION IN SYSTOLE: 2.72 CM (ref 2.1–4)
LEFT VENTRICLE DIASTOLIC VOLUME INDEX: 34.21 ML/M2
LEFT VENTRICLE DIASTOLIC VOLUME: 55.03 ML
LEFT VENTRICLE MASS INDEX: 60.5 G/M2
LEFT VENTRICLE SYSTOLIC VOLUME INDEX: 17.1 ML/M2
LEFT VENTRICLE SYSTOLIC VOLUME: 27.46 ML
LEFT VENTRICULAR INTERNAL DIMENSION IN DIASTOLE: 3.62 CM (ref 3.5–6)
LEFT VENTRICULAR MASS: 97.3 G
LV LATERAL E/E' RATIO: 9.29
LV SEPTAL E/E' RATIO: 16.25
MV PEAK A VEL: 1.01 M/S
MV PEAK E VEL: 0.65 M/S
PISA TR MAX VEL: 2.69 M/S
PULM VEIN S/D RATIO: 1.63
PV PEAK D VEL: 0.38 M/S
PV PEAK S VEL: 0.62 M/S
RA MAJOR: 4.86 CM
RA PRESSURE: 3 MMHG
RA WIDTH: 2.9 CM
RIGHT VENTRICULAR END-DIASTOLIC DIMENSION: 3.85 CM
SINUS: 2.51 CM
STJ: 1.88 CM
TDI LATERAL: 0.07
TDI SEPTAL: 0.04
TDI: 0.06
TR MAX PG: 28.94 MMHG
TRICUSPID ANNULAR PLANE SYSTOLIC EXCURSION: 1.85 CM
TV REST PULMONARY ARTERY PRESSURE: 32 MMHG

## 2019-07-21 ENCOUNTER — PATIENT MESSAGE (OUTPATIENT)
Dept: FAMILY MEDICINE | Facility: CLINIC | Age: 84
End: 2019-07-21

## 2019-07-22 NOTE — TELEPHONE ENCOUNTER
Spoke to daughter, patient will be going into an assisted living facility and has paperwork to complete, advised OV needed, scheduled 7/25/19, lab moved to same date, advised to do before MD visit, fasting.

## 2019-07-25 ENCOUNTER — OFFICE VISIT (OUTPATIENT)
Dept: FAMILY MEDICINE | Facility: CLINIC | Age: 84
End: 2019-07-25
Payer: MEDICARE

## 2019-07-25 ENCOUNTER — LAB VISIT (OUTPATIENT)
Dept: LAB | Facility: HOSPITAL | Age: 84
End: 2019-07-25
Attending: FAMILY MEDICINE
Payer: MEDICARE

## 2019-07-25 VITALS
HEART RATE: 59 BPM | HEIGHT: 61 IN | DIASTOLIC BLOOD PRESSURE: 70 MMHG | TEMPERATURE: 98 F | BODY MASS INDEX: 25.89 KG/M2 | WEIGHT: 137.13 LBS | SYSTOLIC BLOOD PRESSURE: 137 MMHG

## 2019-07-25 DIAGNOSIS — I15.2 HYPERTENSION ASSOCIATED WITH DIABETES: ICD-10-CM

## 2019-07-25 DIAGNOSIS — I49.5 SINUS NODE DYSFUNCTION: ICD-10-CM

## 2019-07-25 DIAGNOSIS — E11.69 COMBINED HYPERLIPIDEMIA ASSOCIATED WITH TYPE 2 DIABETES MELLITUS: ICD-10-CM

## 2019-07-25 DIAGNOSIS — F33.1 MAJOR DEPRESSIVE DISORDER, RECURRENT EPISODE, MODERATE: ICD-10-CM

## 2019-07-25 DIAGNOSIS — M19.91 PRIMARY OSTEOARTHRITIS, UNSPECIFIED SITE: ICD-10-CM

## 2019-07-25 DIAGNOSIS — E03.9 HYPOTHYROIDISM, UNSPECIFIED TYPE: Chronic | ICD-10-CM

## 2019-07-25 DIAGNOSIS — N25.81 SECONDARY HYPERPARATHYROIDISM OF RENAL ORIGIN: ICD-10-CM

## 2019-07-25 DIAGNOSIS — Z11.1 VISIT FOR TB SKIN TEST: ICD-10-CM

## 2019-07-25 DIAGNOSIS — I44.7 LBBB (LEFT BUNDLE BRANCH BLOCK): ICD-10-CM

## 2019-07-25 DIAGNOSIS — D05.12 DUCTAL CARCINOMA IN SITU (DCIS) OF LEFT BREAST: ICD-10-CM

## 2019-07-25 DIAGNOSIS — M81.0 OSTEOPOROSIS, UNSPECIFIED OSTEOPOROSIS TYPE, UNSPECIFIED PATHOLOGICAL FRACTURE PRESENCE: ICD-10-CM

## 2019-07-25 DIAGNOSIS — Z86.79 HISTORY OF ATRIAL FLUTTER: ICD-10-CM

## 2019-07-25 DIAGNOSIS — I12.9 BENIGN HYPERTENSIVE KIDNEY DISEASE WITH CHRONIC KIDNEY DISEASE STAGE I THROUGH STAGE IV, OR UNSPECIFIED: ICD-10-CM

## 2019-07-25 DIAGNOSIS — K08.109 EDENTULOUS: ICD-10-CM

## 2019-07-25 DIAGNOSIS — Z97.4 HEARING AID WORN: ICD-10-CM

## 2019-07-25 DIAGNOSIS — E11.22 DIABETES MELLITUS WITH STAGE 4 CHRONIC KIDNEY DISEASE GFR 15-29: ICD-10-CM

## 2019-07-25 DIAGNOSIS — N18.4 DIABETES MELLITUS WITH STAGE 4 CHRONIC KIDNEY DISEASE GFR 15-29: ICD-10-CM

## 2019-07-25 DIAGNOSIS — E03.9 HYPOTHYROIDISM, UNSPECIFIED TYPE: ICD-10-CM

## 2019-07-25 DIAGNOSIS — E03.9 HYPOTHYROIDISM: ICD-10-CM

## 2019-07-25 DIAGNOSIS — E78.2 COMBINED HYPERLIPIDEMIA ASSOCIATED WITH TYPE 2 DIABETES MELLITUS: ICD-10-CM

## 2019-07-25 DIAGNOSIS — I95.1 ORTHOSTATIC HYPOTENSION: ICD-10-CM

## 2019-07-25 DIAGNOSIS — E11.59 HYPERTENSION ASSOCIATED WITH DIABETES: ICD-10-CM

## 2019-07-25 DIAGNOSIS — Z95.0 PACEMAKER: ICD-10-CM

## 2019-07-25 DIAGNOSIS — E13.9 DIABETES 1.5, MANAGED AS TYPE 2: ICD-10-CM

## 2019-07-25 DIAGNOSIS — E11.40 TYPE 2 DIABETES MELLITUS WITH DIABETIC NEUROPATHY, WITHOUT LONG-TERM CURRENT USE OF INSULIN: ICD-10-CM

## 2019-07-25 DIAGNOSIS — G31.84 MILD COGNITIVE IMPAIRMENT WITH MEMORY LOSS: Primary | ICD-10-CM

## 2019-07-25 LAB
ALT SERPL W/O P-5'-P-CCNC: 9 U/L (ref 10–44)
CHOLEST SERPL-MCNC: 172 MG/DL (ref 120–199)
CHOLEST/HDLC SERPL: 2.7 {RATIO} (ref 2–5)
ESTIMATED AVG GLUCOSE: 157 MG/DL (ref 68–131)
HBA1C MFR BLD HPLC: 7.1 % (ref 4–5.6)
HDLC SERPL-MCNC: 63 MG/DL (ref 40–75)
HDLC SERPL: 36.6 % (ref 20–50)
LDLC SERPL CALC-MCNC: 79.4 MG/DL (ref 63–159)
NONHDLC SERPL-MCNC: 109 MG/DL
TRIGL SERPL-MCNC: 148 MG/DL (ref 30–150)
TSH SERPL DL<=0.005 MIU/L-ACNC: 1.78 UIU/ML (ref 0.4–4)

## 2019-07-25 PROCEDURE — 83036 HEMOGLOBIN GLYCOSYLATED A1C: CPT

## 2019-07-25 PROCEDURE — 1101F PR PT FALLS ASSESS DOC 0-1 FALLS W/OUT INJ PAST YR: ICD-10-PCS | Mod: CPTII,S$GLB,, | Performed by: FAMILY MEDICINE

## 2019-07-25 PROCEDURE — 99999 PR PBB SHADOW E&M-EST. PATIENT-LVL IV: CPT | Mod: PBBFAC,,, | Performed by: FAMILY MEDICINE

## 2019-07-25 PROCEDURE — 1101F PT FALLS ASSESS-DOCD LE1/YR: CPT | Mod: CPTII,S$GLB,, | Performed by: FAMILY MEDICINE

## 2019-07-25 PROCEDURE — 84460 ALANINE AMINO (ALT) (SGPT): CPT

## 2019-07-25 PROCEDURE — 99214 OFFICE O/P EST MOD 30 MIN: CPT | Mod: S$GLB,,, | Performed by: FAMILY MEDICINE

## 2019-07-25 PROCEDURE — 99999 PR PBB SHADOW E&M-EST. PATIENT-LVL IV: ICD-10-PCS | Mod: PBBFAC,,, | Performed by: FAMILY MEDICINE

## 2019-07-25 PROCEDURE — 36415 COLL VENOUS BLD VENIPUNCTURE: CPT | Mod: PO

## 2019-07-25 PROCEDURE — 80061 LIPID PANEL: CPT

## 2019-07-25 PROCEDURE — 99214 PR OFFICE/OUTPT VISIT, EST, LEVL IV, 30-39 MIN: ICD-10-PCS | Mod: S$GLB,,, | Performed by: FAMILY MEDICINE

## 2019-07-25 PROCEDURE — 84443 ASSAY THYROID STIM HORMONE: CPT

## 2019-07-25 NOTE — PROGRESS NOTES
Patient presents needing paperwork for moving to an assisted living facility.  Multiple medical issues as below.  Currently living at home with  who has significant medical issues.  They are both moving to the assisted living. They have a sitter that comes in at home currently.  Daughter with her today.  Medical problems currently stable.  Needs TB skin test.  She is ambulatory with her cane and walker.  She is able to ambulate without this in emergency.  Family members set up her medications for her weekly and she is able to take them on her own.    Diabetes Management Status    Statin: Taking  ACE/ARB: Taking    Screening or Prevention Patient's value Goal Complete/Controlled?   HgA1C Testing and Control   Lab Results   Component Value Date    HGBA1C 6.6 (H) 01/31/2019      Annually/Less than 8% Yes   Lipid profile : 08/01/2018 Annually Yes   LDL control Lab Results   Component Value Date    LDLCALC 101.6 08/01/2018    Annually/Less than 100 mg/dl  No   Nephropathy screening No results found for: LABMICR  Lab Results   Component Value Date    PROTEINUA Negative 02/01/2016    Annually No   Blood pressure BP Readings from Last 1 Encounters:   07/25/19 137/70    Less than 140/90 Yes   Dilated retinal exam : 02/06/2019 Annually Yes   Foot exam   : 05/07/2019 Annually Yes         Diagnoses and all orders for this visit:    Mild cognitive impairment with memory loss    Edentulous    Visit for TB skin test  -     POCT TB Skin Test    Major depressive disorder, recurrent episode, moderate    Hearing aid worn    Pacemaker    Sinus node dysfunction    Orthostatic hypotension    Hypertension associated with diabetes    LBBB (left bundle branch block)    History of atrial flutter    Combined hyperlipidemia associated with type 2 diabetes mellitus    Benign hypertensive kidney disease with chronic kidney disease stage I through stage IV, or unspecified    Ductal carcinoma in situ (DCIS) of left breast    Secondary  hyperparathyroidism of renal origin    Hypothyroidism, unspecified type    Diabetes mellitus with stage 4 chronic kidney disease GFR 15-29    Type 2 diabetes mellitus with diabetic neuropathy, without long-term current use of insulin    Primary osteoarthritis, unspecified site    Osteoporosis, unspecified osteoporosis type, unspecified pathological fracture presence     Good candidate for assisted living.  Forms completed.  Continue current medication continue to use walker.          Past Medical History:  Past Medical History:   Diagnosis Date    *Atrial fibrillation 4/19/2012    *Atrial flutter 4/19/2012    Allergy     Ankle fracture 4/19/2012    Anticoagulant long-term use     Anxiety     Arthritis     Bacterial pneumonia, unspecified 12/7/2012    Breast cancer     LEFT    Cataract     CKD (chronic kidney disease), stage III 5/11/2012    Followed by Dr. 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 8/31/2017    LBBB (left bundle branch block) 10/19/2012    Mild cognitive impairment with memory loss 6/15/2017    Osteopenia 4/19/2012    Osteoporosis     Otitis media     Pacemaker 11/2012    yo/chantell    Secondary hyperparathyroidism of renal origin     Simple renal cyst     Sinus node dysfunction     Urinary incontinence 4/19/2012     Past Surgical History:   Procedure Laterality Date    APPENDECTOMY      BIOPSY-SENTINEL NODE Left 10/21/2015    [facility]    BREAST LUMPECTOMY Right 10/21/2015    CARDIAC PACEMAKER PLACEMENT  12/3/12    Sinus node dysfunction    COLONOSCOPY      excision of squamous cell carcinoma  Right 2017    EYE SURGERY Bilateral     PHACO/IOL    FRACTURE SURGERY Right     ankle    HYSTERECTOMY      total 1970's    LOCALIZATION-NEEDLE - in Radiology Left 10/21/2015    [facility]    LUMPECTOMY-BREAST Left 10/21/2015    [facility]    MASTECTOMY Right 11/2015    MASTECTOMY Left 11/30/2015    [facility]    OOPHORECTOMY      TONSILLECTOMY      TOTAL HIP ARTHROPLASTY Right 2007     Review of patient's allergies indicates:   Allergen Reactions    Amlodipine Edema     Pt stated this medication made her legs swell    Alendronate sodium Other (See Comments)     Reaction: Esophageal bleeding    Fosamax [alendronate] Other (See Comments)     Reaction: Esophageal bleeding  diarrhea    Sorbitol      Diarrhea     Augmentin [amoxicillin-pot clavulanate] Hives and Rash     Current Outpatient Medications on File Prior to Visit   Medication Sig Dispense Refill    ACCU-CHEK SHELDON CONTROL SOLN Soln Use as directed 3 each 3    acetaminophen (TYLENOL EX STR ARTHRITIS PAIN ORAL) Take by mouth.      BD ALCOHOL SWABS PadM USE AS NEEDED 1 each 3    benazepril (LOTENSIN) 20 MG tablet TAKE 1 TABLET EVERY DAY 90 tablet 3    blood sugar diagnostic (ACCU-CHEK SHELDON PLUS TEST STRP) Strp Test glucose once daily 100 strip 3    cyanocobalamin, vitamin B-12, 1,000 mcg TbSR One tab po daily 1 each 0    donepezil (ARICEPT) 5 MG tablet TAKE 1 TABLET EVERY EVENING 90 tablet 3    ELIQUIS 2.5 mg Tab TAKE 1 TABLET TWICE DAILY 180 tablet 3    ERGOCALCIFEROL, VITAMIN D2, (VITAMIN D ORAL) Take by mouth once daily.      glipiZIDE (GLUCOTROL) 2.5 MG TR24 TAKE 1 TABLET EVERY DAY WITH BREAKFAST 90 tablet 3    hydroCHLOROthiazide (HYDRODIURIL) 25 MG tablet TAKE 1 TABLET EVERY DAY 90 tablet 3    lancets (ACCU-CHEK SOFTCLIX LANCETS) Misc Test glucose once daily 100 each 3    levothyroxine (SYNTHROID) 25 MCG tablet Take 1 tablet (25  mcg total) by mouth once daily. 90 tablet 1    metoprolol tartrate (LOPRESSOR) 50 MG tablet TAKE 1/2 TABLET TWICE DAILY (LAST REFILL UNTIL CLINIC VISIT MADE) 90 tablet 3    mirtazapine (REMERON) 45 MG tablet Take 45 mg by mouth every evening.      MYRBETRIQ 50 mg Tb24 TAKE 1 TABLET ONE TIME DAILY 90 tablet 3    oxybutynin (DITROPAN) 5 MG Tab Take 5 mg by mouth once daily.      pravastatin (PRAVACHOL) 40 MG tablet TAKE 1 TABLET EVERY DAY 90 tablet 3    pyridoxine HCl, vitamin B6, (VITAMIN B-6 ORAL) Take by mouth.      venlafaxine (EFFEXOR-XR) 150 MG Cp24 TAKE 1 CAPSULE ONE TIME DAILY 90 capsule 1    verapamil (CALAN-SR) 120 MG CR tablet TAKE 1 TABLET EVERY NIGHT 90 tablet 3    vitamin B comp and vit C no.6 (VITAMIN B COMP WITH VIT C NO.6 ORAL) Take by mouth once daily.       No current facility-administered medications on file prior to visit.      Social History     Socioeconomic History    Marital status:      Spouse name: Not on file    Number of children: Not on file    Years of education: Not on file    Highest education level: Not on file   Occupational History    Not on file   Social Needs    Financial resource strain: Not on file    Food insecurity:     Worry: Not on file     Inability: Not on file    Transportation needs:     Medical: Not on file     Non-medical: Not on file   Tobacco Use    Smoking status: Never Smoker    Smokeless tobacco: Never Used   Substance and Sexual Activity    Alcohol use: No    Drug use: No    Sexual activity: Not Currently   Lifestyle    Physical activity:     Days per week: Not on file     Minutes per session: Not on file    Stress: Not on file   Relationships    Social connections:     Talks on phone: Not on file     Gets together: Not on file     Attends [employer] service: Not on file     Active member of club or organization: Not on file     Attends meetings of clubs or organizations: Not on file     Relationship status: Not on file   Other  "Topics Concern    Not on file   Social History Narrative    Not on file     Family History   Problem Relation Age of Onset    Hypertension Mother     Heart disease Father     Stroke Father         cerebral hemorrhage    Coronary artery disease Brother     Heart disease Brother     Coronary artery disease Brother     COPD Brother     Heart disease Brother     Mitral valve prolapse Daughter     Peripheral vascular disease Son            ROS:  GENERAL: No fever, chills,  or significant weight changes.   CARDIOVASCULAR: Denies chest pain, PND.  ABDOMEN: Appetite fine. Denies diarrhea, abdominal pain, hematemesis or blood in stool.  URINARY: No flank pain, dysuria or hematuria.    Vitals:    07/25/19 0838   BP: 137/70   Pulse: (!) 59   Temp: 98 °F (36.7 °C)   Weight: 62.2 kg (137 lb 2 oz)   Height: 5' 1" (1.549 m)     Wt Readings from Last 3 Encounters:   07/25/19 62.2 kg (137 lb 2 oz)   06/13/19 62.1 kg (137 lb)   06/05/19 62.3 kg (137 lb 5.6 oz)       OBJECTIVE:   APPEARANCE: Well nourished, well developed, in no acute distress.    HEAD: Normocephalic.  Atraumatic.  No sinus tenderness.  EYES:   Right eye: Pupil reactive.  Conjunctiva clear.    Left eye: Pupil reactive.  Conjunctiva clear.  EOMI.    EARS: TM's intact. Light reflex normal. No retraction or perforation.    NOSE:  clear.  MOUTH & THROAT:  No pharyngeal erythema or exudate. No lesions.  NECK: Supple. No bruits.  No JVD.  No cervical lymphadenopathy.  No thyromegaly.    CHEST: Breath sounds clear bilaterally.  Normal respiratory effort  CARDIOVASCULAR: Normal rate.  Regular rhythm.  No murmurs.  No rub.  No gallops.  ABDOMEN: Bowel sounds normal.  Soft.  No tenderness.  No organomegaly.  PERIPHERAL VASCULAR: No cyanosis.  No clubbing.  No edema.  NEUROLOGIC: No focal findings.  Moves all extremities equally.  She is ambulatory slowly without walker  MENTAL STATUS: Alert.  Oriented x 3.        "

## 2019-07-29 ENCOUNTER — CLINICAL SUPPORT (OUTPATIENT)
Dept: FAMILY MEDICINE | Facility: CLINIC | Age: 84
End: 2019-07-29
Payer: MEDICARE

## 2019-07-29 DIAGNOSIS — Z11.1 ENCOUNTER FOR PPD TEST: Primary | ICD-10-CM

## 2019-07-29 PROCEDURE — 86580 POCT TB SKIN TEST: ICD-10-PCS | Mod: S$GLB,,, | Performed by: FAMILY MEDICINE

## 2019-07-29 PROCEDURE — 86580 TB INTRADERMAL TEST: CPT | Mod: S$GLB,,, | Performed by: FAMILY MEDICINE

## 2019-07-29 PROCEDURE — 99999 PR PBB SHADOW E&M-EST. PATIENT-LVL III: CPT | Mod: PBBFAC,,,

## 2019-07-29 PROCEDURE — 99999 PR PBB SHADOW E&M-EST. PATIENT-LVL III: ICD-10-PCS | Mod: PBBFAC,,,

## 2019-07-29 NOTE — PROGRESS NOTES
Patient reports never having a TB skin test before.  Denies egg allergy.  Informed not to scratch area, place anything tight over area.  Informed must be read between 48 & 72 hours, will return Wednesday 7/31/19 at 10:30AM

## 2019-08-05 ENCOUNTER — PATIENT MESSAGE (OUTPATIENT)
Dept: FAMILY MEDICINE | Facility: CLINIC | Age: 84
End: 2019-08-05

## 2019-08-27 ENCOUNTER — PATIENT MESSAGE (OUTPATIENT)
Dept: ADMINISTRATIVE | Facility: OTHER | Age: 84
End: 2019-08-27

## 2019-09-03 ENCOUNTER — OFFICE VISIT (OUTPATIENT)
Dept: PODIATRY | Facility: CLINIC | Age: 84
End: 2019-09-03
Payer: MEDICARE

## 2019-09-03 VITALS
DIASTOLIC BLOOD PRESSURE: 59 MMHG | HEIGHT: 61 IN | WEIGHT: 142.19 LBS | SYSTOLIC BLOOD PRESSURE: 131 MMHG | HEART RATE: 60 BPM | BODY MASS INDEX: 26.85 KG/M2

## 2019-09-03 DIAGNOSIS — B35.1 ONYCHOMYCOSIS DUE TO DERMATOPHYTE: ICD-10-CM

## 2019-09-03 DIAGNOSIS — E11.49 TYPE II DIABETES MELLITUS WITH NEUROLOGICAL MANIFESTATIONS: Primary | ICD-10-CM

## 2019-09-03 DIAGNOSIS — L84 PRE-ULCERATIVE CALLUSES: ICD-10-CM

## 2019-09-03 PROCEDURE — 11055 PARING/CUTG B9 HYPRKER LES 1: CPT | Mod: Q9,HCNC,LT,S$GLB | Performed by: PODIATRIST

## 2019-09-03 PROCEDURE — 11721 PR DEBRIDEMENT OF NAILS, 6 OR MORE: ICD-10-PCS | Mod: 59,Q9,HCNC,S$GLB | Performed by: PODIATRIST

## 2019-09-03 PROCEDURE — 99999 PR PBB SHADOW E&M-EST. PATIENT-LVL III: ICD-10-PCS | Mod: PBBFAC,HCNC,, | Performed by: PODIATRIST

## 2019-09-03 PROCEDURE — 11055 PR TRIM HYPERKERATOTIC SKIN LESION, ONE: ICD-10-PCS | Mod: Q9,HCNC,LT,S$GLB | Performed by: PODIATRIST

## 2019-09-03 PROCEDURE — 99499 NO LOS: ICD-10-PCS | Mod: HCNC,S$GLB,, | Performed by: PODIATRIST

## 2019-09-03 PROCEDURE — 11721 DEBRIDE NAIL 6 OR MORE: CPT | Mod: 59,Q9,HCNC,S$GLB | Performed by: PODIATRIST

## 2019-09-03 PROCEDURE — 99499 UNLISTED E&M SERVICE: CPT | Mod: HCNC,S$GLB,, | Performed by: PODIATRIST

## 2019-09-03 PROCEDURE — 99999 PR PBB SHADOW E&M-EST. PATIENT-LVL III: CPT | Mod: PBBFAC,HCNC,, | Performed by: PODIATRIST

## 2019-09-03 NOTE — PROGRESS NOTES
Subjective:     Patient ID: Kassi Skelton is a 87 y.o. female.    Chief Complaint: Nail Care (Pt reports no pain feet, diabetic pt, wears casual shoes w/o socks, PCP Dr Cook, lat visit;7/25/19)    Kassi is a 87 y.o. female who presents to the clinic for evaluation and treatment of high risk feet. 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 The patient's chief complaint is long, thick toenails. This patient has documented high risk feet requiring routine maintenance secondary to diabetes mellitis and those secondary complications of diabetes, as mentioned..    PCP: Mor Cook MD    Date Last Seen by PCP: 07/25/19    Current shoe gear:  Affected Foot: Tennis shoes     Unaffected Foot: Tennis shoes    Hemoglobin A1C   Date Value Ref Range Status   07/25/2019 7.1 (H) 4.0 - 5.6 % Final     Comment:     ADA Screening Guidelines:  5.7-6.4%  Consistent with prediabetes  >or=6.5%  Consistent with diabetes  High levels of fetal hemoglobin interfere with the HbA1C  assay. Heterozygous hemoglobin variants  (HbS, HgC, etc)do  not significantly interfere with this assay.   However, presence of multiple variants may affect accuracy.     01/31/2019 6.6 (H) 4.0 - 5.6 % Final     Comment:     ADA Screening Guidelines:  5.7-6.4%  Consistent with prediabetes  >or=6.5%  Consistent with diabetes  High levels of fetal hemoglobin interfere with the HbA1C  assay. Heterozygous hemoglobin variants (HbS, HgC, etc)do  not significantly interfere with this assay.   However, presence of multiple variants may affect accuracy.     08/01/2018 6.3 (H) 4.0 - 5.6 % Final     Comment:     ADA Screening Guidelines:  5.7-6.4%  Consistent with prediabetes  >or=6.5%  Consistent with diabetes  High levels of fetal hemoglobin interfere with the HbA1C  assay. Heterozygous hemoglobin variants (HbS, HgC, etc)do  not significantly interfere with this assay.   However, presence of multiple variants may affect accuracy.         Patient Active Problem List   Diagnosis    History of atrial flutter    Combined hyperlipidemia associated with type 2 diabetes mellitus    Hypothyroidism    Major depressive disorder, recurrent episode, moderate    Osteoporosis    Benign hypertensive kidney disease with chronic kidney disease    Cyst of kidney, acquired    Atrial fibrillation    Anticoagulation monitoring by pharmacist    LBBB (left bundle branch block)    Sinus node dysfunction    Cardiac pacemaker in situ    Urinary incontinence, mixed    Pacemaker    Orthostatic hypotension    DCIS (ductal carcinoma in situ)    Hypertension associated with diabetes    Diabetes mellitus with stage 4 chronic kidney disease GFR 15-29    Dizziness    Secondary hyperparathyroidism of renal origin    Hearing aid worn    Diabetic neuropathy, type II diabetes mellitus    Mild cognitive impairment with memory loss    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    BD ALCOHOL SWABS PADM    USE AS NEEDED    BENAZEPRIL (LOTENSIN) 20 MG TABLET    TAKE 1 TABLET EVERY DAY    BLOOD SUGAR DIAGNOSTIC (ACCU-CHEK SHELDON PLUS TEST STRP) STRP    Test glucose once daily    CYANOCOBALAMIN, VITAMIN B-12, 1,000 MCG TBSR    One tab po daily    DONEPEZIL (ARICEPT) 5 MG TABLET    TAKE 1 TABLET EVERY EVENING    ELIQUIS 2.5 MG TAB    TAKE 1 TABLET TWICE DAILY    ERGOCALCIFEROL, VITAMIN D2, (VITAMIN D ORAL)    Take by mouth once daily.    GLIPIZIDE (GLUCOTROL) 2.5 MG TR24    TAKE 1 TABLET EVERY DAY WITH BREAKFAST    HYDROCHLOROTHIAZIDE (HYDRODIURIL) 25 MG TABLET    TAKE 1 TABLET EVERY DAY    LANCETS (ACCU-CHEK SOFTCLIX LANCETS) MISC    Test glucose once daily    LEVOTHYROXINE (SYNTHROID) 25 MCG TABLET    Take 1 tablet (25 mcg total) by mouth once daily.    METOPROLOL TARTRATE (LOPRESSOR) 50 MG TABLET    TAKE 1/2 TABLET TWICE DAILY (LAST REFILL UNTIL CLINIC VISIT MADE)    MIRABEGRON (MYRBETRIQ) 50 MG TB24    TAKE 1 TABLET ONE TIME DAILY    MIRTAZAPINE (REMERON) 45 MG TABLET    Take 45 mg by mouth every evening.    OXYBUTYNIN (DITROPAN) 5 MG TAB    Take 5 mg by mouth once daily.    PRAVASTATIN (PRAVACHOL) 40 MG TABLET    TAKE 1 TABLET EVERY DAY    PYRIDOXINE HCL, VITAMIN B6, (VITAMIN B-6 ORAL)    Take by mouth.    VENLAFAXINE (EFFEXOR-XR) 150 MG CP24    TAKE 1 CAPSULE ONE TIME DAILY    VERAPAMIL (CALAN-SR) 120 MG CR TABLET    TAKE 1 TABLET EVERY NIGHT    VITAMIN B COMP AND VIT C NO.6 (VITAMIN B COMP WITH VIT C NO.6 ORAL)    Take by mouth once daily.       Review of patient's allergies indicates:   Allergen Reactions    Amlodipine Edema     Pt stated this medication made her legs swell    Alendronate sodium Other (See Comments)     Reaction: Esophageal bleeding    Fosamax [alendronate] Other (See Comments)     Reaction: Esophageal bleeding  diarrhea     Sorbitol      Diarrhea     Augmentin [amoxicillin-pot clavulanate] Hives and Rash       Past Surgical History:   Procedure Laterality Date    APPENDECTOMY      BIOPSY-SENTINEL NODE Left 10/21/2015    [facility] at [facility]    BREAST LUMPECTOMY Right 10/21/2015    CARDIAC PACEMAKER PLACEMENT  12/3/12    Sinus node dysfunction    COLONOSCOPY      excision of squamous cell carcinoma Right 2017    EYE SURGERY Bilateral     PHACO/IOL    FRACTURE SURGERY Right     ankle    HYSTERECTOMY      total 1970's    LOCALIZATION-NEEDLE - in Radiology Left 10/21/2015    [facility]    LUMPECTOMY-BREAST Left 10/21/2015    [facility]    MASTECTOMY Right 11/2015    MASTECTOMY Left 11/30/2015    [facility]    OOPHORECTOMY      TONSILLECTOMY      TOTAL HIP ARTHROPLASTY Right 2007       Family History   Problem Relation Age of Onset    Hypertension Mother     Heart disease Father     Stroke Father         cerebral hemorrhage    Coronary artery disease Brother     Heart disease Brother     Coronary artery disease Brother     COPD Brother     Heart disease Brother     Mitral valve prolapse Daughter     Peripheral vascular disease Son        Social History     Socioeconomic History    Marital status:      Spouse name: Not on file    Number of children: Not on file    Years of education: Not on file    Highest education level: Not on file   Occupational History    Not on file   Social Needs    Financial resource strain: Not on file    Food insecurity:     Worry: Not on file     Inability: Not on file    Transportation needs:     Medical: Not on file     Non-medical: Not on file   Tobacco Use    Smoking status: Never Smoker    Smokeless tobacco: Never Used   Substance and Sexual Activity    Alcohol use: No    Drug use: No    Sexual activity: Not Currently   Lifestyle    Physical  "activity:     Days per week: Not on file     Minutes per session: Not on file    Stress: Not on file   Relationships    Social connections:     Talks on phone: Not on file     Gets together: Not on file     Attends [employer] service: Not on file     Active member of club or organization: Not on file     Attends meetings of clubs or organizations: Not on file     Relationship status: Not on file   Other Topics Concern    Not on file   Social History Narrative    Not on file       Vitals:    09/03/19 1529   BP: (!) 131/59   Pulse: 60   Weight: 64.5 kg (142 lb 3.2 oz)   Height: 5' 1" (1.549 m)   PainSc: 0-No pain       Hemoglobin A1C   Date Value Ref Range Status   07/25/2019 7.1 (H) 4.0 - 5.6 % Final     Comment:     ADA Screening Guidelines:  5.7-6.4%  Consistent with prediabetes  >or=6.5%  Consistent with diabetes  High levels of fetal hemoglobin interfere with the HbA1C  assay. Heterozygous hemoglobin variants (HbS, HgC, etc)do  not significantly interfere with this assay.   However, presence of multiple variants may affect accuracy.     01/31/2019 6.6 (H) 4.0 - 5.6 % Final     Comment:     ADA Screening Guidelines:  5.7-6.4%  Consistent with prediabetes  >or=6.5%  Consistent with diabetes  High levels of fetal hemoglobin interfere with the HbA1C  assay. Heterozygous hemoglobin variants (HbS, HgC, etc)do  not significantly interfere with this assay.   However, presence of multiple variants may affect accuracy.     08/01/2018 6.3 (H) 4.0 - 5.6 % Final     Comment:     ADA Screening Guidelines:  5.7-6.4%  Consistent with prediabetes  >or=6.5%  Consistent with diabetes  High levels of fetal hemoglobin interfere with the HbA1C  assay. Heterozygous hemoglobin variants (HbS, HgC, etc)do  not significantly interfere with this assay.   However, presence of multiple variants may affect accuracy.         Review of Systems   Constitutional: Negative for chills and fever.   Respiratory: Negative for shortness of breath.  "   Cardiovascular: Negative for chest pain, palpitations, orthopnea, claudication and leg swelling.   Gastrointestinal: Negative for diarrhea, nausea and vomiting.   Musculoskeletal: Negative for joint pain.   Skin: Negative for rash.   Neurological: Positive for sensory change. Negative for dizziness, tingling, focal weakness and weakness.   Psychiatric/Behavioral: Negative.              Objective:       PHYSICAL EXAM: Apperance: Alert and orient in no distress,well developed, and with good attention to grooming and body habits  Lower Extremity Exam  VASCULAR: Dorsalis pedis pulses 1/4 bilateral and Posterior Tibial pulses 1/4 bilateral. Capillary fill time <4 seconds bilateral. No edema observed bilateral. Varicosities present bilateral. Skin temperature of the lower extremities is warm to warm, proximal to distal. Hair growth absent bilateral. (--) lymphangitis or (--) cellulitis noted right.  DERMATOLOGICAL: No skin rashes, subcutaneous nodules, lesions, or ulcers observed bilateral. The dorsum surface of the feet are soft and supple.  The plantar aspects of both feet are dry and scaly. Nails 1,2,3,4,5 bilateral elongated, thickened, and discolored with subungual debris. Webspaces 1,2,3,4 clean, dry and without evidence of break in skin integrity bilateral. Thin hyperkeratotic lesions noted to left plantar 1st submetatarsal.  NEUROLOGICAL: Light touch, sharp-dull, proprioception all present and equal bilaterally.   MUSCULOSKELETAL: Muscle strength is 5/5 for foot inverters, everters, plantarflexors, and dorsiflexors. Muscle tone is normal. Fat pad atrophy noted bilateral.           Assessment:       Encounter Diagnoses   Name Primary?    Type II diabetes mellitus with neurological manifestations Yes    Pre-ulcerative calluses - Left Foot     Onychomycosis due to dermatophyte          Plan:   Type II diabetes mellitus with neurological manifestations    Pre-ulcerative calluses - Left Foot    Onychomycosis due  to dermatophyte      I counseled the patient on her conditions, regarding findings of my examination, my impressions, and usual treatment plan.   Greater than 15 minutes of a 20 minute appointment spent on education about the diabetic foot, neuropathy, and prevention of limb loss.  Shoe inspection. Diabetic Foot Education. Patient reminded of the importance of good nutrition and blood sugar control to help prevent podiatric complications of diabetes. Patient instructed on proper foot hygeine. We discussed wearing proper shoe gear, daily foot inspections, never walking without protective shoe gear, never putting sharp instruments to feet.    With patient's permission, nails 1-5 bilateral were debrided/trimmed in length and thickness aggressively to their soft tissue attachment mechanically and with electric , removing all offending nail and debris. Patient relates relief following the procedure.  With patient's permission, left callus trimmed in thickness with #15 blade in thickness without incident.   Applied Betadine to right foot skin abrasion and covered with band-aid.   Patient  will continue to monitor the areas daily, inspect feet, wear protective shoe gear when ambulatory, moisturizer to maintain skin integrity. Patient reminded of the importance of good nutrition and blood sugar control to help prevent podiatric complications of diabetes.  Patient to return 3 months or sooner if needed.                 Luzmaria Valle DPM  [facility]

## 2019-09-18 ENCOUNTER — TELEPHONE (OUTPATIENT)
Dept: PODIATRY | Facility: CLINIC | Age: 84
End: 2019-09-18

## 2019-09-18 NOTE — TELEPHONE ENCOUNTER
Patient was called in regards to rescheduling Podiatry appointment on 12/03/19. There was no answer. left voice message requesting call back.

## 2019-09-22 RX ORDER — LEVOTHYROXINE SODIUM 25 UG/1
25 TABLET ORAL DAILY
Qty: 90 TABLET | Refills: 3 | Status: SHIPPED | OUTPATIENT
Start: 2019-09-22 | End: 2020-09-21

## 2019-09-22 RX ORDER — DONEPEZIL HYDROCHLORIDE 5 MG/1
TABLET, FILM COATED ORAL
Qty: 90 TABLET | Refills: 3 | Status: SHIPPED | OUTPATIENT
Start: 2019-09-22 | End: 2020-12-04 | Stop reason: SDUPTHER

## 2019-09-23 RX ORDER — APIXABAN 2.5 MG/1
TABLET, FILM COATED ORAL
Qty: 180 TABLET | Refills: 3 | Status: SHIPPED | OUTPATIENT
Start: 2019-09-23 | End: 2020-06-25

## 2019-09-23 RX ORDER — GLIPIZIDE 2.5 MG/1
TABLET, EXTENDED RELEASE ORAL
Qty: 90 TABLET | Refills: 0 | Status: SHIPPED | OUTPATIENT
Start: 2019-09-23 | End: 2019-10-18 | Stop reason: SDUPTHER

## 2019-09-23 RX ORDER — HYDROCHLOROTHIAZIDE 25 MG/1
TABLET ORAL
Qty: 90 TABLET | Refills: 3 | Status: SHIPPED | OUTPATIENT
Start: 2019-09-23 | End: 2020-05-15

## 2019-10-01 RX ORDER — GLIPIZIDE 2.5 MG/1
TABLET, EXTENDED RELEASE ORAL
Qty: 90 TABLET | Refills: 0 | OUTPATIENT
Start: 2019-10-01

## 2019-10-08 ENCOUNTER — TELEPHONE (OUTPATIENT)
Dept: FAMILY MEDICINE | Facility: CLINIC | Age: 84
End: 2019-10-08

## 2019-10-08 ENCOUNTER — TELEPHONE (OUTPATIENT)
Dept: NEPHROLOGY | Facility: CLINIC | Age: 84
End: 2019-10-08

## 2019-10-08 DIAGNOSIS — R30.0 DYSURIA: Primary | ICD-10-CM

## 2019-10-08 NOTE — TELEPHONE ENCOUNTER
----- Message from Elsa Burroughs sent at 10/8/2019  1:10 PM CDT -----  Contact: Tova Sánchez (Daughter)  Tova Sánchez (Daughter) calling wanting to speak to the nurse regarding thinks pt have kidney or bladder specimen and wants to know if she can drop off in [place] and have the Dr make the order,please....[phone]

## 2019-10-08 NOTE — TELEPHONE ENCOUNTER
----- Message from Elsa Burroughs sent at 10/8/2019  1:10 PM CDT -----  Contact: Tova Sánchez (Daughter)  Tova Sánchez (Daughter) calling wanting to speak to the nurse regarding thinks pt have kidney or bladder specimen and wants to know if she can drop off in [place] and have the Dr make the order,please....[phone]

## 2019-10-08 NOTE — TELEPHONE ENCOUNTER
Spoke to patient.  Informed will defer to PCP, who would treat her UTI, as he has already placed an order for urine.     Patient asked when she would hear from them.  I told her I would send a message and she would likely hear from them tomorrow.

## 2019-10-08 NOTE — TELEPHONE ENCOUNTER
Spoke to daughter, Saturday, patient started to complain about scant urine amounts, yesterday she started complaining about painful urination, I asked if she drank water, daughter reports she does not drink much.  Left urine, please advise

## 2019-10-08 NOTE — TELEPHONE ENCOUNTER
----- Message from Elodia Noyola sent at 10/8/2019  3:05 PM CDT -----  Contact: [phone]  Pt has left a rine sample stating that she thinks that she has a UTI and is asking if an order can be placed for it/please advise/thnaks      If a rx is going to be called in please send it to [facility] in [place]

## 2019-10-09 ENCOUNTER — LAB VISIT (OUTPATIENT)
Dept: LAB | Facility: HOSPITAL | Age: 84
End: 2019-10-09
Attending: FAMILY MEDICINE
Payer: MEDICARE

## 2019-10-09 ENCOUNTER — PATIENT MESSAGE (OUTPATIENT)
Dept: FAMILY MEDICINE | Facility: CLINIC | Age: 84
End: 2019-10-09

## 2019-10-09 DIAGNOSIS — R30.0 DYSURIA: ICD-10-CM

## 2019-10-09 DIAGNOSIS — N30.00 ACUTE CYSTITIS WITHOUT HEMATURIA: ICD-10-CM

## 2019-10-09 DIAGNOSIS — N30.00 ACUTE CYSTITIS WITHOUT HEMATURIA: Primary | ICD-10-CM

## 2019-10-09 LAB
BACTERIA #/AREA URNS HPF: ABNORMAL /HPF
BILIRUB UR QL STRIP: NEGATIVE
CLARITY UR: ABNORMAL
COLOR UR: YELLOW
GLUCOSE UR QL STRIP: NEGATIVE
HGB UR QL STRIP: ABNORMAL
KETONES UR QL STRIP: NEGATIVE
LEUKOCYTE ESTERASE UR QL STRIP: ABNORMAL
MICROSCOPIC COMMENT: ABNORMAL
NITRITE UR QL STRIP: POSITIVE
PH UR STRIP: 6 [PH] (ref 5–8)
PROT UR QL STRIP: ABNORMAL
RBC #/AREA URNS HPF: 4 /HPF (ref 0–4)
SP GR UR STRIP: 1.02 (ref 1–1.03)
URN SPEC COLLECT METH UR: ABNORMAL
WBC #/AREA URNS HPF: >100 /HPF (ref 0–5)
WBC CLUMPS URNS QL MICRO: ABNORMAL

## 2019-10-09 PROCEDURE — 87077 CULTURE AEROBIC IDENTIFY: CPT | Mod: HCNC

## 2019-10-09 PROCEDURE — 81000 URINALYSIS NONAUTO W/SCOPE: CPT | Mod: HCNC,PO

## 2019-10-09 PROCEDURE — 87186 SC STD MICRODIL/AGAR DIL: CPT | Mod: HCNC

## 2019-10-09 PROCEDURE — 87086 URINE CULTURE/COLONY COUNT: CPT | Mod: HCNC

## 2019-10-09 PROCEDURE — 87088 URINE BACTERIA CULTURE: CPT | Mod: HCNC

## 2019-10-09 RX ORDER — SULFAMETHOXAZOLE AND TRIMETHOPRIM 400; 80 MG/1; MG/1
1 TABLET ORAL 2 TIMES DAILY
Qty: 6 TABLET | Refills: 0 | Status: SHIPPED | OUTPATIENT
Start: 2019-10-09 | End: 2019-10-12

## 2019-10-09 NOTE — TELEPHONE ENCOUNTER
Urinalysis shows UTI.  Please get urine culture.  Orders placed.  Recommend Bactrim single strength 1 twice a day x3 days.  Prescription sent to   Pharmacy

## 2019-10-11 LAB — BACTERIA UR CULT: ABNORMAL

## 2019-10-14 RX ORDER — CIPROFLOXACIN 500 MG/1
500 TABLET ORAL DAILY
Qty: 3 TABLET | Refills: 0 | Status: SHIPPED | OUTPATIENT
Start: 2019-10-14 | End: 2019-10-17

## 2019-10-14 NOTE — TELEPHONE ENCOUNTER
Notes recorded by Mor Cook MD on 10/14/2019 at 10:34 AM CDT  Urine culture shows bacteria which is resistant to the Bactrim prescribed.  Recommend change to Cipro 500 mg once a day x3 days. My nurse will contact you to arrange.  Thanks,  Dr. Cook

## 2019-10-18 DIAGNOSIS — E78.5 HYPERLIPIDEMIA, UNSPECIFIED HYPERLIPIDEMIA TYPE: ICD-10-CM

## 2019-10-18 RX ORDER — BENAZEPRIL HYDROCHLORIDE 20 MG/1
TABLET ORAL
Qty: 90 TABLET | Refills: 3 | Status: SHIPPED | OUTPATIENT
Start: 2019-10-18 | End: 2020-09-21

## 2019-10-18 RX ORDER — GLIPIZIDE 2.5 MG/1
TABLET, EXTENDED RELEASE ORAL
Qty: 90 TABLET | Refills: 0 | Status: SHIPPED | OUTPATIENT
Start: 2019-10-18 | End: 2019-12-05 | Stop reason: SDUPTHER

## 2019-10-19 RX ORDER — PRAVASTATIN SODIUM 40 MG/1
TABLET ORAL
Qty: 90 TABLET | Refills: 3 | Status: SHIPPED | OUTPATIENT
Start: 2019-10-19 | End: 2019-10-22 | Stop reason: SDUPTHER

## 2019-10-20 RX ORDER — METOPROLOL TARTRATE 50 MG/1
TABLET ORAL
Qty: 90 TABLET | Refills: 3 | Status: SHIPPED | OUTPATIENT
Start: 2019-10-20 | End: 2020-09-20

## 2019-10-20 RX ORDER — VERAPAMIL HYDROCHLORIDE 120 MG/1
TABLET, FILM COATED, EXTENDED RELEASE ORAL
Qty: 90 TABLET | Refills: 3 | Status: SHIPPED | OUTPATIENT
Start: 2019-10-20 | End: 2020-10-14

## 2019-10-22 DIAGNOSIS — E78.5 HYPERLIPIDEMIA, UNSPECIFIED HYPERLIPIDEMIA TYPE: ICD-10-CM

## 2019-10-23 RX ORDER — PRAVASTATIN SODIUM 40 MG/1
TABLET ORAL
Qty: 90 TABLET | Refills: 2 | Status: SHIPPED | OUTPATIENT
Start: 2019-10-23 | End: 2020-09-21

## 2019-10-29 ENCOUNTER — PATIENT MESSAGE (OUTPATIENT)
Dept: CARDIOLOGY | Facility: CLINIC | Age: 84
End: 2019-10-29

## 2019-10-29 DIAGNOSIS — I49.5 SINUS NODE DYSFUNCTION: ICD-10-CM

## 2019-10-29 DIAGNOSIS — Z95.0 PACEMAKER: Primary | ICD-10-CM

## 2019-11-19 ENCOUNTER — TELEPHONE (OUTPATIENT)
Dept: PODIATRY | Facility: CLINIC | Age: 84
End: 2019-11-19

## 2019-11-19 NOTE — TELEPHONE ENCOUNTER
Patient was called in regards to Podiatry appointment on 12/03/19. There was no answer. left voice message requesting call back.

## 2019-12-03 ENCOUNTER — LAB VISIT (OUTPATIENT)
Dept: LAB | Facility: HOSPITAL | Age: 84
End: 2019-12-03
Attending: FAMILY MEDICINE
Payer: MEDICARE

## 2019-12-03 DIAGNOSIS — N18.30 CKD (CHRONIC KIDNEY DISEASE), STAGE III: ICD-10-CM

## 2019-12-03 DIAGNOSIS — N25.81 SECONDARY RENAL HYPERPARATHYROIDISM: ICD-10-CM

## 2019-12-03 LAB
ALBUMIN SERPL BCP-MCNC: 3.8 G/DL (ref 3.5–5.2)
ANION GAP SERPL CALC-SCNC: 8 MMOL/L (ref 8–16)
BUN SERPL-MCNC: 45 MG/DL (ref 8–23)
CALCIUM SERPL-MCNC: 9.5 MG/DL (ref 8.7–10.5)
CHLORIDE SERPL-SCNC: 104 MMOL/L (ref 95–110)
CO2 SERPL-SCNC: 28 MMOL/L (ref 23–29)
CREAT SERPL-MCNC: 2.3 MG/DL (ref 0.5–1.4)
EST. GFR  (AFRICAN AMERICAN): 21.4 ML/MIN/1.73 M^2
EST. GFR  (NON AFRICAN AMERICAN): 18.5 ML/MIN/1.73 M^2
GLUCOSE SERPL-MCNC: 188 MG/DL (ref 70–110)
PHOSPHATE SERPL-MCNC: 4.1 MG/DL (ref 2.7–4.5)
POTASSIUM SERPL-SCNC: 4.7 MMOL/L (ref 3.5–5.1)
PTH-INTACT SERPL-MCNC: 265 PG/ML (ref 9–77)
SODIUM SERPL-SCNC: 140 MMOL/L (ref 136–145)

## 2019-12-03 PROCEDURE — 80069 RENAL FUNCTION PANEL: CPT | Mod: HCNC

## 2019-12-03 PROCEDURE — 83970 ASSAY OF PARATHORMONE: CPT | Mod: HCNC

## 2019-12-03 PROCEDURE — 36415 COLL VENOUS BLD VENIPUNCTURE: CPT | Mod: HCNC,PO

## 2019-12-04 ENCOUNTER — PATIENT MESSAGE (OUTPATIENT)
Dept: DERMATOLOGY | Facility: CLINIC | Age: 84
End: 2019-12-04

## 2019-12-04 ENCOUNTER — TELEPHONE (OUTPATIENT)
Dept: DERMATOLOGY | Facility: CLINIC | Age: 84
End: 2019-12-04

## 2019-12-05 RX ORDER — GLIPIZIDE 2.5 MG/1
TABLET, EXTENDED RELEASE ORAL
Qty: 90 TABLET | Refills: 0 | Status: SHIPPED | OUTPATIENT
Start: 2019-12-05 | End: 2019-12-08 | Stop reason: SDUPTHER

## 2019-12-08 ENCOUNTER — PATIENT MESSAGE (OUTPATIENT)
Dept: FAMILY MEDICINE | Facility: CLINIC | Age: 84
End: 2019-12-08

## 2019-12-10 ENCOUNTER — HOSPITAL ENCOUNTER (OUTPATIENT)
Dept: RADIOLOGY | Facility: HOSPITAL | Age: 84
Discharge: HOME | End: 2019-12-10
Attending: NURSE PRACTITIONER
Payer: MEDICARE

## 2019-12-10 ENCOUNTER — OFFICE VISIT (OUTPATIENT)
Dept: FAMILY MEDICINE | Facility: CLINIC | Age: 84
End: 2019-12-10
Payer: MEDICARE

## 2019-12-10 VITALS
TEMPERATURE: 98 F | DIASTOLIC BLOOD PRESSURE: 64 MMHG | SYSTOLIC BLOOD PRESSURE: 140 MMHG | HEIGHT: 60 IN | HEART RATE: 64 BPM | BODY MASS INDEX: 30.82 KG/M2 | WEIGHT: 157 LBS

## 2019-12-10 DIAGNOSIS — M25.571 RIGHT ANKLE PAIN, UNSPECIFIED CHRONICITY: ICD-10-CM

## 2019-12-10 DIAGNOSIS — L81.9 DISCOLORATION OF SKIN OF FOOT: Primary | ICD-10-CM

## 2019-12-10 DIAGNOSIS — M79.671 RIGHT FOOT PAIN: ICD-10-CM

## 2019-12-10 DIAGNOSIS — R22.41 FOOT MASS, RIGHT: ICD-10-CM

## 2019-12-10 DIAGNOSIS — R60.0 LOCALIZED EDEMA: ICD-10-CM

## 2019-12-10 PROCEDURE — 73630 X-RAY EXAM OF FOOT: CPT | Mod: 26,HCNC,RT, | Performed by: RADIOLOGY

## 2019-12-10 PROCEDURE — 73610 XR ANKLE COMPLETE 3 VIEW RIGHT: ICD-10-PCS | Mod: 26,HCNC,RT, | Performed by: RADIOLOGY

## 2019-12-10 PROCEDURE — 73630 XR FOOT COMPLETE 3 VIEW RIGHT: ICD-10-PCS | Mod: 26,HCNC,RT, | Performed by: RADIOLOGY

## 2019-12-10 PROCEDURE — 73610 X-RAY EXAM OF ANKLE: CPT | Mod: TC,HCNC,PO,RT

## 2019-12-10 PROCEDURE — 1159F MED LIST DOCD IN RCRD: CPT | Mod: HCNC,S$GLB,, | Performed by: NURSE PRACTITIONER

## 2019-12-10 PROCEDURE — 99999 PR PBB SHADOW E&M-EST. PATIENT-LVL V: ICD-10-PCS | Mod: PBBFAC,HCNC,, | Performed by: NURSE PRACTITIONER

## 2019-12-10 PROCEDURE — 73630 X-RAY EXAM OF FOOT: CPT | Mod: TC,HCNC,PO,RT

## 2019-12-10 PROCEDURE — 1159F PR MEDICATION LIST DOCUMENTED IN MEDICAL RECORD: ICD-10-PCS | Mod: HCNC,S$GLB,, | Performed by: NURSE PRACTITIONER

## 2019-12-10 PROCEDURE — 1126F PR PAIN SEVERITY QUANTIFIED, NO PAIN PRESENT: ICD-10-PCS | Mod: HCNC,S$GLB,, | Performed by: NURSE PRACTITIONER

## 2019-12-10 PROCEDURE — 99999 PR PBB SHADOW E&M-EST. PATIENT-LVL V: CPT | Mod: PBBFAC,HCNC,, | Performed by: NURSE PRACTITIONER

## 2019-12-10 PROCEDURE — 1101F PT FALLS ASSESS-DOCD LE1/YR: CPT | Mod: HCNC,CPTII,S$GLB, | Performed by: NURSE PRACTITIONER

## 2019-12-10 PROCEDURE — 73610 X-RAY EXAM OF ANKLE: CPT | Mod: 26,HCNC,RT, | Performed by: RADIOLOGY

## 2019-12-10 PROCEDURE — 1101F PR PT FALLS ASSESS DOC 0-1 FALLS W/OUT INJ PAST YR: ICD-10-PCS | Mod: HCNC,CPTII,S$GLB, | Performed by: NURSE PRACTITIONER

## 2019-12-10 PROCEDURE — 1126F AMNT PAIN NOTED NONE PRSNT: CPT | Mod: HCNC,S$GLB,, | Performed by: NURSE PRACTITIONER

## 2019-12-10 PROCEDURE — 99213 PR OFFICE/OUTPT VISIT, EST, LEVL III, 20-29 MIN: ICD-10-PCS | Mod: HCNC,S$GLB,, | Performed by: NURSE PRACTITIONER

## 2019-12-10 PROCEDURE — 99213 OFFICE O/P EST LOW 20 MIN: CPT | Mod: HCNC,S$GLB,, | Performed by: NURSE PRACTITIONER

## 2019-12-10 RX ORDER — ERGOCALCIFEROL 1.25 MG/1
2400 CAPSULE ORAL
COMMUNITY
End: 2019-12-10

## 2019-12-10 RX ORDER — GLIPIZIDE 2.5 MG/1
TABLET, EXTENDED RELEASE ORAL
Qty: 30 TABLET | Refills: 0 | Status: SHIPPED | OUTPATIENT
Start: 2019-12-10 | End: 2020-04-27

## 2019-12-10 NOTE — PROGRESS NOTES
Subjective:       Patient ID: Kassi Skelton is a 87 y.o. female.    Chief Complaint: Skin Discoloration    Skin Discoloration   Chronicity: Right foot; pt states x 2 w; unable to recall any injury. The current episode started 1 to 4 weeks ago. The problem occurs daily. The problem has been gradually improving. Pertinent negatives include no abdominal pain, anorexia, arthralgias, change in bowel habit, chest pain, chills, congestion, coughing, diaphoresis, fatigue, fever, headaches, joint swelling, myalgias, nausea, neck pain, numbness, rash, sore throat, swollen glands, urinary symptoms, vertigo, visual change, vomiting or weakness. Associated symptoms comments: Right foot swelling; resolved. Nothing aggravates the symptoms. She has tried nothing (Declines medication for pain) for the symptoms. The treatment provided no relief.       Past Medical History:   Diagnosis Date    *Atrial fibrillation 4/19/2012    *Atrial flutter 4/19/2012    Allergy     Ankle fracture 4/19/2012    Anticoagulant long-term use     Anxiety     Arthritis     Bacterial pneumonia, unspecified 12/7/2012    Breast cancer     LEFT    Cataract     CKD (chronic kidney disease), stage III 5/11/2012    Followed by Dr. 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 8/31/2017    LBBB (left bundle branch block) 10/19/2012    Mild cognitive impairment with memory loss 6/15/2017    Osteopenia 4/19/2012    Osteoporosis     Otitis media     Pacemaker 11/2012    yo/chantell    Secondary hyperparathyroidism of renal origin     Simple renal cyst     Sinus node dysfunction     Urinary  incontinence 4/19/2012     Social History     Socioeconomic History    Marital status:      Spouse name: Not on file    Number of children: Not on file    Years of education: Not on file    Highest education level: Not on file   Occupational History    Not on file   Social Needs    Financial resource strain: Not on file    Food insecurity:     Worry: Not on file     Inability: Not on file    Transportation needs:     Medical: Not on file     Non-medical: Not on file   Tobacco Use    Smoking status: Never Smoker    Smokeless tobacco: Never Used   Substance and Sexual Activity    Alcohol use: No    Drug use: No    Sexual activity: Not Currently   Lifestyle    Physical activity:     Days per week: Not on file     Minutes per session: Not on file    Stress: Not on file   Relationships    Social connections:     Talks on phone: Not on file     Gets together: Not on file     Attends Church service: Not on file     Active member of club or organization: Not on file     Attends meetings of clubs or organizations: Not on file     Relationship status: Not on file   Other Topics Concern    Not on file   Social History Narrative    Not on file     Past Surgical History:   Procedure Laterality Date    APPENDECTOMY      BREAST LUMPECTOMY Right 10/21/2015    CARDIAC PACEMAKER PLACEMENT  12/3/12    Sinus node dysfunction    COLONOSCOPY      excision of squamous cell carcinoma Right 2017    EYE SURGERY Bilateral     PHACO/IOL    FRACTURE SURGERY Right     ankle    HYSTERECTOMY      total 1970's    MASTECTOMY Right 11/2015    OOPHORECTOMY      TONSILLECTOMY      TOTAL HIP ARTHROPLASTY Right 2007       Review of Systems   Constitutional: Negative.  Negative for chills, diaphoresis, fatigue and fever.   HENT: Negative.  Negative for congestion and sore throat.    Eyes: Negative.    Respiratory: Negative.  Negative for cough.    Cardiovascular: Negative.  Negative for chest pain.    Gastrointestinal: Negative.  Negative for abdominal pain, anorexia, change in bowel habit, nausea and vomiting.   Endocrine: Negative.    Genitourinary: Negative.    Musculoskeletal: Negative.  Negative for arthralgias, joint swelling, myalgias and neck pain.   Skin: Positive for color change (right foot). Negative for rash.   Allergic/Immunologic: Negative.    Neurological: Negative.  Negative for vertigo, weakness, numbness and headaches.   Psychiatric/Behavioral: Negative.        Objective:      Physical Exam   Constitutional: She is oriented to person, place, and time. She appears well-developed and well-nourished.   HENT:   Head: Normocephalic.   Right Ear: External ear normal.   Left Ear: External ear normal.   Nose: Nose normal.   Mouth/Throat: Oropharynx is clear and moist.   Eyes: Pupils are equal, round, and reactive to light.   Neck: Normal range of motion. Neck supple.   Cardiovascular: Normal rate, regular rhythm and normal heart sounds.   Pulmonary/Chest: Effort normal and breath sounds normal.   Abdominal: Soft. Bowel sounds are normal.   Musculoskeletal: Normal range of motion.        Feet:    Neurological: She is alert and oriented to person, place, and time.   Skin: Skin is warm and dry. Capillary refill takes 2 to 3 seconds.   Psychiatric: She has a normal mood and affect. Her behavior is normal. Judgment and thought content normal.   Nursing note and vitals reviewed.      Assessment:       1. Discoloration of skin of foot    2. Right foot pain    3. Right ankle pain, unspecified chronicity    4. Foot mass, right    5. Localized edema         Plan:           Kassi was seen today for skin discoloration.    Diagnoses and all orders for this visit:    Discoloration of skin of foot  Right foot pain  Right ankle pain, unspecified chronicity  Foot mass, right  Localized edema   -     US Lower Extremity Veins Right; Future  -     X-Ray Foot Complete Right; Future  -     X-Ray Ankle Complete Right;  Future  Elevate affected lower extremity while lying/sitting  Tylenol OTC as directed  Report to ER immediately if symptoms worsen

## 2019-12-10 NOTE — PATIENT INSTRUCTIONS
Report to ER immediately if symptoms worsen  Elevate affected lower extremity while lying/sitting  Tylenol OTC as directed

## 2019-12-11 ENCOUNTER — OFFICE VISIT (OUTPATIENT)
Dept: NEPHROLOGY | Facility: CLINIC | Age: 84
End: 2019-12-11
Payer: MEDICARE

## 2019-12-11 ENCOUNTER — TELEPHONE (OUTPATIENT)
Dept: FAMILY MEDICINE | Facility: CLINIC | Age: 84
End: 2019-12-11

## 2019-12-11 ENCOUNTER — CLINICAL SUPPORT (OUTPATIENT)
Dept: CARDIOLOGY | Facility: CLINIC | Age: 84
End: 2019-12-11
Attending: INTERNAL MEDICINE
Payer: MEDICARE

## 2019-12-11 VITALS
DIASTOLIC BLOOD PRESSURE: 46 MMHG | SYSTOLIC BLOOD PRESSURE: 110 MMHG | WEIGHT: 156.31 LBS | BODY MASS INDEX: 30.69 KG/M2 | HEART RATE: 60 BPM | OXYGEN SATURATION: 97 % | HEIGHT: 60 IN

## 2019-12-11 DIAGNOSIS — N18.30 CKD (CHRONIC KIDNEY DISEASE), STAGE III: Primary | ICD-10-CM

## 2019-12-11 DIAGNOSIS — I49.5 SINUS NODE DYSFUNCTION: ICD-10-CM

## 2019-12-11 DIAGNOSIS — I12.9 BENIGN HYPERTENSIVE KIDNEY DISEASE WITH CHRONIC KIDNEY DISEASE, STAGE 1-4 OR UNSPECIFIED CHRONIC KIDNEY DISEASE: ICD-10-CM

## 2019-12-11 DIAGNOSIS — Z95.0 PACEMAKER: ICD-10-CM

## 2019-12-11 DIAGNOSIS — N28.1 CYST OF KIDNEY, ACQUIRED: ICD-10-CM

## 2019-12-11 DIAGNOSIS — N25.81 SECONDARY RENAL HYPERPARATHYROIDISM: ICD-10-CM

## 2019-12-11 DIAGNOSIS — R22.41 FOOT MASS, RIGHT: Primary | ICD-10-CM

## 2019-12-11 PROCEDURE — 99999 PR PBB SHADOW E&M-EST. PATIENT-LVL III: ICD-10-PCS | Mod: PBBFAC,HCNC,, | Performed by: INTERNAL MEDICINE

## 2019-12-11 PROCEDURE — 99214 OFFICE O/P EST MOD 30 MIN: CPT | Mod: HCNC,S$GLB,, | Performed by: INTERNAL MEDICINE

## 2019-12-11 PROCEDURE — 1159F PR MEDICATION LIST DOCUMENTED IN MEDICAL RECORD: ICD-10-PCS | Mod: HCNC,S$GLB,, | Performed by: INTERNAL MEDICINE

## 2019-12-11 PROCEDURE — 99999 PR PBB SHADOW E&M-EST. PATIENT-LVL I: ICD-10-PCS | Mod: PBBFAC,HCNC,,

## 2019-12-11 PROCEDURE — 99214 PR OFFICE/OUTPT VISIT, EST, LEVL IV, 30-39 MIN: ICD-10-PCS | Mod: HCNC,S$GLB,, | Performed by: INTERNAL MEDICINE

## 2019-12-11 PROCEDURE — 1101F PT FALLS ASSESS-DOCD LE1/YR: CPT | Mod: HCNC,CPTII,S$GLB, | Performed by: INTERNAL MEDICINE

## 2019-12-11 PROCEDURE — 99999 PR PBB SHADOW E&M-EST. PATIENT-LVL III: CPT | Mod: PBBFAC,HCNC,, | Performed by: INTERNAL MEDICINE

## 2019-12-11 PROCEDURE — 1159F MED LIST DOCD IN RCRD: CPT | Mod: HCNC,S$GLB,, | Performed by: INTERNAL MEDICINE

## 2019-12-11 PROCEDURE — 99999 PR PBB SHADOW E&M-EST. PATIENT-LVL I: CPT | Mod: PBBFAC,HCNC,,

## 2019-12-11 PROCEDURE — 1101F PR PT FALLS ASSESS DOC 0-1 FALLS W/OUT INJ PAST YR: ICD-10-PCS | Mod: HCNC,CPTII,S$GLB, | Performed by: INTERNAL MEDICINE

## 2019-12-11 RX ORDER — CALCITRIOL 0.25 UG/1
0.25 CAPSULE ORAL DAILY
Qty: 90 CAPSULE | Refills: 1 | Status: SHIPPED | OUTPATIENT
Start: 2019-12-11 | End: 2020-04-03

## 2019-12-11 NOTE — PROGRESS NOTES
Subjective:       Patient ID: Kassi Skelton is a 87 y.o. White female who presents for return patient evaluation for chronic renal failure.    She noticed she had a bruise on her right foot two weeks ago but is not really having pain in her foot.  She is more sleepy lately.  She had a pacemaker interrogation today.      Review of Systems   Constitutional: Positive for fatigue. Negative for appetite change, chills and fever.   HENT:        Dry mouth-chronic   Eyes: Negative for visual disturbance.   Respiratory: Negative for cough and shortness of breath.    Cardiovascular: Negative for chest pain and leg swelling.   Gastrointestinal: Negative for abdominal pain, diarrhea, nausea and vomiting.   Genitourinary: Negative for difficulty urinating, dysuria and hematuria.   Musculoskeletal: Positive for arthralgias and gait problem. Negative for myalgias.   Skin: Negative for rash.   Neurological: Negative for dizziness and headaches.   Psychiatric/Behavioral: Negative for sleep disturbance.       The past medical, family and social histories were reviewed for this encounter.     BP (!) 110/46 (BP Location: Right arm, Patient Position: Sitting)   Pulse 60   Ht 5' (1.524 m)   Wt 70.9 kg (156 lb 4.9 oz)   SpO2 97%   BMI 30.53 kg/m²     Objective:      Physical Exam   Constitutional: She appears well-developed and well-nourished. No distress.   HENT:   Head: Normocephalic and atraumatic.   Eyes: Conjunctivae are normal. No scleral icterus.   Neck: Normal range of motion. No JVD present.   Cardiovascular: Normal rate, regular rhythm and normal heart sounds. Exam reveals no gallop and no friction rub.   No murmur heard.  Pulmonary/Chest: Effort normal and breath sounds normal. No respiratory distress. She has no wheezes.   Abdominal: Soft. Bowel sounds are normal. She exhibits no distension. There is no tenderness.   Musculoskeletal: She exhibits no edema.   Right foot has an ecchymosis on the dorsum, the foot is  cool but is the same temperature as the left foot   Skin: Skin is warm and dry. No rash noted.   Psychiatric: She has a normal mood and affect.   Vitals reviewed.      Assessment:       1. CKD (chronic kidney disease), stage III    2. Benign hypertensive kidney disease with chronic kidney disease, stage 1-4 or unspecified chronic kidney disease    3. Secondary renal hyperparathyroidism    4. Cyst of kidney, acquired        Plan:   Return to clinic on April 14th.  Labs for next visit include rp, pth, cbc.  Baseline creatinine is 1.1-1.5 since 2010 but is still in the upper 1.5-2.0 range for the past four years.  She wishes to continue conservative care.  She does not want dialysis.  PTH is 265 with a calcium of 9.5.  UPC is negative.  Continue current medications as prescribed and reviewed.   Blood pressure is controlled on the current regimen.  Her daughter Tova's cell is [phone].  Stop ergocalciferol.  Calcitriol 0.25 mcg daily.    Regarding her foot, Dr. Brewer asked her to go to the ER to have it evaluated.  I do not suspect she has an arterial issue as if this was the case then I do not see where she would be able to walk on it.  To me it appears she may have stepped on something or dropped something on her foot.  She does not remember such an injury.

## 2020-01-07 ENCOUNTER — LAB VISIT (OUTPATIENT)
Dept: LAB | Facility: HOSPITAL | Age: 85
End: 2020-01-07
Attending: FAMILY MEDICINE
Payer: MEDICARE

## 2020-01-07 ENCOUNTER — OFFICE VISIT (OUTPATIENT)
Dept: PODIATRY | Facility: CLINIC | Age: 85
End: 2020-01-07
Payer: MEDICARE

## 2020-01-07 VITALS
HEIGHT: 61 IN | SYSTOLIC BLOOD PRESSURE: 151 MMHG | WEIGHT: 156 LBS | DIASTOLIC BLOOD PRESSURE: 72 MMHG | HEART RATE: 60 BPM | BODY MASS INDEX: 29.45 KG/M2

## 2020-01-07 DIAGNOSIS — N18.4 DIABETES MELLITUS WITH STAGE 4 CHRONIC KIDNEY DISEASE GFR 15-29: ICD-10-CM

## 2020-01-07 DIAGNOSIS — E03.9 HYPOTHYROIDISM, UNSPECIFIED TYPE: ICD-10-CM

## 2020-01-07 DIAGNOSIS — E11.22 DIABETES MELLITUS WITH STAGE 4 CHRONIC KIDNEY DISEASE GFR 15-29: ICD-10-CM

## 2020-01-07 DIAGNOSIS — E11.49 TYPE II DIABETES MELLITUS WITH NEUROLOGICAL MANIFESTATIONS: Primary | ICD-10-CM

## 2020-01-07 DIAGNOSIS — B35.1 ONYCHOMYCOSIS DUE TO DERMATOPHYTE: ICD-10-CM

## 2020-01-07 DIAGNOSIS — E03.9 HYPOTHYROIDISM: ICD-10-CM

## 2020-01-07 DIAGNOSIS — L84 PRE-ULCERATIVE CALLUSES: ICD-10-CM

## 2020-01-07 DIAGNOSIS — E78.2 COMBINED HYPERLIPIDEMIA ASSOCIATED WITH TYPE 2 DIABETES MELLITUS: ICD-10-CM

## 2020-01-07 DIAGNOSIS — E11.69 COMBINED HYPERLIPIDEMIA ASSOCIATED WITH TYPE 2 DIABETES MELLITUS: ICD-10-CM

## 2020-01-07 LAB
ESTIMATED AVG GLUCOSE: 163 MG/DL (ref 68–131)
HBA1C MFR BLD HPLC: 7.3 % (ref 4–5.6)

## 2020-01-07 PROCEDURE — 1126F AMNT PAIN NOTED NONE PRSNT: CPT | Mod: HCNC,S$GLB,, | Performed by: PODIATRIST

## 2020-01-07 PROCEDURE — 11721 PR DEBRIDEMENT OF NAILS, 6 OR MORE: ICD-10-PCS | Mod: 59,Q9,HCNC,S$GLB | Performed by: PODIATRIST

## 2020-01-07 PROCEDURE — 1126F PR PAIN SEVERITY QUANTIFIED, NO PAIN PRESENT: ICD-10-PCS | Mod: HCNC,S$GLB,, | Performed by: PODIATRIST

## 2020-01-07 PROCEDURE — 84443 ASSAY THYROID STIM HORMONE: CPT | Mod: HCNC

## 2020-01-07 PROCEDURE — 1101F PR PT FALLS ASSESS DOC 0-1 FALLS W/OUT INJ PAST YR: ICD-10-PCS | Mod: HCNC,CPTII,S$GLB, | Performed by: PODIATRIST

## 2020-01-07 PROCEDURE — 1101F PT FALLS ASSESS-DOCD LE1/YR: CPT | Mod: HCNC,CPTII,S$GLB, | Performed by: PODIATRIST

## 2020-01-07 PROCEDURE — 99213 PR OFFICE/OUTPT VISIT, EST, LEVL III, 20-29 MIN: ICD-10-PCS | Mod: 25,HCNC,S$GLB, | Performed by: PODIATRIST

## 2020-01-07 PROCEDURE — 83036 HEMOGLOBIN GLYCOSYLATED A1C: CPT | Mod: HCNC

## 2020-01-07 PROCEDURE — 99999 PR PBB SHADOW E&M-EST. PATIENT-LVL III: CPT | Mod: PBBFAC,HCNC,, | Performed by: PODIATRIST

## 2020-01-07 PROCEDURE — 1159F MED LIST DOCD IN RCRD: CPT | Mod: HCNC,S$GLB,, | Performed by: PODIATRIST

## 2020-01-07 PROCEDURE — 11721 DEBRIDE NAIL 6 OR MORE: CPT | Mod: 59,Q9,HCNC,S$GLB | Performed by: PODIATRIST

## 2020-01-07 PROCEDURE — 99213 OFFICE O/P EST LOW 20 MIN: CPT | Mod: 25,HCNC,S$GLB, | Performed by: PODIATRIST

## 2020-01-07 PROCEDURE — 80061 LIPID PANEL: CPT | Mod: HCNC

## 2020-01-07 PROCEDURE — 11055 PR TRIM HYPERKERATOTIC SKIN LESION, ONE: ICD-10-PCS | Mod: Q9,HCNC,LT,S$GLB | Performed by: PODIATRIST

## 2020-01-07 PROCEDURE — 99499 RISK ADDL DX/OHS AUDIT: ICD-10-PCS | Mod: HCNC,S$GLB,, | Performed by: PODIATRIST

## 2020-01-07 PROCEDURE — 99499 UNLISTED E&M SERVICE: CPT | Mod: HCNC,S$GLB,, | Performed by: PODIATRIST

## 2020-01-07 PROCEDURE — 99999 PR PBB SHADOW E&M-EST. PATIENT-LVL III: ICD-10-PCS | Mod: PBBFAC,HCNC,, | Performed by: PODIATRIST

## 2020-01-07 PROCEDURE — 36415 COLL VENOUS BLD VENIPUNCTURE: CPT | Mod: HCNC,PO

## 2020-01-07 PROCEDURE — 11055 PARING/CUTG B9 HYPRKER LES 1: CPT | Mod: Q9,HCNC,LT,S$GLB | Performed by: PODIATRIST

## 2020-01-07 PROCEDURE — 1159F PR MEDICATION LIST DOCUMENTED IN MEDICAL RECORD: ICD-10-PCS | Mod: HCNC,S$GLB,, | Performed by: PODIATRIST

## 2020-01-07 NOTE — PROGRESS NOTES
Subjective:     Patient ID: Kassi Skelton is a 87 y.o. female.    Chief Complaint: Nail Care (no c/o pain. wears casual shoes. diabetic Pt. last seen on 07/25/19 with PCP Dr. Cook)    Kassi is a 87 y.o. female who presents to the clinic for evaluation and treatment of high risk feet. Kassi has a past medical history of *Atrial fibrillation (4/19/2012), *Atrial flutter (4/19/2012), Allergy, Ankle fracture (4/19/2012), Anticoagulant long-term use, Anxiety, Arthritis, Bacterial pneumonia, unspecified (12/7/2012), Breast cancer, Cataract, CKD (chronic kidney disease), stage III (5/11/2012), Dependent on walker for ambulation, Depression (4/19/2012), Diabetes mellitus with stage 3 chronic kidney disease (2/1/2016), Diabetes with neurologic complications, Edentulous, HEARING LOSS, Hip fracture, right (4/19/2012), HLD (hyperlipidemia) (4/19/2012), HTN (hypertension) (4/19/2012), Hypothyroidism (4/19/2012), 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 The patient's chief complaint is long, thick toenails. This patient has documented high risk feet requiring routine maintenance secondary to diabetes mellitis and those secondary complications of diabetes, as mentioned..    PCP: Mor Cook MD    Date Last Seen by PCP: 07/25/19    Current shoe gear:  Affected Foot: Tennis shoes     Unaffected Foot: Tennis shoes    Hemoglobin A1C   Date Value Ref Range Status   07/25/2019 7.1 (H) 4.0 - 5.6 % Final     Comment:     ADA Screening Guidelines:  5.7-6.4%  Consistent with prediabetes  >or=6.5%  Consistent with diabetes  High levels of fetal hemoglobin interfere with the HbA1C  assay. Heterozygous  hemoglobin variants (HbS, HgC, etc)do  not significantly interfere with this assay.   However, presence of multiple variants may affect accuracy.     01/31/2019 6.6 (H) 4.0 - 5.6 % Final     Comment:     ADA Screening Guidelines:  5.7-6.4%  Consistent with prediabetes  >or=6.5%  Consistent with diabetes  High levels of fetal hemoglobin interfere with the HbA1C  assay. Heterozygous hemoglobin variants (HbS, HgC, etc)do  not significantly interfere with this assay.   However, presence of multiple variants may affect accuracy.     08/01/2018 6.3 (H) 4.0 - 5.6 % Final     Comment:     ADA Screening Guidelines:  5.7-6.4%  Consistent with prediabetes  >or=6.5%  Consistent with diabetes  High levels of fetal hemoglobin interfere with the HbA1C  assay. Heterozygous hemoglobin variants (HbS, HgC, etc)do  not significantly interfere with this assay.   However, presence of multiple variants may affect accuracy.         Patient Active Problem List   Diagnosis    History of atrial flutter    Combined hyperlipidemia associated with type 2 diabetes mellitus    Hypothyroidism    Major depressive disorder, recurrent episode, moderate    Osteoporosis    Benign hypertensive kidney disease with chronic kidney disease    Cyst of kidney, acquired    Atrial fibrillation    Anticoagulation monitoring by pharmacist    LBBB (left bundle branch block)    Sinus node dysfunction    Cardiac pacemaker in situ    Urinary incontinence, mixed    Pacemaker    Orthostatic hypotension    DCIS (ductal carcinoma in situ)    Hypertension associated with diabetes    Diabetes mellitus with stage 4 chronic kidney disease GFR 15-29    Dizziness    Secondary hyperparathyroidism of renal origin    Hearing aid worn    Diabetic neuropathy, type II diabetes mellitus    Mild cognitive impairment with memory loss    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    BD ALCOHOL SWABS PADM    USE AS NEEDED    BENAZEPRIL (LOTENSIN) 20 MG TABLET    TAKE 1 TABLET EVERY DAY    BLOOD SUGAR DIAGNOSTIC (ACCU-CHEK SHELODN PLUS TEST STRP) STRP    Test glucose once daily    CALCITRIOL (ROCALTROL) 0.25 MCG CAP    Take 1 capsule (0.25 mcg total) by mouth once daily.    CYANOCOBALAMIN, VITAMIN B-12, 1,000 MCG TBSR    One tab po daily    DONEPEZIL (ARICEPT) 5 MG TABLET    TAKE 1 TABLET EVERY EVENING    ELIQUIS 2.5 MG TAB    TAKE 1 TABLET TWICE DAILY    ERGOCALCIFEROL, VITAMIN D2, (VITAMIN D ORAL)    Take by mouth once daily.    GLIPIZIDE (GLUCOTROL) 2.5 MG TR24    TAKE 1 TABLET EVERY DAY WITH BREAKFAST    HYDROCHLOROTHIAZIDE (HYDRODIURIL) 25 MG TABLET    TAKE 1 TABLET EVERY DAY    LANCETS (ACCU-CHEK SOFTCLIX LANCETS) MISC    Test glucose once daily    LEVOTHYROXINE (SYNTHROID) 25 MCG TABLET    TAKE 1 TABLET (25 MCG TOTAL) BY MOUTH ONCE DAILY.    METOPROLOL TARTRATE (LOPRESSOR) 50 MG TABLET    TAKE 1/2 TABLET TWICE DAILY (LAST REFILL UNTIL CLINIC VISIT MADE)    MIRABEGRON (MYRBETRIQ) 50 MG TB24    TAKE 1 TABLET ONE TIME DAILY    MIRTAZAPINE (REMERON) 45 MG TABLET    Take 45 mg by mouth every evening.    OXYBUTYNIN (DITROPAN) 5 MG TAB    Take 5 mg by mouth once daily.    PRAVASTATIN (PRAVACHOL) 40 MG TABLET    TAKE 1 TABLET EVERY DAY    PYRIDOXINE HCL, VITAMIN B6, (VITAMIN B-6 ORAL)    Take by mouth.    VENLAFAXINE (EFFEXOR-XR) 150 MG CP24    TAKE 1 CAPSULE ONE TIME DAILY    VERAPAMIL (CALAN-SR) 120 MG CR TABLET    TAKE 1 TABLET EVERY NIGHT    VITAMIN B COMP AND VIT C NO.6 (VITAMIN B COMP WITH VIT C NO.6 ORAL)    Take by mouth once daily.       Review of patient's allergies indicates:   Allergen Reactions    Amlodipine Edema     Pt stated this medication made her legs swell    Alendronate sodium Other (See Comments)     Reaction:  Esophageal bleeding    Fosamax [alendronate] Other (See Comments)     Reaction: Esophageal bleeding  diarrhea    Sorbitol      Diarrhea     Augmentin [amoxicillin-pot clavulanate] Hives and Rash       Past Surgical History:   Procedure Laterality Date    APPENDECTOMY      BREAST LUMPECTOMY Right 10/21/2015    CARDIAC PACEMAKER PLACEMENT  12/3/12    Sinus node dysfunction    COLONOSCOPY      excision of squamous cell carcinoma Right 2017    EYE SURGERY Bilateral     PHACO/IOL    FRACTURE SURGERY Right     ankle    HYSTERECTOMY      total 1970's    MASTECTOMY Right 11/2015    OOPHORECTOMY      TONSILLECTOMY      TOTAL HIP ARTHROPLASTY Right 2007       Family History   Problem Relation Age of Onset    Hypertension Mother     Heart disease Father     Stroke Father         cerebral hemorrhage    Coronary artery disease Brother     Heart disease Brother     Coronary artery disease Brother     COPD Brother     Heart disease Brother     Mitral valve prolapse Daughter     Peripheral vascular disease Son        Social History     Socioeconomic History    Marital status:      Spouse name: Not on file    Number of children: Not on file    Years of education: Not on file    Highest education level: Not on file   Occupational History    Not on file   Social Needs    Financial resource strain: Not on file    Food insecurity:     Worry: Not on file     Inability: Not on file    Transportation needs:     Medical: Not on file     Non-medical: Not on file   Tobacco Use    Smoking status: Never Smoker    Smokeless tobacco: Never Used   Substance and Sexual Activity    Alcohol use: No    Drug use: No    Sexual activity: Not Currently   Lifestyle    Physical activity:     Days per week: Not on file     Minutes per session: Not on file    Stress: Not on file   Relationships    Social connections:     Talks on phone: Not on file     Gets together: Not on file     Attends Religion service:  "Not on file     Active member of club or organization: Not on file     Attends meetings of clubs or organizations: Not on file     Relationship status: Not on file   Other Topics Concern    Not on file   Social History Narrative    Not on file       Vitals:    01/07/20 1411   BP: (!) 151/72   Pulse: 60   Weight: 70.8 kg (156 lb)   Height: 5' 1" (1.549 m)   PainSc: 0-No pain   PainLoc: Foot       Hemoglobin A1C   Date Value Ref Range Status   07/25/2019 7.1 (H) 4.0 - 5.6 % Final     Comment:     ADA Screening Guidelines:  5.7-6.4%  Consistent with prediabetes  >or=6.5%  Consistent with diabetes  High levels of fetal hemoglobin interfere with the HbA1C  assay. Heterozygous hemoglobin variants (HbS, HgC, etc)do  not significantly interfere with this assay.   However, presence of multiple variants may affect accuracy.     01/31/2019 6.6 (H) 4.0 - 5.6 % Final     Comment:     ADA Screening Guidelines:  5.7-6.4%  Consistent with prediabetes  >or=6.5%  Consistent with diabetes  High levels of fetal hemoglobin interfere with the HbA1C  assay. Heterozygous hemoglobin variants (HbS, HgC, etc)do  not significantly interfere with this assay.   However, presence of multiple variants may affect accuracy.     08/01/2018 6.3 (H) 4.0 - 5.6 % Final     Comment:     ADA Screening Guidelines:  5.7-6.4%  Consistent with prediabetes  >or=6.5%  Consistent with diabetes  High levels of fetal hemoglobin interfere with the HbA1C  assay. Heterozygous hemoglobin variants (HbS, HgC, etc)do  not significantly interfere with this assay.   However, presence of multiple variants may affect accuracy.         Review of Systems   Constitutional: Negative for chills and fever.   Respiratory: Negative for shortness of breath.    Cardiovascular: Negative for chest pain, palpitations, orthopnea, claudication and leg swelling.   Gastrointestinal: Negative for diarrhea, nausea and vomiting.   Musculoskeletal: Negative for joint pain.   Skin: Negative for " rash.   Neurological: Positive for sensory change. Negative for dizziness, tingling, focal weakness and weakness.   Psychiatric/Behavioral: Negative.              Objective:       PHYSICAL EXAM: Apperance: Alert and orient in no distress,well developed, and with good attention to grooming and body habits  Lower Extremity Exam  VASCULAR: Dorsalis pedis pulses 1/4 bilateral and Posterior Tibial pulses 1/4 bilateral. Capillary fill time <4 seconds bilateral. No edema observed bilateral. Varicosities present bilateral. Skin temperature of the lower extremities is warm to warm, proximal to distal. Hair growth absent bilateral. (--) lymphangitis or (--) cellulitis noted right.  DERMATOLOGICAL: No skin rashes, subcutaneous nodules, lesions, or ulcers observed bilateral. The dorsum surface of the feet are soft and supple.  The plantar aspects of both feet are dry and scaly. Nails 1,2,3,4,5 bilateral elongated, thickened, and discolored with subungual debris. Webspaces 1,2,3,4 clean, dry and without evidence of break in skin integrity bilateral. Thin hyperkeratotic lesions noted to left plantar 1st submetatarsal. Minimal ecchymosis noted to right dorsal toes 2,3,4.   NEUROLOGICAL: Light touch, sharp-dull, proprioception all present and equal bilaterally.   MUSCULOSKELETAL: Muscle strength is 5/5 for foot inverters, everters, plantarflexors, and dorsiflexors. Muscle tone is normal. Fat pad atrophy noted bilateral.           Assessment:       Encounter Diagnoses   Name Primary?    Type II diabetes mellitus with neurological manifestations Yes    Pre-ulcerative calluses - Left Foot     Onychomycosis due to dermatophyte          Plan:   Type II diabetes mellitus with neurological manifestations    Pre-ulcerative calluses - Left Foot    Onychomycosis due to dermatophyte      I counseled the patient on her conditions, regarding findings of my examination, my impressions, and usual treatment plan.   Greater than 50% of this  visit spent on counseling and coordination of care.  Greater than 15 minutes of a 20 minute appointment spent on education about the diabetic foot, neuropathy, and prevention of limb loss.  Shoe inspection. Diabetic Foot Education. Patient reminded of the importance of good nutrition and blood sugar control to help prevent podiatric complications of diabetes. Patient instructed on proper foot hygeine. We discussed wearing proper shoe gear, daily foot inspections, never walking without protective shoe gear, never putting sharp instruments to feet.    With patient's permission, nails 1-5 bilateral were debrided/trimmed in length and thickness aggressively to their soft tissue attachment mechanically and with electric , removing all offending nail and debris. Patient relates relief following the procedure.  With patient's permission, left callus trimmed in thickness with #15 blade in thickness without incident.   Patient  will continue to monitor the areas daily, inspect feet, wear protective shoe gear when ambulatory, moisturizer to maintain skin integrity. Patient reminded of the importance of good nutrition and blood sugar control to help prevent podiatric complications of diabetes.  Patient to return 3 months or sooner if needed.                 Luzmaria Valle DPM  [facility]

## 2020-01-08 LAB
CHOLEST SERPL-MCNC: 189 MG/DL (ref 120–199)
CHOLEST/HDLC SERPL: 3 {RATIO} (ref 2–5)
HDLC SERPL-MCNC: 62 MG/DL (ref 40–75)
HDLC SERPL: 32.8 % (ref 20–50)
LDLC SERPL CALC-MCNC: ABNORMAL MG/DL (ref 63–159)
NONHDLC SERPL-MCNC: 127 MG/DL
TRIGL SERPL-MCNC: 510 MG/DL (ref 30–150)
TSH SERPL DL<=0.005 MIU/L-ACNC: 1.53 UIU/ML (ref 0.4–4)

## 2020-01-14 ENCOUNTER — OFFICE VISIT (OUTPATIENT)
Dept: DERMATOLOGY | Facility: CLINIC | Age: 85
End: 2020-01-14
Payer: MEDICARE

## 2020-01-14 VITALS — RESPIRATION RATE: 18 BRPM | BODY MASS INDEX: 29.47 KG/M2 | HEIGHT: 61 IN | WEIGHT: 156.06 LBS

## 2020-01-14 DIAGNOSIS — L57.0 ACTINIC KERATOSIS: ICD-10-CM

## 2020-01-14 DIAGNOSIS — L90.5 SCAR: Primary | ICD-10-CM

## 2020-01-14 PROCEDURE — 99212 OFFICE O/P EST SF 10 MIN: CPT | Mod: 25,HCNC,S$GLB, | Performed by: DERMATOLOGY

## 2020-01-14 PROCEDURE — 1101F PR PT FALLS ASSESS DOC 0-1 FALLS W/OUT INJ PAST YR: ICD-10-PCS | Mod: HCNC,CPTII,S$GLB, | Performed by: DERMATOLOGY

## 2020-01-14 PROCEDURE — 1101F PT FALLS ASSESS-DOCD LE1/YR: CPT | Mod: HCNC,CPTII,S$GLB, | Performed by: DERMATOLOGY

## 2020-01-14 PROCEDURE — 99999 PR PBB SHADOW E&M-EST. PATIENT-LVL IV: CPT | Mod: PBBFAC,HCNC,, | Performed by: DERMATOLOGY

## 2020-01-14 PROCEDURE — 99999 PR PBB SHADOW E&M-EST. PATIENT-LVL IV: ICD-10-PCS | Mod: PBBFAC,HCNC,, | Performed by: DERMATOLOGY

## 2020-01-14 PROCEDURE — 17000 PR DESTRUCTION(LASER SURGERY,CRYOSURGERY,CHEMOSURGERY),PREMALIGNANT LESIONS,FIRST LESION: ICD-10-PCS | Mod: HCNC,S$GLB,, | Performed by: DERMATOLOGY

## 2020-01-14 PROCEDURE — 17000 DESTRUCT PREMALG LESION: CPT | Mod: HCNC,S$GLB,, | Performed by: DERMATOLOGY

## 2020-01-14 PROCEDURE — 1159F MED LIST DOCD IN RCRD: CPT | Mod: HCNC,S$GLB,, | Performed by: DERMATOLOGY

## 2020-01-14 PROCEDURE — 99212 PR OFFICE/OUTPT VISIT, EST, LEVL II, 10-19 MIN: ICD-10-PCS | Mod: 25,HCNC,S$GLB, | Performed by: DERMATOLOGY

## 2020-01-14 PROCEDURE — 1159F PR MEDICATION LIST DOCUMENTED IN MEDICAL RECORD: ICD-10-PCS | Mod: HCNC,S$GLB,, | Performed by: DERMATOLOGY

## 2020-01-14 NOTE — PROGRESS NOTES
Subjective:       Patient ID:  Kassi Skelton is a 87 y.o. female who presents for   Chief Complaint   Patient presents with    Skin Check     C/o lesion to R hand x 2-3 months. Asymptomatic. Not treating.     SCC R forearm- excised 8/14/17  No fhx of melanoma    +pacemaker      Past Medical History:  4/19/2012: *Atrial fibrillation  4/19/2012: *Atrial flutter  No date: Allergy  4/19/2012: Ankle fracture  No date: Anticoagulant long-term use  No date: Anxiety  No date: Arthritis  12/7/2012: Bacterial pneumonia, unspecified  No date: Breast cancer      Comment:  LEFT  No date: Cataract  5/11/2012: CKD (chronic kidney disease), stage III      Comment:  Followed by Dr. 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   HENT: Negative for sore throat.    Respiratory: Negative for cough.    Gastrointestinal: Negative for nausea and vomiting.   Skin: Negative for itching, rash and dry skin.        Objective:    Physical Exam   Skin:                 Diagram Legend     Erythematous scaling macule/papule c/w actinic keratosis       Vascular papule c/w angioma      Pigmented  verrucoid papule/plaque c/w seborrheic keratosis      Yellow umbilicated papule c/w sebaceous hyperplasia      Irregularly shaped tan macule c/w lentigo     1-2 mm smooth white papules consistent with Milia      Movable subcutaneous cyst with punctum c/w epidermal inclusion cyst      Subcutaneous movable cyst c/w pilar cyst      Firm pink to brown papule c/w dermatofibroma      Pedunculated fleshy papule(s) c/w skin tag(s)      Evenly pigmented macule c/w junctional nevus     Mildly variegated pigmented, slightly irregular-bordered macule c/w mildly atypical nevus      Flesh colored to evenly pigmented papule c/w intradermal nevus       Pink pearly papule/plaque c/w basal cell carcinoma      Erythematous hyperkeratotic cursted plaque c/w SCC      Surgical scar with no sign of skin cancer recurrence      Open and closed comedones      Inflammatory papules and pustules      Verrucoid papule consistent consistent with wart     Erythematous eczematous patches and plaques     Dystrophic onycholytic nail with subungual debris c/w onychomycosis     Umbilicated papule    Erythematous-base heme-crusted tan verrucoid plaque consistent with inflamed seborrheic keratosis     Erythematous Silvery Scaling Plaque c/w Psoriasis     See annotation      Assessment / Plan:        Scar, right arm  Reassurance   NER    Actinic keratosis  Cryosurgery Procedure Note    Verbal consent from the patient is obtained and the patient is aware of the precancerous quality and need for treatment of these lesions. Liquid nitrogen cryosurgery is applied to the 1 actinic keratoses, as detailed in the physical exam, to produce a freeze injury. The patient is aware that blisters may form and is instructed on wound care with gentle cleansing and use of vaseline ointment to keep moist until healed. The patient is supplied a handout on cryosurgery and is instructed to call if lesions do not completely resolve. Discussed risk postinflammatory pigmentary  changes.              Follow up in about 6 months (around 7/14/2020).

## 2020-01-23 ENCOUNTER — PATIENT MESSAGE (OUTPATIENT)
Dept: CARDIOLOGY | Facility: CLINIC | Age: 85
End: 2020-01-23

## 2020-02-07 ENCOUNTER — OFFICE VISIT (OUTPATIENT)
Dept: FAMILY MEDICINE | Facility: CLINIC | Age: 85
End: 2020-02-07
Payer: MEDICARE

## 2020-02-07 VITALS
BODY MASS INDEX: 27.48 KG/M2 | WEIGHT: 140 LBS | TEMPERATURE: 98 F | HEART RATE: 72 BPM | SYSTOLIC BLOOD PRESSURE: 143 MMHG | HEIGHT: 60 IN | DIASTOLIC BLOOD PRESSURE: 67 MMHG

## 2020-02-07 DIAGNOSIS — R29.6 FREQUENT FALLS: Primary | ICD-10-CM

## 2020-02-07 DIAGNOSIS — M25.551 PAIN OF RIGHT HIP JOINT: ICD-10-CM

## 2020-02-07 PROCEDURE — 99999 PR PBB SHADOW E&M-EST. PATIENT-LVL V: CPT | Mod: PBBFAC,HCNC,, | Performed by: NURSE PRACTITIONER

## 2020-02-07 PROCEDURE — 99999 PR PBB SHADOW E&M-EST. PATIENT-LVL V: ICD-10-PCS | Mod: PBBFAC,HCNC,, | Performed by: NURSE PRACTITIONER

## 2020-02-07 PROCEDURE — 1159F PR MEDICATION LIST DOCUMENTED IN MEDICAL RECORD: ICD-10-PCS | Mod: HCNC,S$GLB,, | Performed by: NURSE PRACTITIONER

## 2020-02-07 PROCEDURE — 99213 PR OFFICE/OUTPT VISIT, EST, LEVL III, 20-29 MIN: ICD-10-PCS | Mod: HCNC,S$GLB,, | Performed by: NURSE PRACTITIONER

## 2020-02-07 PROCEDURE — 1125F AMNT PAIN NOTED PAIN PRSNT: CPT | Mod: HCNC,S$GLB,, | Performed by: NURSE PRACTITIONER

## 2020-02-07 PROCEDURE — 1125F PR PAIN SEVERITY QUANTIFIED, PAIN PRESENT: ICD-10-PCS | Mod: HCNC,S$GLB,, | Performed by: NURSE PRACTITIONER

## 2020-02-07 PROCEDURE — 1101F PR PT FALLS ASSESS DOC 0-1 FALLS W/OUT INJ PAST YR: ICD-10-PCS | Mod: HCNC,CPTII,S$GLB, | Performed by: NURSE PRACTITIONER

## 2020-02-07 PROCEDURE — 1101F PT FALLS ASSESS-DOCD LE1/YR: CPT | Mod: HCNC,CPTII,S$GLB, | Performed by: NURSE PRACTITIONER

## 2020-02-07 PROCEDURE — 99213 OFFICE O/P EST LOW 20 MIN: CPT | Mod: HCNC,S$GLB,, | Performed by: NURSE PRACTITIONER

## 2020-02-07 PROCEDURE — 1159F MED LIST DOCD IN RCRD: CPT | Mod: HCNC,S$GLB,, | Performed by: NURSE PRACTITIONER

## 2020-02-07 RX ORDER — TRAMADOL HYDROCHLORIDE 50 MG/1
50 TABLET ORAL EVERY 8 HOURS PRN
Qty: 15 TABLET | Refills: 0 | Status: SHIPPED | OUTPATIENT
Start: 2020-02-07 | End: 2020-02-12

## 2020-02-07 NOTE — PROGRESS NOTES
"Subjective:       Patient ID: Kassi Skelton is a 87 y.o. female.    Chief Complaint: Fall    Fall   The accident occurred 5 to 7 days ago (has had 3 falls in past 2 w). The fall occurred while walking and while standing. She fell from a height of 1 to 2 ft. She landed on hard floor. There was no blood loss. The point of impact was the right hip. The pain is present in the right hip. The pain is severe. The symptoms are aggravated by movement and pressure on injury. Pertinent negatives include no abdominal pain, bowel incontinence, fever, headaches, hearing loss, hematuria, loss of consciousness, nausea, numbness, tingling, visual change or vomiting. She has tried acetaminophen for the symptoms. The treatment provided mild (ambulates with walker) relief.   Daughter  has frequent falls; states,"before she went to [place] she was active. Now, she sits and does puzzles. I think it's just her strength." Declines CT head, neurology. She does have some cognitive impairment with memory impairment; currently taking aricept; pt states does not remember how she falls or what happens prior to falling. Pt/caregiver informed xray unavailable in clinic today; declined [facility]; states will wait until Monday. Pt has no other complaints today.  Past Medical History:   Diagnosis Date    *Atrial fibrillation 4/19/2012    *Atrial flutter 4/19/2012    Allergy     Ankle fracture 4/19/2012    Anticoagulant long-term use     Anxiety     Arthritis     Bacterial pneumonia, unspecified 12/7/2012    Breast cancer     LEFT    Cataract     CKD (chronic kidney disease), stage III 5/11/2012    Followed by Dr. 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    LBBB (left bundle branch block) 10/19/2012    Mild cognitive impairment with memory loss 6/15/2017    Osteopenia 4/19/2012    Osteoporosis     Otitis media     Pacemaker 11/2012    yo/chantell    Secondary hyperparathyroidism of renal origin     Simple renal cyst     Sinus node dysfunction     Urinary incontinence 4/19/2012     Social History     Socioeconomic History    Marital status:      Spouse name: Not on file    Number of children: Not on file    Years of education: Not on file    Highest education level: Not on file   Occupational History    Not on file   Social Needs    Financial resource strain: Not on file    Food insecurity:     Worry: Not on file     Inability: Not on file    Transportation needs:     Medical: Not on file     Non-medical: Not on file   Tobacco Use    Smoking status: Never Smoker    Smokeless tobacco: Never Used   Substance and Sexual Activity    Alcohol use: No    Drug use: No    Sexual activity: Not Currently   Lifestyle    Physical activity:     Days per week: Not on file     Minutes per session: Not on file    Stress: Not on file   Relationships    Social connections:     Talks on phone: Not on file     Gets together: Not on file     Attends Religion service: Not on file     Active member of club or organization: Not on file     Attends meetings of clubs or organizations: Not on file     Relationship status: Not on file   Other Topics Concern    Not on file   Social History Narrative    Not on file     Past Surgical History:   Procedure Laterality Date    APPENDECTOMY      BREAST LUMPECTOMY Right 10/21/2015    CARDIAC PACEMAKER PLACEMENT  12/3/12    Sinus node dysfunction    COLONOSCOPY      excision of squamous cell carcinoma Right 2017    EYE SURGERY Bilateral     PHACO/IOL    FRACTURE SURGERY Right      ankle    HYSTERECTOMY      total 1970's    MASTECTOMY Right 11/2015    OOPHORECTOMY      TONSILLECTOMY      TOTAL HIP ARTHROPLASTY Right 2007       Review of Systems   Constitutional: Negative.  Negative for fever.   HENT: Negative.    Eyes: Negative.    Respiratory: Negative.    Cardiovascular: Negative.    Gastrointestinal: Negative.  Negative for abdominal pain, bowel incontinence, nausea and vomiting.   Endocrine: Negative.    Genitourinary: Negative.  Negative for hematuria.   Musculoskeletal: Positive for arthralgias (right hip).   Skin: Negative.    Allergic/Immunologic: Negative.    Neurological: Negative.  Negative for tingling, loss of consciousness, numbness and headaches.   Psychiatric/Behavioral: Negative.        Objective:      Physical Exam   Constitutional: She is oriented to person, place, and time. She appears well-developed and well-nourished.   HENT:   Head: Normocephalic.   Right Ear: External ear normal.   Left Ear: External ear normal.   Nose: Nose normal.   Mouth/Throat: Oropharynx is clear and moist.   Eyes: Pupils are equal, round, and reactive to light. Conjunctivae are normal.   Neck: Normal range of motion. Neck supple.   Cardiovascular: Normal rate, regular rhythm and normal heart sounds.   Pulmonary/Chest: Effort normal and breath sounds normal.   Abdominal: Soft. Bowel sounds are normal.   Musculoskeletal: Normal range of motion.        Right hip: She exhibits bony tenderness. She exhibits normal range of motion, normal strength, no tenderness, no swelling, no crepitus, no deformity and no laceration.   Neurological: She is alert and oriented to person, place, and time.   Skin: Skin is warm and dry. Capillary refill takes 2 to 3 seconds.   Psychiatric: She has a normal mood and affect. Her behavior is normal. Judgment and thought content normal.   Nursing note and vitals reviewed.      Assessment:       1. Frequent falls    2. Pain of right hip joint        Plan:            Kassi was seen today for fall.    Diagnoses and all orders for this visit:    Frequent falls  Pain of right hip joint  -     Ambulatory referral/consult to Physical/Occupational Therapy; Future  -     X-Ray Hip 2 View Right; Future  -     X-Ray Pelvis Routine AP; Future  Request for pain medication sent to PCP to approve  Report to ER immediately if symptoms worsen

## 2020-02-10 ENCOUNTER — HOSPITAL ENCOUNTER (OUTPATIENT)
Dept: RADIOLOGY | Facility: HOSPITAL | Age: 85
Discharge: HOME | End: 2020-02-10
Attending: NURSE PRACTITIONER
Payer: MEDICARE

## 2020-02-10 ENCOUNTER — TELEPHONE (OUTPATIENT)
Dept: FAMILY MEDICINE | Facility: CLINIC | Age: 85
End: 2020-02-10

## 2020-02-10 DIAGNOSIS — M25.551 PAIN OF RIGHT HIP JOINT: ICD-10-CM

## 2020-02-10 PROCEDURE — 73502 X-RAY EXAM HIP UNI 2-3 VIEWS: CPT | Mod: 26,HCNC,RT, | Performed by: RADIOLOGY

## 2020-02-10 PROCEDURE — 73502 X-RAY EXAM HIP UNI 2-3 VIEWS: CPT | Mod: TC,HCNC,PO,RT

## 2020-02-10 PROCEDURE — 73502 XR HIP 2 VIEW RIGHT: ICD-10-PCS | Mod: 26,HCNC,RT, | Performed by: RADIOLOGY

## 2020-02-10 NOTE — TELEPHONE ENCOUNTER
Pt's states she is still having pain in her hip from a fall on last week. Pt states that she has been taking the Ultram but with no relief. Please advise.

## 2020-02-12 ENCOUNTER — PATIENT OUTREACH (OUTPATIENT)
Dept: ADMINISTRATIVE | Facility: OTHER | Age: 85
End: 2020-02-12

## 2020-02-12 ENCOUNTER — OFFICE VISIT (OUTPATIENT)
Dept: FAMILY MEDICINE | Facility: CLINIC | Age: 85
End: 2020-02-12
Payer: MEDICARE

## 2020-02-12 VITALS
SYSTOLIC BLOOD PRESSURE: 151 MMHG | TEMPERATURE: 98 F | BODY MASS INDEX: 27.34 KG/M2 | DIASTOLIC BLOOD PRESSURE: 77 MMHG | HEART RATE: 59 BPM | HEIGHT: 60 IN

## 2020-02-12 DIAGNOSIS — G31.84 MILD COGNITIVE IMPAIRMENT WITH MEMORY LOSS: ICD-10-CM

## 2020-02-12 DIAGNOSIS — E11.22 DIABETES MELLITUS WITH STAGE 4 CHRONIC KIDNEY DISEASE GFR 15-29: ICD-10-CM

## 2020-02-12 DIAGNOSIS — N18.4 DIABETES MELLITUS WITH STAGE 4 CHRONIC KIDNEY DISEASE GFR 15-29: ICD-10-CM

## 2020-02-12 DIAGNOSIS — F33.1 MAJOR DEPRESSIVE DISORDER, RECURRENT EPISODE, MODERATE: ICD-10-CM

## 2020-02-12 DIAGNOSIS — M25.551 PAIN OF RIGHT HIP JOINT: ICD-10-CM

## 2020-02-12 DIAGNOSIS — R29.6 MULTIPLE FALLS: Primary | ICD-10-CM

## 2020-02-12 DIAGNOSIS — N25.81 SECONDARY HYPERPARATHYROIDISM OF RENAL ORIGIN: ICD-10-CM

## 2020-02-12 DIAGNOSIS — Z96.641 HISTORY OF RIGHT HIP REPLACEMENT: ICD-10-CM

## 2020-02-12 DIAGNOSIS — I48.91 ATRIAL FIBRILLATION, UNSPECIFIED TYPE: Chronic | ICD-10-CM

## 2020-02-12 PROCEDURE — 1125F PR PAIN SEVERITY QUANTIFIED, PAIN PRESENT: ICD-10-PCS | Mod: HCNC,S$GLB,, | Performed by: FAMILY MEDICINE

## 2020-02-12 PROCEDURE — 99214 OFFICE O/P EST MOD 30 MIN: CPT | Mod: HCNC,S$GLB,, | Performed by: FAMILY MEDICINE

## 2020-02-12 PROCEDURE — 1159F MED LIST DOCD IN RCRD: CPT | Mod: HCNC,S$GLB,, | Performed by: FAMILY MEDICINE

## 2020-02-12 PROCEDURE — 1101F PT FALLS ASSESS-DOCD LE1/YR: CPT | Mod: HCNC,CPTII,S$GLB, | Performed by: FAMILY MEDICINE

## 2020-02-12 PROCEDURE — 1125F AMNT PAIN NOTED PAIN PRSNT: CPT | Mod: HCNC,S$GLB,, | Performed by: FAMILY MEDICINE

## 2020-02-12 PROCEDURE — 1159F PR MEDICATION LIST DOCUMENTED IN MEDICAL RECORD: ICD-10-PCS | Mod: HCNC,S$GLB,, | Performed by: FAMILY MEDICINE

## 2020-02-12 PROCEDURE — 99499 UNLISTED E&M SERVICE: CPT | Mod: HCNC,S$GLB,, | Performed by: FAMILY MEDICINE

## 2020-02-12 PROCEDURE — 1101F PR PT FALLS ASSESS DOC 0-1 FALLS W/OUT INJ PAST YR: ICD-10-PCS | Mod: HCNC,CPTII,S$GLB, | Performed by: FAMILY MEDICINE

## 2020-02-12 PROCEDURE — 99999 PR PBB SHADOW E&M-EST. PATIENT-LVL V: ICD-10-PCS | Mod: PBBFAC,HCNC,, | Performed by: FAMILY MEDICINE

## 2020-02-12 PROCEDURE — 3051F HG A1C>EQUAL 7.0%<8.0%: CPT | Mod: HCNC,CPTII,S$GLB, | Performed by: FAMILY MEDICINE

## 2020-02-12 PROCEDURE — 99999 PR PBB SHADOW E&M-EST. PATIENT-LVL V: CPT | Mod: PBBFAC,HCNC,, | Performed by: FAMILY MEDICINE

## 2020-02-12 PROCEDURE — 99214 PR OFFICE/OUTPT VISIT, EST, LEVL IV, 30-39 MIN: ICD-10-PCS | Mod: HCNC,S$GLB,, | Performed by: FAMILY MEDICINE

## 2020-02-12 PROCEDURE — 99499 RISK ADDL DX/OHS AUDIT: ICD-10-PCS | Mod: HCNC,S$GLB,, | Performed by: FAMILY MEDICINE

## 2020-02-12 PROCEDURE — 3051F PR MOST RECENT HEMOGLOBIN A1C LEVEL 7.0 - < 8.0%: ICD-10-PCS | Mod: HCNC,CPTII,S$GLB, | Performed by: FAMILY MEDICINE

## 2020-02-12 RX ORDER — DEXTROMETHORPHAN HYDROBROMIDE, GUAIFENESIN 5; 100 MG/5ML; MG/5ML
650 LIQUID ORAL EVERY 8 HOURS
Refills: 0 | COMMUNITY
Start: 2020-02-12 | End: 2020-07-17

## 2020-02-12 NOTE — PROGRESS NOTES
Presents for follow-up.  She has had several recent falls including a fall yesterday without injury.  She did have a more significant fall about 10 days ago.  She was seen by the nurse practitioner couple of days after that.  She had a negative x-ray of her hip.  She does occasionally get some hip discomfort, but this does not seem to be persistent.  She was given tramadol.  She has difficulty with memory and has not seen Neurology in a while.  She is compliant with Aricept.  They were instructed in physical therapy.  She is currently living in an assisted living facility and wants to do this through home health.  Sees Nephrology regarding chronic kidney disease appointment up-to-date.  Recent elevated triglycerides.  Blood pressure is mildly elevated today.    Diabetes Management Status    Statin: Taking  ACE/ARB: Taking    Screening or Prevention Patient's value Goal Complete/Controlled?   HgA1C Testing and Control   Lab Results   Component Value Date    HGBA1C 7.3 (H) 01/07/2020      Annually/Less than 8% Yes   Lipid profile : 01/07/2020 Annually Yes   LDL control Lab Results   Component Value Date    LDLCALC Invalid, Trig>400.0 01/07/2020    Annually/Less than 100 mg/dl  Yes   Nephropathy screening No results found for: LABMICR  Lab Results   Component Value Date    PROTEINUA Trace (A) 10/09/2019    Annually Yes   Blood pressure BP Readings from Last 1 Encounters:   02/12/20 (!) 151/77    Less than 140/90 No   Dilated retinal exam : 02/06/2019 Annually Yes   Foot exam   : 05/07/2019 Annually Yes         Kassi was seen today for leg pain.    Diagnoses and all orders for this visit:    Multiple falls  -     Ambulatory referral/consult to Orthopedics; Future  -     Ambulatory referral/consult to Home Health; Future  -     Ambulatory referral/consult to Neurology; Future    Pain of right hip joint  -     Ambulatory referral/consult to Orthopedics; Future  -     Ambulatory referral/consult to Home Health;  Future    History of right hip replacement  -     Ambulatory referral/consult to Orthopedics; Future  -     Ambulatory referral/consult to Home Health; Future    Mild cognitive impairment with memory loss  -     Ambulatory referral/consult to Home Health; Future  -     Ambulatory referral/consult to Neurology; Future    Atrial fibrillation, unspecified type  -     Ambulatory referral/consult to Home Health; Future    Major depressive disorder, recurrent episode, moderate  -     Ambulatory referral/consult to Home Health; Future    Diabetes mellitus with stage 4 chronic kidney disease GFR 15-29  -     Ambulatory referral/consult to Home Health; Future    Secondary hyperparathyroidism of renal origin  -     Ambulatory referral/consult to Home Health; Future    Other orders  -     acetaminophen (TYLENOL) 650 MG TbSR; Take 1 tablet (650 mg total) by mouth every 8 (eight) hours.     Discontinue tramadol.  Use her walker regularly.  Continue current medication.  Follow-up laboratory scheduled.              Past Medical History:  Past Medical History:   Diagnosis Date    *Atrial fibrillation 4/19/2012    *Atrial flutter 4/19/2012    Allergy     Ankle fracture 4/19/2012    Anticoagulant long-term use     Anxiety     Arthritis     Bacterial pneumonia, unspecified 12/7/2012    Breast cancer     LEFT    Cataract     CKD (chronic kidney disease), stage III 5/11/2012    Followed by Dr. 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    LBBB (left bundle branch block) 10/19/2012    Mild cognitive impairment with memory loss  6/15/2017    Osteopenia 4/19/2012    Osteoporosis     Otitis media     Pacemaker 11/2012    yo/chantell    Secondary hyperparathyroidism of renal origin     Simple renal cyst     Sinus node dysfunction     Urinary incontinence 4/19/2012     Past Surgical History:   Procedure Laterality Date    APPENDECTOMY      BREAST LUMPECTOMY Right 10/21/2015    CARDIAC PACEMAKER PLACEMENT  12/3/12    Sinus node dysfunction    COLONOSCOPY      excision of squamous cell carcinoma Right 2017    EYE SURGERY Bilateral     PHACO/IOL    FRACTURE SURGERY Right     ankle    HYSTERECTOMY      total 1970's    MASTECTOMY Right 11/2015    OOPHORECTOMY      TONSILLECTOMY      TOTAL HIP ARTHROPLASTY Right 2007     Review of patient's allergies indicates:   Allergen Reactions    Amlodipine Edema     Pt stated this medication made her legs swell    Alendronate sodium Other (See Comments)     Reaction: Esophageal bleeding    Fosamax [alendronate] Other (See Comments)     Reaction: Esophageal bleeding  diarrhea    Sorbitol      Diarrhea     Augmentin [amoxicillin-pot clavulanate] Hives and Rash     Current Outpatient Medications on File Prior to Visit   Medication Sig Dispense Refill    ACCU-CHEK SHELDON CONTROL SOLN Soln Use as directed 3 each 3    BD ALCOHOL SWABS PadM USE AS NEEDED 1 each 3    benazepril (LOTENSIN) 20 MG tablet TAKE 1 TABLET EVERY DAY 90 tablet 3    blood sugar diagnostic (ACCU-CHEK SHELDON PLUS TEST STRP) Strp Test glucose once daily 100 strip 3    calcitRIOL (ROCALTROL) 0.25 MCG Cap Take 1 capsule (0.25 mcg total) by mouth once daily. 90 capsule 1    cyanocobalamin, vitamin B-12, 1,000 mcg TbSR One tab po daily 1 each 0    donepezil (ARICEPT) 5 MG tablet TAKE 1 TABLET EVERY EVENING 90 tablet 3    ELIQUIS 2.5 mg Tab TAKE 1 TABLET TWICE DAILY 180 tablet 3    glipiZIDE (GLUCOTROL) 2.5 MG TR24 TAKE 1 TABLET EVERY DAY WITH BREAKFAST 30 tablet 0    hydroCHLOROthiazide (HYDRODIURIL) 25 MG tablet TAKE 1  TABLET EVERY DAY 90 tablet 3    lancets (ACCU-CHEK SOFTCLIX LANCETS) Misc Test glucose once daily 100 each 3    levothyroxine (SYNTHROID) 25 MCG tablet TAKE 1 TABLET (25 MCG TOTAL) BY MOUTH ONCE DAILY. 90 tablet 3    metoprolol tartrate (LOPRESSOR) 50 MG tablet TAKE 1/2 TABLET TWICE DAILY (LAST REFILL UNTIL CLINIC VISIT MADE) 90 tablet 3    mirabegron (MYRBETRIQ) 50 mg Tb24 TAKE 1 TABLET ONE TIME DAILY 90 tablet 3    mirtazapine (REMERON) 45 MG tablet Take 45 mg by mouth every evening.      pravastatin (PRAVACHOL) 40 MG tablet TAKE 1 TABLET EVERY DAY 90 tablet 2    pyridoxine HCl, vitamin B6, (VITAMIN B-6 ORAL) Take by mouth.      venlafaxine (EFFEXOR-XR) 150 MG Cp24 TAKE 1 CAPSULE ONE TIME DAILY 90 capsule 1    verapamil (CALAN-SR) 120 MG CR tablet TAKE 1 TABLET EVERY NIGHT 90 tablet 3    vitamin B comp and vit C no.6 (VITAMIN B COMP WITH VIT C NO.6 ORAL) Take by mouth once daily.      [DISCONTINUED] ERGOCALCIFEROL, VITAMIN D2, (VITAMIN D ORAL) Take by mouth once daily.      [DISCONTINUED] oxybutynin (DITROPAN) 5 MG Tab Take 5 mg by mouth once daily.      [DISCONTINUED] traMADol (ULTRAM) 50 mg tablet Take 1 tablet (50 mg total) by mouth every 8 (eight) hours as needed for Pain. 15 tablet 0     No current facility-administered medications on file prior to visit.      Social History     Socioeconomic History    Marital status:      Spouse name: Not on file    Number of children: Not on file    Years of education: Not on file    Highest education level: Not on file   Occupational History    Not on file   Social Needs    Financial resource strain: Not on file    Food insecurity:     Worry: Not on file     Inability: Not on file    Transportation needs:     Medical: Not on file     Non-medical: Not on file   Tobacco Use    Smoking status: Never Smoker    Smokeless tobacco: Never Used   Substance and Sexual Activity    Alcohol use: No    Drug use: No    Sexual activity: Not Currently    Lifestyle    Physical activity:     Days per week: Not on file     Minutes per session: Not on file    Stress: Not on file   Relationships    Social connections:     Talks on phone: Not on file     Gets together: Not on file     Attends Sabianist service: Not on file     Active member of club or organization: Not on file     Attends meetings of clubs or organizations: Not on file     Relationship status: Not on file   Other Topics Concern    Not on file   Social History Narrative    Not on file     Family History   Problem Relation Age of Onset    Hypertension Mother     Heart disease Father     Stroke Father         cerebral hemorrhage    Coronary artery disease Brother     Heart disease Brother     Coronary artery disease Brother     COPD Brother     Heart disease Brother     Mitral valve prolapse Daughter     Peripheral vascular disease Son            ROS:  GENERAL: No fever, chills.   CARDIOVASCULAR: Denies chest pain.  ABDOMEN: Appetite fine. Denies diarrhea, abdominal pain, hematemesis or blood in stool.  URINARY: No flank pain, dysuria or hematuria.    Vitals:    02/12/20 1545   BP: (!) 151/77   Pulse: (!) 59   Temp: 98 °F (36.7 °C)   Height: 5' (1.524 m)     Wt Readings from Last 3 Encounters:   02/07/20 63.5 kg (140 lb)   01/14/20 70.8 kg (156 lb 1.4 oz)   01/07/20 70.8 kg (156 lb)       OBJECTIVE:   APPEARANCE: Well nourished, well developed, in no acute distress.    HEAD: Normocephalic.  Atraumatic.  No sinus tenderness.  EYES:   Right eye: Pupil reactive.  Conjunctiva clear.    Left eye: Pupil reactive.  Conjunctiva clear.  EOMI.    EARS: TM's intact. Light reflex normal. No retraction or perforation.    NOSE:  clear.  MOUTH & THROAT:  No pharyngeal erythema or exudate. No lesions.  NECK: Supple. No bruits.  No JVD.  No cervical lymphadenopathy.  No thyromegaly.    CHEST: Breath sounds clear bilaterally.  Normal respiratory effort  CARDIOVASCULAR: Normal rate.  Regular rhythm.  No  murmurs.  No rub.  No gallops.  ABDOMEN: Bowel sounds normal.  Soft.  No tenderness.  No organomegaly.  PERIPHERAL VASCULAR: No cyanosis.  No clubbing.  No edema.  NEUROLOGIC:  She is able to stand up and ambulate with her walker slowly.    There is no tenderness over the hip.  MENTAL STATUS: Alert.

## 2020-02-13 ENCOUNTER — OFFICE VISIT (OUTPATIENT)
Dept: ORTHOPEDICS | Facility: CLINIC | Age: 85
End: 2020-02-13
Payer: MEDICARE

## 2020-02-13 VITALS
WEIGHT: 140 LBS | BODY MASS INDEX: 27.48 KG/M2 | DIASTOLIC BLOOD PRESSURE: 83 MMHG | SYSTOLIC BLOOD PRESSURE: 142 MMHG | HEART RATE: 60 BPM | HEIGHT: 60 IN

## 2020-02-13 DIAGNOSIS — M70.61 GREATER TROCHANTERIC BURSITIS OF RIGHT HIP: Primary | ICD-10-CM

## 2020-02-13 PROCEDURE — 99204 PR OFFICE/OUTPT VISIT, NEW, LEVL IV, 45-59 MIN: ICD-10-PCS | Mod: HCNC,25,S$GLB, | Performed by: ORTHOPAEDIC SURGERY

## 2020-02-13 PROCEDURE — 1159F MED LIST DOCD IN RCRD: CPT | Mod: HCNC,S$GLB,, | Performed by: ORTHOPAEDIC SURGERY

## 2020-02-13 PROCEDURE — 99999 PR PBB SHADOW E&M-EST. PATIENT-LVL III: ICD-10-PCS | Mod: PBBFAC,HCNC,, | Performed by: ORTHOPAEDIC SURGERY

## 2020-02-13 PROCEDURE — 1159F PR MEDICATION LIST DOCUMENTED IN MEDICAL RECORD: ICD-10-PCS | Mod: HCNC,S$GLB,, | Performed by: ORTHOPAEDIC SURGERY

## 2020-02-13 PROCEDURE — 1125F AMNT PAIN NOTED PAIN PRSNT: CPT | Mod: HCNC,S$GLB,, | Performed by: ORTHOPAEDIC SURGERY

## 2020-02-13 PROCEDURE — 99999 PR PBB SHADOW E&M-EST. PATIENT-LVL III: CPT | Mod: PBBFAC,HCNC,, | Performed by: ORTHOPAEDIC SURGERY

## 2020-02-13 PROCEDURE — 1125F PR PAIN SEVERITY QUANTIFIED, PAIN PRESENT: ICD-10-PCS | Mod: HCNC,S$GLB,, | Performed by: ORTHOPAEDIC SURGERY

## 2020-02-13 PROCEDURE — 1101F PT FALLS ASSESS-DOCD LE1/YR: CPT | Mod: HCNC,CPTII,S$GLB, | Performed by: ORTHOPAEDIC SURGERY

## 2020-02-13 PROCEDURE — 1101F PR PT FALLS ASSESS DOC 0-1 FALLS W/OUT INJ PAST YR: ICD-10-PCS | Mod: HCNC,CPTII,S$GLB, | Performed by: ORTHOPAEDIC SURGERY

## 2020-02-13 PROCEDURE — 20610 DRAIN/INJ JOINT/BURSA W/O US: CPT | Mod: HCNC,RT,S$GLB, | Performed by: ORTHOPAEDIC SURGERY

## 2020-02-13 PROCEDURE — 20610 LARGE JOINT ASPIRATION/INJECTION: R GREATER TROCHANTERIC BURSA: ICD-10-PCS | Mod: HCNC,RT,S$GLB, | Performed by: ORTHOPAEDIC SURGERY

## 2020-02-13 PROCEDURE — 99204 OFFICE O/P NEW MOD 45 MIN: CPT | Mod: HCNC,25,S$GLB, | Performed by: ORTHOPAEDIC SURGERY

## 2020-02-13 RX ADMIN — TRIAMCINOLONE ACETONIDE 40 MG: 40 INJECTION, SUSPENSION INTRA-ARTICULAR; INTRAMUSCULAR at 08:02

## 2020-02-13 NOTE — PROGRESS NOTES
Chief Complaint   Patient presents with    Right Hip - Pain      HPI:   This is a 87 y.o. who presents to clinic today for right hip pain for the past 1 weeks after fall. Complains of pain while sleeping on side and when descending stairs. Pain is aching. No numbness or tingling. No associated signs or symptoms.      Past Medical History:   Diagnosis Date    *Atrial fibrillation 4/19/2012    *Atrial flutter 4/19/2012    Allergy     Ankle fracture 4/19/2012    Anticoagulant long-term use     Anxiety     Arthritis     Bacterial pneumonia, unspecified 12/7/2012    Breast cancer     LEFT    Cataract     CKD (chronic kidney disease), stage III 5/11/2012    Followed by Dr. 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    LBBB (left bundle branch block) 10/19/2012    Mild cognitive impairment with memory loss 6/15/2017    Osteopenia 4/19/2012    Osteoporosis     Otitis media     Pacemaker 11/2012    yo/chantell    Secondary hyperparathyroidism of renal origin     Simple renal cyst     Sinus node dysfunction     Urinary incontinence 4/19/2012     Past Surgical History:   Procedure Laterality Date    APPENDECTOMY      BREAST LUMPECTOMY Right 10/21/2015    CARDIAC PACEMAKER PLACEMENT  12/3/12    Sinus node dysfunction    COLONOSCOPY      excision of squamous cell carcinoma Right 2017    EYE SURGERY Bilateral     PHACO/IOL    FRACTURE SURGERY Right     ankle    HYSTERECTOMY      total 1970's    MASTECTOMY Right 11/2015    OOPHORECTOMY      TONSILLECTOMY      TOTAL HIP ARTHROPLASTY Right 2007     Current  Outpatient Medications on File Prior to Visit   Medication Sig Dispense Refill    ACCU-CHEK SHELDON CONTROL SOLN Soln Use as directed 3 each 3    acetaminophen (TYLENOL) 650 MG TbSR Take 1 tablet (650 mg total) by mouth every 8 (eight) hours.  0    BD ALCOHOL SWABS PadM USE AS NEEDED 1 each 3    benazepril (LOTENSIN) 20 MG tablet TAKE 1 TABLET EVERY DAY 90 tablet 3    blood sugar diagnostic (ACCU-CHEK SHELDON PLUS TEST STRP) Strp Test glucose once daily 100 strip 3    calcitRIOL (ROCALTROL) 0.25 MCG Cap Take 1 capsule (0.25 mcg total) by mouth once daily. 90 capsule 1    cyanocobalamin, vitamin B-12, 1,000 mcg TbSR One tab po daily 1 each 0    donepezil (ARICEPT) 5 MG tablet TAKE 1 TABLET EVERY EVENING 90 tablet 3    ELIQUIS 2.5 mg Tab TAKE 1 TABLET TWICE DAILY 180 tablet 3    glipiZIDE (GLUCOTROL) 2.5 MG TR24 TAKE 1 TABLET EVERY DAY WITH BREAKFAST 30 tablet 0    hydroCHLOROthiazide (HYDRODIURIL) 25 MG tablet TAKE 1 TABLET EVERY DAY 90 tablet 3    lancets (ACCU-CHEK SOFTCLIX LANCETS) Misc Test glucose once daily 100 each 3    levothyroxine (SYNTHROID) 25 MCG tablet TAKE 1 TABLET (25 MCG TOTAL) BY MOUTH ONCE DAILY. 90 tablet 3    metoprolol tartrate (LOPRESSOR) 50 MG tablet TAKE 1/2 TABLET TWICE DAILY (LAST REFILL UNTIL CLINIC VISIT MADE) 90 tablet 3    mirabegron (MYRBETRIQ) 50 mg Tb24 TAKE 1 TABLET ONE TIME DAILY 90 tablet 3    mirtazapine (REMERON) 45 MG tablet Take 45 mg by mouth every evening.      pravastatin (PRAVACHOL) 40 MG tablet TAKE 1 TABLET EVERY DAY 90 tablet 2    pyridoxine HCl, vitamin B6, (VITAMIN B-6 ORAL) Take by mouth.      venlafaxine (EFFEXOR-XR) 150 MG Cp24 TAKE 1 CAPSULE ONE TIME DAILY 90 capsule 1    verapamil (CALAN-SR) 120 MG CR tablet TAKE 1 TABLET EVERY NIGHT 90 tablet 3    vitamin B comp and vit C no.6 (VITAMIN B COMP WITH VIT C NO.6 ORAL) Take by mouth once daily.       No current facility-administered medications on file prior to visit.      Review of patient's  allergies indicates:   Allergen Reactions    Amlodipine Edema     Pt stated this medication made her legs swell    Alendronate sodium Other (See Comments)     Reaction: Esophageal bleeding    Fosamax [alendronate] Other (See Comments)     Reaction: Esophageal bleeding  diarrhea    Sorbitol      Diarrhea     Augmentin [amoxicillin-pot clavulanate] Hives and Rash     Family History   Problem Relation Age of Onset    Hypertension Mother     Heart disease Father     Stroke Father         cerebral hemorrhage    Coronary artery disease Brother     Heart disease Brother     Coronary artery disease Brother     COPD Brother     Heart disease Brother     Mitral valve prolapse Daughter     Peripheral vascular disease Son      Social History     Socioeconomic History    Marital status:      Spouse name: Not on file    Number of children: Not on file    Years of education: Not on file    Highest education level: Not on file   Occupational History    Not on file   Social Needs    Financial resource strain: Not on file    Food insecurity:     Worry: Not on file     Inability: Not on file    Transportation needs:     Medical: Not on file     Non-medical: Not on file   Tobacco Use    Smoking status: Never Smoker    Smokeless tobacco: Never Used   Substance and Sexual Activity    Alcohol use: No    Drug use: No    Sexual activity: Not Currently   Lifestyle    Physical activity:     Days per week: Not on file     Minutes per session: Not on file    Stress: Not on file   Relationships    Social connections:     Talks on phone: Not on file     Gets together: Not on file     Attends [employer] service: Not on file     Active member of club or organization: Not on file     Attends meetings of clubs or organizations: Not on file     Relationship status: Not on file   Other Topics Concern    Not on file   Social History Narrative    Not on file       Review of Systems:  Constitutional:  Denies fever or  chills   Eyes:  Denies change in visual acuity   HENT:  Denies nasal congestion or sore throat   Respiratory:  Denies cough or shortness of breath   Cardiovascular:  Denies chest pain or edema   GI:  Denies abdominal pain, nausea, vomiting, bloody stools or diarrhea   :  Denies dysuria   Integument:  Denies rash   Neurologic:  Denies headache, focal weakness or sensory changes   Endocrine:  Denies polyuria or polydipsia   Lymphatic:  Denies swollen glands   Psychiatric:  Denies depression or anxiety     Physical Exam:   Constitutional:  Well developed, well nourished, no acute distress, non-toxic appearance   Integument:  Well hydrated, no rash   Lymphatic:  No lymphadenopathy noted   Neurologic:  Alert & oriented x 3  Psychiatric:  Speech and behavior appropriate     Bilateral Hip Exam    left Hip Exam   leftt hip exam performed same as contralateral hip and is normal.     right Hip Exam     Tenderness   The patient is experiencing tenderness in the greater trochanter.    Range of Motion   The patient has normal hip ROM.    Muscle Strength   Abduction: 4/5   Other   Erythema: absent  Sensation: normal  Pulse: present    X-rays were personally reviewed by me and findings discussed with the patient.  2 views of the left hip show no fractures or dislocations    Greater trochanteric bursitis of right hip  -     Large Joint Aspiration/Injection: R greater trochanteric bursa           Using an aseptic technique, I injected 5 cc of lidocaine 1% without and 1 cc of kenalog 40mg into the right Hip. The patient tolerated this well. I will have them return to clinic in 6 weeks.

## 2020-02-13 NOTE — PROCEDURES
Large Joint Aspiration/Injection: R greater trochanteric bursa  Performed by: Augie Garcia MD  Authorized by: Augie Garcia MD  Date/Time: 2/13/2020 8:45 AM    Consent Done?:  Yes (Verbal)  Indications:  pain  Timeout: Immediately prior to procedure a time out was called to verify the correct patient, procedure, equipment, support staff and site/side marked as required.  Prep:Patient was prepped and draped in the usual sterile fashion.        Anesthesia  Local anesthesia used  Anesthetic: lidocaine 1% without epinephrine  Anesthetic total: 5mL    Details:   Needle size: 21 G   Approach: lateral  Location:  Hip  Site:  R greater trochanteric bursa    Medications: 40 mg triamcinolone acetonide 40 mg/mL  Patient tolerance:  patient tolerated the procedure well with no immediate complications

## 2020-02-13 NOTE — LETTER
February 19, 2020      Mor Cook MD  [address]  [place] [address]           [facility]  [address] [place] [address]  Phone: [phone]          Patient: Kassi Skelton   MR Number: [id number]   YOB: 1932   Date of Visit: 2/13/2020       Dear Dr. Mor Cook:    Thank you for referring Kassi Skelton to me for evaluation. Attached you will find relevant portions of my assessment and plan of care.    If you have questions, please do not hesitate to call me. I look forward to following Kassi Skelton along with you.    Sincerely,    Augie Garcia MD    Enclosure  CC:  No Recipients    If you would like to receive this communication electronically, please contact [email or web address] or [phone] to request more information on [employer] Link access.    For providers and/or their staff who would like to refer a patient to [facility], please contact us through our one-stop-shop provider referral line, [facility] , at [phone].    If you feel you have received this communication in error or would no longer like to receive these types of communications, please e-mail [email or web address]

## 2020-02-15 PROCEDURE — G0180 PR HOME HEALTH MD CERTIFICATION: ICD-10-PCS | Mod: ,,, | Performed by: FAMILY MEDICINE

## 2020-02-15 PROCEDURE — G0180 MD CERTIFICATION HHA PATIENT: HCPCS | Mod: ,,, | Performed by: FAMILY MEDICINE

## 2020-02-19 RX ORDER — TRIAMCINOLONE ACETONIDE 40 MG/ML
40 INJECTION, SUSPENSION INTRA-ARTICULAR; INTRAMUSCULAR
Status: DISCONTINUED | OUTPATIENT
Start: 2020-02-13 | End: 2020-02-19 | Stop reason: HOSPADM

## 2020-02-26 ENCOUNTER — EXTERNAL HOME HEALTH (OUTPATIENT)
Dept: HOME HEALTH SERVICES | Facility: HOSPITAL | Age: 85
End: 2020-02-26
Payer: MEDICARE

## 2020-03-25 ENCOUNTER — TELEPHONE (OUTPATIENT)
Dept: ORTHOPEDICS | Facility: CLINIC | Age: 85
End: 2020-03-25

## 2020-03-25 NOTE — TELEPHONE ENCOUNTER
Called patient's daughter, Tova, regarding upcoming appointment with Dr. Garcia. Explained due to COVID-19, we are rescheduling all appointments unless they feel they are having an urgent or emergent issue. Left voicemail to call us to get rescheduled.

## 2020-03-30 ENCOUNTER — TELEPHONE (OUTPATIENT)
Dept: ORTHOPEDICS | Facility: CLINIC | Age: 85
End: 2020-03-30

## 2020-03-30 NOTE — TELEPHONE ENCOUNTER
Left voicemail for Bronwyn that Dr. Garcia did not order Home Health, Dr. Cook did. She will have to contact Dr. Cook's office for new orders. Thanks, Leilani

## 2020-03-30 NOTE — TELEPHONE ENCOUNTER
----- Message from Bam Tom sent at 3/30/2020  8:47 AM CDT -----  Contact: Bronwyn ([facility])  Type: Needs Medical Advice    Who Called:  Bronwyn Renee Call Back Number: [phone]  Additional Information: Caller states patient's daughter is requesting to extend home health. Please call to advise. Thanks!

## 2020-04-03 RX ORDER — CALCITRIOL 0.25 UG/1
CAPSULE ORAL
Qty: 90 CAPSULE | Refills: 1 | Status: SHIPPED | OUTPATIENT
Start: 2020-04-03 | End: 2020-08-31

## 2020-04-16 ENCOUNTER — DOCUMENT SCAN (OUTPATIENT)
Dept: HOME HEALTH SERVICES | Facility: HOSPITAL | Age: 85
End: 2020-04-16
Payer: MEDICARE

## 2020-04-21 ENCOUNTER — PATIENT MESSAGE (OUTPATIENT)
Dept: PODIATRY | Facility: CLINIC | Age: 85
End: 2020-04-21

## 2020-04-27 ENCOUNTER — PATIENT MESSAGE (OUTPATIENT)
Dept: PODIATRY | Facility: CLINIC | Age: 85
End: 2020-04-27

## 2020-04-27 RX ORDER — GLIPIZIDE 2.5 MG/1
TABLET, EXTENDED RELEASE ORAL
Qty: 90 TABLET | Refills: 0 | Status: SHIPPED | OUTPATIENT
Start: 2020-04-27 | End: 2020-06-26

## 2020-04-29 RX ORDER — MIRABEGRON 50 MG/1
TABLET, FILM COATED, EXTENDED RELEASE ORAL
Qty: 90 TABLET | Refills: 3 | Status: SHIPPED | OUTPATIENT
Start: 2020-04-29 | End: 2021-02-12

## 2020-05-15 RX ORDER — HYDROCHLOROTHIAZIDE 25 MG/1
TABLET ORAL
Qty: 90 TABLET | Refills: 3 | Status: SHIPPED | OUTPATIENT
Start: 2020-05-15 | End: 2021-03-12

## 2020-06-01 ENCOUNTER — TELEPHONE (OUTPATIENT)
Dept: FAMILY MEDICINE | Facility: CLINIC | Age: 85
End: 2020-06-01

## 2020-06-01 DIAGNOSIS — I95.1 ORTHOSTATIC HYPOTENSION: ICD-10-CM

## 2020-06-01 DIAGNOSIS — F33.1 MAJOR DEPRESSIVE DISORDER, RECURRENT EPISODE, MODERATE: ICD-10-CM

## 2020-06-01 DIAGNOSIS — Z95.0 PACEMAKER: ICD-10-CM

## 2020-06-01 DIAGNOSIS — N25.81 SECONDARY HYPERPARATHYROIDISM OF RENAL ORIGIN: ICD-10-CM

## 2020-06-01 DIAGNOSIS — E11.40 TYPE 2 DIABETES MELLITUS WITH DIABETIC NEUROPATHY, WITHOUT LONG-TERM CURRENT USE OF INSULIN: ICD-10-CM

## 2020-06-01 DIAGNOSIS — K08.109 EDENTULOUS: ICD-10-CM

## 2020-06-01 DIAGNOSIS — G31.84 MILD COGNITIVE IMPAIRMENT WITH MEMORY LOSS: Primary | ICD-10-CM

## 2020-06-01 DIAGNOSIS — I48.91 ATRIAL FIBRILLATION, UNSPECIFIED TYPE: Chronic | ICD-10-CM

## 2020-06-01 DIAGNOSIS — N18.4 DIABETES MELLITUS WITH STAGE 4 CHRONIC KIDNEY DISEASE GFR 15-29: ICD-10-CM

## 2020-06-01 DIAGNOSIS — E11.22 DIABETES MELLITUS WITH STAGE 4 CHRONIC KIDNEY DISEASE GFR 15-29: ICD-10-CM

## 2020-06-01 DIAGNOSIS — M70.61 GREATER TROCHANTERIC BURSITIS OF RIGHT HIP: ICD-10-CM

## 2020-06-01 NOTE — TELEPHONE ENCOUNTER
Per Tova, patient was receiving HH nurse visits and PT, while in [place]. [place] has been on lockdown since Covid issue so she moved patient to her home but was told the HH orders are no longer in effect, new orders needed for [facility], to be sent in.  Mignon informed message will be sent to Dr camp and he can send the orders via EPIC

## 2020-06-01 NOTE — TELEPHONE ENCOUNTER
----- Message from Francia Wallace sent at 6/1/2020 10:36 AM CDT -----  Contact: Madison-daughter  Madison requesting a call back regarding pt home health and physical therapy. Please call Madison back at [phone]

## 2020-06-03 ENCOUNTER — TELEPHONE (OUTPATIENT)
Dept: FAMILY MEDICINE | Facility: CLINIC | Age: 85
End: 2020-06-03

## 2020-06-03 PROCEDURE — G0180 PR HOME HEALTH MD CERTIFICATION: ICD-10-PCS | Mod: ,,, | Performed by: FAMILY MEDICINE

## 2020-06-03 PROCEDURE — G0180 MD CERTIFICATION HHA PATIENT: HCPCS | Mod: ,,, | Performed by: FAMILY MEDICINE

## 2020-06-03 NOTE — TELEPHONE ENCOUNTER
Per  nurse, patient admitted today.  Has .5 X .5 cm puncture type wound to left heel.  Patient has been soaking foot and nurse told them to stop.  He checked depth with q tip and patient c/o pain to area.  Nurse applied mepilex to area.  Also has skin abrasion on left shin, mepilex applied.  Should they consult Dr Valle, patient last seen 1/2020, please advise.

## 2020-06-03 NOTE — TELEPHONE ENCOUNTER
----- Message from Elisa Cameron sent at 6/3/2020  1:44 PM CDT -----  Contact: [facility] Nurse- Lex Almanza  Please call nurse concerning wound care orders for the patient, she has a wound on her LT Heel. Please call at  [phone] (Lex)

## 2020-06-12 ENCOUNTER — EXTERNAL HOME HEALTH (OUTPATIENT)
Dept: HOME HEALTH SERVICES | Facility: HOSPITAL | Age: 85
End: 2020-06-12
Payer: MEDICARE

## 2020-06-15 ENCOUNTER — TELEPHONE (OUTPATIENT)
Dept: FAMILY MEDICINE | Facility: CLINIC | Age: 85
End: 2020-06-15

## 2020-06-15 NOTE — TELEPHONE ENCOUNTER
----- Message from Karli Gramajo sent at 6/15/2020 10:41 AM CDT -----  Regarding: LOWER BACK PAIN  Contact: NADER PIERRE   PATIENT DAUGHTER STATES PATIENT HAVING BACK PAIN AND WOULD LIKE TO GET U/A FROM  NURSE. PLEASE CALL NADER @ [phone]. THANKS

## 2020-06-16 ENCOUNTER — LAB VISIT (OUTPATIENT)
Dept: LAB | Facility: HOSPITAL | Age: 85
End: 2020-06-16
Attending: FAMILY MEDICINE
Payer: MEDICARE

## 2020-06-16 ENCOUNTER — TELEPHONE (OUTPATIENT)
Dept: FAMILY MEDICINE | Facility: CLINIC | Age: 85
End: 2020-06-16

## 2020-06-16 PROCEDURE — U0003 INFECTIOUS AGENT DETECTION BY NUCLEIC ACID (DNA OR RNA); SEVERE ACUTE RESPIRATORY SYNDROME CORONAVIRUS 2 (SARS-COV-2) (CORONAVIRUS DISEASE [COVID-19]), AMPLIFIED PROBE TECHNIQUE, MAKING USE OF HIGH THROUGHPUT TECHNOLOGIES AS DESCRIBED BY CMS-2020-01-R: HCPCS | Mod: HCNC

## 2020-06-16 NOTE — TELEPHONE ENCOUNTER
----- Message from Francia Wallace sent at 6/16/2020  9:28 AM CDT -----  Contact: Juliann-daughter  Type:  Patient Returning Call    Who Called:Juliann  Who Left Message for Patient:  Does the patient know what this is regarding?:  Would the patient rather a call back or a response via [facility]? call  Best Call Back Number:[phone]  Additional Information:

## 2020-06-17 ENCOUNTER — LAB VISIT (OUTPATIENT)
Dept: LAB | Facility: HOSPITAL | Age: 85
End: 2020-06-17
Attending: FAMILY MEDICINE
Payer: MEDICARE

## 2020-06-17 DIAGNOSIS — R39.15 URINARY URGENCY: Primary | ICD-10-CM

## 2020-06-17 DIAGNOSIS — R39.15 URINARY URGENCY: ICD-10-CM

## 2020-06-17 LAB — SARS-COV-2 RNA RESP QL NAA+PROBE: NOT DETECTED

## 2020-06-17 PROCEDURE — 87077 CULTURE AEROBIC IDENTIFY: CPT | Mod: HCNC

## 2020-06-17 PROCEDURE — 87186 SC STD MICRODIL/AGAR DIL: CPT | Mod: HCNC

## 2020-06-17 PROCEDURE — 87088 URINE BACTERIA CULTURE: CPT | Mod: HCNC

## 2020-06-17 PROCEDURE — 87086 URINE CULTURE/COLONY COUNT: CPT | Mod: HCNC

## 2020-06-17 RX ORDER — CIPROFLOXACIN 250 MG/1
250 TABLET, FILM COATED ORAL DAILY
Qty: 3 TABLET | Refills: 0 | Status: SHIPPED | OUTPATIENT
Start: 2020-06-17 | End: 2020-08-18 | Stop reason: SDUPTHER

## 2020-06-17 NOTE — TELEPHONE ENCOUNTER
MD NAOMY Bright. Staff             Urine sample shows UTI.  Please get urine culture if urine still available otherwise we will base treatment based on her last urine culture results from the fall..  Start Cipro 250 mg once daily x3 days. My nurse will contact you to arrange.   Thanks,   Dr. Cook

## 2020-06-20 LAB — BACTERIA UR CULT: ABNORMAL

## 2020-06-29 ENCOUNTER — TELEPHONE (OUTPATIENT)
Dept: FAMILY MEDICINE | Facility: CLINIC | Age: 85
End: 2020-06-29

## 2020-06-29 NOTE — TELEPHONE ENCOUNTER
Per HH nurse, patient with increased anxiety/depression, since being on lock down in assisted living facility, please advise

## 2020-06-29 NOTE — TELEPHONE ENCOUNTER
----- Message from Martha Garrett sent at 6/29/2020  1:43 PM CDT -----  Regarding: Advise  Contact: Bronwyn( home health nurse)  Reason: pt have been having some increased anxiety and ask for a call to advise     Communication: [phone]

## 2020-06-29 NOTE — TELEPHONE ENCOUNTER
she already has 2 different antidepressants listed on her medication list.  I think she was seeing [facility] psychiatry at [place].  Recommend get in contact with them as they were dealing with this medication.

## 2020-07-07 ENCOUNTER — TELEPHONE (OUTPATIENT)
Dept: FAMILY MEDICINE | Facility: CLINIC | Age: 85
End: 2020-07-07

## 2020-07-07 DIAGNOSIS — G31.84 MILD COGNITIVE IMPAIRMENT WITH MEMORY LOSS: Primary | ICD-10-CM

## 2020-07-07 DIAGNOSIS — E11.59 HYPERTENSION ASSOCIATED WITH DIABETES: ICD-10-CM

## 2020-07-07 DIAGNOSIS — I48.91 ATRIAL FIBRILLATION, UNSPECIFIED TYPE: Chronic | ICD-10-CM

## 2020-07-07 DIAGNOSIS — I15.2 HYPERTENSION ASSOCIATED WITH DIABETES: ICD-10-CM

## 2020-07-07 DIAGNOSIS — E11.69 COMBINED HYPERLIPIDEMIA ASSOCIATED WITH TYPE 2 DIABETES MELLITUS: ICD-10-CM

## 2020-07-07 DIAGNOSIS — M19.91 PRIMARY OSTEOARTHRITIS, UNSPECIFIED SITE: ICD-10-CM

## 2020-07-07 DIAGNOSIS — E78.2 COMBINED HYPERLIPIDEMIA ASSOCIATED WITH TYPE 2 DIABETES MELLITUS: ICD-10-CM

## 2020-07-07 NOTE — TELEPHONE ENCOUNTER
Okay.  Subsequent home health order placed.  I am not sure if this is the same thing or not.  If not then need to put in another order to sign.

## 2020-07-07 NOTE — TELEPHONE ENCOUNTER
----- Message from Breonna Avila sent at 7/7/2020 10:59 AM CDT -----  Regarding: [facility]  Caller is calling in regards to order for Pt to be released to a different [facility] for home health. Please call back [phone].  Thanks

## 2020-07-07 NOTE — TELEPHONE ENCOUNTER
Patient now living with her son, in [place], they need HH orders, for PT, sent to [facility] , which is the [facility] for [place].

## 2020-07-08 PROCEDURE — G0180 PR HOME HEALTH MD CERTIFICATION: ICD-10-PCS | Mod: ,,, | Performed by: FAMILY MEDICINE

## 2020-07-08 PROCEDURE — G0180 MD CERTIFICATION HHA PATIENT: HCPCS | Mod: ,,, | Performed by: FAMILY MEDICINE

## 2020-07-09 ENCOUNTER — DOCUMENT SCAN (OUTPATIENT)
Dept: HOME HEALTH SERVICES | Facility: HOSPITAL | Age: 85
End: 2020-07-09
Payer: MEDICARE

## 2020-07-13 ENCOUNTER — DOCUMENT SCAN (OUTPATIENT)
Dept: HOME HEALTH SERVICES | Facility: HOSPITAL | Age: 85
End: 2020-07-13
Payer: MEDICARE

## 2020-07-17 ENCOUNTER — TELEPHONE (OUTPATIENT)
Dept: NEPHROLOGY | Facility: CLINIC | Age: 85
End: 2020-07-17

## 2020-07-17 RX ORDER — INSULIN PUMP SYRINGE, 3 ML
EACH MISCELLANEOUS
Qty: 1 EACH | Refills: 0 | Status: SHIPPED | OUTPATIENT
Start: 2020-07-17 | End: 2024-03-04

## 2020-07-17 RX ORDER — LANCETS
EACH MISCELLANEOUS
Qty: 100 EACH | Refills: 3 | Status: SHIPPED | OUTPATIENT
Start: 2020-07-17 | End: 2021-11-23

## 2020-07-17 NOTE — TELEPHONE ENCOUNTER
----- Message from Errol Kang MD sent at 7/16/2020  2:43 PM CDT -----  Fu prn  ----- Message -----  From: SYSTEM  Sent: 4/9/2020  12:18 AM CDT  To: [facility] Clinical Support

## 2020-07-20 ENCOUNTER — TELEPHONE (OUTPATIENT)
Dept: FAMILY MEDICINE | Facility: CLINIC | Age: 85
End: 2020-07-20

## 2020-07-20 NOTE — TELEPHONE ENCOUNTER
On hold for a while, twice, faxed note to [phone],  asking to send more specific information as to what is needed

## 2020-07-20 NOTE — TELEPHONE ENCOUNTER
----- Message from Karli Gramajo sent at 7/20/2020  2:47 PM CDT -----  Regarding: CALLING REGADING A SIGNATURE ON PAPERWORK SENT ON PATIENT  Contact: ANTHONY BARBER/ [place]  PLEASE CALL ANTHONY @ [phone] REF # [id number]. THANKS

## 2020-07-23 ENCOUNTER — EXTERNAL HOME HEALTH (OUTPATIENT)
Dept: HOME HEALTH SERVICES | Facility: HOSPITAL | Age: 85
End: 2020-07-23
Payer: MEDICARE

## 2020-07-27 ENCOUNTER — DOCUMENT SCAN (OUTPATIENT)
Dept: HOME HEALTH SERVICES | Facility: HOSPITAL | Age: 85
End: 2020-07-27
Payer: MEDICARE

## 2020-07-28 ENCOUNTER — TELEPHONE (OUTPATIENT)
Dept: FAMILY MEDICINE | Facility: CLINIC | Age: 85
End: 2020-07-28

## 2020-07-28 DIAGNOSIS — I48.91 ATRIAL FIBRILLATION, UNSPECIFIED TYPE: Chronic | ICD-10-CM

## 2020-07-28 DIAGNOSIS — E11.22 DIABETES MELLITUS WITH STAGE 4 CHRONIC KIDNEY DISEASE GFR 15-29: ICD-10-CM

## 2020-07-28 DIAGNOSIS — Z95.0 CARDIAC PACEMAKER IN SITU: Chronic | ICD-10-CM

## 2020-07-28 DIAGNOSIS — N18.4 DIABETES MELLITUS WITH STAGE 4 CHRONIC KIDNEY DISEASE GFR 15-29: ICD-10-CM

## 2020-07-28 DIAGNOSIS — E11.40 TYPE 2 DIABETES MELLITUS WITH DIABETIC NEUROPATHY, WITHOUT LONG-TERM CURRENT USE OF INSULIN: ICD-10-CM

## 2020-07-28 DIAGNOSIS — D05.12 DUCTAL CARCINOMA IN SITU (DCIS) OF LEFT BREAST: ICD-10-CM

## 2020-07-28 DIAGNOSIS — M19.91 PRIMARY OSTEOARTHRITIS, UNSPECIFIED SITE: ICD-10-CM

## 2020-07-28 DIAGNOSIS — G31.84 MILD COGNITIVE IMPAIRMENT WITH MEMORY LOSS: Primary | ICD-10-CM

## 2020-07-28 DIAGNOSIS — I12.9 BENIGN HYPERTENSIVE KIDNEY DISEASE WITH CHRONIC KIDNEY DISEASE STAGE I THROUGH STAGE IV, OR UNSPECIFIED: ICD-10-CM

## 2020-07-28 DIAGNOSIS — N25.81 SECONDARY HYPERPARATHYROIDISM OF RENAL ORIGIN: ICD-10-CM

## 2020-07-28 DIAGNOSIS — F33.1 MAJOR DEPRESSIVE DISORDER, RECURRENT EPISODE, MODERATE: ICD-10-CM

## 2020-07-28 NOTE — TELEPHONE ENCOUNTER
----- Message from Lazaro Hendrickson sent at 7/28/2020 10:39 AM CDT -----  Regarding: pt advice  Contact: Olivia hobbs/[facility]  called to consult with nurse about getting a pt new order for home health in [place]. Pt is needs to be released as soon as possible and need to have home health set up. Please return call to [facility]. Thanks tp

## 2020-07-28 NOTE — TELEPHONE ENCOUNTER
Patient temporarily moved to [place] but now back in [place], HH needs orders to resume HH with the [facility] office, please advise.

## 2020-08-10 ENCOUNTER — TELEPHONE (OUTPATIENT)
Dept: FAMILY MEDICINE | Facility: CLINIC | Age: 85
End: 2020-08-10

## 2020-08-10 ENCOUNTER — EXTERNAL HOME HEALTH (OUTPATIENT)
Dept: HOME HEALTH SERVICES | Facility: HOSPITAL | Age: 85
End: 2020-08-10

## 2020-08-10 NOTE — TELEPHONE ENCOUNTER
Per Bronwyn, patient had old pressure sore to left heel that opened up again.  They applied silver alginate and border dressing and will be going out twice a week.  Please advise if you have any further orders,

## 2020-08-10 NOTE — TELEPHONE ENCOUNTER
----- Message from Breonna Avila sent at 8/10/2020  1:46 PM CDT -----  Regarding: pressure sore that healed and opened back up  Type:  Needs Medical Advice    Who Called: [facility]  Symptoms (please be specific): pressure sore to left heel   How long has patient had these symptoms:  n/a  Pharmacy name and phone #:  n/a  Would the patient rather a call back or a response via [facility]? Call back   Best Call Back Number: [phone]  Additional Information: Please call back. Thanks

## 2020-08-10 NOTE — TELEPHONE ENCOUNTER
----- Message from Breonna Avila sent at 8/10/2020  2:31 PM CDT -----  Regarding: missed call  Type:  Patient Returning Call    Who Called:Bronwyn  Who Left Message for Patient:staff  Does the patient know what this is regarding?:n/a  Would the patient rather a call back or a response via [facility]? Call back.  Best Call Back Number: [phone]  Additional Information: Please call back. Thanks

## 2020-08-12 ENCOUNTER — PATIENT MESSAGE (OUTPATIENT)
Dept: NEPHROLOGY | Facility: CLINIC | Age: 85
End: 2020-08-12

## 2020-08-12 NOTE — TELEPHONE ENCOUNTER
Patient went to [facility] provider yesterday.  On aricept, but provider wanted to try EXELON patch 4.6mg instead.  ( it can go up to 9mg QD) and wanted to know if that's ok with Dr Kang.     She uses [facility]   [phone].  They come today.  2 days a week normally, no regular schedule. I spoke with Debra at the Home Health and advise will fax nephrology orders (CBC, RFP and PTH)  over to [phone] to be drawn at their convenience, probably next week.   Will message Dr Cook for orders as well.

## 2020-08-14 ENCOUNTER — TELEPHONE (OUTPATIENT)
Dept: FAMILY MEDICINE | Facility: CLINIC | Age: 85
End: 2020-08-14

## 2020-08-14 NOTE — TELEPHONE ENCOUNTER
Left message on voice mail to return call, per 8/10 call, if wound no better with HH treatment, may need to see podiatrist per Dr Cook

## 2020-08-14 NOTE — TELEPHONE ENCOUNTER
Jewels informed patient will need to see podiatrist, has seen Dr Valle this year, she will inform them to schedule appt

## 2020-08-14 NOTE — TELEPHONE ENCOUNTER
----- Message from Jess Valerio sent at 8/14/2020  1:46 PM CDT -----  Contact: Jewels  [facility]  Request a call concerning a sore on the pt left heel, no additional info given and can be reached at [phone]///[email or web address]

## 2020-08-17 ENCOUNTER — PATIENT OUTREACH (OUTPATIENT)
Dept: ADMINISTRATIVE | Facility: OTHER | Age: 85
End: 2020-08-17

## 2020-08-17 DIAGNOSIS — N18.4 DIABETES MELLITUS WITH STAGE 4 CHRONIC KIDNEY DISEASE GFR 15-29: Primary | ICD-10-CM

## 2020-08-17 DIAGNOSIS — E11.22 DIABETES MELLITUS WITH STAGE 4 CHRONIC KIDNEY DISEASE GFR 15-29: Primary | ICD-10-CM

## 2020-08-18 ENCOUNTER — OFFICE VISIT (OUTPATIENT)
Dept: FAMILY MEDICINE | Facility: CLINIC | Age: 85
End: 2020-08-18
Payer: MEDICARE

## 2020-08-18 ENCOUNTER — OFFICE VISIT (OUTPATIENT)
Dept: PODIATRY | Facility: CLINIC | Age: 85
End: 2020-08-18
Payer: MEDICARE

## 2020-08-18 ENCOUNTER — TELEPHONE (OUTPATIENT)
Dept: FAMILY MEDICINE | Facility: CLINIC | Age: 85
End: 2020-08-18

## 2020-08-18 VITALS
SYSTOLIC BLOOD PRESSURE: 142 MMHG | BODY MASS INDEX: 29.45 KG/M2 | WEIGHT: 150 LBS | HEIGHT: 60 IN | DIASTOLIC BLOOD PRESSURE: 76 MMHG

## 2020-08-18 VITALS
BODY MASS INDEX: 29.45 KG/M2 | SYSTOLIC BLOOD PRESSURE: 142 MMHG | WEIGHT: 150 LBS | HEIGHT: 60 IN | TEMPERATURE: 98 F | DIASTOLIC BLOOD PRESSURE: 73 MMHG | HEART RATE: 60 BPM

## 2020-08-18 DIAGNOSIS — L84 PRE-ULCERATIVE CALLUSES: ICD-10-CM

## 2020-08-18 DIAGNOSIS — L90.9 FAT PAD ATROPHY OF FOOT: ICD-10-CM

## 2020-08-18 DIAGNOSIS — B35.1 ONYCHOMYCOSIS DUE TO DERMATOPHYTE: ICD-10-CM

## 2020-08-18 DIAGNOSIS — R39.15 URINARY URGENCY: ICD-10-CM

## 2020-08-18 DIAGNOSIS — N39.0 URINARY TRACT INFECTION WITHOUT HEMATURIA, SITE UNSPECIFIED: Primary | ICD-10-CM

## 2020-08-18 DIAGNOSIS — E11.49 TYPE II DIABETES MELLITUS WITH NEUROLOGICAL MANIFESTATIONS: Primary | ICD-10-CM

## 2020-08-18 LAB
BACTERIA #/AREA URNS HPF: ABNORMAL /HPF
BILIRUB UR QL STRIP: NEGATIVE
CLARITY UR: ABNORMAL
COLOR UR: YELLOW
GLUCOSE UR QL STRIP: NEGATIVE
HGB UR QL STRIP: ABNORMAL
KETONES UR QL STRIP: NEGATIVE
LEUKOCYTE ESTERASE UR QL STRIP: ABNORMAL
MICROSCOPIC COMMENT: ABNORMAL
NITRITE UR QL STRIP: NEGATIVE
PH UR STRIP: 6 [PH] (ref 5–8)
PROT UR QL STRIP: ABNORMAL
RBC #/AREA URNS HPF: 4 /HPF (ref 0–4)
SP GR UR STRIP: 1.03 (ref 1–1.03)
SQUAMOUS #/AREA URNS HPF: 3 /HPF
URN SPEC COLLECT METH UR: ABNORMAL
WBC #/AREA URNS HPF: >100 /HPF (ref 0–5)

## 2020-08-18 PROCEDURE — 1159F PR MEDICATION LIST DOCUMENTED IN MEDICAL RECORD: ICD-10-PCS | Mod: HCNC,S$GLB,, | Performed by: FAMILY MEDICINE

## 2020-08-18 PROCEDURE — 1159F MED LIST DOCD IN RCRD: CPT | Mod: HCNC,S$GLB,, | Performed by: PODIATRIST

## 2020-08-18 PROCEDURE — 1126F AMNT PAIN NOTED NONE PRSNT: CPT | Mod: HCNC,S$GLB,, | Performed by: PODIATRIST

## 2020-08-18 PROCEDURE — 11721 PR DEBRIDEMENT OF NAILS, 6 OR MORE: ICD-10-PCS | Mod: Q9,59,HCNC,S$GLB | Performed by: PODIATRIST

## 2020-08-18 PROCEDURE — 99213 OFFICE O/P EST LOW 20 MIN: CPT | Mod: HCNC,S$GLB,, | Performed by: FAMILY MEDICINE

## 2020-08-18 PROCEDURE — 99213 PR OFFICE/OUTPT VISIT, EST, LEVL III, 20-29 MIN: ICD-10-PCS | Mod: HCNC,S$GLB,, | Performed by: FAMILY MEDICINE

## 2020-08-18 PROCEDURE — 99214 PR OFFICE/OUTPT VISIT, EST, LEVL IV, 30-39 MIN: ICD-10-PCS | Mod: 25,HCNC,S$GLB, | Performed by: PODIATRIST

## 2020-08-18 PROCEDURE — 11055 PR TRIM HYPERKERATOTIC SKIN LESION, ONE: ICD-10-PCS | Mod: Q9,HCNC,S$GLB, | Performed by: PODIATRIST

## 2020-08-18 PROCEDURE — 1101F PR PT FALLS ASSESS DOC 0-1 FALLS W/OUT INJ PAST YR: ICD-10-PCS | Mod: HCNC,CPTII,S$GLB, | Performed by: FAMILY MEDICINE

## 2020-08-18 PROCEDURE — 99999 PR PBB SHADOW E&M-EST. PATIENT-LVL IV: ICD-10-PCS | Mod: PBBFAC,HCNC,, | Performed by: FAMILY MEDICINE

## 2020-08-18 PROCEDURE — 99214 OFFICE O/P EST MOD 30 MIN: CPT | Mod: 25,HCNC,S$GLB, | Performed by: PODIATRIST

## 2020-08-18 PROCEDURE — 99999 PR PBB SHADOW E&M-EST. PATIENT-LVL IV: CPT | Mod: PBBFAC,HCNC,, | Performed by: FAMILY MEDICINE

## 2020-08-18 PROCEDURE — 1101F PR PT FALLS ASSESS DOC 0-1 FALLS W/OUT INJ PAST YR: ICD-10-PCS | Mod: HCNC,CPTII,S$GLB, | Performed by: PODIATRIST

## 2020-08-18 PROCEDURE — 1159F PR MEDICATION LIST DOCUMENTED IN MEDICAL RECORD: ICD-10-PCS | Mod: HCNC,S$GLB,, | Performed by: PODIATRIST

## 2020-08-18 PROCEDURE — 1101F PT FALLS ASSESS-DOCD LE1/YR: CPT | Mod: HCNC,CPTII,S$GLB, | Performed by: FAMILY MEDICINE

## 2020-08-18 PROCEDURE — 87077 CULTURE AEROBIC IDENTIFY: CPT | Mod: HCNC

## 2020-08-18 PROCEDURE — 1101F PT FALLS ASSESS-DOCD LE1/YR: CPT | Mod: HCNC,CPTII,S$GLB, | Performed by: PODIATRIST

## 2020-08-18 PROCEDURE — 87086 URINE CULTURE/COLONY COUNT: CPT | Mod: HCNC

## 2020-08-18 PROCEDURE — 81000 URINALYSIS NONAUTO W/SCOPE: CPT | Mod: HCNC,PO

## 2020-08-18 PROCEDURE — 99999 PR PBB SHADOW E&M-EST. PATIENT-LVL IV: ICD-10-PCS | Mod: PBBFAC,HCNC,, | Performed by: PODIATRIST

## 2020-08-18 PROCEDURE — 1126F PR PAIN SEVERITY QUANTIFIED, NO PAIN PRESENT: ICD-10-PCS | Mod: HCNC,S$GLB,, | Performed by: PODIATRIST

## 2020-08-18 PROCEDURE — 11055 PARING/CUTG B9 HYPRKER LES 1: CPT | Mod: Q9,HCNC,S$GLB, | Performed by: PODIATRIST

## 2020-08-18 PROCEDURE — 99999 PR PBB SHADOW E&M-EST. PATIENT-LVL IV: CPT | Mod: PBBFAC,HCNC,, | Performed by: PODIATRIST

## 2020-08-18 PROCEDURE — 1159F MED LIST DOCD IN RCRD: CPT | Mod: HCNC,S$GLB,, | Performed by: FAMILY MEDICINE

## 2020-08-18 PROCEDURE — 87186 SC STD MICRODIL/AGAR DIL: CPT | Mod: HCNC

## 2020-08-18 PROCEDURE — 87088 URINE BACTERIA CULTURE: CPT | Mod: HCNC

## 2020-08-18 PROCEDURE — 11721 DEBRIDE NAIL 6 OR MORE: CPT | Mod: Q9,59,HCNC,S$GLB | Performed by: PODIATRIST

## 2020-08-18 RX ORDER — VENLAFAXINE 75 MG/1
75 TABLET ORAL DAILY
COMMUNITY
End: 2022-03-10 | Stop reason: SDUPTHER

## 2020-08-18 RX ORDER — RIVASTIGMINE 4.6 MG/24H
PATCH, EXTENDED RELEASE TRANSDERMAL
COMMUNITY
Start: 2020-08-13 | End: 2020-09-01 | Stop reason: SDUPTHER

## 2020-08-18 RX ORDER — CIPROFLOXACIN 250 MG/1
250 TABLET, FILM COATED ORAL DAILY
Qty: 3 TABLET | Refills: 0 | Status: SHIPPED | OUTPATIENT
Start: 2020-08-18 | End: 2020-08-21

## 2020-08-18 NOTE — PROGRESS NOTES
complains of  frequency, urgency. Symptoms over the past week. No fever or chills. No significant abdominal or back pain.  She had UTI a few months ago.  Review previous culture.    Kassi was seen today for possible uti.    Diagnoses and all orders for this visit:    Urinary tract infection without hematuria, site unspecified  -     Urinalysis  -     Urine culture; Future  -     Urine culture    Urinary urgency  -     ciprofloxacin HCl (CIPRO) 250 MG tablet; Take 1 tablet (250 mg total) by mouth once daily. for 3 days    Other orders  -     Urinalysis Microscopic        Urinalysis consistent with UTI     Increase fluids. . Follow up as needed if not resolving in the next couple days           Past Medical History:  Past Medical History:   Diagnosis Date    *Atrial fibrillation 4/19/2012    *Atrial flutter 4/19/2012    Allergy     Ankle fracture 4/19/2012    Anticoagulant long-term use     Anxiety     Arthritis     Bacterial pneumonia, unspecified 12/7/2012    Breast cancer     LEFT    Cataract     CKD (chronic kidney disease), stage III 5/11/2012    Followed by Dr. 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    LBBB (left bundle branch block) 10/19/2012    Mild cognitive impairment with memory loss 6/15/2017    Osteopenia 4/19/2012    Osteoporosis     Otitis media     Pacemaker 11/2012    yo/chantell    Secondary hyperparathyroidism of renal origin     Simple renal cyst     Sinus node dysfunction     Urinary incontinence 4/19/2012     Past Surgical History:   Procedure Laterality Date    APPENDECTOMY       BREAST LUMPECTOMY Right 10/21/2015    CARDIAC PACEMAKER PLACEMENT  12/3/12    Sinus node dysfunction    COLONOSCOPY      excision of squamous cell carcinoma Right 2017    EYE SURGERY Bilateral     PHACO/IOL    FRACTURE SURGERY Right     ankle    HYSTERECTOMY      total 1970's    MASTECTOMY Right 11/2015    OOPHORECTOMY      TONSILLECTOMY      TOTAL HIP ARTHROPLASTY Right 2007     Review of patient's allergies indicates:   Allergen Reactions    Amlodipine Edema     Pt stated this medication made her legs swell    Alendronate sodium Other (See Comments)     Reaction: Esophageal bleeding    Fosamax [alendronate] Other (See Comments)     Reaction: Esophageal bleeding  diarrhea    Sorbitol      Diarrhea     Augmentin [amoxicillin-pot clavulanate] Hives and Rash     Current Outpatient Medications on File Prior to Visit   Medication Sig Dispense Refill    ACCU-CHEK SHELDON CONTROL SOLN Soln Use as directed 3 each 3    BD ALCOHOL SWABS PadM USE AS NEEDED 1 each 3    benazepril (LOTENSIN) 20 MG tablet TAKE 1 TABLET EVERY DAY 90 tablet 3    blood sugar diagnostic (ACCU-CHEK SHELDON PLUS TEST STRP) Strp Test glucose once daily 100 strip 3    blood sugar diagnostic Strp To check BG once daily, to use with insurance preferred meter 100 strip 3    blood-glucose meter kit To check BG once daily, to use with insurance preferred meter 1 each 0    calcitRIOL (ROCALTROL) 0.25 MCG Cap TAKE 1 CAPSULE EVERY DAY 90 capsule 1    cyanocobalamin, vitamin B-12, 1,000 mcg TbSR One tab po daily 1 each 0    ELIQUIS 2.5 mg Tab TAKE 1 TABLET TWICE DAILY 180 tablet 3    glipiZIDE (GLUCOTROL) 2.5 MG TR24 TAKE 1 TABLET EVERY DAY WITH BREAKFAST 90 tablet 0    hydroCHLOROthiazide (HYDRODIURIL) 25 MG tablet TAKE 1 TABLET EVERY DAY 90 tablet 3    lancets Misc To check BG once daily, to use with insurance preferred meter 100 each 3    levothyroxine (SYNTHROID) 25 MCG tablet TAKE 1 TABLET (25 MCG TOTAL) BY MOUTH ONCE DAILY. 90  tablet 3    metoprolol tartrate (LOPRESSOR) 50 MG tablet TAKE 1/2 TABLET TWICE DAILY (LAST REFILL UNTIL CLINIC VISIT MADE) 90 tablet 3    mirtazapine (REMERON) 45 MG tablet Take 45 mg by mouth every evening.      MYRBETRIQ 50 mg Tb24 TAKE 1 TABLET EVERY DAY 90 tablet 3    pravastatin (PRAVACHOL) 40 MG tablet TAKE 1 TABLET EVERY DAY 90 tablet 2    venlafaxine (EFFEXOR) 75 MG tablet Take 75 mg by mouth once daily.      venlafaxine (EFFEXOR-XR) 150 MG Cp24 TAKE 1 CAPSULE ONE TIME DAILY 90 capsule 1    verapamil (CALAN-SR) 120 MG CR tablet TAKE 1 TABLET EVERY NIGHT 90 tablet 3    donepezil (ARICEPT) 5 MG tablet TAKE 1 TABLET EVERY EVENING 90 tablet 3    rivastigmine (EXELON) 4.6 mg/24 hr PT24 APPLY 1 PATCH ONTO THE SKIN EVERY DAY      [DISCONTINUED] ciprofloxacin HCl (CIPRO) 250 MG tablet Take 1 tablet (250 mg total) by mouth once daily. for 3 days 3 tablet 0     No current facility-administered medications on file prior to visit.      Social History     Socioeconomic History    Marital status:      Spouse name: Not on file    Number of children: Not on file    Years of education: Not on file    Highest education level: Not on file   Occupational History    Not on file   Social Needs    Financial resource strain: Not on file    Food insecurity     Worry: Not on file     Inability: Not on file    Transportation needs     Medical: Not on file     Non-medical: Not on file   Tobacco Use    Smoking status: Never Smoker    Smokeless tobacco: Never Used   Substance and Sexual Activity    Alcohol use: No    Drug use: No    Sexual activity: Not Currently   Lifestyle    Physical activity     Days per week: Not on file     Minutes per session: Not on file    Stress: Not on file   Relationships    Social connections     Talks on phone: Not on file     Gets together: Not on file     Attends [employer] service: Not on file     Active member of club or organization: Not on file     Attends meetings of  clubs or organizations: Not on file     Relationship status: Not on file   Other Topics Concern    Not on file   Social History Narrative    Not on file     Family History   Problem Relation Age of Onset    Hypertension Mother     Heart disease Father     Stroke Father         cerebral hemorrhage    Coronary artery disease Brother     Heart disease Brother     Coronary artery disease Brother     COPD Brother     Heart disease Brother     Mitral valve prolapse Daughter     Peripheral vascular disease Son          Vitals:    08/18/20 1432   BP: (!) 142/73   Pulse: 60   Temp: 97.9 °F (36.6 °C)   Weight: 68 kg (150 lb)   Height: 5' (1.524 m)       general: No acute distress   Chest clear to auscultation. Normal respiratory effort   Heart: Regular rate and rhythm   Abdomen: Positive bowel sounds soft nontender no hepato- splenomegaly.   Back: No CVAT

## 2020-08-18 NOTE — PROGRESS NOTES
Chart reviewed.   Immunizations: Triggered Imm Registry     Orders placed: eye exam   Upcoming appts to satisfy [facility]ENRY topics: n/a

## 2020-08-18 NOTE — TELEPHONE ENCOUNTER
----- Message from Natali Mcgee sent at 8/18/2020 12:47 PM CDT -----  Contact: [facility])[phone]  Would like to consult with nurse regarding 2 new skin tares on left and right arm. Please call back at [phone]. Thanks

## 2020-08-19 ENCOUNTER — DOCUMENT SCAN (OUTPATIENT)
Dept: HOME HEALTH SERVICES | Facility: HOSPITAL | Age: 85
End: 2020-08-19
Payer: MEDICARE

## 2020-08-20 ENCOUNTER — TELEPHONE (OUTPATIENT)
Dept: FAMILY MEDICINE | Facility: CLINIC | Age: 85
End: 2020-08-20

## 2020-08-20 ENCOUNTER — DOCUMENT SCAN (OUTPATIENT)
Dept: HOME HEALTH SERVICES | Facility: HOSPITAL | Age: 85
End: 2020-08-20
Payer: MEDICARE

## 2020-08-20 NOTE — TELEPHONE ENCOUNTER
----- Message from Scarlett Loaiza sent at 8/20/2020  8:16 AM CDT -----  Regarding:  nurse  Contact:  nurse [phone]   nurse is calling to speak with someone in the office about the pt having a lab today for the home visit but pt states she have already had the labs. So the  nurse is asking should she still do the labs today

## 2020-08-21 LAB — BACTERIA UR CULT: ABNORMAL

## 2020-08-30 NOTE — PROGRESS NOTES
Subjective:     Patient ID: Kassi Skelton is a 88 y.o. female.    Chief Complaint: Diabetic Foot Exam (c/o soreness to left heel . wears casual shoes. diabetic Pt. PCP Dr. Cook.)    Kassi is a 88 y.o. female who presents to the clinic for evaluation and treatment of high risk feet. Kassi has a past medical history of *Atrial fibrillation (4/19/2012), *Atrial flutter (4/19/2012), Allergy, Ankle fracture (4/19/2012), Anticoagulant long-term use, Anxiety, Arthritis, Bacterial pneumonia, unspecified (12/7/2012), Breast cancer, Cataract, CKD (chronic kidney disease), stage III (5/11/2012), Dependent on walker for ambulation, Depression (4/19/2012), Diabetes mellitus with stage 3 chronic kidney disease (2/1/2016), Diabetes with neurologic complications, Edentulous, HEARING LOSS, Hip fracture, right (4/19/2012), HLD (hyperlipidemia) (4/19/2012), HTN (hypertension) (4/19/2012), Hypothyroidism (4/19/2012), 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 The patient's chief complaint is long, thick toenails. This patient has documented high risk feet requiring routine maintenance secondary to diabetes mellitis and those secondary complications of diabetes, as mentioned. Patient al so has soreness to left heel. Patient states she had wound there that home health has been caring for. Patient states the area is healed now. Patient is accompanied by her daughter today.     PCP: Mor Cook MD    Date Last Seen by PCP: 08/18/2020    Current shoe gear:  Affected Foot: Tennis shoes     Unaffected Foot: Tennis shoes    Hemoglobin A1C   Date Value Ref Range Status   08/18/2020 9.4 (H) 4.0 - 5.6 %  Final     Comment:     ADA Screening Guidelines:  5.7-6.4%  Consistent with prediabetes  >or=6.5%  Consistent with diabetes  High levels of fetal hemoglobin interfere with the HbA1C  assay. Heterozygous hemoglobin variants (HbS, HgC, etc)do  not significantly interfere with this assay.   However, presence of multiple variants may affect accuracy.     01/07/2020 7.3 (H) 4.0 - 5.6 % Final     Comment:     ADA Screening Guidelines:  5.7-6.4%  Consistent with prediabetes  >or=6.5%  Consistent with diabetes  High levels of fetal hemoglobin interfere with the HbA1C  assay. Heterozygous hemoglobin variants (HbS, HgC, etc)do  not significantly interfere with this assay.   However, presence of multiple variants may affect accuracy.     07/25/2019 7.1 (H) 4.0 - 5.6 % Final     Comment:     ADA Screening Guidelines:  5.7-6.4%  Consistent with prediabetes  >or=6.5%  Consistent with diabetes  High levels of fetal hemoglobin interfere with the HbA1C  assay. Heterozygous hemoglobin variants (HbS, HgC, etc)do  not significantly interfere with this assay.   However, presence of multiple variants may affect accuracy.         Patient Active Problem List   Diagnosis    History of atrial flutter    Combined hyperlipidemia associated with type 2 diabetes mellitus    Hypothyroidism    Major depressive disorder, recurrent episode, moderate    Osteoporosis    Benign hypertensive kidney disease with chronic kidney disease    Cyst of kidney, acquired    Atrial fibrillation    Anticoagulation monitoring by pharmacist    LBBB (left bundle branch block)    Sinus node dysfunction    Cardiac pacemaker in situ    Urinary incontinence, mixed    Pacemaker    Orthostatic hypotension    DCIS (ductal carcinoma in situ)    Hypertension associated with diabetes    Diabetes mellitus with stage 4 chronic kidney disease GFR 15-29    Dizziness    Secondary hyperparathyroidism of renal origin    Hearing aid worn    Diabetic neuropathy,  type II diabetes mellitus    Mild cognitive impairment with memory loss    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    Primary osteoarthritis    Edentulous    Greater trochanteric bursitis of right hip       Medication List with Changes/Refills   Current Medications    ACCU-CHEK SHELODN CONTROL SOLN SOLN    Use as directed    BD ALCOHOL SWABS PADM    USE AS NEEDED    BENAZEPRIL (LOTENSIN) 20 MG TABLET    TAKE 1 TABLET EVERY DAY    BLOOD SUGAR DIAGNOSTIC (ACCU-CHEK SHELDON PLUS TEST STRP) STRP    Test glucose once daily    BLOOD SUGAR DIAGNOSTIC STRP    To check BG once daily, to use with insurance preferred meter    BLOOD-GLUCOSE METER KIT    To check BG once daily, to use with insurance preferred meter    CALCITRIOL (ROCALTROL) 0.25 MCG CAP    TAKE 1 CAPSULE EVERY DAY    CYANOCOBALAMIN, VITAMIN B-12, 1,000 MCG TBSR    One tab po daily    DONEPEZIL (ARICEPT) 5 MG TABLET    TAKE 1 TABLET EVERY EVENING    ELIQUIS 2.5 MG TAB    TAKE 1 TABLET TWICE DAILY    GLIPIZIDE (GLUCOTROL) 2.5 MG TR24    TAKE 1 TABLET EVERY DAY WITH BREAKFAST    HYDROCHLOROTHIAZIDE (HYDRODIURIL) 25 MG TABLET    TAKE 1 TABLET EVERY DAY    LANCETS MISC    To check BG once daily, to use with insurance preferred meter    LEVOTHYROXINE (SYNTHROID) 25 MCG TABLET    TAKE 1 TABLET (25 MCG TOTAL) BY MOUTH ONCE DAILY.    METOPROLOL TARTRATE (LOPRESSOR) 50 MG TABLET    TAKE 1/2 TABLET TWICE DAILY (LAST REFILL UNTIL CLINIC VISIT MADE)    MIRTAZAPINE (REMERON) 45 MG TABLET    Take 45 mg by mouth every evening.    MYRBETRIQ 50 MG TB24    TAKE 1 TABLET EVERY DAY    PRAVASTATIN (PRAVACHOL) 40 MG TABLET    TAKE 1 TABLET EVERY DAY    RIVASTIGMINE (EXELON) 4.6 MG/24 HR PT24    APPLY 1 PATCH ONTO THE SKIN EVERY DAY    VENLAFAXINE (EFFEXOR) 75 MG TABLET    Take 75 mg by mouth once daily.    VENLAFAXINE (EFFEXOR-XR) 150 MG CP24    TAKE 1 CAPSULE ONE TIME DAILY    VERAPAMIL (CALAN-SR) 120 MG CR TABLET    TAKE 1 TABLET EVERY NIGHT       Review of patient's  allergies indicates:   Allergen Reactions    Amlodipine Edema     Pt stated this medication made her legs swell    Alendronate sodium Other (See Comments)     Reaction: Esophageal bleeding    Fosamax [alendronate] Other (See Comments)     Reaction: Esophageal bleeding  diarrhea    Sorbitol      Diarrhea     Augmentin [amoxicillin-pot clavulanate] Hives and Rash       Past Surgical History:   Procedure Laterality Date    APPENDECTOMY      BREAST LUMPECTOMY Right 10/21/2015    CARDIAC PACEMAKER PLACEMENT  12/3/12    Sinus node dysfunction    COLONOSCOPY      excision of squamous cell carcinoma Right 2017    EYE SURGERY Bilateral     PHACO/IOL    FRACTURE SURGERY Right     ankle    HYSTERECTOMY      total 1970's    MASTECTOMY Right 11/2015    OOPHORECTOMY      TONSILLECTOMY      TOTAL HIP ARTHROPLASTY Right 2007       Family History   Problem Relation Age of Onset    Hypertension Mother     Heart disease Father     Stroke Father         cerebral hemorrhage    Coronary artery disease Brother     Heart disease Brother     Coronary artery disease Brother     COPD Brother     Heart disease Brother     Mitral valve prolapse Daughter     Peripheral vascular disease Son        Social History     Socioeconomic History    Marital status:      Spouse name: Not on file    Number of children: Not on file    Years of education: Not on file    Highest education level: Not on file   Occupational History    Not on file   Social Needs    Financial resource strain: Not hard at all    Food insecurity     Worry: Never true     Inability: Never true    Transportation needs     Medical: No     Non-medical: No   Tobacco Use    Smoking status: Never Smoker    Smokeless tobacco: Never Used   Substance and Sexual Activity    Alcohol use: No     Frequency: Never     Binge frequency: Never    Drug use: No    Sexual activity: Not Currently   Lifestyle    Physical activity     Days per week: 0 days      Minutes per session: 0 min    Stress: Not at all   Relationships    Social connections     Talks on phone: Once a week     Gets together: Never     Attends Christianity service: Not on file     Active member of club or organization: No     Attends meetings of clubs or organizations: Never     Relationship status:    Other Topics Concern    Not on file   Social History Narrative    Not on file       Vitals:    08/18/20 1529   BP: (!) 142/76   Weight: 68 kg (150 lb)   Height: 5' (1.524 m)   PainSc: 0-No pain   PainLoc: Foot       Hemoglobin A1C   Date Value Ref Range Status   08/18/2020 9.4 (H) 4.0 - 5.6 % Final     Comment:     ADA Screening Guidelines:  5.7-6.4%  Consistent with prediabetes  >or=6.5%  Consistent with diabetes  High levels of fetal hemoglobin interfere with the HbA1C  assay. Heterozygous hemoglobin variants (HbS, HgC, etc)do  not significantly interfere with this assay.   However, presence of multiple variants may affect accuracy.     01/07/2020 7.3 (H) 4.0 - 5.6 % Final     Comment:     ADA Screening Guidelines:  5.7-6.4%  Consistent with prediabetes  >or=6.5%  Consistent with diabetes  High levels of fetal hemoglobin interfere with the HbA1C  assay. Heterozygous hemoglobin variants (HbS, HgC, etc)do  not significantly interfere with this assay.   However, presence of multiple variants may affect accuracy.     07/25/2019 7.1 (H) 4.0 - 5.6 % Final     Comment:     ADA Screening Guidelines:  5.7-6.4%  Consistent with prediabetes  >or=6.5%  Consistent with diabetes  High levels of fetal hemoglobin interfere with the HbA1C  assay. Heterozygous hemoglobin variants (HbS, HgC, etc)do  not significantly interfere with this assay.   However, presence of multiple variants may affect accuracy.         Review of Systems   Constitutional: Negative for chills and fever.   Respiratory: Negative for shortness of breath.    Cardiovascular: Negative for chest pain, palpitations, orthopnea, claudication and  leg swelling.   Gastrointestinal: Negative for diarrhea, nausea and vomiting.   Musculoskeletal: Negative for joint pain.   Skin: Negative for rash.   Neurological: Positive for sensory change. Negative for dizziness, tingling, focal weakness and weakness.   Psychiatric/Behavioral: Negative.              Objective:       PHYSICAL EXAM: Apperance: Alert and orient in no distress,well developed, and with good attention to grooming and body habits  Lower Extremity Exam  VASCULAR: Dorsalis pedis pulses 1/4 bilateral and Posterior Tibial pulses 1/4 bilateral. Capillary fill time <4 seconds bilateral. No edema observed bilateral. Varicosities present bilateral. Skin temperature of the lower extremities is warm to warm, proximal to distal. Hair growth absent bilateral.   DERMATOLOGICAL: No skin rashes, subcutaneous nodules, lesions, or ulcers observed bilateral. The dorsum surface of the feet are soft and supple.  The plantar aspects of both feet are dry and scaly. Nails 1,2,3,4,5 bilateral elongated, thickened, and discolored with subungual debris. Webspaces 1,2,3,4 clean, dry and without evidence of break in skin integrity bilateral. Mild hyperkeratotic tissue noted to left medial hallux.   NEUROLOGICAL: Light touch, sharp-dull, proprioception all present and equal bilaterally.   MUSCULOSKELETAL: Muscle strength is 5/5 for foot inverters, everters, plantarflexors, and dorsiflexors. Muscle tone is normal. Fat pad atrophy noted bilateral. Mild tenderness on palpation of left plantar heel.         Assessment:       Encounter Diagnoses   Name Primary?    Type II diabetes mellitus with neurological manifestations Yes    Onychomycosis due to dermatophyte     Pre-ulcerative calluses - Left Foot     Fat pad atrophy of foot          Plan:   Type II diabetes mellitus with neurological manifestations    Onychomycosis due to dermatophyte    Pre-ulcerative calluses - Left Foot    Fat pad atrophy of foot      I counseled the  patient on her conditions, regarding findings of my examination, my impressions, and usual treatment plan.   Greater than 50% of this visit spent on counseling and coordination of care.  Greater than 15 minutes of a 20 minute appointment spent on education about the diabetic foot, neuropathy, and prevention of limb loss.  Shoe inspection. Diabetic Foot Education. Patient reminded of the importance of good nutrition and blood sugar control to help prevent podiatric complications of diabetes. Patient instructed on proper foot hygeine. We discussed wearing proper shoe gear, daily foot inspections, never walking without protective shoe gear, never putting sharp instruments to feet.    With patient's permission, nails 1-5 bilateral were debrided/trimmed in length and thickness aggressively to their soft tissue attachment mechanically and with electric , removing all offending nail and debris. Patient relates relief following the procedure.  With patient's permission, left callus trimmed in thickness with #15 blade in thickness without incident.   Patient instructed to offload heels when laying down/sitting.   Patient  will continue to monitor the areas daily, inspect feet, wear protective shoe gear when ambulatory, moisturizer to maintain skin integrity. Patient reminded of the importance of good nutrition and blood sugar control to help prevent podiatric complications of diabetes.  Patient to return 3 months or sooner if needed.                 Luzmaria Valle DPM  [facility]

## 2020-09-01 ENCOUNTER — OFFICE VISIT (OUTPATIENT)
Dept: FAMILY MEDICINE | Facility: CLINIC | Age: 85
End: 2020-09-01
Payer: MEDICARE

## 2020-09-01 VITALS
WEIGHT: 147.81 LBS | TEMPERATURE: 97 F | HEART RATE: 67 BPM | HEIGHT: 60 IN | SYSTOLIC BLOOD PRESSURE: 139 MMHG | DIASTOLIC BLOOD PRESSURE: 74 MMHG | BODY MASS INDEX: 29.02 KG/M2

## 2020-09-01 DIAGNOSIS — E03.9 HYPOTHYROIDISM, UNSPECIFIED TYPE: Chronic | ICD-10-CM

## 2020-09-01 DIAGNOSIS — N18.4 DIABETES MELLITUS WITH STAGE 4 CHRONIC KIDNEY DISEASE GFR 15-29: Primary | ICD-10-CM

## 2020-09-01 DIAGNOSIS — E11.22 DIABETES MELLITUS WITH STAGE 4 CHRONIC KIDNEY DISEASE GFR 15-29: Primary | ICD-10-CM

## 2020-09-01 PROCEDURE — 99999 PR PBB SHADOW E&M-EST. PATIENT-LVL III: ICD-10-PCS | Mod: PBBFAC,HCNC,, | Performed by: FAMILY MEDICINE

## 2020-09-01 PROCEDURE — 1159F PR MEDICATION LIST DOCUMENTED IN MEDICAL RECORD: ICD-10-PCS | Mod: HCNC,S$GLB,, | Performed by: FAMILY MEDICINE

## 2020-09-01 PROCEDURE — 99213 PR OFFICE/OUTPT VISIT, EST, LEVL III, 20-29 MIN: ICD-10-PCS | Mod: HCNC,S$GLB,, | Performed by: FAMILY MEDICINE

## 2020-09-01 PROCEDURE — 1159F MED LIST DOCD IN RCRD: CPT | Mod: HCNC,S$GLB,, | Performed by: FAMILY MEDICINE

## 2020-09-01 PROCEDURE — 99999 PR PBB SHADOW E&M-EST. PATIENT-LVL III: CPT | Mod: PBBFAC,HCNC,, | Performed by: FAMILY MEDICINE

## 2020-09-01 PROCEDURE — 99213 OFFICE O/P EST LOW 20 MIN: CPT | Mod: HCNC,S$GLB,, | Performed by: FAMILY MEDICINE

## 2020-09-01 PROCEDURE — 1101F PT FALLS ASSESS-DOCD LE1/YR: CPT | Mod: HCNC,CPTII,S$GLB, | Performed by: FAMILY MEDICINE

## 2020-09-01 PROCEDURE — 1101F PR PT FALLS ASSESS DOC 0-1 FALLS W/OUT INJ PAST YR: ICD-10-PCS | Mod: HCNC,CPTII,S$GLB, | Performed by: FAMILY MEDICINE

## 2020-09-01 RX ORDER — RIVASTIGMINE 9.5 MG/24H
1 PATCH, EXTENDED RELEASE TRANSDERMAL DAILY
COMMUNITY
End: 2020-11-17

## 2020-09-01 RX ORDER — REPAGLINIDE 1 MG/1
1 TABLET ORAL
Qty: 90 TABLET | Refills: 1 | Status: SHIPPED | OUTPATIENT
Start: 2020-09-01 | End: 2020-09-28 | Stop reason: SDUPTHER

## 2020-09-01 NOTE — PROGRESS NOTES
Follow-up recent laboratory.  Sugar was elevated.  States compliance medication.  Home glucose readings as below.  Chronic kidney disease stage 4 followed by Nephrology.          Diagnoses and all orders for this visit:    Diabetes mellitus with stage 4 chronic kidney disease GFR 15-29  -     Lipid Panel; Future  -     Comprehensive metabolic panel; Future  -     Hemoglobin A1C; Future    Hypothyroidism, unspecified type  -     TSH; Future    Other orders  -     repaglinide (PRANDIN) 1 MG tablet; Take 1 tablet (1 mg total) by mouth 3 (three) times daily before meals.     Discontinue glipizide.  Laboratory above 3 months.  Send glucose readings 2 weeks.            Diabetes Management Status    Statin: Taking  ACE/ARB: Taking    Screening or Prevention Patient's value Goal Complete/Controlled?   HgA1C Testing and Control   Lab Results   Component Value Date    HGBA1C 9.4 (H) 08/18/2020      Annually/Less than 8% No   Lipid profile : 01/07/2020 Annually Yes   LDL control Lab Results   Component Value Date    LDLCALC Invalid, Trig>400.0 01/07/2020    Annually/Less than 100 mg/dl  Yes   Nephropathy screening No results found for: LABMICR  Lab Results   Component Value Date    PROTEINUA Trace (A) 08/18/2020    Annually Yes   Blood pressure BP Readings from Last 1 Encounters:   09/01/20 139/74    Less than 140/90 Yes   Dilated retinal exam : 02/06/2019 Annually Yes   Foot exam   : 08/18/2020 Annually Yes       Past Medical History:  Past Medical History:   Diagnosis Date    *Atrial fibrillation 4/19/2012    *Atrial flutter 4/19/2012    Allergy     Ankle fracture 4/19/2012    Anticoagulant long-term use     Anxiety     Arthritis     Bacterial pneumonia, unspecified 12/7/2012    Breast cancer     LEFT    Cataract     CKD (chronic kidney disease), stage III 5/11/2012    Followed by Dr. 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 8/31/2017    LBBB (left bundle branch block) 10/19/2012    Mild cognitive impairment with memory loss 6/15/2017    Osteopenia 4/19/2012    Osteoporosis     Otitis media     Pacemaker 11/2012    yo/chantell    Secondary hyperparathyroidism of renal origin     Simple renal cyst     Sinus node dysfunction     Urinary incontinence 4/19/2012     Past Surgical History:   Procedure Laterality Date    APPENDECTOMY      BREAST LUMPECTOMY Right 10/21/2015    CARDIAC PACEMAKER PLACEMENT  12/3/12    Sinus node dysfunction    COLONOSCOPY      excision of squamous cell carcinoma Right 2017    EYE SURGERY Bilateral     PHACO/IOL    FRACTURE SURGERY Right     ankle    HYSTERECTOMY      total 1970's    MASTECTOMY Right 11/2015    OOPHORECTOMY      TONSILLECTOMY      TOTAL HIP ARTHROPLASTY Right 2007     Review of patient's allergies indicates:   Allergen Reactions    Amlodipine Edema     Pt stated this medication made her legs swell    Alendronate sodium Other (See Comments)     Reaction: Esophageal bleeding    Fosamax [alendronate] Other (See Comments)     Reaction: Esophageal bleeding  diarrhea    Sorbitol      Diarrhea     Augmentin [amoxicillin-pot clavulanate] Hives and Rash     Current Outpatient Medications on File Prior to Visit   Medication Sig Dispense Refill    ACCU-CHEK SHELDON CONTROL SOLN Soln Use as directed 3 each 3    BD ALCOHOL SWABS PadM USE AS NEEDED 1 each 3    benazepril (LOTENSIN) 20 MG tablet TAKE 1 TABLET EVERY DAY 90 tablet 3    blood sugar diagnostic (ACCU-CHEK SHELDON PLUS TEST STRP) Strp Test glucose once daily 100 strip 3    blood sugar diagnostic Strp To check BG once daily,  to use with insurance preferred meter 100 strip 3    blood-glucose meter kit To check BG once daily, to use with insurance preferred meter 1 each 0    calcitRIOL (ROCALTROL) 0.25 MCG Cap TAKE 1 CAPSULE EVERY DAY 90 capsule 1    cyanocobalamin, vitamin B-12, 1,000 mcg TbSR One tab po daily 1 each 0    donepezil (ARICEPT) 5 MG tablet TAKE 1 TABLET EVERY EVENING 90 tablet 3    ELIQUIS 2.5 mg Tab TAKE 1 TABLET TWICE DAILY 180 tablet 3    hydroCHLOROthiazide (HYDRODIURIL) 25 MG tablet TAKE 1 TABLET EVERY DAY 90 tablet 3    lancets Misc To check BG once daily, to use with insurance preferred meter 100 each 3    levothyroxine (SYNTHROID) 25 MCG tablet TAKE 1 TABLET (25 MCG TOTAL) BY MOUTH ONCE DAILY. 90 tablet 3    metoprolol tartrate (LOPRESSOR) 50 MG tablet TAKE 1/2 TABLET TWICE DAILY (LAST REFILL UNTIL CLINIC VISIT MADE) 90 tablet 3    mirtazapine (REMERON) 45 MG tablet Take 45 mg by mouth every evening.      MYRBETRIQ 50 mg Tb24 TAKE 1 TABLET EVERY DAY 90 tablet 3    pravastatin (PRAVACHOL) 40 MG tablet TAKE 1 TABLET EVERY DAY 90 tablet 2    rivastigmine (EXELON) 9.5 mg/24 hr PT24 Place 1 patch onto the skin once daily.      venlafaxine (EFFEXOR) 75 MG tablet Take 75 mg by mouth once daily.      venlafaxine (EFFEXOR-XR) 150 MG Cp24 TAKE 1 CAPSULE ONE TIME DAILY 90 capsule 1    verapamil (CALAN-SR) 120 MG CR tablet TAKE 1 TABLET EVERY NIGHT 90 tablet 3    [DISCONTINUED] glipiZIDE (GLUCOTROL) 2.5 MG TR24 TAKE 1 TABLET EVERY DAY WITH BREAKFAST 90 tablet 0    [DISCONTINUED] rivastigmine (EXELON) 4.6 mg/24 hr PT24 APPLY 1 PATCH ONTO THE SKIN EVERY DAY       No current facility-administered medications on file prior to visit.      Social History     Socioeconomic History    Marital status:      Spouse name: Not on file    Number of children: Not on file    Years of education: Not on file    Highest education level: Not on file   Occupational History    Not on file   Social Needs    Financial  resource strain: Not hard at all    Food insecurity     Worry: Never true     Inability: Never true    Transportation needs     Medical: No     Non-medical: No   Tobacco Use    Smoking status: Never Smoker    Smokeless tobacco: Never Used   Substance and Sexual Activity    Alcohol use: No     Frequency: Never     Binge frequency: Never    Drug use: No    Sexual activity: Not Currently   Lifestyle    Physical activity     Days per week: 0 days     Minutes per session: 0 min    Stress: Not at all   Relationships    Social connections     Talks on phone: Once a week     Gets together: Never     Attends Ep[employer]op[employer] service: Not on file     Active member of club or organization: No     Attends meetings of clubs or organizations: Never     Relationship status:    Other Topics Concern    Not on file   Social History Narrative    Not on file     Family History   Problem Relation Age of Onset    Hypertension Mother     Heart disease Father     Stroke Father         cerebral hemorrhage    Coronary artery disease Brother     Heart disease Brother     Coronary artery disease Brother     COPD Brother     Heart disease Brother     Mitral valve prolapse Daughter     Peripheral vascular disease Son        Answers for HPI/ROS submitted by the patient on 8/29/2020   Diabetes problem  Diabetes type: type 1  MedicAlert ID: No  Disease duration: 3 years  blurred vision: No  chest pain: No  fatigue: No  foot paresthesias: No  foot ulcerations: No  polydipsia: No  polyphagia: No  polyuria: No  visual change: No  weakness: No  weight loss: No  Symptom course: stable  confusion: No  dizziness: No  headaches: No  hunger: No  mood changes: No  nervous/anxious: No  pallor: No  seizures: No  sleepiness: No  speech difficulty: No  sweats: No  tremors: No  blackouts: No  nocturnal hypoglycemia: No  required assistance: No  required glucagon: No  CVA: No  heart disease: Yes  impotence: No  nephropathy: Yes  peripheral  neuropathy: Yes  PVD: No  retinopathy: No  autonomic neuropathy: Yes  CAD risks: family history, hypertension, diabetes mellitus  Current treatments: oral agent (monotherapy)  Treatment compliance: all of the time  Home blood tests: 1-2 x per day  Monitoring compliance: fair  Blood glucose trend: fluctuating minimally  Weight trend: stable  Current diet: diabetic  Meal planning: none  Exercise: rarely  Dietitian visit: No  Eye exam current: No  Sees podiatrist: Yes    Vitals:    09/01/20 1446   BP: 139/74   Pulse: 67   Temp: 97.3 °F (36.3 °C)   Weight: 67 kg (147 lb 12.8 oz)   Height: 5' (1.524 m)       Wt Readings from Last 3 Encounters:   09/01/20 67 kg (147 lb 12.8 oz)   08/18/20 68 kg (150 lb)   08/18/20 68 kg (150 lb)       APPEARANCE: Well nourished, well developed, in no acute distress.    HEAD: Normocephalic.  Atraumatic.  EYES:   Right eye: Pupil reactive.  Conjunctiva clear.    Left eye: Pupil reactive.  Conjunctiva clear.    NECK: Supple. No bruits.  No JVD.  No cervical lymphadenopathy.  No thyromegaly.    CHEST: Breath sounds clear bilaterally.  Normal respiratory effort  CARDIOVASCULAR: Normal rate.  Regular rhythm.  No murmurs.  No rub.  No gallops.   No edema.  MENTAL STATUS: Alert.  Oriented x 3.

## 2020-09-04 ENCOUNTER — DOCUMENT SCAN (OUTPATIENT)
Dept: HOME HEALTH SERVICES | Facility: HOSPITAL | Age: 85
End: 2020-09-04
Payer: MEDICARE

## 2020-09-04 PROCEDURE — 99499 NO LOS: ICD-10-PCS | Mod: ,,, | Performed by: FAMILY MEDICINE

## 2020-09-04 PROCEDURE — 99499 UNLISTED E&M SERVICE: CPT | Mod: ,,, | Performed by: FAMILY MEDICINE

## 2020-09-14 ENCOUNTER — TELEPHONE (OUTPATIENT)
Dept: FAMILY MEDICINE | Facility: CLINIC | Age: 85
End: 2020-09-14

## 2020-09-14 NOTE — TELEPHONE ENCOUNTER
Per HH nurse, patient has very small skin tear on her right shin, not chin.  Applied antibiotic ointment and small dressing.  Advised appt, office or virtual, for worsening

## 2020-09-14 NOTE — TELEPHONE ENCOUNTER
----- Message from Sandra Freeman sent at 9/14/2020 12:44 PM CDT -----  Contact: ivette from [facility]  Type:  Needs Medical Advice    Who Called: ivette   Symptoms (please be specific): small skin tear from hitting her chin on the bedroom post    How long has patient had these symptoms:    Pharmacy name and phone #:    Would the patient rather a call back or a response via [facility]? phone  Best Call Back Number: [phone]  Additional Information: has placed a bandage on it and also ointment , wnted to inform the Dr's office

## 2020-09-16 ENCOUNTER — TELEPHONE (OUTPATIENT)
Dept: CARDIOLOGY | Facility: CLINIC | Age: 85
End: 2020-09-16

## 2020-09-16 NOTE — TELEPHONE ENCOUNTER
----- Message from Dalia Zurita MA sent at 9/16/2020  3:56 PM CDT -----  Regarding: Doctor appt  Mr Skelton's daughter called.  Pt has an appt here in the clinic to get her device checked tomorrow.  ([employer] rep is coming out tomorrow).  She stated that she didn't know if she needed to see Dr. Brewer as well.  She said if so, she has mychart so she can see the appt.  She can do any days but Friday's.      Thank you    Kierra Radford

## 2020-09-17 ENCOUNTER — CLINICAL SUPPORT (OUTPATIENT)
Dept: CARDIOLOGY | Facility: CLINIC | Age: 85
End: 2020-09-17
Attending: INTERNAL MEDICINE
Payer: MEDICARE

## 2020-09-17 DIAGNOSIS — Z95.0 PACEMAKER: Primary | ICD-10-CM

## 2020-09-17 DIAGNOSIS — I44.7 LBBB (LEFT BUNDLE BRANCH BLOCK): ICD-10-CM

## 2020-09-17 DIAGNOSIS — Z95.0 PACEMAKER: ICD-10-CM

## 2020-09-17 DIAGNOSIS — I49.5 SINUS NODE DYSFUNCTION: ICD-10-CM

## 2020-09-19 DIAGNOSIS — E78.5 HYPERLIPIDEMIA, UNSPECIFIED HYPERLIPIDEMIA TYPE: ICD-10-CM

## 2020-09-21 RX ORDER — PRAVASTATIN SODIUM 40 MG/1
TABLET ORAL
Qty: 90 TABLET | Refills: 3 | Status: SHIPPED | OUTPATIENT
Start: 2020-09-21 | End: 2021-09-13

## 2020-09-27 PROCEDURE — G0179 PR HOME HEALTH MD RECERTIFICATION: ICD-10-PCS | Mod: ,,, | Performed by: FAMILY MEDICINE

## 2020-09-27 PROCEDURE — G0179 MD RECERTIFICATION HHA PT: HCPCS | Mod: ,,, | Performed by: FAMILY MEDICINE

## 2020-09-28 ENCOUNTER — PATIENT MESSAGE (OUTPATIENT)
Dept: FAMILY MEDICINE | Facility: CLINIC | Age: 85
End: 2020-09-28

## 2020-09-28 RX ORDER — REPAGLINIDE 1 MG/1
1 TABLET ORAL
Qty: 90 TABLET | Refills: 5 | Status: SHIPPED | OUTPATIENT
Start: 2020-09-28 | End: 2020-10-13 | Stop reason: SDUPTHER

## 2020-09-28 NOTE — TELEPHONE ENCOUNTER
I do not want her to run out of medication.  Please send prescription for whatever she needs if she is going to run out before we can review her sugar readings.

## 2020-09-29 ENCOUNTER — PATIENT MESSAGE (OUTPATIENT)
Dept: OTHER | Facility: OTHER | Age: 85
End: 2020-09-29

## 2020-10-02 ENCOUNTER — EXTERNAL HOME HEALTH (OUTPATIENT)
Dept: HOME HEALTH SERVICES | Facility: HOSPITAL | Age: 85
End: 2020-10-02
Payer: MEDICARE

## 2020-10-09 ENCOUNTER — TELEPHONE (OUTPATIENT)
Dept: FAMILY MEDICINE | Facility: CLINIC | Age: 85
End: 2020-10-09

## 2020-10-09 NOTE — TELEPHONE ENCOUNTER
Glucose readings that she dropped off or acceptable.  She did have 1 reading of 75 which is a bit low.  At this point recommend continue with current medications and let us know if she gets further readings below 80.

## 2020-10-12 ENCOUNTER — PATIENT MESSAGE (OUTPATIENT)
Dept: FAMILY MEDICINE | Facility: CLINIC | Age: 85
End: 2020-10-12

## 2020-10-13 ENCOUNTER — PATIENT MESSAGE (OUTPATIENT)
Dept: FAMILY MEDICINE | Facility: CLINIC | Age: 85
End: 2020-10-13

## 2020-10-13 RX ORDER — REPAGLINIDE 1 MG/1
1 TABLET ORAL
Qty: 270 TABLET | Refills: 1 | Status: SHIPPED | OUTPATIENT
Start: 2020-10-13 | End: 2021-01-19

## 2020-10-13 RX ORDER — REPAGLINIDE 1 MG/1
1 TABLET ORAL
Qty: 270 TABLET | Refills: 1 | Status: CANCELLED | OUTPATIENT
Start: 2020-10-13 | End: 2021-10-13

## 2020-10-26 ENCOUNTER — PATIENT MESSAGE (OUTPATIENT)
Dept: FAMILY MEDICINE | Facility: CLINIC | Age: 85
End: 2020-10-26

## 2020-11-03 ENCOUNTER — TELEPHONE (OUTPATIENT)
Dept: FAMILY MEDICINE | Facility: CLINIC | Age: 85
End: 2020-11-03

## 2020-11-03 NOTE — TELEPHONE ENCOUNTER
Per Bronwyn, with [facility], patient was taking trash out Saturday, lost her balance and fell.  VS stable, only injury is a bruise to her right arm, no head injury, patient reports feeling fine

## 2020-11-03 NOTE — TELEPHONE ENCOUNTER
----- Message from Dodie Adorno sent at 11/3/2020  9:09 AM CST -----  Regarding: Pt advice-[facility]  She is calling to report the patient had a bad fall on this Saturday night and no injuries. please advise #[phone]

## 2020-11-09 ENCOUNTER — TELEPHONE (OUTPATIENT)
Dept: FAMILY MEDICINE | Facility: CLINIC | Age: 85
End: 2020-11-09

## 2020-11-09 NOTE — TELEPHONE ENCOUNTER
Number in message is a wrong number, called [facility] but Angelina does not know who Tanisha is, informed her to check if anyone called on patient and have them call back if needed, will send message to [facility] scheduling supervisor

## 2020-11-09 NOTE — TELEPHONE ENCOUNTER
----- Message from Radha Gary sent at 11/9/2020 11:17 AM CST -----  [place]-[facility] is calling  regarding rochelle had increase deiteral for pt. Please call back at [phone]

## 2020-11-10 ENCOUNTER — DOCUMENT SCAN (OUTPATIENT)
Dept: HOME HEALTH SERVICES | Facility: HOSPITAL | Age: 85
End: 2020-11-10
Payer: MEDICARE

## 2020-11-11 ENCOUNTER — PATIENT MESSAGE (OUTPATIENT)
Dept: FAMILY MEDICINE | Facility: CLINIC | Age: 85
End: 2020-11-11

## 2020-11-11 ENCOUNTER — DOCUMENT SCAN (OUTPATIENT)
Dept: HOME HEALTH SERVICES | Facility: HOSPITAL | Age: 85
End: 2020-11-11
Payer: MEDICARE

## 2020-11-11 NOTE — TELEPHONE ENCOUNTER
[facility] scheduling supervisor emailed that they reviewed the call and the information in message is correct.  When number called and asked for Tanisha, person answering stated wrong number

## 2020-11-17 ENCOUNTER — OFFICE VISIT (OUTPATIENT)
Dept: OPHTHALMOLOGY | Facility: CLINIC | Age: 85
End: 2020-11-17
Payer: MEDICARE

## 2020-11-17 ENCOUNTER — OFFICE VISIT (OUTPATIENT)
Dept: PODIATRY | Facility: CLINIC | Age: 85
End: 2020-11-17
Payer: MEDICARE

## 2020-11-17 VITALS
WEIGHT: 147.69 LBS | DIASTOLIC BLOOD PRESSURE: 69 MMHG | HEIGHT: 60 IN | SYSTOLIC BLOOD PRESSURE: 130 MMHG | HEART RATE: 71 BPM | BODY MASS INDEX: 28.99 KG/M2

## 2020-11-17 DIAGNOSIS — E11.9 DIABETES MELLITUS TYPE 2 WITHOUT RETINOPATHY: Primary | ICD-10-CM

## 2020-11-17 DIAGNOSIS — Z96.1 PSEUDOPHAKIA OF BOTH EYES: ICD-10-CM

## 2020-11-17 DIAGNOSIS — E11.49 TYPE II DIABETES MELLITUS WITH NEUROLOGICAL MANIFESTATIONS: Primary | ICD-10-CM

## 2020-11-17 DIAGNOSIS — H35.3131 EARLY DRY STAGE NONEXUDATIVE AGE-RELATED MACULAR DEGENERATION OF BOTH EYES: ICD-10-CM

## 2020-11-17 DIAGNOSIS — B35.1 ONYCHOMYCOSIS DUE TO DERMATOPHYTE: ICD-10-CM

## 2020-11-17 DIAGNOSIS — L84 PRE-ULCERATIVE CALLUSES: ICD-10-CM

## 2020-11-17 PROCEDURE — 11055 PARING/CUTG B9 HYPRKER LES 1: CPT | Mod: Q9,HCNC,S$GLB, | Performed by: PODIATRIST

## 2020-11-17 PROCEDURE — 3288F FALL RISK ASSESSMENT DOCD: CPT | Mod: HCNC,CPTII,S$GLB, | Performed by: PODIATRIST

## 2020-11-17 PROCEDURE — 11721 DEBRIDE NAIL 6 OR MORE: CPT | Mod: 59,Q9,HCNC,S$GLB | Performed by: PODIATRIST

## 2020-11-17 PROCEDURE — 1101F PT FALLS ASSESS-DOCD LE1/YR: CPT | Mod: HCNC,CPTII,S$GLB, | Performed by: PODIATRIST

## 2020-11-17 PROCEDURE — 99499 UNLISTED E&M SERVICE: CPT | Mod: HCNC,S$GLB,, | Performed by: PODIATRIST

## 2020-11-17 PROCEDURE — 11721 PR DEBRIDEMENT OF NAILS, 6 OR MORE: ICD-10-PCS | Mod: 59,Q9,HCNC,S$GLB | Performed by: PODIATRIST

## 2020-11-17 PROCEDURE — 11055 PR TRIM HYPERKERATOTIC SKIN LESION, ONE: ICD-10-PCS | Mod: Q9,HCNC,S$GLB, | Performed by: PODIATRIST

## 2020-11-17 PROCEDURE — 99499 UNLISTED E&M SERVICE: CPT | Mod: S$GLB,,, | Performed by: OPHTHALMOLOGY

## 2020-11-17 PROCEDURE — 99999 PR PBB SHADOW E&M-EST. PATIENT-LVL IV: CPT | Mod: PBBFAC,HCNC,, | Performed by: PODIATRIST

## 2020-11-17 PROCEDURE — 99499 RISK ADDL DX/OHS AUDIT: ICD-10-PCS | Mod: S$GLB,,, | Performed by: OPHTHALMOLOGY

## 2020-11-17 PROCEDURE — 1126F AMNT PAIN NOTED NONE PRSNT: CPT | Mod: HCNC,S$GLB,, | Performed by: OPHTHALMOLOGY

## 2020-11-17 PROCEDURE — 99499 NO LOS: ICD-10-PCS | Mod: HCNC,S$GLB,, | Performed by: PODIATRIST

## 2020-11-17 PROCEDURE — 1126F PR PAIN SEVERITY QUANTIFIED, NO PAIN PRESENT: ICD-10-PCS | Mod: HCNC,S$GLB,, | Performed by: OPHTHALMOLOGY

## 2020-11-17 PROCEDURE — 92015 DETERMINE REFRACTIVE STATE: CPT | Mod: HCNC,S$GLB,, | Performed by: OPHTHALMOLOGY

## 2020-11-17 PROCEDURE — 99999 PR PBB SHADOW E&M-EST. PATIENT-LVL IV: CPT | Mod: PBBFAC,HCNC,, | Performed by: OPHTHALMOLOGY

## 2020-11-17 PROCEDURE — 99999 PR PBB SHADOW E&M-EST. PATIENT-LVL IV: ICD-10-PCS | Mod: PBBFAC,HCNC,, | Performed by: PODIATRIST

## 2020-11-17 PROCEDURE — 92004 COMPRE OPH EXAM NEW PT 1/>: CPT | Mod: HCNC,S$GLB,, | Performed by: OPHTHALMOLOGY

## 2020-11-17 PROCEDURE — 1101F PR PT FALLS ASSESS DOC 0-1 FALLS W/OUT INJ PAST YR: ICD-10-PCS | Mod: HCNC,CPTII,S$GLB, | Performed by: PODIATRIST

## 2020-11-17 PROCEDURE — 92004 PR EYE EXAM, NEW PATIENT,COMPREHESV: ICD-10-PCS | Mod: HCNC,S$GLB,, | Performed by: OPHTHALMOLOGY

## 2020-11-17 PROCEDURE — 3072F LOW RISK FOR RETINOPATHY: CPT | Mod: HCNC,S$GLB,, | Performed by: PODIATRIST

## 2020-11-17 PROCEDURE — 99999 PR PBB SHADOW E&M-EST. PATIENT-LVL IV: ICD-10-PCS | Mod: PBBFAC,HCNC,, | Performed by: OPHTHALMOLOGY

## 2020-11-17 PROCEDURE — 3072F PR LOW RISK FOR RETINOPATHY: ICD-10-PCS | Mod: HCNC,S$GLB,, | Performed by: PODIATRIST

## 2020-11-17 PROCEDURE — 3288F PR FALLS RISK ASSESSMENT DOCUMENTED: ICD-10-PCS | Mod: HCNC,CPTII,S$GLB, | Performed by: PODIATRIST

## 2020-11-17 PROCEDURE — 92015 PR REFRACTION: ICD-10-PCS | Mod: HCNC,S$GLB,, | Performed by: OPHTHALMOLOGY

## 2020-11-17 RX ORDER — INFLUENZA VACCINE, ADJUVANTED 15; 15; 15 UG/.5ML; UG/.5ML; UG/.5ML
INJECTION, SUSPENSION INTRAMUSCULAR
COMMUNITY
Start: 2020-10-07 | End: 2021-10-19

## 2020-11-17 NOTE — PATIENT INSTRUCTIONS
What Are Flashes and Floaters?  Have you ever seen flashes of light, stars, or streaks that arent really there? A few of these flashes are seen by everyone from time to time. Usually you see them in one eye at a time. Flashes are often caused by the gel filling inside of your eye, called the vitreous, pulling on the retina. The retina is a membrane that lines the inside of your eye.  Floaters look like dark specks, clouds, threads, or spider webs moving through your eyesight. Most people see them once in a while. Floaters may be pieces of gel or other material floating inside your eye. They are usually harmless.      Who Gets Flashes?  As you age or if you are nearsighted, you are more likely to see flashes. Nearsightedness is when you have fuzzy distance vision. Sometimes, flashes are a sign of other eye problems that need care.  Who Gets Floaters?  The older you get, the more likely youll notice floaters. Floaters can also be caused by an eye injury or surgery. People who are very nearsighted may get more floaters. If floaters appear suddenly or greatly increase in number, see your healthcare provider. This may be a sign of an eye problem.  Date Last Reviewed: 5/31/2015 © 2000-2017 [employer]. [address] [place] [address], [place] [address]. All rights reserved. This information is not intended as a substitute for professional medical care. Always follow your healthcare professional's instructions.

## 2020-11-17 NOTE — PROGRESS NOTES
HPI     Diabetic Eye Exam      Additional comments: Both              Comments     DLS: 2/6/19    Pt states decrease in va OD x 2-3 wks at dist and near. When she looks up   and then back down it takes awhile for her OD to focus. Feels like her eye   is floating. No floaters or flashes.     LBSL: checked daily by caregiver  Hemoglobin A1C       Date                     Value               Ref Range             Status                08/18/2020               9.4 (H)             4.0 - 5.6 %           Final                 01/07/2020               7.3 (H)             4.0 - 5.6 %           Final                 07/25/2019               7.1 (H)             4.0 - 5.6 %           Final                      Last edited by Cheryle Quintana on 11/17/2020 11:09 AM. (History)        ROS     Negative for: Constitutional, Gastrointestinal, Neurological, Skin,   Genitourinary, Musculoskeletal, HENT, Endocrine, Cardiovascular, Eyes,   Respiratory, Psychiatric, Allergic/Imm, Heme/Lymph    Last edited by Bran Huddleston Jr., MD on 11/17/2020 11:38 AM. (History)        Assessment /Plan     For exam results, see Encounter Report.    Diabetes mellitus type 2 without retinopathy    Pseudophakia of both eyes    Intermediate stage nonexudative age-related macular degeneration of both eyes      -no retinopathy seen on DFE today  -continue good blood pressure and glucose control  -Check your [employer]ler Grid daily, each eye separately; instructions are available on the grid  -Return promptly if a change is noted such as lines that are not visible or looking wavy on the grid  -rx for glasses given to patient  Follow up in about 1 year (around 11/17/2021) for Dilated Diabetic Fundus Exam.

## 2020-11-18 NOTE — PROGRESS NOTES
Subjective:     Patient ID: Kassi Skelton is a 88 y.o. female.    Chief Complaint: Nail Care (PCP Dr. Cook 9/01/20 3 mo Nailcare)    Kassi is a 88 y.o. female who presents to the clinic for evaluation and treatment of high risk feet. Kassi has a past medical history of *Atrial fibrillation (4/19/2012), *Atrial flutter (4/19/2012), Allergy, Ankle fracture (4/19/2012), Anticoagulant long-term use, Anxiety, Arthritis, Bacterial pneumonia, unspecified (12/7/2012), Breast cancer, CKD (chronic kidney disease), stage III (5/11/2012), Dependent on walker for ambulation, Depression (4/19/2012), Diabetes mellitus with stage 3 chronic kidney disease (2/1/2016), Diabetes with neurologic complications, Edentulous, HEARING LOSS, Hip fracture, right (4/19/2012), HLD (hyperlipidemia) (4/19/2012), HTN (hypertension) (4/19/2012), Hypothyroidism (4/19/2012), 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 The patient's chief complaint is long, thick toenails. This patient has documented high risk feet requiring routine maintenance secondary to diabetes mellitis and those secondary complications of diabetes, as mentioned. Patient states the area is healed now. Patient is accompanied by her daughter today.     PCP: Mor Cook MD    Date Last Seen by PCP: 09/01/2020    Current shoe gear:  Affected Foot: Tennis shoes     Unaffected Foot: Tennis shoes    Hemoglobin A1C   Date Value Ref Range Status   08/18/2020 9.4 (H) 4.0 - 5.6 % Final     Comment:     ADA Screening Guidelines:  5.7-6.4%  Consistent with prediabetes  >or=6.5%  Consistent with diabetes  High levels of fetal hemoglobin interfere with the  HbA1C  assay. Heterozygous hemoglobin variants (HbS, HgC, etc)do  not significantly interfere with this assay.   However, presence of multiple variants may affect accuracy.     01/07/2020 7.3 (H) 4.0 - 5.6 % Final     Comment:     ADA Screening Guidelines:  5.7-6.4%  Consistent with prediabetes  >or=6.5%  Consistent with diabetes  High levels of fetal hemoglobin interfere with the HbA1C  assay. Heterozygous hemoglobin variants (HbS, HgC, etc)do  not significantly interfere with this assay.   However, presence of multiple variants may affect accuracy.     07/25/2019 7.1 (H) 4.0 - 5.6 % Final     Comment:     ADA Screening Guidelines:  5.7-6.4%  Consistent with prediabetes  >or=6.5%  Consistent with diabetes  High levels of fetal hemoglobin interfere with the HbA1C  assay. Heterozygous hemoglobin variants (HbS, HgC, etc)do  not significantly interfere with this assay.   However, presence of multiple variants may affect accuracy.         Patient Active Problem List   Diagnosis    History of atrial flutter    Combined hyperlipidemia associated with type 2 diabetes mellitus    Hypothyroidism    Major depressive disorder, recurrent episode, moderate    Osteoporosis    Benign hypertensive kidney disease with chronic kidney disease    Cyst of kidney, acquired    Atrial fibrillation    Anticoagulation monitoring by pharmacist    LBBB (left bundle branch block)    Sinus node dysfunction    Cardiac pacemaker in situ    Urinary incontinence, mixed    Pacemaker    Orthostatic hypotension    DCIS (ductal carcinoma in situ)    Hypertension associated with diabetes    Diabetes mellitus with stage 4 chronic kidney disease GFR 15-29    Dizziness    Secondary hyperparathyroidism of renal origin    Hearing aid worn    Diabetic neuropathy, type II diabetes mellitus    Mild cognitive impairment with memory loss    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    Primary osteoarthritis    Edentulous     Greater trochanteric bursitis of right hip       Medication List with Changes/Refills   Current Medications    ACCU-CHEK SHELDON CONTROL SOLN SOLN    Use as directed    BD ALCOHOL SWABS PADM    USE AS NEEDED    BENAZEPRIL (LOTENSIN) 20 MG TABLET    TAKE 1 TABLET EVERY DAY    BLOOD SUGAR DIAGNOSTIC (ACCU-CHEK SHELDON PLUS TEST STRP) STRP    Test glucose once daily    BLOOD SUGAR DIAGNOSTIC STRP    To check BG once daily, to use with insurance preferred meter    BLOOD-GLUCOSE METER KIT    To check BG once daily, to use with insurance preferred meter    CALCITRIOL (ROCALTROL) 0.25 MCG CAP    TAKE 1 CAPSULE EVERY DAY    DONEPEZIL (ARICEPT) 5 MG TABLET    TAKE 1 TABLET EVERY EVENING    ELIQUIS 2.5 MG TAB    TAKE 1 TABLET TWICE DAILY    FLUAD [id number], 65 YR UP,,PF, 45 MCG (15 MCG X 3)/0.5 ML SYRG    INJECT 0.5ml INTRAMUSCULARLY FOR 1 DOSE    HYDROCHLOROTHIAZIDE (HYDRODIURIL) 25 MG TABLET    TAKE 1 TABLET EVERY DAY    LANCETS MISC    To check BG once daily, to use with insurance preferred meter    LEVOTHYROXINE (SYNTHROID) 25 MCG TABLET    TAKE 1 TABLET BY MOUTH ONCE DAILY.    METOPROLOL TARTRATE (LOPRESSOR) 50 MG TABLET    TAKE 1/2 TABLET TWICE DAILY (LAST REFILL UNTIL CLINIC VISIT MADE)    MIRTAZAPINE (REMERON) 45 MG TABLET    Take 45 mg by mouth every evening.    MYRBETRIQ 50 MG TB24    TAKE 1 TABLET EVERY DAY    PRAVASTATIN (PRAVACHOL) 40 MG TABLET    TAKE 1 TABLET EVERY DAY    REPAGLINIDE (PRANDIN) 1 MG TABLET    Take 1 tablet (1 mg total) by mouth 3 (three) times daily before meals.    VENLAFAXINE (EFFEXOR) 75 MG TABLET    Take 75 mg by mouth once daily.    VERAPAMIL (CALAN-SR) 120 MG CR TABLET    TAKE 1 TABLET EVERY NIGHT       Review of patient's allergies indicates:   Allergen Reactions    Amlodipine Edema     Pt stated this medication made her legs swell    Alendronate sodium Other (See Comments)     Reaction: Esophageal bleeding    Fosamax [alendronate] Other (See Comments)     Reaction: Esophageal  bleeding  diarrhea    Sorbitol      Diarrhea     Augmentin [amoxicillin-pot clavulanate] Hives and Rash       Past Surgical History:   Procedure Laterality Date    APPENDECTOMY      BREAST LUMPECTOMY Right 10/21/2015    CARDIAC PACEMAKER PLACEMENT  12/3/12    Sinus node dysfunction    CATARACT EXTRACTION W/  INTRAOCULAR LENS IMPLANT Bilateral     COLONOSCOPY      excision of squamous cell carcinoma Right 2017    EYE SURGERY Bilateral     PHACO/IOL    FRACTURE SURGERY Right     ankle    HYSTERECTOMY      total 1970's    MASTECTOMY Right 11/2015    OOPHORECTOMY      TONSILLECTOMY      TOTAL HIP ARTHROPLASTY Right 2007       Family History   Problem Relation Age of Onset    Hypertension Mother     Heart disease Father     Stroke Father         cerebral hemorrhage    Coronary artery disease Brother     Heart disease Brother     Coronary artery disease Brother     COPD Brother     Heart disease Brother     Mitral valve prolapse Daughter     Peripheral vascular disease Son     Cataracts Neg Hx     Glaucoma Neg Hx     Macular degeneration Neg Hx     Retinal detachment Neg Hx     Strabismus Neg Hx        Social History     Socioeconomic History    Marital status:      Spouse name: Not on file    Number of children: Not on file    Years of education: Not on file    Highest education level: Not on file   Occupational History    Not on file   Social Needs    Financial resource strain: Not hard at all    Food insecurity     Worry: Never true     Inability: Never true    Transportation needs     Medical: No     Non-medical: No   Tobacco Use    Smoking status: Never Smoker    Smokeless tobacco: Never Used   Substance and Sexual Activity    Alcohol use: No     Frequency: Never     Binge frequency: Never    Drug use: No    Sexual activity: Not Currently   Lifestyle    Physical activity     Days per week: 0 days     Minutes per session: 0 min    Stress: Not at all   Relationships     Social connections     Talks on phone: Once a week     Gets together: Never     Attends Islam service: Not on file     Active member of club or organization: No     Attends meetings of clubs or organizations: Never     Relationship status:    Other Topics Concern    Not on file   Social History Narrative    Not on file       Vitals:    11/17/20 1413   BP: 130/69   Pulse: 71   Weight: 67 kg (147 lb 11.3 oz)   Height: 5' (1.524 m)       Hemoglobin A1C   Date Value Ref Range Status   08/18/2020 9.4 (H) 4.0 - 5.6 % Final     Comment:     ADA Screening Guidelines:  5.7-6.4%  Consistent with prediabetes  >or=6.5%  Consistent with diabetes  High levels of fetal hemoglobin interfere with the HbA1C  assay. Heterozygous hemoglobin variants (HbS, HgC, etc)do  not significantly interfere with this assay.   However, presence of multiple variants may affect accuracy.     01/07/2020 7.3 (H) 4.0 - 5.6 % Final     Comment:     ADA Screening Guidelines:  5.7-6.4%  Consistent with prediabetes  >or=6.5%  Consistent with diabetes  High levels of fetal hemoglobin interfere with the HbA1C  assay. Heterozygous hemoglobin variants (HbS, HgC, etc)do  not significantly interfere with this assay.   However, presence of multiple variants may affect accuracy.     07/25/2019 7.1 (H) 4.0 - 5.6 % Final     Comment:     ADA Screening Guidelines:  5.7-6.4%  Consistent with prediabetes  >or=6.5%  Consistent with diabetes  High levels of fetal hemoglobin interfere with the HbA1C  assay. Heterozygous hemoglobin variants (HbS, HgC, etc)do  not significantly interfere with this assay.   However, presence of multiple variants may affect accuracy.         Review of Systems   Constitutional: Negative for chills and fever.   Respiratory: Negative for shortness of breath.    Cardiovascular: Negative for chest pain, palpitations, orthopnea, claudication and leg swelling.   Gastrointestinal: Negative for diarrhea, nausea and vomiting.    Musculoskeletal: Negative for joint pain.   Skin: Negative for rash.   Neurological: Positive for sensory change. Negative for dizziness, tingling, focal weakness and weakness.   Psychiatric/Behavioral: Negative.              Objective:       PHYSICAL EXAM: Apperance: Alert and orient in no distress,well developed, and with good attention to grooming and body habits  Lower Extremity Exam  VASCULAR: Dorsalis pedis pulses 1/4 bilateral and Posterior Tibial pulses 1/4 bilateral. Capillary fill time <4 seconds bilateral. No edema observed bilateral. Varicosities present bilateral. Skin temperature of the lower extremities is warm to warm, proximal to distal. Hair growth absent bilateral.   DERMATOLOGICAL: No skin rashes, subcutaneous nodules, lesions, or ulcers observed bilateral. The dorsum surface of the feet are soft and supple.  The plantar aspects of both feet are dry and scaly. Nails 1,2,3,4,5 bilateral elongated, thickened, and discolored with subungual debris. Webspaces 1,2,3,4 clean, dry and without evidence of break in skin integrity bilateral. Mild hyperkeratotic tissue noted to left medial hallux.   NEUROLOGICAL: Light touch, sharp-dull, proprioception all present and equal bilaterally.   MUSCULOSKELETAL: Muscle strength is 5/5 for foot inverters, everters, plantarflexors, and dorsiflexors. Muscle tone is normal. Fat pad atrophy noted bilateral. Mild tenderness on palpation of left plantar heel.         Assessment:       Encounter Diagnoses   Name Primary?    Type II diabetes mellitus with neurological manifestations Yes    Onychomycosis due to dermatophyte     Pre-ulcerative calluses - Left Foot          Plan:   Type II diabetes mellitus with neurological manifestations    Onychomycosis due to dermatophyte    Pre-ulcerative calluses - Left Foot      I counseled the patient on her conditions, regarding findings of my examination, my impressions, and usual treatment plan.   Greater than 15 minutes of a  20 minute appointment spent on education about the diabetic foot, neuropathy, and prevention of limb loss.  Shoe inspection. Diabetic Foot Education. Patient reminded of the importance of good nutrition and blood sugar control to help prevent podiatric complications of diabetes. Patient instructed on proper foot hygeine. We discussed wearing proper shoe gear, daily foot inspections, never walking without protective shoe gear, never putting sharp instruments to feet.    With patient's permission, nails 1-5 bilateral were debrided/trimmed in length and thickness aggressively to their soft tissue attachment mechanically and with electric , removing all offending nail and debris. Patient relates relief following the procedure.  With patient's permission, left callus trimmed in thickness with #15 blade in thickness without incident.   Patient instructed to offload heels when laying down/sitting.   Patient  will continue to monitor the areas daily, inspect feet, wear protective shoe gear when ambulatory, moisturizer to maintain skin integrity. Patient reminded of the importance of good nutrition and blood sugar control to help prevent podiatric complications of diabetes.  Patient to return 3 months or sooner if needed.                 Luzmaria Valle DPM  [facility]

## 2020-11-20 ENCOUNTER — DOCUMENT SCAN (OUTPATIENT)
Dept: HOME HEALTH SERVICES | Facility: HOSPITAL | Age: 85
End: 2020-11-20
Payer: MEDICARE

## 2020-11-20 ENCOUNTER — LAB VISIT (OUTPATIENT)
Dept: LAB | Facility: OTHER | Age: 85
End: 2020-11-20
Payer: MEDICARE

## 2020-11-20 DIAGNOSIS — Z03.818 ENCOUNTER FOR OBSERVATION FOR SUSPECTED EXPOSURE TO OTHER BIOLOGICAL AGENTS RULED OUT: ICD-10-CM

## 2020-11-20 PROCEDURE — U0003 INFECTIOUS AGENT DETECTION BY NUCLEIC ACID (DNA OR RNA); SEVERE ACUTE RESPIRATORY SYNDROME CORONAVIRUS 2 (SARS-COV-2) (CORONAVIRUS DISEASE [COVID-19]), AMPLIFIED PROBE TECHNIQUE, MAKING USE OF HIGH THROUGHPUT TECHNOLOGIES AS DESCRIBED BY CMS-2020-01-R: HCPCS | Mod: HCNC

## 2020-11-23 LAB — SARS-COV-2 RNA RESP QL NAA+PROBE: NOT DETECTED

## 2020-11-25 ENCOUNTER — LAB VISIT (OUTPATIENT)
Dept: LAB | Facility: OTHER | Age: 85
End: 2020-11-25
Attending: INTERNAL MEDICINE
Payer: MEDICARE

## 2020-11-25 DIAGNOSIS — Z03.818 ENCOUNTER FOR OBSERVATION FOR SUSPECTED EXPOSURE TO OTHER BIOLOGICAL AGENTS RULED OUT: ICD-10-CM

## 2020-11-25 PROCEDURE — U0003 INFECTIOUS AGENT DETECTION BY NUCLEIC ACID (DNA OR RNA); SEVERE ACUTE RESPIRATORY SYNDROME CORONAVIRUS 2 (SARS-COV-2) (CORONAVIRUS DISEASE [COVID-19]), AMPLIFIED PROBE TECHNIQUE, MAKING USE OF HIGH THROUGHPUT TECHNOLOGIES AS DESCRIBED BY CMS-2020-01-R: HCPCS | Mod: HCNC

## 2020-11-27 ENCOUNTER — DOCUMENT SCAN (OUTPATIENT)
Dept: HOME HEALTH SERVICES | Facility: HOSPITAL | Age: 85
End: 2020-11-27
Payer: MEDICARE

## 2020-11-28 LAB — SARS-COV-2 RNA RESP QL NAA+PROBE: NOT DETECTED

## 2020-11-30 ENCOUNTER — TELEPHONE (OUTPATIENT)
Dept: NEPHROLOGY | Facility: CLINIC | Age: 85
End: 2020-11-30

## 2020-11-30 NOTE — TELEPHONE ENCOUNTER
Spoke to the pt's daughter, Tova, and she had to cancel her mom's appt with Dr Kang because of Covid concern.  The assisted living is on lock down and if the pt left the facility she would be confined to her room for 2 weeks.  Her daughter will call when the pt is able to get labs done in [place] because the facility is not letting any outside labs into the building.

## 2020-12-02 ENCOUNTER — LAB VISIT (OUTPATIENT)
Dept: LAB | Facility: OTHER | Age: 85
End: 2020-12-02
Payer: MEDICARE

## 2020-12-02 DIAGNOSIS — Z03.818 ENCOUNTER FOR OBSERVATION FOR SUSPECTED EXPOSURE TO OTHER BIOLOGICAL AGENTS RULED OUT: ICD-10-CM

## 2020-12-02 PROCEDURE — U0003 INFECTIOUS AGENT DETECTION BY NUCLEIC ACID (DNA OR RNA); SEVERE ACUTE RESPIRATORY SYNDROME CORONAVIRUS 2 (SARS-COV-2) (CORONAVIRUS DISEASE [COVID-19]), AMPLIFIED PROBE TECHNIQUE, MAKING USE OF HIGH THROUGHPUT TECHNOLOGIES AS DESCRIBED BY CMS-2020-01-R: HCPCS | Mod: HCNC

## 2020-12-04 LAB — SARS-COV-2 RNA RESP QL NAA+PROBE: NOT DETECTED

## 2020-12-04 RX ORDER — DONEPEZIL HYDROCHLORIDE 5 MG/1
5 TABLET, FILM COATED ORAL NIGHTLY
Qty: 90 TABLET | Refills: 3 | Status: SHIPPED | OUTPATIENT
Start: 2020-12-04 | End: 2022-03-21 | Stop reason: SDUPTHER

## 2020-12-04 NOTE — TELEPHONE ENCOUNTER
----- Message from Anabella Colon sent at 12/4/2020 11:07 AM CST -----  Contact: Keisha faby/ [facility] [phone]  Provider called to request an order for donepezil (ARICEPT) 5 MG tablet for records.  Please fax to [phone].

## 2020-12-04 NOTE — TELEPHONE ENCOUNTER
Was ordered in October by NP at [facility] [email or web address] [facility], please advise.

## 2020-12-09 ENCOUNTER — LAB VISIT (OUTPATIENT)
Dept: LAB | Facility: OTHER | Age: 85
End: 2020-12-09
Attending: INTERNAL MEDICINE
Payer: MEDICARE

## 2020-12-09 DIAGNOSIS — Z03.818 ENCOUNTER FOR OBSERVATION FOR SUSPECTED EXPOSURE TO OTHER BIOLOGICAL AGENTS RULED OUT: ICD-10-CM

## 2020-12-09 PROCEDURE — U0003 INFECTIOUS AGENT DETECTION BY NUCLEIC ACID (DNA OR RNA); SEVERE ACUTE RESPIRATORY SYNDROME CORONAVIRUS 2 (SARS-COV-2) (CORONAVIRUS DISEASE [COVID-19]), AMPLIFIED PROBE TECHNIQUE, MAKING USE OF HIGH THROUGHPUT TECHNOLOGIES AS DESCRIBED BY CMS-2020-01-R: HCPCS | Mod: HCNC

## 2020-12-10 LAB — SARS-COV-2 RNA RESP QL NAA+PROBE: NOT DETECTED

## 2020-12-16 ENCOUNTER — PATIENT MESSAGE (OUTPATIENT)
Dept: NEPHROLOGY | Facility: CLINIC | Age: 85
End: 2020-12-16

## 2020-12-16 ENCOUNTER — LAB VISIT (OUTPATIENT)
Dept: LAB | Facility: OTHER | Age: 85
End: 2020-12-16
Payer: MEDICARE

## 2020-12-16 ENCOUNTER — PATIENT MESSAGE (OUTPATIENT)
Dept: CARDIOLOGY | Facility: CLINIC | Age: 85
End: 2020-12-16

## 2020-12-16 ENCOUNTER — PATIENT MESSAGE (OUTPATIENT)
Dept: FAMILY MEDICINE | Facility: CLINIC | Age: 85
End: 2020-12-16

## 2020-12-16 DIAGNOSIS — Z03.818 ENCOUNTER FOR OBSERVATION FOR SUSPECTED EXPOSURE TO OTHER BIOLOGICAL AGENTS RULED OUT: ICD-10-CM

## 2020-12-16 PROCEDURE — U0003 INFECTIOUS AGENT DETECTION BY NUCLEIC ACID (DNA OR RNA); SEVERE ACUTE RESPIRATORY SYNDROME CORONAVIRUS 2 (SARS-COV-2) (CORONAVIRUS DISEASE [COVID-19]), AMPLIFIED PROBE TECHNIQUE, MAKING USE OF HIGH THROUGHPUT TECHNOLOGIES AS DESCRIBED BY CMS-2020-01-R: HCPCS | Mod: HCNC

## 2020-12-18 LAB — SARS-COV-2 RNA RESP QL NAA+PROBE: NOT DETECTED

## 2020-12-23 ENCOUNTER — LAB VISIT (OUTPATIENT)
Dept: LAB | Facility: OTHER | Age: 85
End: 2020-12-23
Payer: MEDICARE

## 2020-12-23 DIAGNOSIS — Z03.818 ENCOUNTER FOR OBSERVATION FOR SUSPECTED EXPOSURE TO OTHER BIOLOGICAL AGENTS RULED OUT: ICD-10-CM

## 2020-12-23 PROCEDURE — U0003 INFECTIOUS AGENT DETECTION BY NUCLEIC ACID (DNA OR RNA); SEVERE ACUTE RESPIRATORY SYNDROME CORONAVIRUS 2 (SARS-COV-2) (CORONAVIRUS DISEASE [COVID-19]), AMPLIFIED PROBE TECHNIQUE, MAKING USE OF HIGH THROUGHPUT TECHNOLOGIES AS DESCRIBED BY CMS-2020-01-R: HCPCS | Mod: HCNC

## 2020-12-25 LAB — SARS-COV-2 RNA RESP QL NAA+PROBE: NOT DETECTED

## 2020-12-30 ENCOUNTER — LAB VISIT (OUTPATIENT)
Dept: LAB | Facility: OTHER | Age: 85
End: 2020-12-30
Payer: MEDICARE

## 2020-12-30 DIAGNOSIS — Z03.818 ENCOUNTER FOR OBSERVATION FOR SUSPECTED EXPOSURE TO OTHER BIOLOGICAL AGENTS RULED OUT: ICD-10-CM

## 2020-12-30 PROCEDURE — U0003 INFECTIOUS AGENT DETECTION BY NUCLEIC ACID (DNA OR RNA); SEVERE ACUTE RESPIRATORY SYNDROME CORONAVIRUS 2 (SARS-COV-2) (CORONAVIRUS DISEASE [COVID-19]), AMPLIFIED PROBE TECHNIQUE, MAKING USE OF HIGH THROUGHPUT TECHNOLOGIES AS DESCRIBED BY CMS-2020-01-R: HCPCS | Mod: HCNC

## 2020-12-31 LAB — SARS-COV-2 RNA RESP QL NAA+PROBE: NOT DETECTED

## 2021-01-19 RX ORDER — REPAGLINIDE 1 MG/1
1 TABLET ORAL
Qty: 270 TABLET | Refills: 0 | Status: SHIPPED | OUTPATIENT
Start: 2021-01-19 | End: 2021-03-12

## 2021-01-22 ENCOUNTER — PATIENT MESSAGE (OUTPATIENT)
Dept: FAMILY MEDICINE | Facility: CLINIC | Age: 86
End: 2021-01-22

## 2021-02-01 ENCOUNTER — PATIENT MESSAGE (OUTPATIENT)
Dept: FAMILY MEDICINE | Facility: CLINIC | Age: 86
End: 2021-02-01

## 2021-02-01 DIAGNOSIS — Z51.89 ENCOUNTER FOR WOUND CARE: Primary | ICD-10-CM

## 2021-02-02 ENCOUNTER — TELEPHONE (OUTPATIENT)
Dept: FAMILY MEDICINE | Facility: CLINIC | Age: 86
End: 2021-02-02

## 2021-02-02 PROCEDURE — G0180 MD CERTIFICATION HHA PATIENT: HCPCS | Mod: ,,, | Performed by: FAMILY MEDICINE

## 2021-02-02 PROCEDURE — G0180 PR HOME HEALTH MD CERTIFICATION: ICD-10-PCS | Mod: ,,, | Performed by: FAMILY MEDICINE

## 2021-02-05 ENCOUNTER — TELEPHONE (OUTPATIENT)
Dept: FAMILY MEDICINE | Facility: CLINIC | Age: 86
End: 2021-02-05

## 2021-02-05 RX ORDER — DOXYCYCLINE 100 MG/1
100 CAPSULE ORAL 2 TIMES DAILY
Qty: 14 CAPSULE | Refills: 0 | Status: SHIPPED | OUTPATIENT
Start: 2021-02-05 | End: 2021-02-12

## 2021-02-12 RX ORDER — MIRABEGRON 50 MG/1
TABLET, FILM COATED, EXTENDED RELEASE ORAL
Qty: 90 TABLET | Refills: 1 | Status: SHIPPED | OUTPATIENT
Start: 2021-02-12 | End: 2021-07-15

## 2021-02-18 ENCOUNTER — PATIENT MESSAGE (OUTPATIENT)
Dept: CARDIOLOGY | Facility: CLINIC | Age: 86
End: 2021-02-18

## 2021-02-18 ENCOUNTER — PATIENT MESSAGE (OUTPATIENT)
Dept: NEPHROLOGY | Facility: CLINIC | Age: 86
End: 2021-02-18

## 2021-02-18 ENCOUNTER — PATIENT MESSAGE (OUTPATIENT)
Dept: FAMILY MEDICINE | Facility: CLINIC | Age: 86
End: 2021-02-18

## 2021-02-18 DIAGNOSIS — N18.30 STAGE 3 CHRONIC KIDNEY DISEASE, UNSPECIFIED WHETHER STAGE 3A OR 3B CKD: ICD-10-CM

## 2021-02-18 DIAGNOSIS — N28.1 CYST OF KIDNEY, ACQUIRED: Primary | ICD-10-CM

## 2021-02-26 ENCOUNTER — LAB VISIT (OUTPATIENT)
Dept: LAB | Facility: HOSPITAL | Age: 86
End: 2021-02-26
Attending: FAMILY MEDICINE
Payer: MEDICARE

## 2021-02-26 DIAGNOSIS — N28.1 CYST OF KIDNEY, ACQUIRED: ICD-10-CM

## 2021-02-26 DIAGNOSIS — N18.4 DIABETES MELLITUS WITH STAGE 4 CHRONIC KIDNEY DISEASE GFR 15-29: ICD-10-CM

## 2021-02-26 DIAGNOSIS — E11.22 DIABETES MELLITUS WITH STAGE 4 CHRONIC KIDNEY DISEASE GFR 15-29: ICD-10-CM

## 2021-02-26 DIAGNOSIS — N18.30 STAGE 3 CHRONIC KIDNEY DISEASE, UNSPECIFIED WHETHER STAGE 3A OR 3B CKD: ICD-10-CM

## 2021-02-26 LAB
BASOPHILS # BLD AUTO: 0.12 K/UL (ref 0–0.2)
BASOPHILS NFR BLD: 1.2 % (ref 0–1.9)
DIFFERENTIAL METHOD: ABNORMAL
EOSINOPHIL # BLD AUTO: 0.3 K/UL (ref 0–0.5)
EOSINOPHIL NFR BLD: 3.3 % (ref 0–8)
ERYTHROCYTE [DISTWIDTH] IN BLOOD BY AUTOMATED COUNT: 14 % (ref 11.5–14.5)
HCT VFR BLD AUTO: 39.9 % (ref 37–48.5)
HGB BLD-MCNC: 12.7 G/DL (ref 12–16)
IMM GRANULOCYTES # BLD AUTO: 0.12 K/UL (ref 0–0.04)
IMM GRANULOCYTES NFR BLD AUTO: 1.2 % (ref 0–0.5)
LYMPHOCYTES # BLD AUTO: 2.4 K/UL (ref 1–4.8)
LYMPHOCYTES NFR BLD: 24.4 % (ref 18–48)
MCH RBC QN AUTO: 30.9 PG (ref 27–31)
MCHC RBC AUTO-ENTMCNC: 31.8 G/DL (ref 32–36)
MCV RBC AUTO: 97 FL (ref 82–98)
MONOCYTES # BLD AUTO: 0.9 K/UL (ref 0.3–1)
MONOCYTES NFR BLD: 9.1 % (ref 4–15)
NEUTROPHILS # BLD AUTO: 5.8 K/UL (ref 1.8–7.7)
NEUTROPHILS NFR BLD: 60.8 % (ref 38–73)
NRBC BLD-RTO: 0 /100 WBC
PLATELET # BLD AUTO: 272 K/UL (ref 150–350)
PMV BLD AUTO: 10.3 FL (ref 9.2–12.9)
RBC # BLD AUTO: 4.11 M/UL (ref 4–5.4)
WBC # BLD AUTO: 9.62 K/UL (ref 3.9–12.7)

## 2021-02-26 PROCEDURE — 83036 HEMOGLOBIN GLYCOSYLATED A1C: CPT

## 2021-02-26 PROCEDURE — 80069 RENAL FUNCTION PANEL: CPT

## 2021-02-26 PROCEDURE — 85025 COMPLETE CBC W/AUTO DIFF WBC: CPT

## 2021-02-26 PROCEDURE — 36415 COLL VENOUS BLD VENIPUNCTURE: CPT | Mod: PO

## 2021-02-26 PROCEDURE — 80061 LIPID PANEL: CPT

## 2021-02-26 PROCEDURE — 83970 ASSAY OF PARATHORMONE: CPT

## 2021-02-27 LAB
ALBUMIN SERPL BCP-MCNC: 3.7 G/DL (ref 3.5–5.2)
ANION GAP SERPL CALC-SCNC: 17 MMOL/L (ref 8–16)
BUN SERPL-MCNC: 30 MG/DL (ref 8–23)
CALCIUM SERPL-MCNC: 9.1 MG/DL (ref 8.7–10.5)
CHLORIDE SERPL-SCNC: 101 MMOL/L (ref 95–110)
CHOLEST SERPL-MCNC: 207 MG/DL (ref 120–199)
CHOLEST/HDLC SERPL: 3 {RATIO} (ref 2–5)
CO2 SERPL-SCNC: 21 MMOL/L (ref 23–29)
CREAT SERPL-MCNC: 2.2 MG/DL (ref 0.5–1.4)
EST. GFR  (AFRICAN AMERICAN): 22.4 ML/MIN/1.73 M^2
EST. GFR  (NON AFRICAN AMERICAN): 19.4 ML/MIN/1.73 M^2
ESTIMATED AVG GLUCOSE: 154 MG/DL (ref 68–131)
GLUCOSE SERPL-MCNC: 258 MG/DL (ref 70–110)
HBA1C MFR BLD: 7 % (ref 4–5.6)
HDLC SERPL-MCNC: 70 MG/DL (ref 40–75)
HDLC SERPL: 33.8 % (ref 20–50)
LDLC SERPL CALC-MCNC: 105.4 MG/DL (ref 63–159)
NONHDLC SERPL-MCNC: 137 MG/DL
PHOSPHATE SERPL-MCNC: 4.1 MG/DL (ref 2.7–4.5)
POTASSIUM SERPL-SCNC: 4.7 MMOL/L (ref 3.5–5.1)
PTH-INTACT SERPL-MCNC: 203 PG/ML (ref 9–77)
SODIUM SERPL-SCNC: 139 MMOL/L (ref 136–145)
TRIGL SERPL-MCNC: 158 MG/DL (ref 30–150)

## 2021-03-01 ENCOUNTER — TELEPHONE (OUTPATIENT)
Dept: FAMILY MEDICINE | Facility: CLINIC | Age: 86
End: 2021-03-01

## 2021-03-01 RX ORDER — VENLAFAXINE HYDROCHLORIDE 150 MG/1
150 CAPSULE, EXTENDED RELEASE ORAL DAILY
COMMUNITY
End: 2022-03-10 | Stop reason: SDUPTHER

## 2021-03-02 ENCOUNTER — PATIENT MESSAGE (OUTPATIENT)
Dept: FAMILY MEDICINE | Facility: CLINIC | Age: 86
End: 2021-03-02

## 2021-03-12 ENCOUNTER — PATIENT MESSAGE (OUTPATIENT)
Dept: FAMILY MEDICINE | Facility: CLINIC | Age: 86
End: 2021-03-12

## 2021-03-12 ENCOUNTER — LAB VISIT (OUTPATIENT)
Dept: LAB | Facility: HOSPITAL | Age: 86
End: 2021-03-12
Attending: FAMILY MEDICINE
Payer: MEDICARE

## 2021-03-12 ENCOUNTER — TELEPHONE (OUTPATIENT)
Dept: FAMILY MEDICINE | Facility: CLINIC | Age: 86
End: 2021-03-12

## 2021-03-12 DIAGNOSIS — R35.0 URINARY FREQUENCY: Primary | ICD-10-CM

## 2021-03-12 DIAGNOSIS — R35.0 URINARY FREQUENCY: ICD-10-CM

## 2021-03-12 LAB
BACTERIA #/AREA URNS HPF: ABNORMAL /HPF
BILIRUB UR QL STRIP: NEGATIVE
CLARITY UR: ABNORMAL
COLOR UR: YELLOW
GLUCOSE UR QL STRIP: NEGATIVE
HGB UR QL STRIP: ABNORMAL
KETONES UR QL STRIP: NEGATIVE
LEUKOCYTE ESTERASE UR QL STRIP: ABNORMAL
MICROSCOPIC COMMENT: ABNORMAL
NITRITE UR QL STRIP: NEGATIVE
PH UR STRIP: 6 [PH] (ref 5–8)
PROT UR QL STRIP: ABNORMAL
RBC #/AREA URNS HPF: 10 /HPF (ref 0–4)
SP GR UR STRIP: 1.02 (ref 1–1.03)
SQUAMOUS #/AREA URNS HPF: 8 /HPF
URN SPEC COLLECT METH UR: ABNORMAL
WBC #/AREA URNS HPF: >100 /HPF (ref 0–5)

## 2021-03-12 PROCEDURE — 87088 URINE BACTERIA CULTURE: CPT | Performed by: FAMILY MEDICINE

## 2021-03-12 PROCEDURE — 87077 CULTURE AEROBIC IDENTIFY: CPT | Performed by: FAMILY MEDICINE

## 2021-03-12 PROCEDURE — 87086 URINE CULTURE/COLONY COUNT: CPT | Performed by: FAMILY MEDICINE

## 2021-03-12 PROCEDURE — 87186 SC STD MICRODIL/AGAR DIL: CPT | Performed by: FAMILY MEDICINE

## 2021-03-12 PROCEDURE — 81000 URINALYSIS NONAUTO W/SCOPE: CPT | Mod: PO | Performed by: FAMILY MEDICINE

## 2021-03-12 RX ORDER — SULFAMETHOXAZOLE AND TRIMETHOPRIM 400; 80 MG/1; MG/1
1 TABLET ORAL 2 TIMES DAILY
Qty: 6 TABLET | Refills: 0 | Status: SHIPPED | OUTPATIENT
Start: 2021-03-12 | End: 2021-04-01 | Stop reason: SDUPTHER

## 2021-03-12 RX ORDER — REPAGLINIDE 1 MG/1
TABLET ORAL
Qty: 270 TABLET | Refills: 1 | Status: SHIPPED | OUTPATIENT
Start: 2021-03-12 | End: 2021-09-13

## 2021-03-15 ENCOUNTER — PATIENT MESSAGE (OUTPATIENT)
Dept: FAMILY MEDICINE | Facility: CLINIC | Age: 86
End: 2021-03-15

## 2021-03-15 LAB — BACTERIA UR CULT: ABNORMAL

## 2021-03-30 ENCOUNTER — OFFICE VISIT (OUTPATIENT)
Dept: PODIATRY | Facility: CLINIC | Age: 86
End: 2021-03-30
Payer: MEDICARE

## 2021-03-30 VITALS
SYSTOLIC BLOOD PRESSURE: 160 MMHG | BODY MASS INDEX: 28.99 KG/M2 | HEIGHT: 60 IN | WEIGHT: 147.69 LBS | DIASTOLIC BLOOD PRESSURE: 74 MMHG | HEART RATE: 71 BPM

## 2021-03-30 DIAGNOSIS — B35.1 ONYCHOMYCOSIS DUE TO DERMATOPHYTE: ICD-10-CM

## 2021-03-30 DIAGNOSIS — E11.49 TYPE II DIABETES MELLITUS WITH NEUROLOGICAL MANIFESTATIONS: Primary | ICD-10-CM

## 2021-03-30 DIAGNOSIS — L84 PRE-ULCERATIVE CALLUSES: ICD-10-CM

## 2021-03-30 PROCEDURE — 11721 DEBRIDE NAIL 6 OR MORE: CPT | Mod: 59,Q9,S$GLB, | Performed by: PODIATRIST

## 2021-03-30 PROCEDURE — 3288F PR FALLS RISK ASSESSMENT DOCUMENTED: ICD-10-PCS | Mod: CPTII,S$GLB,, | Performed by: PODIATRIST

## 2021-03-30 PROCEDURE — 1101F PR PT FALLS ASSESS DOC 0-1 FALLS W/OUT INJ PAST YR: ICD-10-PCS | Mod: CPTII,S$GLB,, | Performed by: PODIATRIST

## 2021-03-30 PROCEDURE — 3288F FALL RISK ASSESSMENT DOCD: CPT | Mod: CPTII,S$GLB,, | Performed by: PODIATRIST

## 2021-03-30 PROCEDURE — 99499 UNLISTED E&M SERVICE: CPT | Mod: S$GLB,,, | Performed by: PODIATRIST

## 2021-03-30 PROCEDURE — 99999 PR PBB SHADOW E&M-EST. PATIENT-LVL IV: ICD-10-PCS | Mod: PBBFAC,,, | Performed by: PODIATRIST

## 2021-03-30 PROCEDURE — 1101F PT FALLS ASSESS-DOCD LE1/YR: CPT | Mod: CPTII,S$GLB,, | Performed by: PODIATRIST

## 2021-03-30 PROCEDURE — 11055 PARING/CUTG B9 HYPRKER LES 1: CPT | Mod: Q9,S$GLB,, | Performed by: PODIATRIST

## 2021-03-30 PROCEDURE — 99999 PR PBB SHADOW E&M-EST. PATIENT-LVL IV: CPT | Mod: PBBFAC,,, | Performed by: PODIATRIST

## 2021-03-30 PROCEDURE — 11055 PR TRIM HYPERKERATOTIC SKIN LESION, ONE: ICD-10-PCS | Mod: Q9,S$GLB,, | Performed by: PODIATRIST

## 2021-03-30 PROCEDURE — 11721 PR DEBRIDEMENT OF NAILS, 6 OR MORE: ICD-10-PCS | Mod: 59,Q9,S$GLB, | Performed by: PODIATRIST

## 2021-03-30 PROCEDURE — 99499 NO LOS: ICD-10-PCS | Mod: S$GLB,,, | Performed by: PODIATRIST

## 2021-03-31 ENCOUNTER — TELEPHONE (OUTPATIENT)
Dept: DERMATOLOGY | Facility: CLINIC | Age: 86
End: 2021-03-31

## 2021-04-01 ENCOUNTER — LAB VISIT (OUTPATIENT)
Dept: LAB | Facility: HOSPITAL | Age: 86
End: 2021-04-01
Attending: FAMILY MEDICINE
Payer: MEDICARE

## 2021-04-01 ENCOUNTER — PATIENT MESSAGE (OUTPATIENT)
Dept: FAMILY MEDICINE | Facility: CLINIC | Age: 86
End: 2021-04-01

## 2021-04-01 DIAGNOSIS — R35.0 URINARY FREQUENCY: ICD-10-CM

## 2021-04-01 DIAGNOSIS — R35.0 URINARY FREQUENCY: Primary | ICD-10-CM

## 2021-04-01 LAB
BACTERIA #/AREA URNS HPF: ABNORMAL /HPF
BILIRUB UR QL STRIP: NEGATIVE
CLARITY UR: ABNORMAL
COLOR UR: YELLOW
GLUCOSE UR QL STRIP: NEGATIVE
HGB UR QL STRIP: ABNORMAL
KETONES UR QL STRIP: NEGATIVE
LEUKOCYTE ESTERASE UR QL STRIP: ABNORMAL
MICROSCOPIC COMMENT: ABNORMAL
NITRITE UR QL STRIP: NEGATIVE
PH UR STRIP: 7 [PH] (ref 5–8)
PROT UR QL STRIP: NEGATIVE
RBC #/AREA URNS HPF: 20 /HPF (ref 0–4)
SP GR UR STRIP: 1.01 (ref 1–1.03)
SQUAMOUS #/AREA URNS HPF: 5 /HPF
URN SPEC COLLECT METH UR: ABNORMAL
WBC #/AREA URNS HPF: >100 /HPF (ref 0–5)

## 2021-04-01 PROCEDURE — 87077 CULTURE AEROBIC IDENTIFY: CPT | Performed by: FAMILY MEDICINE

## 2021-04-01 PROCEDURE — 81000 URINALYSIS NONAUTO W/SCOPE: CPT | Mod: PO | Performed by: FAMILY MEDICINE

## 2021-04-01 PROCEDURE — 87088 URINE BACTERIA CULTURE: CPT | Performed by: FAMILY MEDICINE

## 2021-04-01 PROCEDURE — 87186 SC STD MICRODIL/AGAR DIL: CPT | Performed by: FAMILY MEDICINE

## 2021-04-01 PROCEDURE — 87086 URINE CULTURE/COLONY COUNT: CPT | Performed by: FAMILY MEDICINE

## 2021-04-01 RX ORDER — SULFAMETHOXAZOLE AND TRIMETHOPRIM 400; 80 MG/1; MG/1
1 TABLET ORAL 2 TIMES DAILY
Qty: 14 TABLET | Refills: 0 | Status: SHIPPED | OUTPATIENT
Start: 2021-04-01 | End: 2021-04-08

## 2021-04-03 LAB — BACTERIA UR CULT: ABNORMAL

## 2021-04-03 PROCEDURE — G0179 MD RECERTIFICATION HHA PT: HCPCS | Mod: ,,, | Performed by: FAMILY MEDICINE

## 2021-04-03 PROCEDURE — G0179 PR HOME HEALTH MD RECERTIFICATION: ICD-10-PCS | Mod: ,,, | Performed by: FAMILY MEDICINE

## 2021-04-14 ENCOUNTER — PATIENT MESSAGE (OUTPATIENT)
Dept: CARDIOLOGY | Facility: CLINIC | Age: 86
End: 2021-04-14

## 2021-04-14 ENCOUNTER — TELEPHONE (OUTPATIENT)
Dept: CARDIOLOGY | Facility: CLINIC | Age: 86
End: 2021-04-14

## 2021-04-14 ENCOUNTER — EXTERNAL HOME HEALTH (OUTPATIENT)
Dept: HOME HEALTH SERVICES | Facility: HOSPITAL | Age: 86
End: 2021-04-14
Payer: MEDICARE

## 2021-04-14 ENCOUNTER — TELEPHONE (OUTPATIENT)
Dept: DERMATOLOGY | Facility: CLINIC | Age: 86
End: 2021-04-14

## 2021-04-20 ENCOUNTER — CLINICAL SUPPORT (OUTPATIENT)
Dept: CARDIOLOGY | Facility: CLINIC | Age: 86
End: 2021-04-20
Attending: INTERNAL MEDICINE
Payer: MEDICARE

## 2021-04-20 ENCOUNTER — PATIENT OUTREACH (OUTPATIENT)
Dept: ADMINISTRATIVE | Facility: OTHER | Age: 86
End: 2021-04-20

## 2021-04-20 ENCOUNTER — OFFICE VISIT (OUTPATIENT)
Dept: DERMATOLOGY | Facility: CLINIC | Age: 86
End: 2021-04-20
Payer: MEDICARE

## 2021-04-20 ENCOUNTER — PES CALL (OUTPATIENT)
Dept: ADMINISTRATIVE | Facility: CLINIC | Age: 86
End: 2021-04-20

## 2021-04-20 VITALS — BODY MASS INDEX: 28.99 KG/M2 | RESPIRATION RATE: 18 BRPM | HEIGHT: 60 IN | WEIGHT: 147.69 LBS

## 2021-04-20 DIAGNOSIS — I44.7 LBBB (LEFT BUNDLE BRANCH BLOCK): ICD-10-CM

## 2021-04-20 DIAGNOSIS — D48.5 NEOPLASM OF UNCERTAIN BEHAVIOR OF SKIN: Primary | ICD-10-CM

## 2021-04-20 DIAGNOSIS — Z95.0 PACEMAKER: ICD-10-CM

## 2021-04-20 PROCEDURE — 99499 NO LOS: ICD-10-PCS | Mod: HCNC,S$GLB,, | Performed by: DERMATOLOGY

## 2021-04-20 PROCEDURE — 88305 TISSUE EXAM BY PATHOLOGIST: ICD-10-PCS | Mod: 26,,, | Performed by: PATHOLOGY

## 2021-04-20 PROCEDURE — 11102 PR TANGENTIAL BIOPSY, SKIN, SINGLE LESION: ICD-10-PCS | Mod: HCNC,S$GLB,, | Performed by: DERMATOLOGY

## 2021-04-20 PROCEDURE — 99499 UNLISTED E&M SERVICE: CPT | Mod: HCNC,S$GLB,, | Performed by: DERMATOLOGY

## 2021-04-20 PROCEDURE — 88305 TISSUE EXAM BY PATHOLOGIST: CPT | Mod: 26,,, | Performed by: PATHOLOGY

## 2021-04-20 PROCEDURE — 11102 TANGNTL BX SKIN SINGLE LES: CPT | Mod: HCNC,S$GLB,, | Performed by: DERMATOLOGY

## 2021-04-20 PROCEDURE — 1126F AMNT PAIN NOTED NONE PRSNT: CPT | Mod: HCNC,S$GLB,, | Performed by: DERMATOLOGY

## 2021-04-20 PROCEDURE — 1101F PT FALLS ASSESS-DOCD LE1/YR: CPT | Mod: HCNC,CPTII,S$GLB, | Performed by: DERMATOLOGY

## 2021-04-20 PROCEDURE — 88305 TISSUE EXAM BY PATHOLOGIST: CPT | Performed by: PATHOLOGY

## 2021-04-20 PROCEDURE — 1126F PR PAIN SEVERITY QUANTIFIED, NO PAIN PRESENT: ICD-10-PCS | Mod: HCNC,S$GLB,, | Performed by: DERMATOLOGY

## 2021-04-20 PROCEDURE — 99999 PR PBB SHADOW E&M-EST. PATIENT-LVL IV: CPT | Mod: PBBFAC,HCNC,, | Performed by: DERMATOLOGY

## 2021-04-20 PROCEDURE — 3288F PR FALLS RISK ASSESSMENT DOCUMENTED: ICD-10-PCS | Mod: HCNC,CPTII,S$GLB, | Performed by: DERMATOLOGY

## 2021-04-20 PROCEDURE — 3288F FALL RISK ASSESSMENT DOCD: CPT | Mod: HCNC,CPTII,S$GLB, | Performed by: DERMATOLOGY

## 2021-04-20 PROCEDURE — 99999 PR PBB SHADOW E&M-EST. PATIENT-LVL I: CPT | Mod: PBBFAC,HCNC,,

## 2021-04-20 PROCEDURE — 1101F PR PT FALLS ASSESS DOC 0-1 FALLS W/OUT INJ PAST YR: ICD-10-PCS | Mod: HCNC,CPTII,S$GLB, | Performed by: DERMATOLOGY

## 2021-04-20 PROCEDURE — 99999 PR PBB SHADOW E&M-EST. PATIENT-LVL I: ICD-10-PCS | Mod: PBBFAC,HCNC,,

## 2021-04-20 PROCEDURE — 99999 PR PBB SHADOW E&M-EST. PATIENT-LVL IV: ICD-10-PCS | Mod: PBBFAC,HCNC,, | Performed by: DERMATOLOGY

## 2021-04-22 ENCOUNTER — LAB VISIT (OUTPATIENT)
Dept: LAB | Facility: HOSPITAL | Age: 86
End: 2021-04-22
Attending: FAMILY MEDICINE
Payer: MEDICARE

## 2021-04-22 ENCOUNTER — TELEPHONE (OUTPATIENT)
Dept: NEPHROLOGY | Facility: CLINIC | Age: 86
End: 2021-04-22

## 2021-04-22 ENCOUNTER — PATIENT MESSAGE (OUTPATIENT)
Dept: FAMILY MEDICINE | Facility: CLINIC | Age: 86
End: 2021-04-22

## 2021-04-22 DIAGNOSIS — N39.0 RECURRENT UTI: Primary | ICD-10-CM

## 2021-04-22 DIAGNOSIS — I48.91 ATRIAL FIBRILLATION, UNSPECIFIED TYPE: Primary | ICD-10-CM

## 2021-04-22 DIAGNOSIS — R32 URINARY INCONTINENCE, UNSPECIFIED TYPE: ICD-10-CM

## 2021-04-22 DIAGNOSIS — R32 URINARY INCONTINENCE, UNSPECIFIED TYPE: Primary | ICD-10-CM

## 2021-04-22 LAB
BACTERIA #/AREA URNS HPF: ABNORMAL /HPF
BILIRUB UR QL STRIP: NEGATIVE
CLARITY UR: ABNORMAL
COLOR UR: YELLOW
GLUCOSE UR QL STRIP: NEGATIVE
HGB UR QL STRIP: ABNORMAL
HYALINE CASTS #/AREA URNS LPF: 0 /LPF
KETONES UR QL STRIP: NEGATIVE
LEUKOCYTE ESTERASE UR QL STRIP: ABNORMAL
MICROSCOPIC COMMENT: ABNORMAL
NITRITE UR QL STRIP: NEGATIVE
PH UR STRIP: 6 [PH] (ref 5–8)
PROT UR QL STRIP: ABNORMAL
RBC #/AREA URNS HPF: 20 /HPF (ref 0–4)
SP GR UR STRIP: 1.02 (ref 1–1.03)
URN SPEC COLLECT METH UR: ABNORMAL
WBC #/AREA URNS HPF: >100 /HPF (ref 0–5)

## 2021-04-22 PROCEDURE — 87086 URINE CULTURE/COLONY COUNT: CPT | Mod: HCNC | Performed by: FAMILY MEDICINE

## 2021-04-22 PROCEDURE — 87186 SC STD MICRODIL/AGAR DIL: CPT | Mod: HCNC | Performed by: FAMILY MEDICINE

## 2021-04-22 PROCEDURE — 87077 CULTURE AEROBIC IDENTIFY: CPT | Mod: HCNC | Performed by: FAMILY MEDICINE

## 2021-04-22 PROCEDURE — 81000 URINALYSIS NONAUTO W/SCOPE: CPT | Mod: HCNC,PO | Performed by: FAMILY MEDICINE

## 2021-04-22 PROCEDURE — 87088 URINE BACTERIA CULTURE: CPT | Mod: HCNC | Performed by: FAMILY MEDICINE

## 2021-04-22 RX ORDER — SULFAMETHOXAZOLE AND TRIMETHOPRIM 800; 160 MG/1; MG/1
1 TABLET ORAL 2 TIMES DAILY
Qty: 20 TABLET | Refills: 0 | Status: SHIPPED | OUTPATIENT
Start: 2021-04-22 | End: 2021-05-02

## 2021-04-23 ENCOUNTER — EXTERNAL HOME HEALTH (OUTPATIENT)
Dept: HOME HEALTH SERVICES | Facility: HOSPITAL | Age: 86
End: 2021-04-23
Payer: MEDICARE

## 2021-04-23 ENCOUNTER — PATIENT MESSAGE (OUTPATIENT)
Dept: FAMILY MEDICINE | Facility: CLINIC | Age: 86
End: 2021-04-23

## 2021-04-25 LAB — BACTERIA UR CULT: ABNORMAL

## 2021-04-29 LAB
FINAL PATHOLOGIC DIAGNOSIS: NORMAL
GROSS: NORMAL
Lab: NORMAL
MICROSCOPIC EXAM: NORMAL

## 2021-05-03 ENCOUNTER — DOCUMENT SCAN (OUTPATIENT)
Dept: HOME HEALTH SERVICES | Facility: HOSPITAL | Age: 86
End: 2021-05-03
Payer: MEDICARE

## 2021-05-03 ENCOUNTER — TELEPHONE (OUTPATIENT)
Dept: DERMATOLOGY | Facility: CLINIC | Age: 86
End: 2021-05-03

## 2021-05-12 ENCOUNTER — PATIENT MESSAGE (OUTPATIENT)
Dept: UROLOGY | Facility: CLINIC | Age: 86
End: 2021-05-12

## 2021-05-13 ENCOUNTER — OFFICE VISIT (OUTPATIENT)
Dept: CARDIOLOGY | Facility: CLINIC | Age: 86
End: 2021-05-13
Payer: MEDICARE

## 2021-05-13 VITALS
DIASTOLIC BLOOD PRESSURE: 65 MMHG | HEIGHT: 60 IN | BODY MASS INDEX: 30.55 KG/M2 | SYSTOLIC BLOOD PRESSURE: 149 MMHG | WEIGHT: 155.63 LBS | HEART RATE: 59 BPM

## 2021-05-13 DIAGNOSIS — I48.91 ATRIAL FIBRILLATION, UNSPECIFIED TYPE: ICD-10-CM

## 2021-05-13 DIAGNOSIS — I44.7 LBBB (LEFT BUNDLE BRANCH BLOCK): ICD-10-CM

## 2021-05-13 DIAGNOSIS — Z95.0 PACEMAKER: ICD-10-CM

## 2021-05-13 DIAGNOSIS — Z95.0 CARDIAC PACEMAKER IN SITU: Primary | Chronic | ICD-10-CM

## 2021-05-13 DIAGNOSIS — I48.91 ATRIAL FIBRILLATION, UNSPECIFIED TYPE: Primary | ICD-10-CM

## 2021-05-13 PROCEDURE — 1159F PR MEDICATION LIST DOCUMENTED IN MEDICAL RECORD: ICD-10-PCS | Mod: S$GLB,,, | Performed by: INTERNAL MEDICINE

## 2021-05-13 PROCEDURE — 1126F PR PAIN SEVERITY QUANTIFIED, NO PAIN PRESENT: ICD-10-PCS | Mod: S$GLB,,, | Performed by: INTERNAL MEDICINE

## 2021-05-13 PROCEDURE — 3288F PR FALLS RISK ASSESSMENT DOCUMENTED: ICD-10-PCS | Mod: CPTII,S$GLB,, | Performed by: INTERNAL MEDICINE

## 2021-05-13 PROCEDURE — 99499 RISK ADDL DX/OHS AUDIT: ICD-10-PCS | Mod: HCNC,S$GLB,, | Performed by: INTERNAL MEDICINE

## 2021-05-13 PROCEDURE — 99999 PR PBB SHADOW E&M-EST. PATIENT-LVL IV: CPT | Mod: PBBFAC,,, | Performed by: INTERNAL MEDICINE

## 2021-05-13 PROCEDURE — 1100F PR PT FALLS ASSESS DOC 2+ FALLS/FALL W/INJURY/YR: ICD-10-PCS | Mod: CPTII,S$GLB,, | Performed by: INTERNAL MEDICINE

## 2021-05-13 PROCEDURE — 93010 EKG 12-LEAD: ICD-10-PCS | Mod: S$GLB,,, | Performed by: INTERNAL MEDICINE

## 2021-05-13 PROCEDURE — 1126F AMNT PAIN NOTED NONE PRSNT: CPT | Mod: S$GLB,,, | Performed by: INTERNAL MEDICINE

## 2021-05-13 PROCEDURE — 1100F PTFALLS ASSESS-DOCD GE2>/YR: CPT | Mod: CPTII,S$GLB,, | Performed by: INTERNAL MEDICINE

## 2021-05-13 PROCEDURE — 99214 PR OFFICE/OUTPT VISIT, EST, LEVL IV, 30-39 MIN: ICD-10-PCS | Mod: 25,S$GLB,, | Performed by: INTERNAL MEDICINE

## 2021-05-13 PROCEDURE — 93005 ELECTROCARDIOGRAM TRACING: CPT | Mod: PO

## 2021-05-13 PROCEDURE — 1159F MED LIST DOCD IN RCRD: CPT | Mod: S$GLB,,, | Performed by: INTERNAL MEDICINE

## 2021-05-13 PROCEDURE — 99499 UNLISTED E&M SERVICE: CPT | Mod: HCNC,S$GLB,, | Performed by: INTERNAL MEDICINE

## 2021-05-13 PROCEDURE — 3288F FALL RISK ASSESSMENT DOCD: CPT | Mod: CPTII,S$GLB,, | Performed by: INTERNAL MEDICINE

## 2021-05-13 PROCEDURE — 93010 ELECTROCARDIOGRAM REPORT: CPT | Mod: S$GLB,,, | Performed by: INTERNAL MEDICINE

## 2021-05-13 PROCEDURE — 99999 PR PBB SHADOW E&M-EST. PATIENT-LVL IV: ICD-10-PCS | Mod: PBBFAC,,, | Performed by: INTERNAL MEDICINE

## 2021-05-13 PROCEDURE — 99214 OFFICE O/P EST MOD 30 MIN: CPT | Mod: 25,S$GLB,, | Performed by: INTERNAL MEDICINE

## 2021-05-19 ENCOUNTER — OFFICE VISIT (OUTPATIENT)
Dept: SURGERY | Facility: CLINIC | Age: 86
End: 2021-05-19
Payer: MEDICARE

## 2021-05-19 VITALS
DIASTOLIC BLOOD PRESSURE: 67 MMHG | HEIGHT: 60 IN | BODY MASS INDEX: 30.77 KG/M2 | TEMPERATURE: 98 F | WEIGHT: 156.75 LBS | SYSTOLIC BLOOD PRESSURE: 146 MMHG | HEART RATE: 60 BPM

## 2021-05-19 DIAGNOSIS — C44.609 SKIN CANCER, UPPER EXTREMITY, LEFT: Primary | ICD-10-CM

## 2021-05-19 PROCEDURE — 1126F AMNT PAIN NOTED NONE PRSNT: CPT | Mod: HCNC,S$GLB,, | Performed by: SURGERY

## 2021-05-19 PROCEDURE — 99203 PR OFFICE/OUTPT VISIT, NEW, LEVL III, 30-44 MIN: ICD-10-PCS | Mod: HCNC,S$GLB,, | Performed by: SURGERY

## 2021-05-19 PROCEDURE — 99999 PR PBB SHADOW E&M-EST. PATIENT-LVL IV: ICD-10-PCS | Mod: PBBFAC,HCNC,, | Performed by: SURGERY

## 2021-05-19 PROCEDURE — 99203 OFFICE O/P NEW LOW 30 MIN: CPT | Mod: HCNC,S$GLB,, | Performed by: SURGERY

## 2021-05-19 PROCEDURE — 1159F MED LIST DOCD IN RCRD: CPT | Mod: HCNC,S$GLB,, | Performed by: SURGERY

## 2021-05-19 PROCEDURE — 1100F PTFALLS ASSESS-DOCD GE2>/YR: CPT | Mod: HCNC,CPTII,S$GLB, | Performed by: SURGERY

## 2021-05-19 PROCEDURE — 3288F FALL RISK ASSESSMENT DOCD: CPT | Mod: HCNC,CPTII,S$GLB, | Performed by: SURGERY

## 2021-05-19 PROCEDURE — 3288F PR FALLS RISK ASSESSMENT DOCUMENTED: ICD-10-PCS | Mod: HCNC,CPTII,S$GLB, | Performed by: SURGERY

## 2021-05-19 PROCEDURE — 99999 PR PBB SHADOW E&M-EST. PATIENT-LVL IV: CPT | Mod: PBBFAC,HCNC,, | Performed by: SURGERY

## 2021-05-19 PROCEDURE — 1126F PR PAIN SEVERITY QUANTIFIED, NO PAIN PRESENT: ICD-10-PCS | Mod: HCNC,S$GLB,, | Performed by: SURGERY

## 2021-05-19 PROCEDURE — 1159F PR MEDICATION LIST DOCUMENTED IN MEDICAL RECORD: ICD-10-PCS | Mod: HCNC,S$GLB,, | Performed by: SURGERY

## 2021-05-19 PROCEDURE — 1100F PR PT FALLS ASSESS DOC 2+ FALLS/FALL W/INJURY/YR: ICD-10-PCS | Mod: HCNC,CPTII,S$GLB, | Performed by: SURGERY

## 2021-05-20 ENCOUNTER — PATIENT MESSAGE (OUTPATIENT)
Dept: SURGERY | Facility: CLINIC | Age: 86
End: 2021-05-20

## 2021-05-20 ENCOUNTER — TELEPHONE (OUTPATIENT)
Dept: CARDIOLOGY | Facility: CLINIC | Age: 86
End: 2021-05-20

## 2021-05-20 RX ORDER — SODIUM CHLORIDE 9 MG/ML
INJECTION, SOLUTION INTRAVENOUS CONTINUOUS
Status: CANCELLED | OUTPATIENT
Start: 2021-05-20

## 2021-05-25 ENCOUNTER — PATIENT OUTREACH (OUTPATIENT)
Dept: ADMINISTRATIVE | Facility: OTHER | Age: 86
End: 2021-05-25

## 2021-05-25 RX ORDER — RIVASTIGMINE 9.5 MG/24H
PATCH, EXTENDED RELEASE TRANSDERMAL
COMMUNITY
Start: 2021-01-03 | End: 2021-10-19 | Stop reason: SINTOL

## 2021-05-26 ENCOUNTER — OFFICE VISIT (OUTPATIENT)
Dept: UROLOGY | Facility: CLINIC | Age: 86
End: 2021-05-26
Payer: MEDICARE

## 2021-05-26 VITALS — HEIGHT: 60 IN | BODY MASS INDEX: 30.77 KG/M2 | WEIGHT: 156.75 LBS

## 2021-05-26 DIAGNOSIS — N39.0 RECURRENT UTI: ICD-10-CM

## 2021-05-26 DIAGNOSIS — R30.0 DYSURIA: Primary | ICD-10-CM

## 2021-05-26 LAB
BILIRUB SERPL-MCNC: ABNORMAL MG/DL
BLOOD URINE, POC: ABNORMAL
CLARITY, POC UA: CLEAR
COLOR, POC UA: YELLOW
GLUCOSE UR QL STRIP: ABNORMAL
KETONES UR QL STRIP: ABNORMAL
LEUKOCYTE ESTERASE URINE, POC: ABNORMAL
NITRITE, POC UA: ABNORMAL
PH, POC UA: 6.5
PROTEIN, POC: ABNORMAL
SPECIFIC GRAVITY, POC UA: 1.01
UROBILINOGEN, POC UA: 0.2

## 2021-05-26 PROCEDURE — 99999 PR PBB SHADOW E&M-EST. PATIENT-LVL IV: ICD-10-PCS | Mod: PBBFAC,HCNC,, | Performed by: UROLOGY

## 2021-05-26 PROCEDURE — 99999 PR PBB SHADOW E&M-EST. PATIENT-LVL IV: CPT | Mod: PBBFAC,HCNC,, | Performed by: UROLOGY

## 2021-05-26 PROCEDURE — 1101F PT FALLS ASSESS-DOCD LE1/YR: CPT | Mod: HCNC,CPTII,S$GLB, | Performed by: UROLOGY

## 2021-05-26 PROCEDURE — 3288F FALL RISK ASSESSMENT DOCD: CPT | Mod: HCNC,CPTII,S$GLB, | Performed by: UROLOGY

## 2021-05-26 PROCEDURE — 1159F PR MEDICATION LIST DOCUMENTED IN MEDICAL RECORD: ICD-10-PCS | Mod: HCNC,S$GLB,, | Performed by: UROLOGY

## 2021-05-26 PROCEDURE — 99203 OFFICE O/P NEW LOW 30 MIN: CPT | Mod: HCNC,25,S$GLB, | Performed by: UROLOGY

## 2021-05-26 PROCEDURE — 81002 POCT URINE DIPSTICK WITHOUT MICROSCOPE: ICD-10-PCS | Mod: HCNC,S$GLB,, | Performed by: UROLOGY

## 2021-05-26 PROCEDURE — 81002 URINALYSIS NONAUTO W/O SCOPE: CPT | Mod: HCNC,S$GLB,, | Performed by: UROLOGY

## 2021-05-26 PROCEDURE — 3288F PR FALLS RISK ASSESSMENT DOCUMENTED: ICD-10-PCS | Mod: HCNC,CPTII,S$GLB, | Performed by: UROLOGY

## 2021-05-26 PROCEDURE — 1126F PR PAIN SEVERITY QUANTIFIED, NO PAIN PRESENT: ICD-10-PCS | Mod: HCNC,S$GLB,, | Performed by: UROLOGY

## 2021-05-26 PROCEDURE — 1159F MED LIST DOCD IN RCRD: CPT | Mod: HCNC,S$GLB,, | Performed by: UROLOGY

## 2021-05-26 PROCEDURE — 99203 PR OFFICE/OUTPT VISIT, NEW, LEVL III, 30-44 MIN: ICD-10-PCS | Mod: HCNC,25,S$GLB, | Performed by: UROLOGY

## 2021-05-26 PROCEDURE — 1101F PR PT FALLS ASSESS DOC 0-1 FALLS W/OUT INJ PAST YR: ICD-10-PCS | Mod: HCNC,CPTII,S$GLB, | Performed by: UROLOGY

## 2021-05-26 PROCEDURE — 1126F AMNT PAIN NOTED NONE PRSNT: CPT | Mod: HCNC,S$GLB,, | Performed by: UROLOGY

## 2021-05-27 ENCOUNTER — TELEPHONE (OUTPATIENT)
Dept: CARDIOLOGY | Facility: CLINIC | Age: 86
End: 2021-05-27

## 2021-06-02 ENCOUNTER — ANESTHESIA EVENT (OUTPATIENT)
Dept: SURGERY | Facility: HOSPITAL | Age: 86
End: 2021-06-02
Payer: MEDICARE

## 2021-06-02 ENCOUNTER — TELEPHONE (OUTPATIENT)
Dept: CARDIOLOGY | Facility: HOSPITAL | Age: 86
End: 2021-06-02

## 2021-06-03 ENCOUNTER — HOSPITAL ENCOUNTER (OUTPATIENT)
Facility: HOSPITAL | Age: 86
Discharge: HOME | End: 2021-06-03
Attending: SURGERY | Admitting: SURGERY
Payer: MEDICARE

## 2021-06-03 ENCOUNTER — ANESTHESIA (OUTPATIENT)
Dept: SURGERY | Facility: HOSPITAL | Age: 86
End: 2021-06-03
Payer: MEDICARE

## 2021-06-03 VITALS
WEIGHT: 156 LBS | TEMPERATURE: 98 F | HEIGHT: 60 IN | OXYGEN SATURATION: 98 % | BODY MASS INDEX: 30.63 KG/M2 | RESPIRATION RATE: 18 BRPM | SYSTOLIC BLOOD PRESSURE: 132 MMHG | DIASTOLIC BLOOD PRESSURE: 62 MMHG | HEART RATE: 64 BPM

## 2021-06-03 DIAGNOSIS — C44.609 SKIN CANCER, UPPER EXTREMITY, LEFT: ICD-10-CM

## 2021-06-03 LAB — GLUCOSE SERPL-MCNC: 165 MG/DL (ref 70–110)

## 2021-06-03 PROCEDURE — 88305 TISSUE EXAM BY PATHOLOGIST: ICD-10-PCS | Mod: 26,HCNC,, | Performed by: PATHOLOGY

## 2021-06-03 PROCEDURE — D9220A PRA ANESTHESIA: Mod: HCNC,ANES,, | Performed by: ANESTHESIOLOGY

## 2021-06-03 PROCEDURE — D9220A PRA ANESTHESIA: Mod: HCNC,CRNA,, | Performed by: NURSE ANESTHETIST, CERTIFIED REGISTERED

## 2021-06-03 PROCEDURE — 25000003 PHARM REV CODE 250: Mod: HCNC,PO | Performed by: SURGERY

## 2021-06-03 PROCEDURE — 11606 EXC TR-EXT MAL+MARG >4 CM: CPT | Mod: HCNC,LT,, | Performed by: SURGERY

## 2021-06-03 PROCEDURE — 63600175 PHARM REV CODE 636 W HCPCS: Mod: HCNC,PO | Performed by: NURSE ANESTHETIST, CERTIFIED REGISTERED

## 2021-06-03 PROCEDURE — 63600175 PHARM REV CODE 636 W HCPCS: Mod: HCNC,PO | Performed by: ANESTHESIOLOGY

## 2021-06-03 PROCEDURE — 11606 PR EXC SKIN MALIG >4 CM TRUNK,ARM,LEG: ICD-10-PCS | Mod: HCNC,LT,, | Performed by: SURGERY

## 2021-06-03 PROCEDURE — 37000009 HC ANESTHESIA EA ADD 15 MINS: Mod: HCNC,PO | Performed by: SURGERY

## 2021-06-03 PROCEDURE — 71000015 HC POSTOP RECOV 1ST HR: Mod: HCNC,PO | Performed by: SURGERY

## 2021-06-03 PROCEDURE — 36000706: Mod: HCNC,PO | Performed by: SURGERY

## 2021-06-03 PROCEDURE — 88305 TISSUE EXAM BY PATHOLOGIST: CPT | Mod: 26,HCNC,, | Performed by: PATHOLOGY

## 2021-06-03 PROCEDURE — 71000033 HC RECOVERY, INTIAL HOUR: Mod: HCNC,PO | Performed by: SURGERY

## 2021-06-03 PROCEDURE — D9220A PRA ANESTHESIA: ICD-10-PCS | Mod: HCNC,CRNA,, | Performed by: NURSE ANESTHETIST, CERTIFIED REGISTERED

## 2021-06-03 PROCEDURE — 25000003 PHARM REV CODE 250: Mod: HCNC,PO | Performed by: NURSE ANESTHETIST, CERTIFIED REGISTERED

## 2021-06-03 PROCEDURE — 37000008 HC ANESTHESIA 1ST 15 MINUTES: Mod: HCNC,PO | Performed by: SURGERY

## 2021-06-03 PROCEDURE — 36000707: Mod: HCNC,PO | Performed by: SURGERY

## 2021-06-03 PROCEDURE — D9220A PRA ANESTHESIA: ICD-10-PCS | Mod: HCNC,ANES,, | Performed by: ANESTHESIOLOGY

## 2021-06-03 PROCEDURE — 88305 TISSUE EXAM BY PATHOLOGIST: CPT | Mod: HCNC | Performed by: PATHOLOGY

## 2021-06-03 PROCEDURE — 82962 GLUCOSE BLOOD TEST: CPT | Mod: HCNC,PO | Performed by: SURGERY

## 2021-06-03 RX ORDER — PROPOFOL 10 MG/ML
VIAL (ML) INTRAVENOUS CONTINUOUS PRN
Status: DISCONTINUED | OUTPATIENT
Start: 2021-06-03 | End: 2021-06-03

## 2021-06-03 RX ORDER — HYDROCODONE BITARTRATE AND ACETAMINOPHEN 5; 325 MG/1; MG/1
1 TABLET ORAL EVERY 4 HOURS PRN
Status: DISCONTINUED | OUTPATIENT
Start: 2021-06-03 | End: 2021-06-03 | Stop reason: HOSPADM

## 2021-06-03 RX ORDER — PROPOFOL 10 MG/ML
VIAL (ML) INTRAVENOUS
Status: DISCONTINUED | OUTPATIENT
Start: 2021-06-03 | End: 2021-06-03

## 2021-06-03 RX ORDER — ONDANSETRON 2 MG/ML
4 INJECTION INTRAMUSCULAR; INTRAVENOUS EVERY 12 HOURS PRN
Status: DISCONTINUED | OUTPATIENT
Start: 2021-06-03 | End: 2021-06-03 | Stop reason: HOSPADM

## 2021-06-03 RX ORDER — LIDOCAINE HYDROCHLORIDE 20 MG/ML
INJECTION INTRAVENOUS
Status: DISCONTINUED | OUTPATIENT
Start: 2021-06-03 | End: 2021-06-03

## 2021-06-03 RX ORDER — SODIUM CHLORIDE, SODIUM LACTATE, POTASSIUM CHLORIDE, CALCIUM CHLORIDE 600; 310; 30; 20 MG/100ML; MG/100ML; MG/100ML; MG/100ML
INJECTION, SOLUTION INTRAVENOUS CONTINUOUS
Status: DISCONTINUED | OUTPATIENT
Start: 2021-06-03 | End: 2021-06-03 | Stop reason: HOSPADM

## 2021-06-03 RX ORDER — BUPIVACAINE HCL/EPINEPHRINE 0.25-.0005
VIAL (ML) INJECTION
Status: DISCONTINUED | OUTPATIENT
Start: 2021-06-03 | End: 2021-06-03 | Stop reason: HOSPADM

## 2021-06-03 RX ORDER — SODIUM CHLORIDE 9 MG/ML
INJECTION, SOLUTION INTRAVENOUS CONTINUOUS
Status: DISCONTINUED | OUTPATIENT
Start: 2021-06-03 | End: 2021-06-03 | Stop reason: HOSPADM

## 2021-06-03 RX ORDER — CEFAZOLIN SODIUM 1 G/3ML
INJECTION, POWDER, FOR SOLUTION INTRAMUSCULAR; INTRAVENOUS
Status: DISCONTINUED | OUTPATIENT
Start: 2021-06-03 | End: 2021-06-03

## 2021-06-03 RX ORDER — LIDOCAINE HYDROCHLORIDE 10 MG/ML
1 INJECTION, SOLUTION EPIDURAL; INFILTRATION; INTRACAUDAL; PERINEURAL ONCE
Status: DISCONTINUED | OUTPATIENT
Start: 2021-06-03 | End: 2021-06-03 | Stop reason: HOSPADM

## 2021-06-03 RX ORDER — ACETAMINOPHEN 10 MG/ML
INJECTION, SOLUTION INTRAVENOUS
Status: DISCONTINUED | OUTPATIENT
Start: 2021-06-03 | End: 2021-06-03

## 2021-06-03 RX ORDER — ONDANSETRON 2 MG/ML
INJECTION INTRAMUSCULAR; INTRAVENOUS
Status: DISCONTINUED | OUTPATIENT
Start: 2021-06-03 | End: 2021-06-03

## 2021-06-03 RX ORDER — HYDROCODONE BITARTRATE AND ACETAMINOPHEN 5; 325 MG/1; MG/1
1 TABLET ORAL EVERY 6 HOURS PRN
Qty: 20 TABLET | Refills: 0 | Status: SHIPPED | OUTPATIENT
Start: 2021-06-03 | End: 2021-10-15

## 2021-06-03 RX ORDER — FENTANYL CITRATE 50 UG/ML
INJECTION, SOLUTION INTRAMUSCULAR; INTRAVENOUS
Status: DISCONTINUED | OUTPATIENT
Start: 2021-06-03 | End: 2021-06-03

## 2021-06-03 RX ADMIN — LIDOCAINE HYDROCHLORIDE 60 MG: 20 INJECTION, SOLUTION INTRAVENOUS at 12:06

## 2021-06-03 RX ADMIN — ONDANSETRON 4 MG: 2 INJECTION, SOLUTION INTRAMUSCULAR; INTRAVENOUS at 12:06

## 2021-06-03 RX ADMIN — PROPOFOL 20 MG: 10 INJECTION, EMULSION INTRAVENOUS at 12:06

## 2021-06-03 RX ADMIN — SODIUM CHLORIDE, SODIUM LACTATE, POTASSIUM CHLORIDE, AND CALCIUM CHLORIDE: .6; .31; .03; .02 INJECTION, SOLUTION INTRAVENOUS at 12:06

## 2021-06-03 RX ADMIN — ACETAMINOPHEN 750 MG: 10 INJECTION, SOLUTION INTRAVENOUS at 12:06

## 2021-06-03 RX ADMIN — PROPOFOL 100 MCG/KG/MIN: 10 INJECTION, EMULSION INTRAVENOUS at 12:06

## 2021-06-03 RX ADMIN — FENTANYL CITRATE 25 MCG: 50 INJECTION, SOLUTION INTRAMUSCULAR; INTRAVENOUS at 12:06

## 2021-06-03 RX ADMIN — CEFAZOLIN 2 G: 1 INJECTION, POWDER, FOR SOLUTION INTRAVENOUS at 12:06

## 2021-06-04 ENCOUNTER — TELEPHONE (OUTPATIENT)
Dept: SURGERY | Facility: CLINIC | Age: 86
End: 2021-06-04

## 2021-06-08 ENCOUNTER — TELEPHONE (OUTPATIENT)
Dept: SURGERY | Facility: CLINIC | Age: 86
End: 2021-06-08

## 2021-06-14 LAB
FINAL PATHOLOGIC DIAGNOSIS: NORMAL
GROSS: NORMAL
Lab: NORMAL
MICROSCOPIC EXAM: NORMAL

## 2021-06-16 ENCOUNTER — OFFICE VISIT (OUTPATIENT)
Dept: SURGERY | Facility: CLINIC | Age: 86
End: 2021-06-16
Payer: MEDICARE

## 2021-06-16 VITALS — TEMPERATURE: 97 F | HEIGHT: 60 IN | BODY MASS INDEX: 31.46 KG/M2 | WEIGHT: 160.25 LBS

## 2021-06-16 DIAGNOSIS — Z09 POSTOP CHECK: Primary | ICD-10-CM

## 2021-06-16 PROCEDURE — 3288F FALL RISK ASSESSMENT DOCD: CPT | Mod: HCNC,CPTII,S$GLB, | Performed by: SURGERY

## 2021-06-16 PROCEDURE — 1126F AMNT PAIN NOTED NONE PRSNT: CPT | Mod: HCNC,S$GLB,, | Performed by: SURGERY

## 2021-06-16 PROCEDURE — 1101F PR PT FALLS ASSESS DOC 0-1 FALLS W/OUT INJ PAST YR: ICD-10-PCS | Mod: HCNC,CPTII,S$GLB, | Performed by: SURGERY

## 2021-06-16 PROCEDURE — 99024 POSTOP FOLLOW-UP VISIT: CPT | Mod: HCNC,S$GLB,, | Performed by: SURGERY

## 2021-06-16 PROCEDURE — 99999 PR PBB SHADOW E&M-EST. PATIENT-LVL IV: ICD-10-PCS | Mod: PBBFAC,HCNC,, | Performed by: SURGERY

## 2021-06-16 PROCEDURE — 1126F PR PAIN SEVERITY QUANTIFIED, NO PAIN PRESENT: ICD-10-PCS | Mod: HCNC,S$GLB,, | Performed by: SURGERY

## 2021-06-16 PROCEDURE — 99999 PR PBB SHADOW E&M-EST. PATIENT-LVL IV: CPT | Mod: PBBFAC,HCNC,, | Performed by: SURGERY

## 2021-06-16 PROCEDURE — 3288F PR FALLS RISK ASSESSMENT DOCUMENTED: ICD-10-PCS | Mod: HCNC,CPTII,S$GLB, | Performed by: SURGERY

## 2021-06-16 PROCEDURE — 99024 PR POST-OP FOLLOW-UP VISIT: ICD-10-PCS | Mod: HCNC,S$GLB,, | Performed by: SURGERY

## 2021-06-16 PROCEDURE — 1101F PT FALLS ASSESS-DOCD LE1/YR: CPT | Mod: HCNC,CPTII,S$GLB, | Performed by: SURGERY

## 2021-06-29 DIAGNOSIS — Z95.0 PACEMAKER: Primary | ICD-10-CM

## 2021-06-29 DIAGNOSIS — I44.7 LBBB (LEFT BUNDLE BRANCH BLOCK): ICD-10-CM

## 2021-06-30 ENCOUNTER — PATIENT MESSAGE (OUTPATIENT)
Dept: UROLOGY | Facility: CLINIC | Age: 86
End: 2021-06-30

## 2021-07-06 ENCOUNTER — OFFICE VISIT (OUTPATIENT)
Dept: PODIATRY | Facility: CLINIC | Age: 86
End: 2021-07-06
Payer: MEDICARE

## 2021-07-06 VITALS
WEIGHT: 160 LBS | HEART RATE: 85 BPM | HEIGHT: 60 IN | BODY MASS INDEX: 31.41 KG/M2 | SYSTOLIC BLOOD PRESSURE: 176 MMHG | DIASTOLIC BLOOD PRESSURE: 83 MMHG

## 2021-07-06 DIAGNOSIS — B35.1 ONYCHOMYCOSIS DUE TO DERMATOPHYTE: ICD-10-CM

## 2021-07-06 DIAGNOSIS — L84 PRE-ULCERATIVE CALLUSES: ICD-10-CM

## 2021-07-06 DIAGNOSIS — E11.49 TYPE II DIABETES MELLITUS WITH NEUROLOGICAL MANIFESTATIONS: Primary | ICD-10-CM

## 2021-07-06 PROCEDURE — 1100F PTFALLS ASSESS-DOCD GE2>/YR: CPT | Mod: HCNC,CPTII,S$GLB, | Performed by: PODIATRIST

## 2021-07-06 PROCEDURE — 11055 PARING/CUTG B9 HYPRKER LES 1: CPT | Mod: Q9,HCNC,S$GLB, | Performed by: PODIATRIST

## 2021-07-06 PROCEDURE — 99213 PR OFFICE/OUTPT VISIT, EST, LEVL III, 20-29 MIN: ICD-10-PCS | Mod: 25,HCNC,S$GLB, | Performed by: PODIATRIST

## 2021-07-06 PROCEDURE — 3051F HG A1C>EQUAL 7.0%<8.0%: CPT | Mod: HCNC,CPTII,S$GLB, | Performed by: PODIATRIST

## 2021-07-06 PROCEDURE — 11721 PR DEBRIDEMENT OF NAILS, 6 OR MORE: ICD-10-PCS | Mod: 59,Q9,HCNC,S$GLB | Performed by: PODIATRIST

## 2021-07-06 PROCEDURE — 99499 UNLISTED E&M SERVICE: CPT | Mod: HCNC,S$GLB,, | Performed by: PODIATRIST

## 2021-07-06 PROCEDURE — 99999 PR PBB SHADOW E&M-EST. PATIENT-LVL IV: CPT | Mod: PBBFAC,HCNC,, | Performed by: PODIATRIST

## 2021-07-06 PROCEDURE — 99999 PR PBB SHADOW E&M-EST. PATIENT-LVL IV: ICD-10-PCS | Mod: PBBFAC,HCNC,, | Performed by: PODIATRIST

## 2021-07-06 PROCEDURE — 11055 PR TRIM HYPERKERATOTIC SKIN LESION, ONE: ICD-10-PCS | Mod: Q9,HCNC,S$GLB, | Performed by: PODIATRIST

## 2021-07-06 PROCEDURE — 3051F PR MOST RECENT HEMOGLOBIN A1C LEVEL 7.0 - < 8.0%: ICD-10-PCS | Mod: HCNC,CPTII,S$GLB, | Performed by: PODIATRIST

## 2021-07-06 PROCEDURE — 1159F MED LIST DOCD IN RCRD: CPT | Mod: HCNC,S$GLB,, | Performed by: PODIATRIST

## 2021-07-06 PROCEDURE — 99213 OFFICE O/P EST LOW 20 MIN: CPT | Mod: 25,HCNC,S$GLB, | Performed by: PODIATRIST

## 2021-07-06 PROCEDURE — 1126F PR PAIN SEVERITY QUANTIFIED, NO PAIN PRESENT: ICD-10-PCS | Mod: HCNC,S$GLB,, | Performed by: PODIATRIST

## 2021-07-06 PROCEDURE — 1100F PR PT FALLS ASSESS DOC 2+ FALLS/FALL W/INJURY/YR: ICD-10-PCS | Mod: HCNC,CPTII,S$GLB, | Performed by: PODIATRIST

## 2021-07-06 PROCEDURE — 11721 DEBRIDE NAIL 6 OR MORE: CPT | Mod: 59,Q9,HCNC,S$GLB | Performed by: PODIATRIST

## 2021-07-06 PROCEDURE — 99499 RISK ADDL DX/OHS AUDIT: ICD-10-PCS | Mod: HCNC,S$GLB,, | Performed by: PODIATRIST

## 2021-07-06 PROCEDURE — 3288F FALL RISK ASSESSMENT DOCD: CPT | Mod: HCNC,CPTII,S$GLB, | Performed by: PODIATRIST

## 2021-07-06 PROCEDURE — 1126F AMNT PAIN NOTED NONE PRSNT: CPT | Mod: HCNC,S$GLB,, | Performed by: PODIATRIST

## 2021-07-06 PROCEDURE — 1159F PR MEDICATION LIST DOCUMENTED IN MEDICAL RECORD: ICD-10-PCS | Mod: HCNC,S$GLB,, | Performed by: PODIATRIST

## 2021-07-06 PROCEDURE — 3288F PR FALLS RISK ASSESSMENT DOCUMENTED: ICD-10-PCS | Mod: HCNC,CPTII,S$GLB, | Performed by: PODIATRIST

## 2021-07-15 ENCOUNTER — PATIENT MESSAGE (OUTPATIENT)
Dept: FAMILY MEDICINE | Facility: CLINIC | Age: 86
End: 2021-07-15

## 2021-07-15 RX ORDER — LEVOTHYROXINE SODIUM 25 UG/1
TABLET ORAL
Qty: 90 TABLET | Refills: 0 | Status: SHIPPED | OUTPATIENT
Start: 2021-07-15 | End: 2021-09-13

## 2021-07-15 RX ORDER — MIRABEGRON 50 MG/1
TABLET, FILM COATED, EXTENDED RELEASE ORAL
Qty: 90 TABLET | Refills: 0 | Status: SHIPPED | OUTPATIENT
Start: 2021-07-15 | End: 2022-02-14 | Stop reason: SDUPTHER

## 2021-08-13 ENCOUNTER — PATIENT MESSAGE (OUTPATIENT)
Dept: NEPHROLOGY | Facility: CLINIC | Age: 86
End: 2021-08-13

## 2021-08-16 ENCOUNTER — LAB VISIT (OUTPATIENT)
Dept: LAB | Facility: HOSPITAL | Age: 86
End: 2021-08-16
Attending: FAMILY MEDICINE
Payer: MEDICARE

## 2021-08-16 DIAGNOSIS — E03.9 HYPOTHYROIDISM, UNSPECIFIED TYPE: Chronic | ICD-10-CM

## 2021-08-16 DIAGNOSIS — N18.30 STAGE 3 CHRONIC KIDNEY DISEASE, UNSPECIFIED WHETHER STAGE 3A OR 3B CKD: Primary | ICD-10-CM

## 2021-08-16 LAB — TSH SERPL DL<=0.005 MIU/L-ACNC: 2.46 UIU/ML (ref 0.4–4)

## 2021-08-16 PROCEDURE — 84443 ASSAY THYROID STIM HORMONE: CPT | Mod: HCNC | Performed by: FAMILY MEDICINE

## 2021-09-15 ENCOUNTER — PATIENT MESSAGE (OUTPATIENT)
Dept: FAMILY MEDICINE | Facility: CLINIC | Age: 86
End: 2021-09-15

## 2021-09-15 ENCOUNTER — TELEPHONE (OUTPATIENT)
Dept: FAMILY MEDICINE | Facility: CLINIC | Age: 86
End: 2021-09-15

## 2021-09-21 ENCOUNTER — PATIENT MESSAGE (OUTPATIENT)
Dept: FAMILY MEDICINE | Facility: CLINIC | Age: 86
End: 2021-09-21

## 2021-10-12 ENCOUNTER — TELEPHONE (OUTPATIENT)
Dept: FAMILY MEDICINE | Facility: CLINIC | Age: 86
End: 2021-10-12

## 2021-10-15 ENCOUNTER — OFFICE VISIT (OUTPATIENT)
Dept: FAMILY MEDICINE | Facility: CLINIC | Age: 86
End: 2021-10-15
Payer: MEDICARE

## 2021-10-15 ENCOUNTER — PATIENT MESSAGE (OUTPATIENT)
Dept: FAMILY MEDICINE | Facility: CLINIC | Age: 86
End: 2021-10-15

## 2021-10-15 ENCOUNTER — TELEPHONE (OUTPATIENT)
Dept: FAMILY MEDICINE | Facility: CLINIC | Age: 86
End: 2021-10-15

## 2021-10-15 VITALS
HEART RATE: 59 BPM | HEIGHT: 60 IN | WEIGHT: 190 LBS | BODY MASS INDEX: 37.3 KG/M2 | SYSTOLIC BLOOD PRESSURE: 152 MMHG | TEMPERATURE: 97 F | DIASTOLIC BLOOD PRESSURE: 78 MMHG

## 2021-10-15 DIAGNOSIS — N18.4 DIABETES MELLITUS WITH STAGE 4 CHRONIC KIDNEY DISEASE GFR 15-29: ICD-10-CM

## 2021-10-15 DIAGNOSIS — Z09 HOSPITAL DISCHARGE FOLLOW-UP: Primary | ICD-10-CM

## 2021-10-15 DIAGNOSIS — Z79.899 ENCOUNTER FOR MEDICATION REVIEW: ICD-10-CM

## 2021-10-15 DIAGNOSIS — E11.22 DIABETES MELLITUS WITH STAGE 4 CHRONIC KIDNEY DISEASE GFR 15-29: ICD-10-CM

## 2021-10-15 DIAGNOSIS — N39.0 URINARY TRACT INFECTION WITHOUT HEMATURIA, SITE UNSPECIFIED: ICD-10-CM

## 2021-10-15 PROCEDURE — 99999 PR PBB SHADOW E&M-EST. PATIENT-LVL V: CPT | Mod: PBBFAC,HCNC,, | Performed by: NURSE PRACTITIONER

## 2021-10-15 PROCEDURE — 1101F PT FALLS ASSESS-DOCD LE1/YR: CPT | Mod: HCNC,CPTII,S$GLB, | Performed by: NURSE PRACTITIONER

## 2021-10-15 PROCEDURE — 3288F FALL RISK ASSESSMENT DOCD: CPT | Mod: HCNC,CPTII,S$GLB, | Performed by: NURSE PRACTITIONER

## 2021-10-15 PROCEDURE — 3288F PR FALLS RISK ASSESSMENT DOCUMENTED: ICD-10-PCS | Mod: HCNC,CPTII,S$GLB, | Performed by: NURSE PRACTITIONER

## 2021-10-15 PROCEDURE — 99999 PR PBB SHADOW E&M-EST. PATIENT-LVL V: ICD-10-PCS | Mod: PBBFAC,HCNC,, | Performed by: NURSE PRACTITIONER

## 2021-10-15 PROCEDURE — 1101F PR PT FALLS ASSESS DOC 0-1 FALLS W/OUT INJ PAST YR: ICD-10-PCS | Mod: HCNC,CPTII,S$GLB, | Performed by: NURSE PRACTITIONER

## 2021-10-15 PROCEDURE — 1159F MED LIST DOCD IN RCRD: CPT | Mod: HCNC,CPTII,S$GLB, | Performed by: NURSE PRACTITIONER

## 2021-10-15 PROCEDURE — 1160F RVW MEDS BY RX/DR IN RCRD: CPT | Mod: HCNC,CPTII,S$GLB, | Performed by: NURSE PRACTITIONER

## 2021-10-15 PROCEDURE — 99495 TCM SERVICES (MODERATE COMPLEXITY): ICD-10-PCS | Mod: HCNC,S$GLB,, | Performed by: NURSE PRACTITIONER

## 2021-10-15 PROCEDURE — 1160F PR REVIEW ALL MEDS BY PRESCRIBER/CLIN PHARMACIST DOCUMENTED: ICD-10-PCS | Mod: HCNC,CPTII,S$GLB, | Performed by: NURSE PRACTITIONER

## 2021-10-15 PROCEDURE — 99495 TRANSJ CARE MGMT MOD F2F 14D: CPT | Mod: HCNC,S$GLB,, | Performed by: NURSE PRACTITIONER

## 2021-10-15 PROCEDURE — 1159F PR MEDICATION LIST DOCUMENTED IN MEDICAL RECORD: ICD-10-PCS | Mod: HCNC,CPTII,S$GLB, | Performed by: NURSE PRACTITIONER

## 2021-10-15 RX ORDER — INSULIN LISPRO 100 [IU]/ML
1 INJECTION, SOLUTION INTRAVENOUS; SUBCUTANEOUS 3 TIMES DAILY PRN
Qty: 15 ML | Refills: 0 | Status: SHIPPED | OUTPATIENT
Start: 2021-10-15 | End: 2021-10-18

## 2021-10-18 RX ORDER — INSULIN ASPART 100 [IU]/ML
INJECTION, SOLUTION INTRAVENOUS; SUBCUTANEOUS
Qty: 1 BOX | Refills: 0 | Status: SHIPPED | OUTPATIENT
Start: 2021-10-18 | End: 2021-12-03

## 2021-10-18 RX ORDER — INSULIN ASPART 100 [IU]/ML
INJECTION, SOLUTION INTRAVENOUS; SUBCUTANEOUS
Qty: 3 BOX | Refills: 0 | Status: SHIPPED | OUTPATIENT
Start: 2021-10-18 | End: 2021-12-03

## 2021-10-18 RX ORDER — INSULIN ASPART 100 [IU]/ML
INJECTION, SOLUTION INTRAVENOUS; SUBCUTANEOUS
Qty: 1 BOX | Refills: 0 | Status: SHIPPED | OUTPATIENT
Start: 2021-10-18 | End: 2021-10-18 | Stop reason: SDUPTHER

## 2021-10-19 ENCOUNTER — PATIENT MESSAGE (OUTPATIENT)
Dept: NEPHROLOGY | Facility: CLINIC | Age: 86
End: 2021-10-19
Payer: MEDICARE

## 2021-10-19 ENCOUNTER — PATIENT MESSAGE (OUTPATIENT)
Dept: CARDIOLOGY | Facility: CLINIC | Age: 86
End: 2021-10-19
Payer: MEDICARE

## 2021-10-19 RX ORDER — TROSPIUM CHLORIDE ER 60 MG/1
60 CAPSULE ORAL DAILY
COMMUNITY
End: 2023-05-30

## 2021-10-26 ENCOUNTER — PES CALL (OUTPATIENT)
Dept: ADMINISTRATIVE | Facility: CLINIC | Age: 86
End: 2021-10-26
Payer: MEDICARE

## 2021-10-29 ENCOUNTER — PATIENT MESSAGE (OUTPATIENT)
Dept: FAMILY MEDICINE | Facility: CLINIC | Age: 86
End: 2021-10-29
Payer: MEDICARE

## 2021-11-04 ENCOUNTER — OFFICE VISIT (OUTPATIENT)
Dept: HOME HEALTH SERVICES | Facility: CLINIC | Age: 86
End: 2021-11-04
Payer: MEDICARE

## 2021-11-04 DIAGNOSIS — N39.46 URINARY INCONTINENCE, MIXED: Chronic | ICD-10-CM

## 2021-11-04 DIAGNOSIS — E11.69 COMBINED HYPERLIPIDEMIA ASSOCIATED WITH TYPE 2 DIABETES MELLITUS: ICD-10-CM

## 2021-11-04 DIAGNOSIS — Z86.79 HISTORY OF ATRIAL FLUTTER: ICD-10-CM

## 2021-11-04 DIAGNOSIS — N25.81 SECONDARY HYPERPARATHYROIDISM OF RENAL ORIGIN: ICD-10-CM

## 2021-11-04 DIAGNOSIS — I48.91 ATRIAL FIBRILLATION, UNSPECIFIED TYPE: Chronic | ICD-10-CM

## 2021-11-04 DIAGNOSIS — Z00.00 ENCOUNTER FOR PREVENTIVE HEALTH EXAMINATION: Primary | ICD-10-CM

## 2021-11-04 DIAGNOSIS — E03.9 HYPOTHYROIDISM, UNSPECIFIED TYPE: Chronic | ICD-10-CM

## 2021-11-04 DIAGNOSIS — Z95.0 PACEMAKER: ICD-10-CM

## 2021-11-04 DIAGNOSIS — J84.10 LUNG GRANULOMA: ICD-10-CM

## 2021-11-04 DIAGNOSIS — F33.1 MAJOR DEPRESSIVE DISORDER, RECURRENT EPISODE, MODERATE: ICD-10-CM

## 2021-11-04 DIAGNOSIS — E78.2 COMBINED HYPERLIPIDEMIA ASSOCIATED WITH TYPE 2 DIABETES MELLITUS: ICD-10-CM

## 2021-11-04 DIAGNOSIS — Z99.3 DEPENDENCE ON WHEELCHAIR: ICD-10-CM

## 2021-11-04 DIAGNOSIS — I15.2 HYPERTENSION ASSOCIATED WITH DIABETES: ICD-10-CM

## 2021-11-04 DIAGNOSIS — I49.5 SINUS NODE DYSFUNCTION: ICD-10-CM

## 2021-11-04 DIAGNOSIS — E11.22 DIABETES MELLITUS WITH STAGE 4 CHRONIC KIDNEY DISEASE GFR 15-29: ICD-10-CM

## 2021-11-04 DIAGNOSIS — E11.40 TYPE 2 DIABETES MELLITUS WITH DIABETIC NEUROPATHY, WITHOUT LONG-TERM CURRENT USE OF INSULIN: ICD-10-CM

## 2021-11-04 DIAGNOSIS — N18.4 DIABETES MELLITUS WITH STAGE 4 CHRONIC KIDNEY DISEASE GFR 15-29: ICD-10-CM

## 2021-11-04 DIAGNOSIS — E11.59 HYPERTENSION ASSOCIATED WITH DIABETES: ICD-10-CM

## 2021-11-04 DIAGNOSIS — I44.7 LBBB (LEFT BUNDLE BRANCH BLOCK): ICD-10-CM

## 2021-11-04 DIAGNOSIS — M81.0 OSTEOPOROSIS, UNSPECIFIED OSTEOPOROSIS TYPE, UNSPECIFIED PATHOLOGICAL FRACTURE PRESENCE: ICD-10-CM

## 2021-11-04 DIAGNOSIS — G31.84 MILD COGNITIVE IMPAIRMENT WITH MEMORY LOSS: ICD-10-CM

## 2021-11-04 PROCEDURE — 99499 RISK ADDL DX/OHS AUDIT: ICD-10-PCS | Mod: S$GLB,,, | Performed by: NURSE PRACTITIONER

## 2021-11-04 PROCEDURE — G0439 PR MEDICARE ANNUAL WELLNESS SUBSEQUENT VISIT: ICD-10-PCS | Mod: S$GLB,,, | Performed by: NURSE PRACTITIONER

## 2021-11-04 PROCEDURE — 1101F PR PT FALLS ASSESS DOC 0-1 FALLS W/OUT INJ PAST YR: ICD-10-PCS | Mod: CPTII,S$GLB,, | Performed by: NURSE PRACTITIONER

## 2021-11-04 PROCEDURE — 1101F PT FALLS ASSESS-DOCD LE1/YR: CPT | Mod: CPTII,S$GLB,, | Performed by: NURSE PRACTITIONER

## 2021-11-04 PROCEDURE — 1160F PR REVIEW ALL MEDS BY PRESCRIBER/CLIN PHARMACIST DOCUMENTED: ICD-10-PCS | Mod: CPTII,S$GLB,, | Performed by: NURSE PRACTITIONER

## 2021-11-04 PROCEDURE — 3288F PR FALLS RISK ASSESSMENT DOCUMENTED: ICD-10-PCS | Mod: CPTII,S$GLB,, | Performed by: NURSE PRACTITIONER

## 2021-11-04 PROCEDURE — 99499 UNLISTED E&M SERVICE: CPT | Mod: S$GLB,,, | Performed by: NURSE PRACTITIONER

## 2021-11-04 PROCEDURE — 1160F RVW MEDS BY RX/DR IN RCRD: CPT | Mod: CPTII,S$GLB,, | Performed by: NURSE PRACTITIONER

## 2021-11-04 PROCEDURE — G0439 PPPS, SUBSEQ VISIT: HCPCS | Mod: S$GLB,,, | Performed by: NURSE PRACTITIONER

## 2021-11-04 PROCEDURE — 1159F MED LIST DOCD IN RCRD: CPT | Mod: CPTII,S$GLB,, | Performed by: NURSE PRACTITIONER

## 2021-11-04 PROCEDURE — 1159F PR MEDICATION LIST DOCUMENTED IN MEDICAL RECORD: ICD-10-PCS | Mod: CPTII,S$GLB,, | Performed by: NURSE PRACTITIONER

## 2021-11-04 PROCEDURE — 3288F FALL RISK ASSESSMENT DOCD: CPT | Mod: CPTII,S$GLB,, | Performed by: NURSE PRACTITIONER

## 2021-11-04 PROCEDURE — 3052F HG A1C>EQUAL 8.0%<EQUAL 9.0%: CPT | Mod: CPTII,S$GLB,, | Performed by: NURSE PRACTITIONER

## 2021-11-04 PROCEDURE — 3052F PR MOST RECENT HEMOGLOBIN A1C LEVEL 8.0 - < 9.0%: ICD-10-PCS | Mod: CPTII,S$GLB,, | Performed by: NURSE PRACTITIONER

## 2021-11-08 ENCOUNTER — DOCUMENT SCAN (OUTPATIENT)
Dept: HOME HEALTH SERVICES | Facility: HOSPITAL | Age: 86
End: 2021-11-08
Payer: MEDICARE

## 2021-11-08 VITALS
WEIGHT: 160 LBS | SYSTOLIC BLOOD PRESSURE: 145 MMHG | TEMPERATURE: 98 F | OXYGEN SATURATION: 97 % | HEIGHT: 61 IN | DIASTOLIC BLOOD PRESSURE: 78 MMHG | BODY MASS INDEX: 30.21 KG/M2

## 2021-11-08 PROBLEM — J84.10 LUNG GRANULOMA: Status: ACTIVE | Noted: 2021-11-08

## 2021-12-03 ENCOUNTER — HOSPITAL ENCOUNTER (OUTPATIENT)
Dept: RADIOLOGY | Facility: HOSPITAL | Age: 86
Discharge: HOME | End: 2021-12-03
Attending: FAMILY MEDICINE
Payer: MEDICARE

## 2021-12-03 ENCOUNTER — TELEPHONE (OUTPATIENT)
Dept: PODIATRY | Facility: CLINIC | Age: 86
End: 2021-12-03
Payer: MEDICARE

## 2021-12-03 ENCOUNTER — OFFICE VISIT (OUTPATIENT)
Dept: FAMILY MEDICINE | Facility: CLINIC | Age: 86
End: 2021-12-03
Payer: MEDICARE

## 2021-12-03 VITALS
BODY MASS INDEX: 37.3 KG/M2 | DIASTOLIC BLOOD PRESSURE: 63 MMHG | TEMPERATURE: 97 F | HEART RATE: 60 BPM | SYSTOLIC BLOOD PRESSURE: 144 MMHG | WEIGHT: 190 LBS | HEIGHT: 60 IN

## 2021-12-03 DIAGNOSIS — L97.529 DIABETIC ULCER OF TOE OF LEFT FOOT ASSOCIATED WITH TYPE 2 DIABETES MELLITUS, UNSPECIFIED ULCER STAGE: ICD-10-CM

## 2021-12-03 DIAGNOSIS — E11.22 DIABETES MELLITUS WITH STAGE 4 CHRONIC KIDNEY DISEASE GFR 15-29: ICD-10-CM

## 2021-12-03 DIAGNOSIS — Z95.0 PACEMAKER: ICD-10-CM

## 2021-12-03 DIAGNOSIS — E11.40 TYPE 2 DIABETES MELLITUS WITH DIABETIC NEUROPATHY, WITHOUT LONG-TERM CURRENT USE OF INSULIN: ICD-10-CM

## 2021-12-03 DIAGNOSIS — F33.1 MAJOR DEPRESSIVE DISORDER, RECURRENT EPISODE, MODERATE: ICD-10-CM

## 2021-12-03 DIAGNOSIS — L97.929 ULCER OF LEFT LOWER EXTREMITY, UNSPECIFIED ULCER STAGE: ICD-10-CM

## 2021-12-03 DIAGNOSIS — E11.621 DIABETIC ULCER OF TOE OF LEFT FOOT ASSOCIATED WITH TYPE 2 DIABETES MELLITUS, UNSPECIFIED ULCER STAGE: ICD-10-CM

## 2021-12-03 DIAGNOSIS — G31.84 MILD COGNITIVE IMPAIRMENT WITH MEMORY LOSS: ICD-10-CM

## 2021-12-03 DIAGNOSIS — N18.4 DIABETES MELLITUS WITH STAGE 4 CHRONIC KIDNEY DISEASE GFR 15-29: ICD-10-CM

## 2021-12-03 DIAGNOSIS — M86.9 OSTEOMYELITIS OF SECOND TOE OF LEFT FOOT: Primary | ICD-10-CM

## 2021-12-03 PROCEDURE — 99999 PR PBB SHADOW E&M-EST. PATIENT-LVL V: CPT | Mod: PBBFAC,HCNC,, | Performed by: FAMILY MEDICINE

## 2021-12-03 PROCEDURE — 99499 UNLISTED E&M SERVICE: CPT | Mod: HCNC,S$GLB,, | Performed by: FAMILY MEDICINE

## 2021-12-03 PROCEDURE — 99215 PR OFFICE/OUTPT VISIT, EST, LEVL V, 40-54 MIN: ICD-10-PCS | Mod: HCNC,S$GLB,, | Performed by: FAMILY MEDICINE

## 2021-12-03 PROCEDURE — 73660 XR TOE 2 OR MORE VIEWS LEFT: ICD-10-PCS | Mod: 26,HCNC,LT, | Performed by: RADIOLOGY

## 2021-12-03 PROCEDURE — 73660 X-RAY EXAM OF TOE(S): CPT | Mod: 26,HCNC,LT, | Performed by: RADIOLOGY

## 2021-12-03 PROCEDURE — 99215 OFFICE O/P EST HI 40 MIN: CPT | Mod: HCNC,S$GLB,, | Performed by: FAMILY MEDICINE

## 2021-12-03 PROCEDURE — 82570 ASSAY OF URINE CREATININE: CPT | Mod: HCNC | Performed by: FAMILY MEDICINE

## 2021-12-03 PROCEDURE — 73660 X-RAY EXAM OF TOE(S): CPT | Mod: TC,HCNC,PO,LT

## 2021-12-03 PROCEDURE — 99499 RISK ADDL DX/OHS AUDIT: ICD-10-PCS | Mod: HCNC,S$GLB,, | Performed by: FAMILY MEDICINE

## 2021-12-03 PROCEDURE — 99999 PR PBB SHADOW E&M-EST. PATIENT-LVL V: ICD-10-PCS | Mod: PBBFAC,HCNC,, | Performed by: FAMILY MEDICINE

## 2021-12-03 RX ORDER — DOXYCYCLINE 100 MG/1
100 CAPSULE ORAL 2 TIMES DAILY
Qty: 20 CAPSULE | Refills: 0 | Status: SHIPPED | OUTPATIENT
Start: 2021-12-03 | End: 2021-12-07 | Stop reason: SDUPTHER

## 2021-12-06 LAB
ALBUMIN/CREAT UR: 71 UG/MG (ref 0–30)
CREAT UR-MCNC: 131 MG/DL (ref 15–325)
MICROALBUMIN UR DL<=1MG/L-MCNC: 93 UG/ML

## 2021-12-07 ENCOUNTER — PATIENT MESSAGE (OUTPATIENT)
Dept: CARDIOLOGY | Facility: CLINIC | Age: 86
End: 2021-12-07
Payer: MEDICARE

## 2021-12-07 ENCOUNTER — OFFICE VISIT (OUTPATIENT)
Dept: PODIATRY | Facility: CLINIC | Age: 86
End: 2021-12-07
Payer: MEDICARE

## 2021-12-07 ENCOUNTER — PATIENT MESSAGE (OUTPATIENT)
Dept: FAMILY MEDICINE | Facility: CLINIC | Age: 86
End: 2021-12-07
Payer: MEDICARE

## 2021-12-07 VITALS
WEIGHT: 190 LBS | BODY MASS INDEX: 37.3 KG/M2 | HEART RATE: 60 BPM | DIASTOLIC BLOOD PRESSURE: 70 MMHG | SYSTOLIC BLOOD PRESSURE: 178 MMHG | HEIGHT: 60 IN

## 2021-12-07 DIAGNOSIS — M86.9 OSTEOMYELITIS OF LEFT FOOT, UNSPECIFIED TYPE: Primary | ICD-10-CM

## 2021-12-07 DIAGNOSIS — B35.1 ONYCHOMYCOSIS DUE TO DERMATOPHYTE: ICD-10-CM

## 2021-12-07 DIAGNOSIS — L84 PRE-ULCERATIVE CALLUSES: ICD-10-CM

## 2021-12-07 DIAGNOSIS — L97.521 TOE ULCER, LEFT, LIMITED TO BREAKDOWN OF SKIN: ICD-10-CM

## 2021-12-07 DIAGNOSIS — E11.49 TYPE II DIABETES MELLITUS WITH NEUROLOGICAL MANIFESTATIONS: ICD-10-CM

## 2021-12-07 PROCEDURE — 99999 PR PBB SHADOW E&M-EST. PATIENT-LVL V: ICD-10-PCS | Mod: PBBFAC,HCNC,, | Performed by: PODIATRIST

## 2021-12-07 PROCEDURE — 11721 PR DEBRIDEMENT OF NAILS, 6 OR MORE: ICD-10-PCS | Mod: 59,Q9,HCNC,S$GLB | Performed by: PODIATRIST

## 2021-12-07 PROCEDURE — 99499 RISK ADDL DX/OHS AUDIT: ICD-10-PCS | Mod: HCNC,S$GLB,, | Performed by: PODIATRIST

## 2021-12-07 PROCEDURE — 11042 DBRDMT SUBQ TIS 1ST 20SQCM/<: CPT | Mod: 59,HCNC,S$GLB, | Performed by: PODIATRIST

## 2021-12-07 PROCEDURE — 11042 PR DEBRIDEMENT, SKIN, SUB-Q TISSUE,=<20 SQ CM: ICD-10-PCS | Mod: 59,HCNC,S$GLB, | Performed by: PODIATRIST

## 2021-12-07 PROCEDURE — 11055 PR TRIM HYPERKERATOTIC SKIN LESION, ONE: ICD-10-PCS | Mod: Q9,HCNC,S$GLB, | Performed by: PODIATRIST

## 2021-12-07 PROCEDURE — 99999 PR PBB SHADOW E&M-EST. PATIENT-LVL V: CPT | Mod: PBBFAC,HCNC,, | Performed by: PODIATRIST

## 2021-12-07 PROCEDURE — 99499 UNLISTED E&M SERVICE: CPT | Mod: HCNC,S$GLB,, | Performed by: PODIATRIST

## 2021-12-07 PROCEDURE — 11721 DEBRIDE NAIL 6 OR MORE: CPT | Mod: 59,Q9,HCNC,S$GLB | Performed by: PODIATRIST

## 2021-12-07 PROCEDURE — 99214 PR OFFICE/OUTPT VISIT, EST, LEVL IV, 30-39 MIN: ICD-10-PCS | Mod: 25,HCNC,S$GLB, | Performed by: PODIATRIST

## 2021-12-07 PROCEDURE — 99214 OFFICE O/P EST MOD 30 MIN: CPT | Mod: 25,HCNC,S$GLB, | Performed by: PODIATRIST

## 2021-12-07 PROCEDURE — 11055 PARING/CUTG B9 HYPRKER LES 1: CPT | Mod: Q9,HCNC,S$GLB, | Performed by: PODIATRIST

## 2021-12-07 RX ORDER — DOXYCYCLINE 100 MG/1
100 CAPSULE ORAL 2 TIMES DAILY
Qty: 20 CAPSULE | Refills: 0 | Status: SHIPPED | OUTPATIENT
Start: 2021-12-07 | End: 2021-12-17

## 2021-12-09 ENCOUNTER — TELEPHONE (OUTPATIENT)
Dept: FAMILY MEDICINE | Facility: CLINIC | Age: 86
End: 2021-12-09
Payer: MEDICARE

## 2021-12-13 ENCOUNTER — TELEPHONE (OUTPATIENT)
Dept: FAMILY MEDICINE | Facility: CLINIC | Age: 86
End: 2021-12-13
Payer: MEDICARE

## 2021-12-17 ENCOUNTER — TELEPHONE (OUTPATIENT)
Dept: NEPHROLOGY | Facility: CLINIC | Age: 86
End: 2021-12-17
Payer: MEDICARE

## 2021-12-17 ENCOUNTER — OFFICE VISIT (OUTPATIENT)
Dept: FAMILY MEDICINE | Facility: CLINIC | Age: 86
End: 2021-12-17
Payer: MEDICARE

## 2021-12-17 VITALS
DIASTOLIC BLOOD PRESSURE: 56 MMHG | SYSTOLIC BLOOD PRESSURE: 118 MMHG | WEIGHT: 145 LBS | BODY MASS INDEX: 28.47 KG/M2 | TEMPERATURE: 98 F | HEART RATE: 60 BPM | HEIGHT: 60 IN

## 2021-12-17 DIAGNOSIS — M86.9 OSTEOMYELITIS OF SECOND TOE OF LEFT FOOT: ICD-10-CM

## 2021-12-17 DIAGNOSIS — E11.22 DIABETES MELLITUS WITH STAGE 4 CHRONIC KIDNEY DISEASE GFR 15-29: ICD-10-CM

## 2021-12-17 DIAGNOSIS — E11.649 TYPE 2 DIABETES MELLITUS WITH HYPOGLYCEMIA WITHOUT COMA, UNSPECIFIED WHETHER LONG TERM INSULIN USE: Primary | ICD-10-CM

## 2021-12-17 DIAGNOSIS — N18.4 DIABETES MELLITUS WITH STAGE 4 CHRONIC KIDNEY DISEASE GFR 15-29: ICD-10-CM

## 2021-12-17 PROCEDURE — 99999 PR PBB SHADOW E&M-EST. PATIENT-LVL V: ICD-10-PCS | Mod: PBBFAC,HCNC,, | Performed by: FAMILY MEDICINE

## 2021-12-17 PROCEDURE — 99999 PR PBB SHADOW E&M-EST. PATIENT-LVL V: CPT | Mod: PBBFAC,HCNC,, | Performed by: FAMILY MEDICINE

## 2021-12-17 PROCEDURE — 99214 PR OFFICE/OUTPT VISIT, EST, LEVL IV, 30-39 MIN: ICD-10-PCS | Mod: HCNC,S$GLB,, | Performed by: FAMILY MEDICINE

## 2021-12-17 PROCEDURE — 99214 OFFICE O/P EST MOD 30 MIN: CPT | Mod: HCNC,S$GLB,, | Performed by: FAMILY MEDICINE

## 2021-12-17 RX ORDER — PEN NEEDLE, DIABETIC 29 G X1/2"
NEEDLE, DISPOSABLE MISCELLANEOUS
Qty: 100 EACH | Refills: 3 | Status: SHIPPED | OUTPATIENT
Start: 2021-12-17 | End: 2022-11-02

## 2021-12-17 RX ORDER — INSULIN GLARGINE 100 [IU]/ML
5 INJECTION, SOLUTION SUBCUTANEOUS NIGHTLY
Qty: 15 ML | Refills: 11 | Status: SHIPPED | OUTPATIENT
Start: 2021-12-17 | End: 2021-12-28

## 2021-12-28 ENCOUNTER — OFFICE VISIT (OUTPATIENT)
Dept: PODIATRY | Facility: CLINIC | Age: 86
End: 2021-12-28
Payer: MEDICARE

## 2021-12-28 ENCOUNTER — HOSPITAL ENCOUNTER (OUTPATIENT)
Dept: RADIOLOGY | Facility: HOSPITAL | Age: 86
Discharge: HOME | End: 2021-12-28
Attending: PODIATRIST
Payer: MEDICARE

## 2021-12-28 ENCOUNTER — OFFICE VISIT (OUTPATIENT)
Dept: FAMILY MEDICINE | Facility: CLINIC | Age: 86
End: 2021-12-28
Payer: MEDICARE

## 2021-12-28 VITALS — HEART RATE: 59 BPM | SYSTOLIC BLOOD PRESSURE: 120 MMHG | DIASTOLIC BLOOD PRESSURE: 66 MMHG | TEMPERATURE: 98 F

## 2021-12-28 DIAGNOSIS — M86.9 OSTEOMYELITIS OF SECOND TOE OF LEFT FOOT: ICD-10-CM

## 2021-12-28 DIAGNOSIS — M86.9 OSTEOMYELITIS OF LEFT FOOT, UNSPECIFIED TYPE: ICD-10-CM

## 2021-12-28 DIAGNOSIS — N17.9 AKI (ACUTE KIDNEY INJURY): ICD-10-CM

## 2021-12-28 DIAGNOSIS — E11.22 DIABETES MELLITUS WITH STAGE 4 CHRONIC KIDNEY DISEASE GFR 15-29: ICD-10-CM

## 2021-12-28 DIAGNOSIS — L84 PRE-ULCERATIVE CALLUSES: Primary | ICD-10-CM

## 2021-12-28 DIAGNOSIS — N39.0 RECURRENT UTI: Primary | ICD-10-CM

## 2021-12-28 DIAGNOSIS — N18.4 DIABETES MELLITUS WITH STAGE 4 CHRONIC KIDNEY DISEASE GFR 15-29: ICD-10-CM

## 2021-12-28 DIAGNOSIS — E11.621 DIABETIC ULCER OF TOE OF LEFT FOOT ASSOCIATED WITH TYPE 2 DIABETES MELLITUS, UNSPECIFIED ULCER STAGE: ICD-10-CM

## 2021-12-28 DIAGNOSIS — L97.529 DIABETIC ULCER OF TOE OF LEFT FOOT ASSOCIATED WITH TYPE 2 DIABETES MELLITUS, UNSPECIFIED ULCER STAGE: ICD-10-CM

## 2021-12-28 PROCEDURE — 99496 TRANSJ CARE MGMT HIGH F2F 7D: CPT | Mod: HCNC,S$GLB,, | Performed by: FAMILY MEDICINE

## 2021-12-28 PROCEDURE — 99999 PR PBB SHADOW E&M-EST. PATIENT-LVL IV: CPT | Mod: PBBFAC,HCNC,, | Performed by: FAMILY MEDICINE

## 2021-12-28 PROCEDURE — 1159F MED LIST DOCD IN RCRD: CPT | Mod: HCNC,CPTII,S$GLB, | Performed by: PODIATRIST

## 2021-12-28 PROCEDURE — 3288F PR FALLS RISK ASSESSMENT DOCUMENTED: ICD-10-PCS | Mod: HCNC,CPTII,S$GLB, | Performed by: PODIATRIST

## 2021-12-28 PROCEDURE — 99214 OFFICE O/P EST MOD 30 MIN: CPT | Mod: HCNC,S$GLB,, | Performed by: PODIATRIST

## 2021-12-28 PROCEDURE — 99499 UNLISTED E&M SERVICE: CPT | Mod: S$GLB,,, | Performed by: FAMILY MEDICINE

## 2021-12-28 PROCEDURE — 99499 RISK ADDL DX/OHS AUDIT: ICD-10-PCS | Mod: S$GLB,,, | Performed by: FAMILY MEDICINE

## 2021-12-28 PROCEDURE — 1160F RVW MEDS BY RX/DR IN RCRD: CPT | Mod: HCNC,CPTII,S$GLB, | Performed by: PODIATRIST

## 2021-12-28 PROCEDURE — 3052F PR MOST RECENT HEMOGLOBIN A1C LEVEL 8.0 - < 9.0%: ICD-10-PCS | Mod: HCNC,CPTII,S$GLB, | Performed by: PODIATRIST

## 2021-12-28 PROCEDURE — 99496 TRANSITIONAL CARE MANAGE SERVICE 7 DAY DISCHARGE: ICD-10-PCS | Mod: HCNC,S$GLB,, | Performed by: FAMILY MEDICINE

## 2021-12-28 PROCEDURE — 99214 PR OFFICE/OUTPT VISIT, EST, LEVL IV, 30-39 MIN: ICD-10-PCS | Mod: HCNC,S$GLB,, | Performed by: PODIATRIST

## 2021-12-28 PROCEDURE — 1160F PR REVIEW ALL MEDS BY PRESCRIBER/CLIN PHARMACIST DOCUMENTED: ICD-10-PCS | Mod: HCNC,CPTII,S$GLB, | Performed by: PODIATRIST

## 2021-12-28 PROCEDURE — 73660 X-RAY EXAM OF TOE(S): CPT | Mod: 26,HCNC,LT, | Performed by: RADIOLOGY

## 2021-12-28 PROCEDURE — 99999 PR PBB SHADOW E&M-EST. PATIENT-LVL III: CPT | Mod: PBBFAC,HCNC,, | Performed by: PODIATRIST

## 2021-12-28 PROCEDURE — 1101F PT FALLS ASSESS-DOCD LE1/YR: CPT | Mod: HCNC,CPTII,S$GLB, | Performed by: PODIATRIST

## 2021-12-28 PROCEDURE — 1101F PR PT FALLS ASSESS DOC 0-1 FALLS W/OUT INJ PAST YR: ICD-10-PCS | Mod: HCNC,CPTII,S$GLB, | Performed by: PODIATRIST

## 2021-12-28 PROCEDURE — 1159F PR MEDICATION LIST DOCUMENTED IN MEDICAL RECORD: ICD-10-PCS | Mod: HCNC,CPTII,S$GLB, | Performed by: PODIATRIST

## 2021-12-28 PROCEDURE — 73660 X-RAY EXAM OF TOE(S): CPT | Mod: TC,HCNC,PO,LT

## 2021-12-28 PROCEDURE — 73660 XR TOE 2 OR MORE VIEWS LEFT: ICD-10-PCS | Mod: 26,HCNC,LT, | Performed by: RADIOLOGY

## 2021-12-28 PROCEDURE — 99999 PR PBB SHADOW E&M-EST. PATIENT-LVL III: ICD-10-PCS | Mod: PBBFAC,HCNC,, | Performed by: PODIATRIST

## 2021-12-28 PROCEDURE — 3288F FALL RISK ASSESSMENT DOCD: CPT | Mod: HCNC,CPTII,S$GLB, | Performed by: PODIATRIST

## 2021-12-28 PROCEDURE — 3052F HG A1C>EQUAL 8.0%<EQUAL 9.0%: CPT | Mod: HCNC,CPTII,S$GLB, | Performed by: PODIATRIST

## 2021-12-28 PROCEDURE — 99999 PR PBB SHADOW E&M-EST. PATIENT-LVL IV: ICD-10-PCS | Mod: PBBFAC,HCNC,, | Performed by: FAMILY MEDICINE

## 2021-12-28 RX ORDER — DOXYCYCLINE 100 MG/1
CAPSULE ORAL
COMMUNITY
Start: 2021-12-11 | End: 2022-03-10

## 2021-12-28 RX ORDER — INSULIN GLARGINE 100 [IU]/ML
5 INJECTION, SOLUTION SUBCUTANEOUS NIGHTLY
Qty: 15 ML | Refills: 11 | COMMUNITY
Start: 2021-12-28 | End: 2022-03-10

## 2021-12-29 ENCOUNTER — PATIENT MESSAGE (OUTPATIENT)
Dept: FAMILY MEDICINE | Facility: CLINIC | Age: 86
End: 2021-12-29
Payer: MEDICARE

## 2022-01-05 ENCOUNTER — OFFICE VISIT (OUTPATIENT)
Dept: CARDIOLOGY | Facility: CLINIC | Age: 87
End: 2022-01-05
Payer: MEDICARE

## 2022-01-05 ENCOUNTER — OFFICE VISIT (OUTPATIENT)
Dept: OPTOMETRY | Facility: CLINIC | Age: 87
End: 2022-01-05
Payer: MEDICARE

## 2022-01-05 ENCOUNTER — CLINICAL SUPPORT (OUTPATIENT)
Dept: CARDIOLOGY | Facility: HOSPITAL | Age: 87
End: 2022-01-05
Attending: INTERNAL MEDICINE
Payer: MEDICARE

## 2022-01-05 VITALS
HEIGHT: 60 IN | BODY MASS INDEX: 31.02 KG/M2 | HEART RATE: 60 BPM | SYSTOLIC BLOOD PRESSURE: 132 MMHG | DIASTOLIC BLOOD PRESSURE: 69 MMHG | WEIGHT: 158 LBS

## 2022-01-05 DIAGNOSIS — I15.2 HYPERTENSION ASSOCIATED WITH DIABETES: Primary | ICD-10-CM

## 2022-01-05 DIAGNOSIS — N18.4 DIABETES MELLITUS WITH STAGE 4 CHRONIC KIDNEY DISEASE GFR 15-29: ICD-10-CM

## 2022-01-05 DIAGNOSIS — E11.9 TYPE 2 DIABETES MELLITUS WITHOUT RETINOPATHY: Primary | ICD-10-CM

## 2022-01-05 DIAGNOSIS — I44.7 LBBB (LEFT BUNDLE BRANCH BLOCK): ICD-10-CM

## 2022-01-05 DIAGNOSIS — Z95.0 PACEMAKER: ICD-10-CM

## 2022-01-05 DIAGNOSIS — Z96.1 PSEUDOPHAKIA OF BOTH EYES: ICD-10-CM

## 2022-01-05 DIAGNOSIS — E11.22 DIABETES MELLITUS WITH STAGE 4 CHRONIC KIDNEY DISEASE GFR 15-29: ICD-10-CM

## 2022-01-05 DIAGNOSIS — E11.59 HYPERTENSION ASSOCIATED WITH DIABETES: Primary | ICD-10-CM

## 2022-01-05 PROCEDURE — 1101F PT FALLS ASSESS-DOCD LE1/YR: CPT | Mod: HCNC,CPTII,S$GLB, | Performed by: INTERNAL MEDICINE

## 2022-01-05 PROCEDURE — 99214 PR OFFICE/OUTPT VISIT, EST, LEVL IV, 30-39 MIN: ICD-10-PCS | Mod: HCNC,S$GLB,, | Performed by: INTERNAL MEDICINE

## 2022-01-05 PROCEDURE — 1101F PR PT FALLS ASSESS DOC 0-1 FALLS W/OUT INJ PAST YR: ICD-10-PCS | Mod: HCNC,CPTII,S$GLB, | Performed by: INTERNAL MEDICINE

## 2022-01-05 PROCEDURE — 1100F PR PT FALLS ASSESS DOC 2+ FALLS/FALL W/INJURY/YR: ICD-10-PCS | Mod: HCNC,CPTII,S$GLB, | Performed by: OPTOMETRIST

## 2022-01-05 PROCEDURE — 99999 PR PBB SHADOW E&M-EST. PATIENT-LVL IV: ICD-10-PCS | Mod: PBBFAC,HCNC,, | Performed by: OPTOMETRIST

## 2022-01-05 PROCEDURE — 2023F PR DILATED RETINAL EXAM W/O EVID OF RETINOPATHY: ICD-10-PCS | Mod: HCNC,CPTII,S$GLB, | Performed by: OPTOMETRIST

## 2022-01-05 PROCEDURE — 1159F MED LIST DOCD IN RCRD: CPT | Mod: HCNC,CPTII,S$GLB, | Performed by: INTERNAL MEDICINE

## 2022-01-05 PROCEDURE — 3288F FALL RISK ASSESSMENT DOCD: CPT | Mod: HCNC,CPTII,S$GLB, | Performed by: OPTOMETRIST

## 2022-01-05 PROCEDURE — 1160F RVW MEDS BY RX/DR IN RCRD: CPT | Mod: HCNC,CPTII,S$GLB, | Performed by: INTERNAL MEDICINE

## 2022-01-05 PROCEDURE — 93280 CARDIAC DEVICE CHECK - IN CLINIC & HOSPITAL: ICD-10-PCS | Mod: 26,HCNC,, | Performed by: INTERNAL MEDICINE

## 2022-01-05 PROCEDURE — 3288F PR FALLS RISK ASSESSMENT DOCUMENTED: ICD-10-PCS | Mod: HCNC,CPTII,S$GLB, | Performed by: INTERNAL MEDICINE

## 2022-01-05 PROCEDURE — 1126F AMNT PAIN NOTED NONE PRSNT: CPT | Mod: HCNC,CPTII,S$GLB, | Performed by: INTERNAL MEDICINE

## 2022-01-05 PROCEDURE — 1159F PR MEDICATION LIST DOCUMENTED IN MEDICAL RECORD: ICD-10-PCS | Mod: HCNC,CPTII,S$GLB, | Performed by: OPTOMETRIST

## 2022-01-05 PROCEDURE — 1100F PTFALLS ASSESS-DOCD GE2>/YR: CPT | Mod: HCNC,CPTII,S$GLB, | Performed by: OPTOMETRIST

## 2022-01-05 PROCEDURE — 1160F PR REVIEW ALL MEDS BY PRESCRIBER/CLIN PHARMACIST DOCUMENTED: ICD-10-PCS | Mod: HCNC,CPTII,S$GLB, | Performed by: INTERNAL MEDICINE

## 2022-01-05 PROCEDURE — 1159F MED LIST DOCD IN RCRD: CPT | Mod: HCNC,CPTII,S$GLB, | Performed by: OPTOMETRIST

## 2022-01-05 PROCEDURE — 92014 COMPRE OPH EXAM EST PT 1/>: CPT | Mod: HCNC,S$GLB,, | Performed by: OPTOMETRIST

## 2022-01-05 PROCEDURE — 3288F FALL RISK ASSESSMENT DOCD: CPT | Mod: HCNC,CPTII,S$GLB, | Performed by: INTERNAL MEDICINE

## 2022-01-05 PROCEDURE — 2023F DILAT RTA XM W/O RTNOPTHY: CPT | Mod: HCNC,CPTII,S$GLB, | Performed by: OPTOMETRIST

## 2022-01-05 PROCEDURE — 99999 PR PBB SHADOW E&M-EST. PATIENT-LVL IV: CPT | Mod: PBBFAC,HCNC,, | Performed by: OPTOMETRIST

## 2022-01-05 PROCEDURE — 3052F HG A1C>EQUAL 8.0%<EQUAL 9.0%: CPT | Mod: HCNC,CPTII,S$GLB, | Performed by: INTERNAL MEDICINE

## 2022-01-05 PROCEDURE — 92014 PR EYE EXAM, EST PATIENT,COMPREHESV: ICD-10-PCS | Mod: HCNC,S$GLB,, | Performed by: OPTOMETRIST

## 2022-01-05 PROCEDURE — 1126F AMNT PAIN NOTED NONE PRSNT: CPT | Mod: HCNC,CPTII,S$GLB, | Performed by: OPTOMETRIST

## 2022-01-05 PROCEDURE — 3288F PR FALLS RISK ASSESSMENT DOCUMENTED: ICD-10-PCS | Mod: HCNC,CPTII,S$GLB, | Performed by: OPTOMETRIST

## 2022-01-05 PROCEDURE — 93280 PM DEVICE PROGR EVAL DUAL: CPT | Mod: 26,HCNC,, | Performed by: INTERNAL MEDICINE

## 2022-01-05 PROCEDURE — 1159F PR MEDICATION LIST DOCUMENTED IN MEDICAL RECORD: ICD-10-PCS | Mod: HCNC,CPTII,S$GLB, | Performed by: INTERNAL MEDICINE

## 2022-01-05 PROCEDURE — 1160F PR REVIEW ALL MEDS BY PRESCRIBER/CLIN PHARMACIST DOCUMENTED: ICD-10-PCS | Mod: HCNC,CPTII,S$GLB, | Performed by: OPTOMETRIST

## 2022-01-05 PROCEDURE — 1160F RVW MEDS BY RX/DR IN RCRD: CPT | Mod: HCNC,CPTII,S$GLB, | Performed by: OPTOMETRIST

## 2022-01-05 PROCEDURE — 99999 PR PBB SHADOW E&M-EST. PATIENT-LVL IV: ICD-10-PCS | Mod: PBBFAC,HCNC,, | Performed by: INTERNAL MEDICINE

## 2022-01-05 PROCEDURE — 99214 OFFICE O/P EST MOD 30 MIN: CPT | Mod: HCNC,S$GLB,, | Performed by: INTERNAL MEDICINE

## 2022-01-05 PROCEDURE — 3052F PR MOST RECENT HEMOGLOBIN A1C LEVEL 8.0 - < 9.0%: ICD-10-PCS | Mod: HCNC,CPTII,S$GLB, | Performed by: INTERNAL MEDICINE

## 2022-01-05 PROCEDURE — 99999 PR PBB SHADOW E&M-EST. PATIENT-LVL IV: CPT | Mod: PBBFAC,HCNC,, | Performed by: INTERNAL MEDICINE

## 2022-01-05 PROCEDURE — 1126F PR PAIN SEVERITY QUANTIFIED, NO PAIN PRESENT: ICD-10-PCS | Mod: HCNC,CPTII,S$GLB, | Performed by: OPTOMETRIST

## 2022-01-05 PROCEDURE — 1126F PR PAIN SEVERITY QUANTIFIED, NO PAIN PRESENT: ICD-10-PCS | Mod: HCNC,CPTII,S$GLB, | Performed by: INTERNAL MEDICINE

## 2022-01-05 NOTE — PROGRESS NOTES
HPI     Dle- 11/17/2020- Dr. Huddleston    Pt here for diabetic eye exam. Pt sts changes to va since last seen, pt   does not wear gls from last visit. Pt sts she is now seeing double in OD   only, without gls. Denies flashes or floaters. Denies eye pain. Excess   watering OD, constant. No gtts. DM controlled with meds.     Hemoglobin A1C       Date                     Value               Ref Range             Status                12/03/2021               8.9 (H)             4.0 - 5.6 %           Final              Comment:    ADA Screening Guidelines:  5.7-6.4%  Consistent with   prediabetes  >or=6.5%  Consistent with diabetes    High levels of fetal   hemoglobin interfere with the HbA1C  assay. Heterozygous hemoglobin   variants (HbS, HgC, etc)do  not significantly interfere with this assay.     However, presence of multiple variants may affect accuracy.         09/06/2021               8.9 (H)             4.6 - 6.2 %           Final                   02/26/2021               7.0 (H)             4.0 - 5.6 %           Final                       08/18/2020               9.4 (H)             4.0 - 5.6 %           Final                  Last edited by Crystal Mosquera MA on 1/5/2022 10:56 AM. (History)            Assessment /Plan     For exam results, see Encounter Report.    Type 2 diabetes mellitus without retinopathy    Diabetes mellitus with stage 4 chronic kidney disease GFR 15-29  -     Ambulatory referral/consult to Optometry    Pseudophakia of both eyes      1,2. No diabetic retinopathy, no csme. Return in 1 year for dilated eye exam.  3. Monitor condition. Patient to report any changes. RTC 1 year recheck.

## 2022-01-05 NOTE — PROGRESS NOTES
Subjective:    Patient ID:  Kassi Skelton is a 89 y.o. female who presents for follow-up of PAF      HPI  She comes with no complaints, no chest pain, no shortness of breath  Back at home after being in [facility] for UTI's    Review of Systems   Constitutional: Negative for decreased appetite, malaise/fatigue, weight gain and weight loss.   Cardiovascular: Negative for chest pain, dyspnea on exertion, leg swelling, palpitations and syncope.   Respiratory: Negative for cough and shortness of breath.    Gastrointestinal: Negative.    Neurological: Negative for weakness.   All other systems reviewed and are negative.       Objective:      Physical Exam  Vitals and nursing note reviewed.   Constitutional:       Appearance: Normal appearance. She is well-developed and well-nourished.   HENT:      Head: Normocephalic.   Eyes:      Pupils: Pupils are equal, round, and reactive to light.   Neck:      Thyroid: No thyromegaly.      Vascular: No carotid bruit or JVD.   Cardiovascular:      Rate and Rhythm: Normal rate and regular rhythm.      Chest Wall: PMI is not displaced.      Pulses: Normal pulses and intact distal pulses.      Heart sounds: Normal heart sounds. No murmur heard.  No gallop.    Pulmonary:      Effort: Pulmonary effort is normal.      Breath sounds: Normal breath sounds.   Abdominal:      Palpations: Abdomen is soft. There is no hepatosplenomegaly or mass.      Tenderness: There is no abdominal tenderness.   Musculoskeletal:         General: No edema. Normal range of motion.      Cervical back: Normal range of motion and neck supple.   Skin:     General: Skin is warm and intact.   Neurological:      Mental Status: She is alert and oriented to person, place, and time.      Sensory: No sensory deficit.      Deep Tendon Reflexes: Strength normal and reflexes are normal and symmetric.   Psychiatric:         Mood and Affect: Mood and affect normal.           Assessment:       1. Hypertension associated with  diabetes    2. LBBB (left bundle branch block)         Plan:     Continue all cardiac medications  Regular exercise program  1 yr f/u

## 2022-01-24 ENCOUNTER — OFFICE VISIT (OUTPATIENT)
Dept: NEPHROLOGY | Facility: CLINIC | Age: 87
End: 2022-01-24
Payer: MEDICARE

## 2022-01-24 VITALS
DIASTOLIC BLOOD PRESSURE: 58 MMHG | WEIGHT: 158 LBS | SYSTOLIC BLOOD PRESSURE: 118 MMHG | BODY MASS INDEX: 31.02 KG/M2 | HEIGHT: 60 IN

## 2022-01-24 DIAGNOSIS — N28.1 CYST OF KIDNEY, ACQUIRED: ICD-10-CM

## 2022-01-24 DIAGNOSIS — N18.4 DIABETES MELLITUS WITH STAGE 4 CHRONIC KIDNEY DISEASE GFR 15-29: ICD-10-CM

## 2022-01-24 DIAGNOSIS — N18.30 STAGE 3 CHRONIC KIDNEY DISEASE, UNSPECIFIED WHETHER STAGE 3A OR 3B CKD: Primary | ICD-10-CM

## 2022-01-24 DIAGNOSIS — E11.22 DIABETES MELLITUS WITH STAGE 4 CHRONIC KIDNEY DISEASE GFR 15-29: ICD-10-CM

## 2022-01-24 PROCEDURE — 1159F MED LIST DOCD IN RCRD: CPT | Mod: HCNC,CPTII,S$GLB, | Performed by: INTERNAL MEDICINE

## 2022-01-24 PROCEDURE — 3288F PR FALLS RISK ASSESSMENT DOCUMENTED: ICD-10-PCS | Mod: HCNC,CPTII,S$GLB, | Performed by: INTERNAL MEDICINE

## 2022-01-24 PROCEDURE — 1101F PT FALLS ASSESS-DOCD LE1/YR: CPT | Mod: HCNC,CPTII,S$GLB, | Performed by: INTERNAL MEDICINE

## 2022-01-24 PROCEDURE — 3052F HG A1C>EQUAL 8.0%<EQUAL 9.0%: CPT | Mod: HCNC,CPTII,S$GLB, | Performed by: INTERNAL MEDICINE

## 2022-01-24 PROCEDURE — 99999 PR PBB SHADOW E&M-EST. PATIENT-LVL IV: ICD-10-PCS | Mod: PBBFAC,HCNC,, | Performed by: INTERNAL MEDICINE

## 2022-01-24 PROCEDURE — 99214 OFFICE O/P EST MOD 30 MIN: CPT | Mod: HCNC,S$GLB,, | Performed by: INTERNAL MEDICINE

## 2022-01-24 PROCEDURE — 3052F PR MOST RECENT HEMOGLOBIN A1C LEVEL 8.0 - < 9.0%: ICD-10-PCS | Mod: HCNC,CPTII,S$GLB, | Performed by: INTERNAL MEDICINE

## 2022-01-24 PROCEDURE — 99214 PR OFFICE/OUTPT VISIT, EST, LEVL IV, 30-39 MIN: ICD-10-PCS | Mod: HCNC,S$GLB,, | Performed by: INTERNAL MEDICINE

## 2022-01-24 PROCEDURE — 99999 PR PBB SHADOW E&M-EST. PATIENT-LVL IV: CPT | Mod: PBBFAC,HCNC,, | Performed by: INTERNAL MEDICINE

## 2022-01-24 PROCEDURE — 1101F PR PT FALLS ASSESS DOC 0-1 FALLS W/OUT INJ PAST YR: ICD-10-PCS | Mod: HCNC,CPTII,S$GLB, | Performed by: INTERNAL MEDICINE

## 2022-01-24 PROCEDURE — 1159F PR MEDICATION LIST DOCUMENTED IN MEDICAL RECORD: ICD-10-PCS | Mod: HCNC,CPTII,S$GLB, | Performed by: INTERNAL MEDICINE

## 2022-01-24 PROCEDURE — 3288F FALL RISK ASSESSMENT DOCD: CPT | Mod: HCNC,CPTII,S$GLB, | Performed by: INTERNAL MEDICINE

## 2022-01-24 NOTE — PROGRESS NOTES
Subjective:       Patient ID: Kassi Skelton is a 89 y.o. White female who presents for return patient evaluation for chronic renal failure.      She has had three UTIs since September of last year. She is seeing a Urologist in [place] now.  She is not a fluid drinker.  She is off of the pill for diabetes and is on insulin now.      Review of Systems   Constitutional: Positive for fatigue. Negative for appetite change, chills and fever.   HENT: Positive for hearing loss.         Dry mouth-chronic   Eyes: Negative for visual disturbance.   Respiratory: Negative for cough and shortness of breath.    Cardiovascular: Positive for leg swelling. Negative for chest pain.   Gastrointestinal: Negative for abdominal pain, diarrhea, nausea and vomiting.   Genitourinary: Positive for dysuria (intermittently). Negative for difficulty urinating and hematuria.   Musculoskeletal: Positive for arthralgias and gait problem. Negative for myalgias.   Skin: Negative for rash.   Neurological: Negative for dizziness and headaches.   Psychiatric/Behavioral: Negative for sleep disturbance.       The past medical, family and social histories were reviewed for this encounter.     BP (!) 118/58 (BP Location: Right arm, Patient Position: Sitting, BP Method: Medium (Manual))   Ht 5' (1.524 m)   Wt 71.7 kg (158 lb)   BMI 30.86 kg/m²     Objective:      Physical Exam  Vitals reviewed.   Constitutional:       General: She is not in acute distress.     Appearance: She is well-developed.   HENT:      Head: Normocephalic and atraumatic.   Eyes:      General: No scleral icterus.     Conjunctiva/sclera: Conjunctivae normal.   Neck:      Vascular: No JVD.   Cardiovascular:      Rate and Rhythm: Normal rate and regular rhythm.      Heart sounds: Normal heart sounds. No murmur heard.  No friction rub. No gallop.    Pulmonary:      Effort: Pulmonary effort is normal. No respiratory distress.      Breath sounds: Normal breath sounds. No wheezing.    Abdominal:      General: Bowel sounds are normal. There is no distension.      Palpations: Abdomen is soft.      Tenderness: There is no abdominal tenderness.   Musculoskeletal:      Cervical back: Normal range of motion.      Right lower leg: Edema (trace-1+) present.      Left lower leg: Edema (trace-1+) present.      Comments: Right foot has an ecchymosis on the dorsum, the foot is cool but is the same temperature as the left foot   Skin:     General: Skin is warm and dry.      Findings: No rash.         Assessment:       1. Stage 3 chronic kidney disease, unspecified whether stage 3a or 3b CKD    2. Diabetes mellitus with stage 4 chronic kidney disease GFR 15-29    3. Cyst of kidney, acquired        Plan:   Return to clinic in 6 months.  Labs for next visit include rp, pth, cbc, upc.  Baseline creatinine is 1.1-1.5 since 2010 but is still in the upper 1.5-2.0 range for the past four years.  She wishes to continue conservative care.  She does not want dialysis.  UPC is negative.  Continue current medications as prescribed and reviewed.   Blood pressure is controlled on the current regimen.  Her daughter Tova's cell is [phone].  Follow up with  as she may need suppressive therapy.

## 2022-01-28 ENCOUNTER — OFFICE VISIT (OUTPATIENT)
Dept: FAMILY MEDICINE | Facility: CLINIC | Age: 87
End: 2022-01-28
Payer: MEDICARE

## 2022-01-28 VITALS
WEIGHT: 150 LBS | DIASTOLIC BLOOD PRESSURE: 52 MMHG | BODY MASS INDEX: 29.45 KG/M2 | SYSTOLIC BLOOD PRESSURE: 118 MMHG | HEIGHT: 60 IN | TEMPERATURE: 98 F | HEART RATE: 60 BPM

## 2022-01-28 DIAGNOSIS — I48.19 PERSISTENT ATRIAL FIBRILLATION: ICD-10-CM

## 2022-01-28 DIAGNOSIS — N25.81 SECONDARY HYPERPARATHYROIDISM OF RENAL ORIGIN: ICD-10-CM

## 2022-01-28 DIAGNOSIS — F33.1 MAJOR DEPRESSIVE DISORDER, RECURRENT EPISODE, MODERATE: ICD-10-CM

## 2022-01-28 DIAGNOSIS — J84.10 LUNG GRANULOMA: ICD-10-CM

## 2022-01-28 DIAGNOSIS — R21 RASH: ICD-10-CM

## 2022-01-28 DIAGNOSIS — E11.22 DIABETES MELLITUS WITH STAGE 4 CHRONIC KIDNEY DISEASE GFR 15-29: Primary | ICD-10-CM

## 2022-01-28 DIAGNOSIS — E11.69 COMBINED HYPERLIPIDEMIA ASSOCIATED WITH TYPE 2 DIABETES MELLITUS: ICD-10-CM

## 2022-01-28 DIAGNOSIS — G31.84 MILD COGNITIVE IMPAIRMENT WITH MEMORY LOSS: ICD-10-CM

## 2022-01-28 DIAGNOSIS — N18.4 DIABETES MELLITUS WITH STAGE 4 CHRONIC KIDNEY DISEASE GFR 15-29: Primary | ICD-10-CM

## 2022-01-28 DIAGNOSIS — E78.2 COMBINED HYPERLIPIDEMIA ASSOCIATED WITH TYPE 2 DIABETES MELLITUS: ICD-10-CM

## 2022-01-28 PROBLEM — C44.92 KERATOACANTHOMA TYPE SQUAMOUS CELL CARCINOMA OF SKIN: Status: RESOLVED | Noted: 2017-08-31 | Resolved: 2022-01-28

## 2022-01-28 PROBLEM — C44.609: Status: RESOLVED | Noted: 2021-06-03 | Resolved: 2022-01-28

## 2022-01-28 PROCEDURE — 3288F PR FALLS RISK ASSESSMENT DOCUMENTED: ICD-10-PCS | Mod: HCNC,CPTII,S$GLB, | Performed by: FAMILY MEDICINE

## 2022-01-28 PROCEDURE — 99999 PR PBB SHADOW E&M-EST. PATIENT-LVL V: CPT | Mod: PBBFAC,HCNC,, | Performed by: FAMILY MEDICINE

## 2022-01-28 PROCEDURE — 1101F PR PT FALLS ASSESS DOC 0-1 FALLS W/OUT INJ PAST YR: ICD-10-PCS | Mod: HCNC,CPTII,S$GLB, | Performed by: FAMILY MEDICINE

## 2022-01-28 PROCEDURE — 99214 PR OFFICE/OUTPT VISIT, EST, LEVL IV, 30-39 MIN: ICD-10-PCS | Mod: HCNC,S$GLB,, | Performed by: FAMILY MEDICINE

## 2022-01-28 PROCEDURE — 3288F FALL RISK ASSESSMENT DOCD: CPT | Mod: HCNC,CPTII,S$GLB, | Performed by: FAMILY MEDICINE

## 2022-01-28 PROCEDURE — 3052F HG A1C>EQUAL 8.0%<EQUAL 9.0%: CPT | Mod: HCNC,CPTII,S$GLB, | Performed by: FAMILY MEDICINE

## 2022-01-28 PROCEDURE — 99499 RISK ADDL DX/OHS AUDIT: ICD-10-PCS | Mod: HCNC,S$GLB,, | Performed by: FAMILY MEDICINE

## 2022-01-28 PROCEDURE — 99499 UNLISTED E&M SERVICE: CPT | Mod: HCNC,S$GLB,, | Performed by: FAMILY MEDICINE

## 2022-01-28 PROCEDURE — 99999 PR PBB SHADOW E&M-EST. PATIENT-LVL V: ICD-10-PCS | Mod: PBBFAC,HCNC,, | Performed by: FAMILY MEDICINE

## 2022-01-28 PROCEDURE — 1159F MED LIST DOCD IN RCRD: CPT | Mod: HCNC,CPTII,S$GLB, | Performed by: FAMILY MEDICINE

## 2022-01-28 PROCEDURE — 1101F PT FALLS ASSESS-DOCD LE1/YR: CPT | Mod: HCNC,CPTII,S$GLB, | Performed by: FAMILY MEDICINE

## 2022-01-28 PROCEDURE — 3052F PR MOST RECENT HEMOGLOBIN A1C LEVEL 8.0 - < 9.0%: ICD-10-PCS | Mod: HCNC,CPTII,S$GLB, | Performed by: FAMILY MEDICINE

## 2022-01-28 PROCEDURE — 99214 OFFICE O/P EST MOD 30 MIN: CPT | Mod: HCNC,S$GLB,, | Performed by: FAMILY MEDICINE

## 2022-01-28 PROCEDURE — 1159F PR MEDICATION LIST DOCUMENTED IN MEDICAL RECORD: ICD-10-PCS | Mod: HCNC,CPTII,S$GLB, | Performed by: FAMILY MEDICINE

## 2022-01-28 RX ORDER — KETOCONAZOLE 20 MG/G
CREAM TOPICAL 2 TIMES DAILY
Qty: 60 G | Refills: 1 | Status: SHIPPED | OUTPATIENT
Start: 2022-01-28 | End: 2023-04-24 | Stop reason: ALTCHOICE

## 2022-01-28 NOTE — PROGRESS NOTES
Follow-up diabetes.  Fasting readings in the morning look okay though she has had a few readings which were below 80.  Most readings after suppertime were elevated.  Depression stable followed by [facility].  Noted that she has both extended release and immediate release venlafaxine.  Show dress this with her psychiatrist.  Cognitive impairment stable, lives in a assisted living facility.  Hyperlipidemia stable.  Atrial fibrillation denies chest pains or shortness of breath.  She does complain of a rash under the right breast with some itching for uncertain length of time.  Previous lung granuloma asymptomatic.  Secondary hyperparathyroidism related to renal disease stable followed Nephrology.      Kassi was seen today for follow-up.    Diagnoses and all orders for this visit:    Diabetes mellitus with stage 4 chronic kidney disease GFR 15-29  -     Hemoglobin A1C; Future    Major depressive disorder, recurrent episode, moderate    Mild cognitive impairment with memory loss    Combined hyperlipidemia associated with type 2 diabetes mellitus    Persistent atrial fibrillation    Rash    Secondary hyperparathyroidism of renal origin    Lung granuloma    Other orders  -     ketoconazole (NIZORAL) 2 % cream; Apply topically 2 (two) times daily. for 14 days      She will address with her psychiatrist getting on all extended release venlafaxine a total of 225 mg daily.  She will start having her glucose checked 3 times daily before breakfast supper and at bedtime and send us readings in a week.  We may consider decreasing Lantus and adding fast acting insulin at supper.  Somewhat unclear as to the exact amount of insulin she is taking for her Lantus at night at present.  Recheck hemoglobin A1c mid March with follow-up appointment afterwards.              Diabetes Management Status    Statin: Taking  ACE/ARB: Taking    Screening or Prevention Patient's value Goal Complete/Controlled?   HgA1C Testing and  Control   Lab Results   Component Value Date    HGBA1C 8.9 (H) 12/03/2021      Annually/Less than 8% No   Lipid profile : 09/13/2021 Annually Yes   LDL control Lab Results   Component Value Date    LDLCALC 105.4 02/26/2021    Annually/Less than 100 mg/dl  No   Nephropathy screening Lab Results   Component Value Date    LABMICR 93.0 12/06/2021     Lab Results   Component Value Date    PROTEINUA 1+ (A) 04/22/2021    Annually Yes   Blood pressure BP Readings from Last 1 Encounters:   01/28/22 (!) 118/52    Less than 140/90 No   Dilated retinal exam : 01/05/2022 Annually Yes   Foot exam   : 12/28/2021 Annually Yes       Past Medical History:  Past Medical History:   Diagnosis Date    *Atrial fibrillation 4/19/2012    *Atrial flutter 4/19/2012    Allergy     Ankle fracture 4/19/2012    Anticoagulant long-term use     Anxiety     Arthritis     Bacterial pneumonia, unspecified 12/7/2012    Breast cancer     LEFT    CKD (chronic kidney disease), stage III 5/11/2012    Followed by Dr. 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    LBBB (left bundle branch block) 10/19/2012    Mild cognitive impairment with memory loss 6/15/2017    Osteopenia 4/19/2012    Osteoporosis     Otitis media     Pacemaker 11/2012    yo/chantell    Secondary hyperparathyroidism of renal origin     Simple renal cyst     Sinus node dysfunction     Squamous cell carcinoma skin of arm     Urinary incontinence 4/19/2012     Past Surgical History:   Procedure Laterality Date    APPENDECTOMY      BREAST LUMPECTOMY Right 10/21/2015    CARDIAC  PACEMAKER PLACEMENT  12/3/12    Sinus node dysfunction    CATARACT EXTRACTION W/  INTRAOCULAR LENS IMPLANT Bilateral     COLONOSCOPY      EXCISION OF LESION Left 6/3/2021    Procedure: EXCISION, LESION  forearm;  Surgeon: Saman Quintero MD;  [facility];  Service: General;  Laterality: Left;    excision of squamous cell carcinoma Right 2017    EYE SURGERY Bilateral     PHACO/IOL    FRACTURE SURGERY Right     ankle    HYSTERECTOMY      total 1970's    MASTECTOMY Right 11/2015    OOPHORECTOMY      TONSILLECTOMY      TOTAL HIP ARTHROPLASTY Right 2007     Review of patient's allergies indicates:   Allergen Reactions    Amlodipine Edema     Pt stated this medication made her legs swell    Alendronate sodium Other (See Comments)     Reaction: Esophageal bleeding    Fosamax [alendronate] Other (See Comments)     Reaction: Esophageal bleeding  diarrhea    Sorbitol      Diarrhea     Augmentin [amoxicillin-pot clavulanate] Hives and Rash     Current Outpatient Medications on File Prior to Visit   Medication Sig Dispense Refill    benazepriL (LOTENSIN) 20 MG tablet TAKE 1 TABLET EVERY DAY 90 tablet 3    calcitRIOL (ROCALTROL) 0.25 MCG Cap TAKE 1 CAPSULE EVERY DAY (NEED MD APPOINTMENT) 90 capsule 1    donepeziL (ARICEPT) 5 MG tablet Take 1 tablet (5 mg total) by mouth every evening. 90 tablet 3    doxycycline (MONODOX) 100 MG capsule       ELIQUIS 2.5 mg Tab TAKE 1 TABLET TWICE DAILY 180 tablet 3    estrogens, conjugated (PREMARIN VAGL) Place vaginally.      hydroCHLOROthiazide (HYDRODIURIL) 25 MG tablet TAKE 1 TABLET EVERY DAY 90 tablet 3    insulin (LANTUS SOLOSTAR U-100 INSULIN) glargine 100 units/mL (3mL) SubQ pen Inject 8 Units into the skin every evening. May increase by 1 units every 3 days if fasting morning glucose over 130. Max 50 units 15 mL 11    levothyroxine (SYNTHROID) 25 MCG tablet TAKE 1 TABLET EVERY DAY 90 tablet 0    metoprolol tartrate (LOPRESSOR) 50 MG tablet TAKE 1/2  "TABLET TWICE DAILY 90 tablet 3    mirtazapine (REMERON) 45 MG tablet Take 45 mg by mouth every evening.      MYRBETRIQ 50 mg Tb24 TAKE 1 TABLET EVERY DAY. 90 tablet 0    pravastatin (PRAVACHOL) 40 MG tablet TAKE 1 TABLET EVERY DAY 90 tablet 3    trospium (SANCTURA XR) 60 mg Cp24 capsule Take 60 mg by mouth once daily.      venlafaxine (EFFEXOR) 75 MG tablet Take 75 mg by mouth once daily.      venlafaxine (EFFEXOR-XR) 150 MG Cp24 Take 150 mg by mouth once daily. Per daughter Tova      verapamiL (CALAN-SR) 120 MG CR tablet TAKE 1 TABLET EVERY NIGHT 90 tablet 3    ACCU-CHEK SHELDON CONTROL SOLN Soln Use as directed 3 each 3    BD ALCOHOL SWABS PadM USE AS NEEDED 1 each 3    blood sugar diagnostic (ACCU-CHEK SHELDON PLUS TEST STRP) Strp Test glucose twice daily before breakfast and at bedtime 100 strip 3    blood-glucose meter kit To check BG once daily, to use with insurance preferred meter 1 each 0    lancets (ACCU-CHEK SOFTCLIX LANCETS) Misc USE TO CHECK BLOOD GLUCOSE EVERY  each 3    pen needle, diabetic 31 gauge x 1/4" Ndle Use nightly 100 each 3     No current facility-administered medications on file prior to visit.     Social History     Socioeconomic History    Marital status:     Number of children: 2   Tobacco Use    Smoking status: Never Smoker    Smokeless tobacco: Never Used   Substance and Sexual Activity    Alcohol use: No    Drug use: No    Sexual activity: Not Currently     Social Determinants of Health     Financial Resource Strain: Low Risk     Difficulty of Paying Living Expenses: Not hard at all   Food Insecurity: No Food Insecurity    Worried About Running Out of Food in the Last Year: Never true    Ran Out of Food in the Last Year: Never true   Transportation Needs: No Transportation Needs    Lack of Transportation (Medical): No    Lack of Transportation (Non-Medical): No   Physical Activity: Insufficiently Active    Days of Exercise per Week: 2 days    " Minutes of Exercise per Session: 10 min   Stress: No Stress Concern Present    Feeling of Stress : Not at all   Social Connections: Socially Isolated    Frequency of Communication with Friends and Family: Once a week    Frequency of Social Gatherings with Friends and Family: Never    Attends Jewish Services: Never    Active Member of Clubs or Organizations: No    Attends Club or Organization Meetings: Never    Marital Status:    Housing Stability: Low Risk     Unable to Pay for Housing in the Last Year: No    Number of Places Lived in the Last Year: 1    Unstable Housing in the Last Year: No     Family History   Problem Relation Age of Onset    Hypertension Mother     Heart disease Father     Stroke Father         cerebral hemorrhage    Coronary artery disease Brother     Heart disease Brother     Coronary artery disease Brother     COPD Brother     Heart disease Brother     Mitral valve prolapse Daughter     Peripheral vascular disease Son     Cataracts Neg Hx     Glaucoma Neg Hx     Macular degeneration Neg Hx     Retinal detachment Neg Hx     Strabismus Neg Hx            ROS:  GENERAL: No fever, chills,  or significant weight changes.   CARDIOVASCULAR: Denies chest pain, PND, orthopnea or reduced exercise tolerance.  ABDOMEN: Appetite fine. Denies diarrhea, abdominal pain, hematemesis or blood in stool.  URINARY: No flank pain, dysuria or hematuria.    Vitals:    01/28/22 1306 01/28/22 1343   BP: (!) 152/66 (!) 118/52   Pulse: 60    Temp: 97.7 °F (36.5 °C)    TempSrc: Temporal    Weight: 68 kg (150 lb)    Height: 5' (1.524 m)        Wt Readings from Last 3 Encounters:   01/28/22 68 kg (150 lb)   01/24/22 71.7 kg (158 lb)   01/05/22 71.7 kg (158 lb)       APPEARANCE: Well nourished, well developed, in no acute distress.    HEAD: Normocephalic.  Atraumatic.  EYES:   Right eye: Pupil reactive.  Conjunctiva clear.    Left eye: Pupil reactive.  Conjunctiva clear.    NECK: Supple. No  bruits.  No JVD.  No cervical lymphadenopathy.  No thyromegaly.    CHEST: Breath sounds clear bilaterally.  Normal respiratory effort  CARDIOVASCULAR: Normal rate.  Regular rhythm.  No murmurs.  No rub.  No gallops.   Trace edema.  MENTAL STATUS: Alert.  Oriented x 3.  She has some erythema at the inframammary fold on the right with slight scale.

## 2022-01-28 NOTE — PATIENT INSTRUCTIONS
Please confirm the amount of insulin she is taking at bedtime.    Check sugar before breakfast , before supper and at bedtime and send in readings in 1 week

## 2022-02-14 ENCOUNTER — PATIENT MESSAGE (OUTPATIENT)
Dept: FAMILY MEDICINE | Facility: CLINIC | Age: 87
End: 2022-02-14
Payer: MEDICARE

## 2022-02-14 RX ORDER — LANCETS
EACH MISCELLANEOUS
Qty: 300 EACH | Refills: 3 | Status: SHIPPED | OUTPATIENT
Start: 2022-02-14 | End: 2022-12-30

## 2022-02-14 RX ORDER — MIRABEGRON 50 MG/1
1 TABLET, FILM COATED, EXTENDED RELEASE ORAL DAILY
Qty: 90 TABLET | Refills: 3 | Status: SHIPPED | OUTPATIENT
Start: 2022-02-14 | End: 2022-11-30

## 2022-02-14 NOTE — TELEPHONE ENCOUNTER
Care Due:                  Date            Visit Type   Department     Provider  --------------------------------------------------------------------------------                                EP -                              PRIMARY      [facility]  Last Visit: 01-      CARE ([facility])   MEDICINE       Mor Cook                               -                              PRIMARY      [facility]  Next Visit: 03-      [facility] ([facility])   MEDICINE       Mor Cook                                                            Last  Test          Frequency    Reason                     Performed    Due Date  --------------------------------------------------------------------------------    CMP.........  12 months..  pravastatin..............  Not Found    Overdue    Lipid Panel.  12 months..  pravastatin..............  02- 02-    Powered by [employer]. Reference number: [id number].   2/14/2022 2:11:13 PM CST

## 2022-02-14 NOTE — TELEPHONE ENCOUNTER
Daughter reports patient's myrbetriq was last refilled at the hospital but she needs another refill as almost out.  Also needs lancets and strips, testing 3 times daily

## 2022-02-15 ENCOUNTER — OFFICE VISIT (OUTPATIENT)
Dept: PODIATRY | Facility: CLINIC | Age: 87
End: 2022-02-15
Payer: MEDICARE

## 2022-02-15 VITALS — WEIGHT: 149.94 LBS | BODY MASS INDEX: 29.44 KG/M2 | HEIGHT: 60 IN

## 2022-02-15 DIAGNOSIS — L84 PRE-ULCERATIVE CALLUSES: ICD-10-CM

## 2022-02-15 DIAGNOSIS — B35.1 ONYCHOMYCOSIS DUE TO DERMATOPHYTE: ICD-10-CM

## 2022-02-15 DIAGNOSIS — E11.49 TYPE II DIABETES MELLITUS WITH NEUROLOGICAL MANIFESTATIONS: Primary | ICD-10-CM

## 2022-02-15 PROCEDURE — 1101F PR PT FALLS ASSESS DOC 0-1 FALLS W/OUT INJ PAST YR: ICD-10-PCS | Mod: HCNC,CPTII,S$GLB, | Performed by: PODIATRIST

## 2022-02-15 PROCEDURE — 1160F RVW MEDS BY RX/DR IN RCRD: CPT | Mod: HCNC,CPTII,S$GLB, | Performed by: PODIATRIST

## 2022-02-15 PROCEDURE — 99499 NO LOS: ICD-10-PCS | Mod: HCNC,S$GLB,, | Performed by: PODIATRIST

## 2022-02-15 PROCEDURE — 3288F FALL RISK ASSESSMENT DOCD: CPT | Mod: HCNC,CPTII,S$GLB, | Performed by: PODIATRIST

## 2022-02-15 PROCEDURE — 99999 PR PBB SHADOW E&M-EST. PATIENT-LVL V: CPT | Mod: PBBFAC,HCNC,, | Performed by: PODIATRIST

## 2022-02-15 PROCEDURE — 99499 UNLISTED E&M SERVICE: CPT | Mod: HCNC,S$GLB,, | Performed by: PODIATRIST

## 2022-02-15 PROCEDURE — 11055 PR TRIM HYPERKERATOTIC SKIN LESION, ONE: ICD-10-PCS | Mod: Q9,HCNC,S$GLB, | Performed by: PODIATRIST

## 2022-02-15 PROCEDURE — 3288F PR FALLS RISK ASSESSMENT DOCUMENTED: ICD-10-PCS | Mod: HCNC,CPTII,S$GLB, | Performed by: PODIATRIST

## 2022-02-15 PROCEDURE — 1160F PR REVIEW ALL MEDS BY PRESCRIBER/CLIN PHARMACIST DOCUMENTED: ICD-10-PCS | Mod: HCNC,CPTII,S$GLB, | Performed by: PODIATRIST

## 2022-02-15 PROCEDURE — 11055 PARING/CUTG B9 HYPRKER LES 1: CPT | Mod: Q9,HCNC,S$GLB, | Performed by: PODIATRIST

## 2022-02-15 PROCEDURE — 99999 PR PBB SHADOW E&M-EST. PATIENT-LVL V: ICD-10-PCS | Mod: PBBFAC,HCNC,, | Performed by: PODIATRIST

## 2022-02-15 PROCEDURE — 1126F PR PAIN SEVERITY QUANTIFIED, NO PAIN PRESENT: ICD-10-PCS | Mod: HCNC,CPTII,S$GLB, | Performed by: PODIATRIST

## 2022-02-15 PROCEDURE — 1159F MED LIST DOCD IN RCRD: CPT | Mod: HCNC,CPTII,S$GLB, | Performed by: PODIATRIST

## 2022-02-15 PROCEDURE — 1101F PT FALLS ASSESS-DOCD LE1/YR: CPT | Mod: HCNC,CPTII,S$GLB, | Performed by: PODIATRIST

## 2022-02-15 PROCEDURE — 11721 PR DEBRIDEMENT OF NAILS, 6 OR MORE: ICD-10-PCS | Mod: Q9,59,HCNC,S$GLB | Performed by: PODIATRIST

## 2022-02-15 PROCEDURE — 1159F PR MEDICATION LIST DOCUMENTED IN MEDICAL RECORD: ICD-10-PCS | Mod: HCNC,CPTII,S$GLB, | Performed by: PODIATRIST

## 2022-02-15 PROCEDURE — 11721 DEBRIDE NAIL 6 OR MORE: CPT | Mod: Q9,59,HCNC,S$GLB | Performed by: PODIATRIST

## 2022-02-15 PROCEDURE — 1126F AMNT PAIN NOTED NONE PRSNT: CPT | Mod: HCNC,CPTII,S$GLB, | Performed by: PODIATRIST

## 2022-02-15 NOTE — PROGRESS NOTES
Subjective:     Patient ID: Kassi Skelton is a 89 y.o. female.    Chief Complaint: Foot Ulcer (No c/o pain, f/u left 2nd toe, diabetic pt, wears slippers with socks, PCP Dr. Cook last seen 1-28-22)    Kassi is a 89 y.o. female who presents to the clinic for evaluation and treatment of high risk feet. Kassi has a past medical history of *Atrial fibrillation (4/19/2012), *Atrial flutter (4/19/2012), Allergy, Ankle fracture (4/19/2012), Anticoagulant long-term use, Anxiety, Arthritis, Bacterial pneumonia, unspecified (12/7/2012), Breast cancer, CKD (chronic kidney disease), stage III (5/11/2012), Dependent on walker for ambulation, Depression (4/19/2012), Diabetes mellitus with stage 3 chronic kidney disease (2/1/2016), Diabetes with neurologic complications, Edentulous, HEARING LOSS, Hip fracture, right (4/19/2012), HLD (hyperlipidemia) (4/19/2012), HTN (hypertension) (4/19/2012), Hypothyroidism (4/19/2012), 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 The patient's chief complaint is left 2nd toe wound check. Patient denies any pain in her feet. Patient states she has been cushioning her heels. Patient denies any injury to her toe.  This patient has documented high risk feet requiring routine maintenance secondary to diabetes mellitis and those secondary complications of diabetes, as mentioned.Patient is accompanied by her daughter today.     PCP: Mor Cook MD    Date Last Seen by PCP: 01/28/2022    Current shoe gear:  Affected Foot: Slip-on shoes     Unaffected Foot: Slip-on shoes    History of Trauma: negative  Sign of Infection:  none    Hemoglobin A1C   Date Value Ref Range Status   12/03/2021 8.9 (H) 4.0 - 5.6 % Final     Comment:     ADA Screening Guidelines:  5.7-6.4%  Consistent with prediabetes  >or=6.5%  Consistent with diabetes    High levels of fetal hemoglobin interfere with the HbA1C  assay. Heterozygous hemoglobin variants (HbS, HgC, etc)do  not significantly interfere with this assay.   However, presence of multiple variants may affect accuracy.     09/06/2021 8.9 (H) 4.6 - 6.2 % Final   02/26/2021 7.0 (H) 4.0 - 5.6 % Final     Comment:     ADA Screening Guidelines:  5.7-6.4%  Consistent with prediabetes  >or=6.5%  Consistent with diabetes    High levels of fetal hemoglobin interfere with the HbA1C  assay. Heterozygous hemoglobin variants (HbS, HgC, etc)do  not significantly interfere with this assay.   However, presence of multiple variants may affect accuracy.     08/18/2020 9.4 (H) 4.0 - 5.6 % Final     Comment:     ADA Screening Guidelines:  5.7-6.4%  Consistent with prediabetes  >or=6.5%  Consistent with diabetes  High levels of fetal hemoglobin interfere with the HbA1C  assay. Heterozygous hemoglobin variants (HbS, HgC, etc)do  not significantly interfere with this assay.   However, presence of multiple variants may affect accuracy.         Patient Active Problem List   Diagnosis    History of atrial flutter    Combined hyperlipidemia associated with type 2 diabetes mellitus    Hypothyroidism    Major depressive disorder, recurrent episode, moderate    Osteoporosis    Benign hypertensive kidney disease with chronic kidney disease    Cyst of kidney, acquired    Persistent atrial fibrillation    Anticoagulation monitoring by pharmacist    LBBB (left bundle branch block)    Sinus node dysfunction    Cardiac pacemaker in situ    Urinary incontinence, mixed    Pacemaker    Orthostatic hypotension    DCIS (ductal carcinoma in situ)    Hypertension associated with diabetes    Diabetes mellitus with stage 4  chronic kidney disease GFR 15-29    Dizziness    Secondary hyperparathyroidism of renal origin    Hearing aid worn    Diabetic neuropathy, type II diabetes mellitus    Mild cognitive impairment with memory loss    Primary osteoarthritis    Edentulous    Greater trochanteric bursitis of right hip    Lung granuloma    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   Current Medications    ACCU-CHEK SHELDON CONTROL SOLN SOLN    Use as directed    BD ALCOHOL SWABS PADM    USE AS NEEDED    BLOOD SUGAR DIAGNOSTIC (ACCU-CHEK SHELDON PLUS TEST STRP) STRP    Test glucose three times daily    BLOOD-GLUCOSE METER KIT    To check BG once daily, to use with insurance preferred meter    CALCITRIOL (ROCALTROL) 0.25 MCG CAP    TAKE 1 CAPSULE EVERY DAY (NEED MD APPOINTMENT)    DONEPEZIL (ARICEPT) 5 MG TABLET    Take 1 tablet (5 mg total) by mouth every evening.    DOXYCYCLINE (MONODOX) 100 MG CAPSULE        ELIQUIS 2.5 MG TAB    TAKE 1 TABLET TWICE DAILY    ESTROGENS, CONJUGATED (PREMARIN VAGL)    Place vaginally.    HYDROCHLOROTHIAZIDE (HYDRODIURIL) 25 MG TABLET    TAKE 1 TABLET EVERY DAY    INSULIN (LANTUS SOLOSTAR U-100 INSULIN) GLARGINE 100 UNITS/ML (3ML) SUBQ PEN    Inject 8 Units into the skin every evening. May increase by 1 units every 3 days if fasting morning glucose over 130. Max 50 units    KETOCONAZOLE (NIZORAL) 2 % CREAM    Apply topically 2 (two) times daily. for 14 days    LANCETS (ACCU-CHEK SOFTCLIX LANCETS) MISC    USE TO CHECK BLOOD GLUCOSE THREE TIMES DAILY    LEVOTHYROXINE (SYNTHROID) 25 MCG TABLET    TAKE 1 TABLET EVERY DAY    METOPROLOL TARTRATE (LOPRESSOR) 50 MG TABLET    TAKE 1/2 TABLET TWICE DAILY    MIRABEGRON (MYRBETRIQ) 50 MG TB24    Take 1 tablet (50 mg total) by mouth once daily.    MIRTAZAPINE (REMERON) 45 MG TABLET    Take 45 mg by mouth  "every evening.    PEN NEEDLE, DIABETIC 31 GAUGE X 1/4" NDLE    Use nightly    PRAVASTATIN (PRAVACHOL) 40 MG TABLET    TAKE 1 TABLET EVERY DAY    TROSPIUM (SANCTURA XR) 60 MG CP24 CAPSULE    Take 60 mg by mouth once daily.    VENLAFAXINE (EFFEXOR) 75 MG TABLET    Take 75 mg by mouth once daily.    VENLAFAXINE (EFFEXOR-XR) 150 MG CP24    Take 150 mg by mouth once daily. Per daughter Tova    VERAPAMIL (CALAN-SR) 120 MG CR TABLET    TAKE 1 TABLET EVERY NIGHT   Changed and/or Refilled Medications    Modified Medication Previous Medication    BENAZEPRIL (LOTENSIN) 20 MG TABLET benazepriL (LOTENSIN) 20 MG tablet       Take 1 tablet (20 mg total) by mouth once daily.    TAKE 1 TABLET EVERY DAY       Review of patient's allergies indicates:   Allergen Reactions    Amlodipine Edema     Pt stated this medication made her legs swell    Alendronate sodium Other (See Comments)     Reaction: Esophageal bleeding    Fosamax [alendronate] Other (See Comments)     Reaction: Esophageal bleeding  diarrhea    Sorbitol      Diarrhea     Augmentin [amoxicillin-pot clavulanate] Hives and Rash       Past Surgical History:   Procedure Laterality Date    APPENDECTOMY      BREAST LUMPECTOMY Right 10/21/2015    CARDIAC PACEMAKER PLACEMENT  12/3/12    Sinus node dysfunction    CATARACT EXTRACTION W/  INTRAOCULAR LENS IMPLANT Bilateral     COLONOSCOPY      EXCISION OF LESION Left 6/3/2021    Procedure: EXCISION, LESION  forearm;  Surgeon: Saman Quintero MD;  [facility];  Service: General;  Laterality: Left;    excision of squamous cell carcinoma Right 2017    EYE SURGERY Bilateral     PHACO/IOL    FRACTURE SURGERY Right     ankle    HYSTERECTOMY      total 1970's    MASTECTOMY Right 11/2015    OOPHORECTOMY      TONSILLECTOMY      TOTAL HIP ARTHROPLASTY Right 2007       Family History   Problem Relation Age of Onset    Hypertension Mother     Heart disease Father     Stroke Father         cerebral hemorrhage    " Coronary artery disease Brother     Heart disease Brother     Coronary artery disease Brother     COPD Brother     Heart disease Brother     Mitral valve prolapse Daughter     Peripheral vascular disease Son     Cataracts Neg Hx     Glaucoma Neg Hx     Macular degeneration Neg Hx     Retinal detachment Neg Hx     Strabismus Neg Hx        Social History     Socioeconomic History    Marital status:     Number of children: 2   Tobacco Use    Smoking status: Never Smoker    Smokeless tobacco: Never Used   Substance and Sexual Activity    Alcohol use: No    Drug use: No    Sexual activity: Not Currently     Social Determinants of Health     Financial Resource Strain: Low Risk     Difficulty of Paying Living Expenses: Not hard at all   Food Insecurity: No Food Insecurity    Worried About Running Out of Food in the Last Year: Never true    Ran Out of Food in the Last Year: Never true   Transportation Needs: No Transportation Needs    Lack of Transportation (Medical): No    Lack of Transportation (Non-Medical): No   Physical Activity: Insufficiently Active    Days of Exercise per Week: 2 days    Minutes of Exercise per Session: 10 min   Stress: No Stress Concern Present    Feeling of Stress : Not at all   Social Connections: Socially Isolated    Frequency of Communication with Friends and Family: Once a week    Frequency of Social Gatherings with Friends and Family: Never    Attends Synagogue Services: Never    Active Member of Clubs or Organizations: No    Attends Club or Organization Meetings: Never    Marital Status:    Housing Stability: Low Risk     Unable to Pay for Housing in the Last Year: No    Number of Places Lived in the Last Year: 1    Unstable Housing in the Last Year: No       Vitals:    02/15/22 1334   Weight: 68 kg (149 lb 14.6 oz)   Height: 5' (1.524 m)   PainSc: 0-No pain   PainLoc: Toe       Hemoglobin A1C   Date Value Ref Range Status   12/03/2021 8.9 (H)  4.0 - 5.6 % Final     Comment:     ADA Screening Guidelines:  5.7-6.4%  Consistent with prediabetes  >or=6.5%  Consistent with diabetes    High levels of fetal hemoglobin interfere with the HbA1C  assay. Heterozygous hemoglobin variants (HbS, HgC, etc)do  not significantly interfere with this assay.   However, presence of multiple variants may affect accuracy.     09/06/2021 8.9 (H) 4.6 - 6.2 % Final   02/26/2021 7.0 (H) 4.0 - 5.6 % Final     Comment:     ADA Screening Guidelines:  5.7-6.4%  Consistent with prediabetes  >or=6.5%  Consistent with diabetes    High levels of fetal hemoglobin interfere with the HbA1C  assay. Heterozygous hemoglobin variants (HbS, HgC, etc)do  not significantly interfere with this assay.   However, presence of multiple variants may affect accuracy.     08/18/2020 9.4 (H) 4.0 - 5.6 % Final     Comment:     ADA Screening Guidelines:  5.7-6.4%  Consistent with prediabetes  >or=6.5%  Consistent with diabetes  High levels of fetal hemoglobin interfere with the HbA1C  assay. Heterozygous hemoglobin variants (HbS, HgC, etc)do  not significantly interfere with this assay.   However, presence of multiple variants may affect accuracy.         Review of Systems   Constitutional: Negative for chills and fever.   Respiratory: Negative for shortness of breath.    Cardiovascular: Negative for chest pain, palpitations, orthopnea, claudication and leg swelling.   Gastrointestinal: Negative for diarrhea, nausea and vomiting.   Musculoskeletal: Negative for joint pain.   Skin: Negative for rash.   Neurological: Positive for tingling and sensory change.   Psychiatric/Behavioral: Negative.              Objective:      PHYSICAL EXAM: Apperance: Alert and orient in no distress,well developed, and with good attention to grooming and body habits  Patient ambulating in slippers.   Lower Extremity Exam  VASCULAR: Dorsalis pedis pulses 1/4 bilateral and Posterior Tibial pulses 1/4 bilateral. Capillary fill time <4  seconds bilateral. No edema observed bilateral. Varicosities present bilateral. Skin temperature of the lower extremities is warm to warm, proximal to distal. Hair growth absent bilateral. (--) lymphangitis or (--) cellulitis noted left.  DERMATOLOGICAL: (--) edema, (--) erythema, (--) malodor, (--)  drainage, (--) warmth to bilateral foot. Previous ulcer noted to distal left 2nd toe closed with thin hyperkeratotic tissue. No open lesion post debridement. The dorsum surface of the feet are soft and supple.  The plantar aspects of feet are dry and scaly. Nails 1,2,3,4,5 bilateral elongated, thickened, and discolored with subungual debris. Webspaces 1,2,3,4 clean, dry and without evidence of break in skin integrity bilateral. Mild hyperkeratotic tissue noted to left medial hallux.   NEUROLOGICAL: Light touch, sharp-dull, proprioception all present and equal bilaterally.   MUSCULOSKELETAL: Muscle strength is 5/5 for foot inverters, everters, plantarflexors, and dorsiflexors. Muscle tone is normal. Fat pad atrophy noted bilateral. Mild tenderness on palpation of left plantar heel.     TEST RESULTS: Radiographs of left foot/ankle taken 12/03/2021 reveals soft tissue edema of the distal 2nd digit present with bold reabsorption of the cortical loss.  Findings concerning for osteomyelitis of the 2nd distal phalanx.          Assessment:       Encounter Diagnoses   Name Primary?    Type II diabetes mellitus with neurological manifestations Yes    Pre-ulcerative calluses - Left Foot     Onychomycosis due to dermatophyte          Plan:   Type II diabetes mellitus with neurological manifestations    Pre-ulcerative calluses - Left Foot    Onychomycosis due to dermatophyte      I counseled the patient on her conditions, regarding findings of my examination, my impressions, and usual treatment plan.   Greater than 15 minutes of a 20 minute appointment spent on education about the diabetic foot, neuropathy, and prevention of limb  loss.  Shoe inspection. Diabetic Foot Education. Patient reminded of the importance of good nutrition and blood sugar control to help prevent podiatric complications of diabetes. Patient instructed on proper foot hygeine. We discussed wearing proper shoe gear, daily foot inspections, never walking without protective shoe gear, never putting sharp instruments to feet.    With patient's permission, nails 1-5 bilateral were debrided/trimmed in length and thickness aggressively to their soft tissue attachment mechanically and with electric , removing all offending nail and debris. Patient relates relief following the procedure.  With patient's permission, left toe preulcerative  trimmed in thickness with #15 blade in thickness without incident.   Patient  will continue to monitor the areas daily, inspect feet, wear protective shoe gear when ambulatory, moisturizer to maintain skin integrity. Patient reminded of the importance of good nutrition and blood sugar control to help prevent podiatric complications of diabetes.  Patient to return 2 months or sooner if needed.             Luzmaria Valle DPM  [facility]

## 2022-03-03 ENCOUNTER — LAB VISIT (OUTPATIENT)
Dept: LAB | Facility: HOSPITAL | Age: 87
End: 2022-03-03
Attending: FAMILY MEDICINE
Payer: MEDICARE

## 2022-03-03 DIAGNOSIS — E11.22 DIABETES MELLITUS WITH STAGE 4 CHRONIC KIDNEY DISEASE GFR 15-29: ICD-10-CM

## 2022-03-03 DIAGNOSIS — N18.4 DIABETES MELLITUS WITH STAGE 4 CHRONIC KIDNEY DISEASE GFR 15-29: ICD-10-CM

## 2022-03-03 LAB
ESTIMATED AVG GLUCOSE: 203 MG/DL (ref 68–131)
HBA1C MFR BLD: 8.7 % (ref 4–5.6)

## 2022-03-03 PROCEDURE — 36415 COLL VENOUS BLD VENIPUNCTURE: CPT | Mod: PO | Performed by: FAMILY MEDICINE

## 2022-03-03 PROCEDURE — 83036 HEMOGLOBIN GLYCOSYLATED A1C: CPT | Performed by: FAMILY MEDICINE

## 2022-03-10 ENCOUNTER — OFFICE VISIT (OUTPATIENT)
Dept: FAMILY MEDICINE | Facility: CLINIC | Age: 87
End: 2022-03-10
Payer: MEDICARE

## 2022-03-10 VITALS
SYSTOLIC BLOOD PRESSURE: 128 MMHG | TEMPERATURE: 98 F | HEIGHT: 60 IN | BODY MASS INDEX: 29.45 KG/M2 | OXYGEN SATURATION: 99 % | DIASTOLIC BLOOD PRESSURE: 60 MMHG | HEART RATE: 60 BPM | WEIGHT: 150 LBS

## 2022-03-10 DIAGNOSIS — N18.4 DIABETES MELLITUS WITH STAGE 4 CHRONIC KIDNEY DISEASE GFR 15-29: Primary | ICD-10-CM

## 2022-03-10 DIAGNOSIS — G31.84 MILD COGNITIVE IMPAIRMENT WITH MEMORY LOSS: ICD-10-CM

## 2022-03-10 DIAGNOSIS — E11.22 DIABETES MELLITUS WITH STAGE 4 CHRONIC KIDNEY DISEASE GFR 15-29: Primary | ICD-10-CM

## 2022-03-10 DIAGNOSIS — F33.1 MAJOR DEPRESSIVE DISORDER, RECURRENT EPISODE, MODERATE: ICD-10-CM

## 2022-03-10 PROCEDURE — 3052F HG A1C>EQUAL 8.0%<EQUAL 9.0%: CPT | Mod: CPTII,S$GLB,, | Performed by: FAMILY MEDICINE

## 2022-03-10 PROCEDURE — 99499 RISK ADDL DX/OHS AUDIT: ICD-10-PCS | Mod: HCNC,S$GLB,, | Performed by: FAMILY MEDICINE

## 2022-03-10 PROCEDURE — 99999 PR PBB SHADOW E&M-EST. PATIENT-LVL V: ICD-10-PCS | Mod: PBBFAC,,, | Performed by: FAMILY MEDICINE

## 2022-03-10 PROCEDURE — 99499 UNLISTED E&M SERVICE: CPT | Mod: HCNC,S$GLB,, | Performed by: FAMILY MEDICINE

## 2022-03-10 PROCEDURE — 1159F MED LIST DOCD IN RCRD: CPT | Mod: CPTII,S$GLB,, | Performed by: FAMILY MEDICINE

## 2022-03-10 PROCEDURE — 3288F FALL RISK ASSESSMENT DOCD: CPT | Mod: CPTII,S$GLB,, | Performed by: FAMILY MEDICINE

## 2022-03-10 PROCEDURE — 1101F PT FALLS ASSESS-DOCD LE1/YR: CPT | Mod: CPTII,S$GLB,, | Performed by: FAMILY MEDICINE

## 2022-03-10 PROCEDURE — 99999 PR PBB SHADOW E&M-EST. PATIENT-LVL V: CPT | Mod: PBBFAC,,, | Performed by: FAMILY MEDICINE

## 2022-03-10 PROCEDURE — 1159F PR MEDICATION LIST DOCUMENTED IN MEDICAL RECORD: ICD-10-PCS | Mod: CPTII,S$GLB,, | Performed by: FAMILY MEDICINE

## 2022-03-10 PROCEDURE — 3288F PR FALLS RISK ASSESSMENT DOCUMENTED: ICD-10-PCS | Mod: CPTII,S$GLB,, | Performed by: FAMILY MEDICINE

## 2022-03-10 PROCEDURE — 99214 PR OFFICE/OUTPT VISIT, EST, LEVL IV, 30-39 MIN: ICD-10-PCS | Mod: S$GLB,,, | Performed by: FAMILY MEDICINE

## 2022-03-10 PROCEDURE — 99214 OFFICE O/P EST MOD 30 MIN: CPT | Mod: S$GLB,,, | Performed by: FAMILY MEDICINE

## 2022-03-10 PROCEDURE — 3052F PR MOST RECENT HEMOGLOBIN A1C LEVEL 8.0 - < 9.0%: ICD-10-PCS | Mod: CPTII,S$GLB,, | Performed by: FAMILY MEDICINE

## 2022-03-10 PROCEDURE — 1101F PR PT FALLS ASSESS DOC 0-1 FALLS W/OUT INJ PAST YR: ICD-10-PCS | Mod: CPTII,S$GLB,, | Performed by: FAMILY MEDICINE

## 2022-03-10 RX ORDER — LEVOTHYROXINE SODIUM 25 UG/1
25 TABLET ORAL
Qty: 90 TABLET | Refills: 1 | Status: SHIPPED | OUTPATIENT
Start: 2022-03-10 | End: 2022-08-11

## 2022-03-10 RX ORDER — INSULIN ASPART 100 [IU]/ML
2 INJECTION, SOLUTION INTRAVENOUS; SUBCUTANEOUS
Qty: 3 ML | Refills: 3 | Status: SHIPPED | OUTPATIENT
Start: 2022-03-10 | End: 2022-08-17

## 2022-03-10 RX ORDER — VENLAFAXINE HYDROCHLORIDE 75 MG/1
75 CAPSULE, EXTENDED RELEASE ORAL DAILY
Qty: 90 CAPSULE | Refills: 3 | Status: SHIPPED | OUTPATIENT
Start: 2022-03-10 | End: 2023-02-13

## 2022-03-10 RX ORDER — MIRTAZAPINE 45 MG/1
45 TABLET, FILM COATED ORAL NIGHTLY
Qty: 90 TABLET | Refills: 3 | Status: SHIPPED | OUTPATIENT
Start: 2022-03-10 | End: 2023-01-04

## 2022-03-10 RX ORDER — INSULIN GLARGINE 100 [IU]/ML
7 INJECTION, SOLUTION SUBCUTANEOUS NIGHTLY
Qty: 9 ML | Refills: 3 | Status: SHIPPED | OUTPATIENT
Start: 2022-03-10 | End: 2022-03-28

## 2022-03-10 RX ORDER — VENLAFAXINE HYDROCHLORIDE 150 MG/1
150 CAPSULE, EXTENDED RELEASE ORAL DAILY
Qty: 90 CAPSULE | Refills: 3 | Status: SHIPPED | OUTPATIENT
Start: 2022-03-10 | End: 2023-01-04

## 2022-03-10 NOTE — PROGRESS NOTES
Follow-up diabetes.  Fasting readings in the morning somewhat elevated, no hypoglycemia.  Assisted living apparently did not increase Lantus as directed.  Still only taking 5 units.   Most readings after suppertime were elevated.  Depression stable followed by [facility] previously, but apparently her provider moved.  Needs refills..  Cognitive impairment stable               Kassi was seen today for follow-up.    Diagnoses and all orders for this visit:    Diabetes mellitus with stage 4 chronic kidney disease GFR 15-29    Mild cognitive impairment with memory loss    Major depressive disorder, recurrent episode, moderate    Other orders  -     insulin (LANTUS SOLOSTAR U-100 INSULIN) glargine 100 units/mL (3mL) SubQ pen; Inject 7 Units into the skin every evening.  -     insulin aspart U-100 (NOVOLOG FLEXPEN U-100 INSULIN) 100 unit/mL (3 mL) InPn pen; Inject 2 Units into the skin before dinner. (supper)  -     mirtazapine (REMERON) 45 MG tablet; Take 1 tablet (45 mg total) by mouth every evening.  -     venlafaxine (EFFEXOR-XR) 150 MG Cp24; Take 1 capsule (150 mg total) by mouth once daily.  -     venlafaxine (EFFEXOR-XR) 75 MG 24 hr capsule; Take 1 capsule (75 mg total) by mouth once daily.  -     levothyroxine (SYNTHROID) 25 MCG tablet; Take 1 tablet (25 mcg total) by mouth before breakfast.      Send in home glucose readings in 2 weeks.  Recheck hemoglobin A1c in July          Diabetes Management Status    Statin: Taking  ACE/ARB: Taking    Screening or Prevention Patient's value Goal Complete/Controlled?   HgA1C Testing and Control   Lab Results   Component Value Date    HGBA1C 8.7 (H) 03/03/2022      Annually/Less than 8% No   Lipid profile : 09/13/2021 Annually No   LDL control Lab Results   Component Value Date    LDLCALC 105.4 02/26/2021    Annually/Less than 100 mg/dl  No   Nephropathy screening Lab Results   Component Value Date    LABMICR 93.0 12/06/2021     Lab Results   Component Value Date     PROTEINUA 1+ (A) 04/22/2021    Annually Yes   Blood pressure BP Readings from Last 1 Encounters:   03/10/22 128/60    Less than 140/90 Yes   Dilated retinal exam : 01/05/2022 Annually Yes   Foot exam   : 12/28/2021 Annually Yes       Past Medical History:  Past Medical History:   Diagnosis Date    *Atrial fibrillation 4/19/2012    *Atrial flutter 4/19/2012    Allergy     Ankle fracture 4/19/2012    Anticoagulant long-term use     Anxiety     Arthritis     Bacterial pneumonia, unspecified 12/7/2012    Breast cancer     LEFT    CKD (chronic kidney disease), stage III 5/11/2012    Followed by Dr. 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    LBBB (left bundle branch block) 10/19/2012    Mild cognitive impairment with memory loss 6/15/2017    Osteopenia 4/19/2012    Osteoporosis     Otitis media     Pacemaker 11/2012    yo/chantell    Secondary hyperparathyroidism of renal origin     Simple renal cyst     Sinus node dysfunction     Squamous cell carcinoma skin of arm     Urinary incontinence 4/19/2012     Past Surgical History:   Procedure Laterality Date    APPENDECTOMY      BREAST LUMPECTOMY Right 10/21/2015    CARDIAC PACEMAKER PLACEMENT  12/3/12    Sinus node dysfunction    CATARACT EXTRACTION W/  INTRAOCULAR LENS IMPLANT Bilateral     COLONOSCOPY      EXCISION OF LESION Left 6/3/2021    Procedure: EXCISION, LESION  forearm;  Surgeon: Saman Quintero MD;  Location: [facility];  Service: General;  Laterality: Left;    excision of squamous cell carcinoma Right 2017    EYE SURGERY Bilateral      PHACO/IOL    FRACTURE SURGERY Right     ankle    HYSTERECTOMY      total 1970's    MASTECTOMY Right 11/2015    OOPHORECTOMY      TONSILLECTOMY      TOTAL HIP ARTHROPLASTY Right 2007     Review of patient's allergies indicates:   Allergen Reactions    Amlodipine Edema     Pt stated this medication made her legs swell    Alendronate sodium Other (See Comments)     Reaction: Esophageal bleeding    Fosamax [alendronate] Other (See Comments)     Reaction: Esophageal bleeding  diarrhea    Sorbitol      Diarrhea     Augmentin [amoxicillin-pot clavulanate] Hives and Rash     Current Outpatient Medications on File Prior to Visit   Medication Sig Dispense Refill    benazepriL (LOTENSIN) 20 MG tablet Take 1 tablet (20 mg total) by mouth once daily. 90 tablet 3    calcitRIOL (ROCALTROL) 0.25 MCG Cap TAKE 1 CAPSULE EVERY DAY (NEED MD APPOINTMENT) 90 capsule 1    donepeziL (ARICEPT) 5 MG tablet Take 1 tablet (5 mg total) by mouth every evening. 90 tablet 3    ELIQUIS 2.5 mg Tab TAKE 1 TABLET TWICE DAILY 180 tablet 3    estrogens, conjugated (PREMARIN VAGL) Place vaginally.      hydroCHLOROthiazide (HYDRODIURIL) 25 MG tablet TAKE 1 TABLET EVERY DAY 90 tablet 3    metoprolol tartrate (LOPRESSOR) 50 MG tablet TAKE 1/2 TABLET TWICE DAILY 90 tablet 3    mirabegron (MYRBETRIQ) 50 mg Tb24 Take 1 tablet (50 mg total) by mouth once daily. 90 tablet 3    pravastatin (PRAVACHOL) 40 MG tablet TAKE 1 TABLET EVERY DAY 90 tablet 3    trospium (SANCTURA XR) 60 mg Cp24 capsule Take 60 mg by mouth once daily.      verapamiL (CALAN-SR) 120 MG CR tablet TAKE 1 TABLET EVERY NIGHT 90 tablet 3    [DISCONTINUED] insulin (LANTUS SOLOSTAR U-100 INSULIN) glargine 100 units/mL (3mL) SubQ pen Inject 5 Units into the skin every evening. May increase by 1 units every 3 days if fasting morning glucose over 130. Max 50 units 15 mL 11    [DISCONTINUED] levothyroxine (SYNTHROID) 25 MCG tablet TAKE 1 TABLET EVERY DAY 90 tablet 0     "[DISCONTINUED] levothyroxine (SYNTHROID) 25 MCG tablet Take 1 tablet (25 mcg total) by mouth before breakfast. 90 tablet 1    [DISCONTINUED] mirtazapine (REMERON) 45 MG tablet Take 45 mg by mouth every evening.      [DISCONTINUED] venlafaxine (EFFEXOR) 75 MG tablet Take 75 mg by mouth once daily.      [DISCONTINUED] venlafaxine (EFFEXOR-XR) 150 MG Cp24 Take 150 mg by mouth once daily. Per daughter Tova      ACCU-CHEK SHELDON CONTROL SOLN Soln Use as directed 3 each 3    BD ALCOHOL SWABS PadM USE AS NEEDED 1 each 3    blood sugar diagnostic (ACCU-CHEK SHELDON PLUS TEST STRP) Strp Test glucose three times daily 300 strip 3    blood-glucose meter kit To check BG once daily, to use with insurance preferred meter 1 each 0    ketoconazole (NIZORAL) 2 % cream Apply topically 2 (two) times daily. for 14 days 60 g 1    lancets (ACCU-CHEK SOFTCLIX LANCETS) Misc USE TO CHECK BLOOD GLUCOSE THREE TIMES DAILY 300 each 3    pen needle, diabetic 31 gauge x 1/4" Ndle Use nightly 100 each 3    [DISCONTINUED] doxycycline (MONODOX) 100 MG capsule        No current facility-administered medications on file prior to visit.     Social History     Socioeconomic History    Marital status:     Number of children: 2   Tobacco Use    Smoking status: Never Smoker    Smokeless tobacco: Never Used   Substance and Sexual Activity    Alcohol use: No    Drug use: No    Sexual activity: Not Currently     Social Determinants of Health     Financial Resource Strain: Unknown    Difficulty of Paying Living Expenses: Patient refused   Food Insecurity: Unknown    Worried About Running Out of Food in the Last Year: Patient refused    Ran Out of Food in the Last Year: Patient refused   Transportation Needs: Unknown    Lack of Transportation (Medical): Patient refused    Lack of Transportation (Non-Medical): Patient refused   Physical Activity: Inactive    Days of Exercise per Week: 0 days    Minutes of Exercise per Session: 10 " min   Stress: No Stress Concern Present    Feeling of Stress : Not at all   Social Connections: Unknown    Frequency of Communication with Friends and Family: Patient refused    Frequency of Social Gatherings with Friends and Family: Patient refused    Attends Sikhism Services: Never    Active Member of Clubs or Organizations: Patient refused    Attends Club or Organization Meetings: Patient refused    Marital Status:    Housing Stability: Unknown    Unable to Pay for Housing in the Last Year: Patient refused    Number of Places Lived in the Last Year: 1    Unstable Housing in the Last Year: Patient refused     Family History   Problem Relation Age of Onset    Hypertension Mother     Heart disease Father     Stroke Father         cerebral hemorrhage    Coronary artery disease Brother     Heart disease Brother     Coronary artery disease Brother     COPD Brother     Heart disease Brother     Mitral valve prolapse Daughter     Peripheral vascular disease Son     Cataracts Neg Hx     Glaucoma Neg Hx     Macular degeneration Neg Hx     Retinal detachment Neg Hx     Strabismus Neg Hx            ROS:  GENERAL: No fever, chills,  or significant weight changes.   CARDIOVASCULAR: Denies chest pain, PND, orthopnea or reduced exercise tolerance.  ABDOMEN: Appetite fine. Denies diarrhea, abdominal pain, hematemesis or blood in stool.  URINARY: No flank pain, dysuria or hematuria.    Vitals:    03/10/22 1059 03/10/22 1152   BP: (!) 167/75 128/60   Pulse: 60    Temp: 97.5 °F (36.4 °C)    TempSrc: Temporal    SpO2: 99%    Weight: 68 kg (150 lb)    Height: 5' (1.524 m)        Wt Readings from Last 3 Encounters:   03/10/22 68 kg (150 lb)   02/15/22 68 kg (149 lb 14.6 oz)   01/28/22 68 kg (150 lb)       APPEARANCE: Well nourished, well developed, in no acute distress.    HEAD: Normocephalic.  Atraumatic.  EYES:   Right eye: Pupil reactive.  Conjunctiva clear.    Left eye: Pupil reactive.  Conjunctiva  clear.    NECK: Supple. No bruits.  No JVD.  No cervical lymphadenopathy.  No thyromegaly.    CHEST: Breath sounds clear bilaterally.  Normal respiratory effort  CARDIOVASCULAR: Normal rate.  Regular rhythm.  No murmurs.  No rub.  No gallops.   No edema.  MENTAL STATUS: Alert.  Oriented x 3.

## 2022-03-21 ENCOUNTER — PATIENT MESSAGE (OUTPATIENT)
Dept: FAMILY MEDICINE | Facility: CLINIC | Age: 87
End: 2022-03-21
Payer: MEDICARE

## 2022-03-21 RX ORDER — DONEPEZIL HYDROCHLORIDE 5 MG/1
5 TABLET, FILM COATED ORAL NIGHTLY
Qty: 90 TABLET | Refills: 3 | Status: SHIPPED | OUTPATIENT
Start: 2022-03-21 | End: 2023-01-04

## 2022-03-25 ENCOUNTER — PATIENT MESSAGE (OUTPATIENT)
Dept: FAMILY MEDICINE | Facility: CLINIC | Age: 87
End: 2022-03-25
Payer: MEDICARE

## 2022-03-28 ENCOUNTER — TELEPHONE (OUTPATIENT)
Dept: FAMILY MEDICINE | Facility: CLINIC | Age: 87
End: 2022-03-28
Payer: MEDICARE

## 2022-03-28 RX ORDER — INSULIN GLARGINE 100 [IU]/ML
10 INJECTION, SOLUTION SUBCUTANEOUS NIGHTLY
Qty: 9 ML | Refills: 3 | COMMUNITY
Start: 2022-03-28 | End: 2022-04-18

## 2022-03-28 NOTE — TELEPHONE ENCOUNTER
Recommend increase Lantus 10 units every evening.  Continue the NovoLog 2 units before supper for now.  Send in readings in 1 week.

## 2022-03-28 NOTE — TELEPHONE ENCOUNTER
Sugar readings are mostly too high, need to increase.  Please confirm with the assisted living how much of the Lantus she is taking.  We have 7 units listed currently.

## 2022-03-28 NOTE — TELEPHONE ENCOUNTER
See prev phone note regarding Blood sugar reading . Her daughter stopped by after going to [place] . She confirmed that her mom takes 7 units of lantus at Bed time. She takes 2 units of Novalog at supper time. At 3 pm every afternoon they have activities which always includes sugar. She also eats Tic Tacs all day for her dry mouth . She does not follow a diabetic diet for lunch or supper and there is always a desert . Any changes that are made needs to be faxed to [place]  and daughter would also like to be informed also . Please advise, thanks

## 2022-03-29 ENCOUNTER — PATIENT MESSAGE (OUTPATIENT)
Dept: FAMILY MEDICINE | Facility: CLINIC | Age: 87
End: 2022-03-29
Payer: MEDICARE

## 2022-03-30 ENCOUNTER — TELEPHONE (OUTPATIENT)
Dept: FAMILY MEDICINE | Facility: CLINIC | Age: 87
End: 2022-03-30
Payer: MEDICARE

## 2022-03-30 DIAGNOSIS — L97.919 ULCER OF RIGHT LOWER EXTREMITY, UNSPECIFIED ULCER STAGE: Primary | ICD-10-CM

## 2022-03-30 DIAGNOSIS — E11.40 TYPE 2 DIABETES MELLITUS WITH DIABETIC NEUROPATHY, WITHOUT LONG-TERM CURRENT USE OF INSULIN: ICD-10-CM

## 2022-03-30 RX ORDER — TRAMADOL HYDROCHLORIDE 50 MG/1
50 TABLET ORAL EVERY 8 HOURS PRN
Qty: 10 TABLET | Refills: 0 | Status: SHIPPED | OUTPATIENT
Start: 2022-03-30 | End: 2022-08-17 | Stop reason: SDUPTHER

## 2022-03-30 RX ORDER — DOXYCYCLINE 100 MG/1
100 CAPSULE ORAL 2 TIMES DAILY
Qty: 20 CAPSULE | Refills: 0 | Status: SHIPPED | OUTPATIENT
Start: 2022-03-30 | End: 2022-04-09

## 2022-03-30 NOTE — TELEPHONE ENCOUNTER
Home health order placed [facility].  Prescription sent tramadol and doxycycline.  ER if worsens prior to coming for appointment tomorrow.

## 2022-03-30 NOTE — TELEPHONE ENCOUNTER
----- Message from Jaime Ellis sent at 3/30/2022 10:17 AM CDT -----  Contact: [facility]  Calling for an order for home health. New wound on right inner ankle. Has a foul order and won't allow work to be done. Call back at [phone] (Daja).

## 2022-03-31 ENCOUNTER — OFFICE VISIT (OUTPATIENT)
Dept: FAMILY MEDICINE | Facility: CLINIC | Age: 87
End: 2022-03-31
Payer: MEDICARE

## 2022-03-31 VITALS
HEART RATE: 62 BPM | WEIGHT: 150 LBS | BODY MASS INDEX: 29.45 KG/M2 | HEIGHT: 60 IN | OXYGEN SATURATION: 99 % | SYSTOLIC BLOOD PRESSURE: 123 MMHG | TEMPERATURE: 98 F | DIASTOLIC BLOOD PRESSURE: 80 MMHG

## 2022-03-31 DIAGNOSIS — L03.115 CELLULITIS OF RIGHT ANKLE: Primary | ICD-10-CM

## 2022-03-31 DIAGNOSIS — G31.84 MILD COGNITIVE IMPAIRMENT WITH MEMORY LOSS: ICD-10-CM

## 2022-03-31 DIAGNOSIS — E11.22 DIABETES MELLITUS WITH STAGE 4 CHRONIC KIDNEY DISEASE GFR 15-29: ICD-10-CM

## 2022-03-31 DIAGNOSIS — N18.4 DIABETES MELLITUS WITH STAGE 4 CHRONIC KIDNEY DISEASE GFR 15-29: ICD-10-CM

## 2022-03-31 DIAGNOSIS — E11.40 TYPE 2 DIABETES MELLITUS WITH DIABETIC NEUROPATHY, WITH LONG-TERM CURRENT USE OF INSULIN: ICD-10-CM

## 2022-03-31 DIAGNOSIS — Z79.4 TYPE 2 DIABETES MELLITUS WITH DIABETIC NEUROPATHY, WITH LONG-TERM CURRENT USE OF INSULIN: ICD-10-CM

## 2022-03-31 PROCEDURE — 99214 PR OFFICE/OUTPT VISIT, EST, LEVL IV, 30-39 MIN: ICD-10-PCS | Mod: S$GLB,,, | Performed by: FAMILY MEDICINE

## 2022-03-31 PROCEDURE — 99499 RISK ADDL DX/OHS AUDIT: ICD-10-PCS | Mod: S$GLB,,, | Performed by: FAMILY MEDICINE

## 2022-03-31 PROCEDURE — 1101F PR PT FALLS ASSESS DOC 0-1 FALLS W/OUT INJ PAST YR: ICD-10-PCS | Mod: CPTII,S$GLB,, | Performed by: FAMILY MEDICINE

## 2022-03-31 PROCEDURE — 3052F PR MOST RECENT HEMOGLOBIN A1C LEVEL 8.0 - < 9.0%: ICD-10-PCS | Mod: CPTII,S$GLB,, | Performed by: FAMILY MEDICINE

## 2022-03-31 PROCEDURE — 99999 PR PBB SHADOW E&M-EST. PATIENT-LVL V: ICD-10-PCS | Mod: PBBFAC,,, | Performed by: FAMILY MEDICINE

## 2022-03-31 PROCEDURE — 1159F MED LIST DOCD IN RCRD: CPT | Mod: CPTII,S$GLB,, | Performed by: FAMILY MEDICINE

## 2022-03-31 PROCEDURE — 1101F PT FALLS ASSESS-DOCD LE1/YR: CPT | Mod: CPTII,S$GLB,, | Performed by: FAMILY MEDICINE

## 2022-03-31 PROCEDURE — 1159F PR MEDICATION LIST DOCUMENTED IN MEDICAL RECORD: ICD-10-PCS | Mod: CPTII,S$GLB,, | Performed by: FAMILY MEDICINE

## 2022-03-31 PROCEDURE — 99999 PR PBB SHADOW E&M-EST. PATIENT-LVL V: CPT | Mod: PBBFAC,,, | Performed by: FAMILY MEDICINE

## 2022-03-31 PROCEDURE — G0180 PR HOME HEALTH MD CERTIFICATION: ICD-10-PCS | Mod: ,,, | Performed by: FAMILY MEDICINE

## 2022-03-31 PROCEDURE — 3052F HG A1C>EQUAL 8.0%<EQUAL 9.0%: CPT | Mod: CPTII,S$GLB,, | Performed by: FAMILY MEDICINE

## 2022-03-31 PROCEDURE — 99499 UNLISTED E&M SERVICE: CPT | Mod: S$GLB,,, | Performed by: FAMILY MEDICINE

## 2022-03-31 PROCEDURE — 3288F PR FALLS RISK ASSESSMENT DOCUMENTED: ICD-10-PCS | Mod: CPTII,S$GLB,, | Performed by: FAMILY MEDICINE

## 2022-03-31 PROCEDURE — G0180 MD CERTIFICATION HHA PATIENT: HCPCS | Mod: ,,, | Performed by: FAMILY MEDICINE

## 2022-03-31 PROCEDURE — 3288F FALL RISK ASSESSMENT DOCD: CPT | Mod: CPTII,S$GLB,, | Performed by: FAMILY MEDICINE

## 2022-03-31 PROCEDURE — 99214 OFFICE O/P EST MOD 30 MIN: CPT | Mod: S$GLB,,, | Performed by: FAMILY MEDICINE

## 2022-03-31 RX ORDER — MUPIROCIN 20 MG/G
OINTMENT TOPICAL 2 TIMES DAILY
Qty: 30 G | Refills: 1 | Status: SHIPPED | OUTPATIENT
Start: 2022-03-31 | End: 2023-05-30

## 2022-03-31 NOTE — PROGRESS NOTES
With a complaint of pain and irritation around the right ankle with malodor.  Unsure about on set possibly within the past week or 2.  Patient has memory issues and is in assisted living.  She has been using some type of rubber heel/ankle cushion on both feet.  She denies any fever chills.  Contacted regarding symptoms yesterday and was given prescription for doxycycline and tramadol pending appointment today.  She does have diabetes which has not been well controlled.  Recently having insulin adjusted.    Diabetes Management Status    Statin: Taking  ACE/ARB: Taking    Screening or Prevention Patient's value Goal Complete/Controlled?   HgA1C Testing and Control   Lab Results   Component Value Date    HGBA1C 8.7 (H) 03/03/2022      Annually/Less than 8% No   Lipid profile : 09/13/2021 Annually No   LDL control Lab Results   Component Value Date    LDLCALC 105.4 02/26/2021    Annually/Less than 100 mg/dl  No   Nephropathy screening Lab Results   Component Value Date    LABMICR 93.0 12/06/2021     Lab Results   Component Value Date    PROTEINUA 1+ (A) 04/22/2021     Lab Results   Component Value Date    UTPCR Unable to calculate 04/25/2019      Annually Yes   Blood pressure BP Readings from Last 1 Encounters:   03/31/22 123/80    Less than 140/90 Yes   Dilated retinal exam : 01/05/2022 Annually Yes   Foot exam   : 12/28/2021 Annually Yes         Kassi was seen today for ankle pain.    Diagnoses and all orders for this visit:    Cellulitis of right ankle  -     Ambulatory referral/consult to Podiatry; Future    Type 2 diabetes mellitus with diabetic neuropathy, with long-term current use of insulin  -     Ambulatory referral/consult to Podiatry; Future    Diabetes mellitus with stage 4 chronic kidney disease GFR 15-29  -     Ambulatory referral/consult to Podiatry; Future    Mild cognitive impairment with memory loss    Other orders  -     mupirocin (BACTROBAN) 2 % ointment; Apply topically 2 (two) times  daily.    She appears to have cellulitis at left ankle area which appears to be directly under the area under the rubber heel/anklet cushion.    Do not use the rubber heel/anklet cushions  Pending home health wound care nurse consult today  Take doxycycline twice daily until gone.  Wash feet ankles with soap and water and let dry well  Use mupirocin ointment twice daily or as recommend by wound care nurse to right ankle/foot  Will have her follow-up with the podiatrist.      Continue other current medications.  We recently adjusted her insulin 2 days ago and are planning on adjusting again when we receive home glucose readings again next week.          Past Medical History:  Past Medical History:   Diagnosis Date    *Atrial fibrillation 4/19/2012    *Atrial flutter 4/19/2012    Allergy     Ankle fracture 4/19/2012    Anticoagulant long-term use     Anxiety     Arthritis     Bacterial pneumonia, unspecified 12/7/2012    Breast cancer     LEFT    CKD (chronic kidney disease), stage III 5/11/2012    Followed by Dr. 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    LBBB (left bundle branch block) 10/19/2012    Mild cognitive impairment with memory loss 6/15/2017    Osteopenia 4/19/2012    Osteoporosis     Otitis media     Pacemaker 11/2012    yo/chantell    Secondary hyperparathyroidism of renal origin     Simple renal cyst     Sinus node dysfunction     Squamous cell carcinoma skin of arm     Urinary incontinence 4/19/2012     Past Surgical History:   Procedure Laterality Date    APPENDECTOMY      BREAST  LUMPECTOMY Right 10/21/2015    CARDIAC PACEMAKER PLACEMENT  12/3/12    Sinus node dysfunction    CATARACT EXTRACTION W/  INTRAOCULAR LENS IMPLANT Bilateral     COLONOSCOPY      EXCISION OF LESION Left 6/3/2021    Procedure: EXCISION, LESION  forearm;  Surgeon: Saman Quintero MD;  Location: [facility];  Service: General;  Laterality: Left;    excision of squamous cell carcinoma Right 2017    EYE SURGERY Bilateral     PHACO/IOL    FRACTURE SURGERY Right     ankle    HYSTERECTOMY      total 1970's    MASTECTOMY Right 11/2015    OOPHORECTOMY      TONSILLECTOMY      TOTAL HIP ARTHROPLASTY Right 2007     Review of patient's allergies indicates:   Allergen Reactions    Amlodipine Edema     Pt stated this medication made her legs swell    Alendronate sodium Other (See Comments)     Reaction: Esophageal bleeding    Fosamax [alendronate] Other (See Comments)     Reaction: Esophageal bleeding  diarrhea    Sorbitol      Diarrhea     Augmentin [amoxicillin-pot clavulanate] Hives and Rash     Current Outpatient Medications on File Prior to Visit   Medication Sig Dispense Refill    benazepriL (LOTENSIN) 20 MG tablet Take 1 tablet (20 mg total) by mouth once daily. 90 tablet 3    calcitRIOL (ROCALTROL) 0.25 MCG Cap TAKE 1 CAPSULE EVERY DAY (NEED MD APPOINTMENT) 90 capsule 1    donepeziL (ARICEPT) 5 MG tablet Take 1 tablet (5 mg total) by mouth every evening. 90 tablet 3    doxycycline (MONODOX) 100 MG capsule Take 1 capsule (100 mg total) by mouth 2 (two) times daily. for 10 days 20 capsule 0    ELIQUIS 2.5 mg Tab TAKE 1 TABLET TWICE DAILY 180 tablet 3    estrogens, conjugated (PREMARIN VAGL) Place vaginally.      hydroCHLOROthiazide (HYDRODIURIL) 25 MG tablet TAKE 1 TABLET EVERY DAY 90 tablet 3    insulin (LANTUS SOLOSTAR U-100 INSULIN) glargine 100 units/mL (3mL) SubQ pen Inject 10 Units into the skin every evening. 9 mL 3    insulin aspart U-100 (NOVOLOG FLEXPEN U-100 INSULIN) 100 unit/mL (3 mL) InPn pen  "Inject 2 Units into the skin before dinner. (supper) 3 mL 3    levothyroxine (SYNTHROID) 25 MCG tablet Take 1 tablet (25 mcg total) by mouth before breakfast. 90 tablet 1    metoprolol tartrate (LOPRESSOR) 50 MG tablet TAKE 1/2 TABLET TWICE DAILY 90 tablet 3    mirabegron (MYRBETRIQ) 50 mg Tb24 Take 1 tablet (50 mg total) by mouth once daily. 90 tablet 3    mirtazapine (REMERON) 45 MG tablet Take 1 tablet (45 mg total) by mouth every evening. 90 tablet 3    pravastatin (PRAVACHOL) 40 MG tablet TAKE 1 TABLET EVERY DAY 90 tablet 3    traMADoL (ULTRAM) 50 mg tablet Take 1 tablet (50 mg total) by mouth every 8 (eight) hours as needed for Pain. 10 tablet 0    trospium (SANCTURA XR) 60 mg Cp24 capsule Take 60 mg by mouth once daily.      venlafaxine (EFFEXOR-XR) 150 MG Cp24 Take 1 capsule (150 mg total) by mouth once daily. 90 capsule 3    venlafaxine (EFFEXOR-XR) 75 MG 24 hr capsule Take 1 capsule (75 mg total) by mouth once daily. 90 capsule 3    verapamiL (CALAN-SR) 120 MG CR tablet TAKE 1 TABLET EVERY NIGHT 90 tablet 3    ACCU-CHEK SHELDON CONTROL SOLN Soln Use as directed 3 each 3    BD ALCOHOL SWABS PadM USE AS NEEDED 1 each 3    blood sugar diagnostic (ACCU-CHEK SHELDON PLUS TEST STRP) Strp Test glucose three times daily 300 strip 3    blood-glucose meter kit To check BG once daily, to use with insurance preferred meter 1 each 0    ketoconazole (NIZORAL) 2 % cream Apply topically 2 (two) times daily. for 14 days 60 g 1    lancets (ACCU-CHEK SOFTCLIX LANCETS) Misc USE TO CHECK BLOOD GLUCOSE THREE TIMES DAILY 300 each 3    pen needle, diabetic 31 gauge x 1/4" Ndle Use nightly 100 each 3     No current facility-administered medications on file prior to visit.     Social History     Socioeconomic History    Marital status:     Number of children: 2   Tobacco Use    Smoking status: Never Smoker    Smokeless tobacco: Never Used   Substance and Sexual Activity    Alcohol use: No    Drug use: No "    Sexual activity: Not Currently     Social Determinants of Health     Financial Resource Strain: Unknown    Difficulty of Paying Living Expenses: Patient refused   Food Insecurity: Unknown    Worried About Running Out of Food in the Last Year: Patient refused    Ran Out of Food in the Last Year: Patient refused   Transportation Needs: Unknown    Lack of Transportation (Medical): Patient refused    Lack of Transportation (Non-Medical): Patient refused   Physical Activity: Inactive    Days of Exercise per Week: 0 days    Minutes of Exercise per Session: 10 min   Stress: No Stress Concern Present    Feeling of Stress : Not at all   Social Connections: Unknown    Frequency of Communication with Friends and Family: Patient refused    Frequency of Social Gatherings with Friends and Family: Patient refused    Attends Bahai Services: Never    Active Member of Clubs or Organizations: Patient refused    Attends Club or Organization Meetings: Patient refused    Marital Status:    Housing Stability: Unknown    Unable to Pay for Housing in the Last Year: Patient refused    Number of Places Lived in the Last Year: 1    Unstable Housing in the Last Year: Patient refused     Family History   Problem Relation Age of Onset    Hypertension Mother     Heart disease Father     Stroke Father         cerebral hemorrhage    Coronary artery disease Brother     Heart disease Brother     Coronary artery disease Brother     COPD Brother     Heart disease Brother     Mitral valve prolapse Daughter     Peripheral vascular disease Son     Cataracts Neg Hx     Glaucoma Neg Hx     Macular degeneration Neg Hx     Retinal detachment Neg Hx     Strabismus Neg Hx            ROS:  GENERAL: No fever, chills,  or significant weight changes.   CARDIOVASCULAR: Denies chest pain    Vitals:    03/31/22 1019   BP: 123/80   Pulse: 62   Temp: 97.7 °F (36.5 °C)   TempSrc: Temporal   SpO2: 99%   Weight: 68 kg (150 lb)    Height: 5' (1.524 m)       Wt Readings from Last 3 Encounters:   03/31/22 68 kg (150 lb)   03/10/22 68 kg (150 lb)   02/15/22 68 kg (149 lb 14.6 oz)       APPEARANCE: Well nourished, well developed, in no acute distress.    HEAD: Normocephalic.  Atraumatic.  EYES:   Right eye: Pupil reactive.  Conjunctiva clear.    Left eye: Pupil reactive.  Conjunctiva clear.    NECK: Supple.   MENTAL STATUS: Alert.  Oriented x 3.  The left ankle and foot show erythema and maceration with malodor which is directly under the area that was covered by the rubber heel/ankle cushion.  She has some tenderness over The ankle.  Photographs as below.

## 2022-03-31 NOTE — PATIENT INSTRUCTIONS
Take doxycycline twice daily until gone.  Wash feet ankles with soap and water and let dry well  Use mupirocin ointment twice daily or as recommend by wound care nurse to right ankle/foot  Do not use the rubber heel/anklet cushions    Someone from podiatry will call to schedule appt (I told them to contact Tova)

## 2022-04-01 ENCOUNTER — TELEPHONE (OUTPATIENT)
Dept: PODIATRY | Facility: CLINIC | Age: 87
End: 2022-04-01
Payer: MEDICARE

## 2022-04-01 NOTE — TELEPHONE ENCOUNTER
Spoke with patient's daughter regarding sooner podiatry appt. Daughter states she will not be available to bring her mother the week April 4-8. appt scheduled for 4/11/22.

## 2022-04-07 ENCOUNTER — PATIENT OUTREACH (OUTPATIENT)
Dept: ADMINISTRATIVE | Facility: OTHER | Age: 87
End: 2022-04-07
Payer: MEDICARE

## 2022-04-08 NOTE — PROGRESS NOTES
Health Maintenance Due   Topic Date Due    TETANUS VACCINE  Never done    Shingles Vaccine (2 of 3) 11/20/2012     Updates were requested from care everywhere.  Chart was reviewed for overdue Proactive [facility] Encounters ([facility]) topics (CRS, Breast Cancer Screening, Eye exam)  Health Maintenance has been updated.  LINKS immunization registry triggered.  Immunizations were reconciled.

## 2022-04-14 ENCOUNTER — LAB VISIT (OUTPATIENT)
Dept: LAB | Facility: HOSPITAL | Age: 87
End: 2022-04-14
Attending: FAMILY MEDICINE
Payer: MEDICARE

## 2022-04-14 ENCOUNTER — PATIENT MESSAGE (OUTPATIENT)
Dept: FAMILY MEDICINE | Facility: CLINIC | Age: 87
End: 2022-04-14
Payer: MEDICARE

## 2022-04-14 DIAGNOSIS — R30.0 DYSURIA: Primary | ICD-10-CM

## 2022-04-14 DIAGNOSIS — R30.0 DYSURIA: ICD-10-CM

## 2022-04-14 LAB
BACTERIA #/AREA URNS HPF: ABNORMAL /HPF
BILIRUB UR QL STRIP: NEGATIVE
CLARITY UR: CLEAR
COLOR UR: YELLOW
GLUCOSE UR QL STRIP: NEGATIVE
HGB UR QL STRIP: NEGATIVE
KETONES UR QL STRIP: NEGATIVE
LEUKOCYTE ESTERASE UR QL STRIP: ABNORMAL
MICROSCOPIC COMMENT: ABNORMAL
NITRITE UR QL STRIP: NEGATIVE
PH UR STRIP: 6 [PH] (ref 5–8)
PROT UR QL STRIP: NEGATIVE
RBC #/AREA URNS HPF: 2 /HPF (ref 0–4)
SP GR UR STRIP: 1.01 (ref 1–1.03)
SQUAMOUS #/AREA URNS HPF: 3 /HPF
URN SPEC COLLECT METH UR: ABNORMAL
WBC #/AREA URNS HPF: 60 /HPF (ref 0–5)

## 2022-04-14 PROCEDURE — 87086 URINE CULTURE/COLONY COUNT: CPT | Performed by: FAMILY MEDICINE

## 2022-04-14 PROCEDURE — 87077 CULTURE AEROBIC IDENTIFY: CPT | Performed by: FAMILY MEDICINE

## 2022-04-14 PROCEDURE — 87186 SC STD MICRODIL/AGAR DIL: CPT | Performed by: FAMILY MEDICINE

## 2022-04-14 PROCEDURE — 81000 URINALYSIS NONAUTO W/SCOPE: CPT | Mod: PO | Performed by: FAMILY MEDICINE

## 2022-04-14 PROCEDURE — 87088 URINE BACTERIA CULTURE: CPT | Performed by: FAMILY MEDICINE

## 2022-04-14 RX ORDER — SULFAMETHOXAZOLE AND TRIMETHOPRIM 400; 80 MG/1; MG/1
TABLET ORAL
Qty: 15 TABLET | Refills: 0 | Status: SHIPPED | OUTPATIENT
Start: 2022-04-14 | End: 2022-09-15

## 2022-04-14 RX ORDER — CONJUGATED ESTROGENS 0.62 MG/G
CREAM VAGINAL
COMMUNITY
Start: 2022-04-04 | End: 2023-01-24 | Stop reason: CLARIF

## 2022-04-14 NOTE — TELEPHONE ENCOUNTER
I signed the order for the urine specimen however if she has had a significant change in her mental status then she probably needs to be seen in clinic or go to ER.

## 2022-04-18 ENCOUNTER — EXTERNAL HOME HEALTH (OUTPATIENT)
Dept: HOME HEALTH SERVICES | Facility: HOSPITAL | Age: 87
End: 2022-04-18
Payer: MEDICARE

## 2022-04-18 ENCOUNTER — PATIENT MESSAGE (OUTPATIENT)
Dept: FAMILY MEDICINE | Facility: CLINIC | Age: 87
End: 2022-04-18
Payer: MEDICARE

## 2022-04-18 LAB — BACTERIA UR CULT: ABNORMAL

## 2022-04-18 RX ORDER — INSULIN GLARGINE 100 [IU]/ML
7 INJECTION, SOLUTION SUBCUTANEOUS NIGHTLY
Qty: 9 ML | Refills: 3 | Status: SHIPPED | OUTPATIENT
Start: 2022-04-18 | End: 2023-01-03

## 2022-04-18 NOTE — TELEPHONE ENCOUNTER
Daughter saw pt Saturday and she was fine, made plans for Easter but Sunday, pt was not feeling well, was out of sorts and her sugar was 62.  Staff at home gave her orange juice and sugar and it dropped to 50.  Daughter reports it took 3 hours to get her sugar back up and to where patient was coherent.  Tova spoke to her mom today and she is fine but has no memory of what happened on Sunday.  Staff will be faxing sugars to you for review.

## 2022-04-18 NOTE — TELEPHONE ENCOUNTER
Spoke with daughter by phone.  Apparently patient had skipped breakfast that morning when she had a low sugar.  Advise she cannot skip meals.  Have her decrease the Lantus down to 7 units at bedtime.  I sent in a new prescription.  Please contact the assisted living to make sure that they get this information.

## 2022-04-28 ENCOUNTER — DOCUMENT SCAN (OUTPATIENT)
Dept: HOME HEALTH SERVICES | Facility: HOSPITAL | Age: 87
End: 2022-04-28
Payer: MEDICARE

## 2022-05-03 ENCOUNTER — OFFICE VISIT (OUTPATIENT)
Dept: PODIATRY | Facility: CLINIC | Age: 87
End: 2022-05-03
Payer: MEDICARE

## 2022-05-03 VITALS
SYSTOLIC BLOOD PRESSURE: 183 MMHG | WEIGHT: 149.94 LBS | BODY MASS INDEX: 29.44 KG/M2 | HEART RATE: 59 BPM | DIASTOLIC BLOOD PRESSURE: 77 MMHG | HEIGHT: 60 IN

## 2022-05-03 DIAGNOSIS — E11.40 TYPE 2 DIABETES MELLITUS WITH DIABETIC NEUROPATHY, WITH LONG-TERM CURRENT USE OF INSULIN: ICD-10-CM

## 2022-05-03 DIAGNOSIS — Z79.4 TYPE 2 DIABETES MELLITUS WITH DIABETIC NEUROPATHY, WITH LONG-TERM CURRENT USE OF INSULIN: ICD-10-CM

## 2022-05-03 DIAGNOSIS — L03.115 CELLULITIS OF RIGHT ANKLE: ICD-10-CM

## 2022-05-03 DIAGNOSIS — L89.512 PRESSURE ULCER OF RIGHT ANKLE, STAGE 2: Primary | ICD-10-CM

## 2022-05-03 DIAGNOSIS — B35.1 ONYCHOMYCOSIS DUE TO DERMATOPHYTE: ICD-10-CM

## 2022-05-03 PROCEDURE — 1160F RVW MEDS BY RX/DR IN RCRD: CPT | Mod: CPTII,S$GLB,, | Performed by: PODIATRIST

## 2022-05-03 PROCEDURE — 99213 OFFICE O/P EST LOW 20 MIN: CPT | Mod: 25,S$GLB,, | Performed by: PODIATRIST

## 2022-05-03 PROCEDURE — 99499 UNLISTED E&M SERVICE: CPT | Mod: S$GLB,,, | Performed by: PODIATRIST

## 2022-05-03 PROCEDURE — 3288F PR FALLS RISK ASSESSMENT DOCUMENTED: ICD-10-PCS | Mod: CPTII,S$GLB,, | Performed by: PODIATRIST

## 2022-05-03 PROCEDURE — 3052F HG A1C>EQUAL 8.0%<EQUAL 9.0%: CPT | Mod: CPTII,S$GLB,, | Performed by: PODIATRIST

## 2022-05-03 PROCEDURE — 1101F PR PT FALLS ASSESS DOC 0-1 FALLS W/OUT INJ PAST YR: ICD-10-PCS | Mod: CPTII,S$GLB,, | Performed by: PODIATRIST

## 2022-05-03 PROCEDURE — 1160F PR REVIEW ALL MEDS BY PRESCRIBER/CLIN PHARMACIST DOCUMENTED: ICD-10-PCS | Mod: CPTII,S$GLB,, | Performed by: PODIATRIST

## 2022-05-03 PROCEDURE — 1101F PT FALLS ASSESS-DOCD LE1/YR: CPT | Mod: CPTII,S$GLB,, | Performed by: PODIATRIST

## 2022-05-03 PROCEDURE — 3288F FALL RISK ASSESSMENT DOCD: CPT | Mod: CPTII,S$GLB,, | Performed by: PODIATRIST

## 2022-05-03 PROCEDURE — 11721 PR DEBRIDEMENT OF NAILS, 6 OR MORE: ICD-10-PCS | Mod: Q9,S$GLB,, | Performed by: PODIATRIST

## 2022-05-03 PROCEDURE — 1159F PR MEDICATION LIST DOCUMENTED IN MEDICAL RECORD: ICD-10-PCS | Mod: CPTII,S$GLB,, | Performed by: PODIATRIST

## 2022-05-03 PROCEDURE — 3052F PR MOST RECENT HEMOGLOBIN A1C LEVEL 8.0 - < 9.0%: ICD-10-PCS | Mod: CPTII,S$GLB,, | Performed by: PODIATRIST

## 2022-05-03 PROCEDURE — 99999 PR PBB SHADOW E&M-EST. PATIENT-LVL V: CPT | Mod: PBBFAC,,, | Performed by: PODIATRIST

## 2022-05-03 PROCEDURE — 99499 RISK ADDL DX/OHS AUDIT: ICD-10-PCS | Mod: S$GLB,,, | Performed by: PODIATRIST

## 2022-05-03 PROCEDURE — 99213 PR OFFICE/OUTPT VISIT, EST, LEVL III, 20-29 MIN: ICD-10-PCS | Mod: 25,S$GLB,, | Performed by: PODIATRIST

## 2022-05-03 PROCEDURE — 1159F MED LIST DOCD IN RCRD: CPT | Mod: CPTII,S$GLB,, | Performed by: PODIATRIST

## 2022-05-03 PROCEDURE — 11721 DEBRIDE NAIL 6 OR MORE: CPT | Mod: Q9,S$GLB,, | Performed by: PODIATRIST

## 2022-05-03 PROCEDURE — 99999 PR PBB SHADOW E&M-EST. PATIENT-LVL V: ICD-10-PCS | Mod: PBBFAC,,, | Performed by: PODIATRIST

## 2022-05-09 ENCOUNTER — PATIENT MESSAGE (OUTPATIENT)
Dept: PODIATRY | Facility: CLINIC | Age: 87
End: 2022-05-09
Payer: MEDICARE

## 2022-05-09 DIAGNOSIS — E11.40 TYPE 2 DIABETES MELLITUS WITH DIABETIC NEUROPATHY, WITH LONG-TERM CURRENT USE OF INSULIN: ICD-10-CM

## 2022-05-09 DIAGNOSIS — L89.512 PRESSURE ULCER OF RIGHT ANKLE, STAGE 2: Primary | ICD-10-CM

## 2022-05-09 DIAGNOSIS — Z79.4 TYPE 2 DIABETES MELLITUS WITH DIABETIC NEUROPATHY, WITH LONG-TERM CURRENT USE OF INSULIN: ICD-10-CM

## 2022-05-10 PROCEDURE — G0180 MD CERTIFICATION HHA PATIENT: HCPCS | Mod: ,,, | Performed by: PODIATRIST

## 2022-05-10 PROCEDURE — G0180 PR HOME HEALTH MD CERTIFICATION: ICD-10-PCS | Mod: ,,, | Performed by: PODIATRIST

## 2022-05-10 NOTE — PROGRESS NOTES
Subjective:     Patient ID: Kassi Skelton is a 89 y.o. female.    Chief Complaint: Nail Care (Diabetic pt wears slippers w/ socks, PCP Dr. Cook last seen 3-31-22)    Kassi is a 89 y.o. female who presents to the clinic for evaluation and treatment of high risk feet. Kassi has a past medical history of *Atrial fibrillation (4/19/2012), *Atrial flutter (4/19/2012), Allergy, Ankle fracture (4/19/2012), Anticoagulant long-term use, Anxiety, Arthritis, Bacterial pneumonia, unspecified (12/7/2012), Breast cancer, CKD (chronic kidney disease), stage III (5/11/2012), Dependent on walker for ambulation, Depression (4/19/2012), Diabetes mellitus with stage 3 chronic kidney disease (2/1/2016), Diabetes with neurologic complications, Edentulous, HEARING LOSS, Hip fracture, right (4/19/2012), HLD (hyperlipidemia) (4/19/2012), HTN (hypertension) (4/19/2012), Hypothyroidism (4/19/2012), 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 The patient's chief complaint is nail care and check right foot. Patient denies any pain in her feet. Patient states she has been cushioning her heels. Patient denies any injury to her toe.  This patient has documented high risk feet requiring routine maintenance secondary to diabetes mellitis and those secondary complications of diabetes, as mentioned.Patient is accompanied by her daughter today.     PCP: Mor Cook MD    Date Last Seen by PCP: 03/31/2022    Current shoe gear:  Affected Foot: Slip-on shoes     Unaffected Foot: Slip-on shoes    History of Trauma: negative  Sign of Infection: none    Hemoglobin A1C   Date Value Ref  Range Status   03/03/2022 8.7 (H) 4.0 - 5.6 % Final     Comment:     ADA Screening Guidelines:  5.7-6.4%  Consistent with prediabetes  >or=6.5%  Consistent with diabetes    High levels of fetal hemoglobin interfere with the HbA1C  assay. Heterozygous hemoglobin variants (HbS, HgC, etc)do  not significantly interfere with this assay.   However, presence of multiple variants may affect accuracy.     12/03/2021 8.9 (H) 4.0 - 5.6 % Final     Comment:     ADA Screening Guidelines:  5.7-6.4%  Consistent with prediabetes  >or=6.5%  Consistent with diabetes    High levels of fetal hemoglobin interfere with the HbA1C  assay. Heterozygous hemoglobin variants (HbS, HgC, etc)do  not significantly interfere with this assay.   However, presence of multiple variants may affect accuracy.     09/06/2021 8.9 (H) 4.6 - 6.2 % Final   02/26/2021 7.0 (H) 4.0 - 5.6 % Final     Comment:     ADA Screening Guidelines:  5.7-6.4%  Consistent with prediabetes  >or=6.5%  Consistent with diabetes    High levels of fetal hemoglobin interfere with the HbA1C  assay. Heterozygous hemoglobin variants (HbS, HgC, etc)do  not significantly interfere with this assay.   However, presence of multiple variants may affect accuracy.         Patient Active Problem List   Diagnosis    History of atrial flutter    Combined hyperlipidemia associated with type 2 diabetes mellitus    Hypothyroidism    Major depressive disorder, recurrent episode, moderate    Osteoporosis    Benign hypertensive kidney disease with chronic kidney disease    Cyst of kidney, acquired    Persistent atrial fibrillation    Anticoagulation monitoring by pharmacist    LBBB (left bundle branch block)    Sinus node dysfunction    Cardiac pacemaker in situ    Urinary incontinence, mixed    Pacemaker    Orthostatic hypotension    DCIS (ductal carcinoma in situ)    Hypertension associated with diabetes    Diabetes mellitus with stage 4 chronic kidney disease GFR 15-29     Dizziness    Secondary hyperparathyroidism of renal origin    Hearing aid worn    Diabetic neuropathy, type II diabetes mellitus    Mild cognitive impairment with memory loss    Primary osteoarthritis    Edentulous    Greater trochanteric bursitis of right hip    Lung granuloma    Osteomyelitis of second toe of left foot       Medication List with Changes/Refills   Current Medications    ACCU-CHEK SHELDON CONTROL SOLN SOLN    Use as directed    BD ALCOHOL SWABS PADM    USE AS NEEDED    BENAZEPRIL (LOTENSIN) 20 MG TABLET    Take 1 tablet (20 mg total) by mouth once daily.    BLOOD SUGAR DIAGNOSTIC (ACCU-CHEK SHELDON PLUS TEST STRP) STRP    Test glucose three times daily    BLOOD-GLUCOSE METER KIT    To check BG once daily, to use with insurance preferred meter    CALCITRIOL (ROCALTROL) 0.25 MCG CAP    TAKE 1 CAPSULE EVERY DAY (NEED MD APPOINTMENT)    DONEPEZIL (ARICEPT) 5 MG TABLET    Take 1 tablet (5 mg total) by mouth every evening.    ELIQUIS 2.5 MG TAB    TAKE 1 TABLET TWICE DAILY    HYDROCHLOROTHIAZIDE (HYDRODIURIL) 25 MG TABLET    TAKE 1 TABLET EVERY DAY    INSULIN (LANTUS SOLOSTAR U-100 INSULIN) GLARGINE 100 UNITS/ML (3ML) SUBQ PEN    Inject 7 Units into the skin every evening.    INSULIN ASPART U-100 (NOVOLOG FLEXPEN U-100 INSULIN) 100 UNIT/ML (3 ML) INPN PEN    Inject 2 Units into the skin before dinner. (supper)    KETOCONAZOLE (NIZORAL) 2 % CREAM    Apply topically 2 (two) times daily. for 14 days    LANCETS (ACCU-CHEK SOFTCLIX LANCETS) MISC    USE TO CHECK BLOOD GLUCOSE THREE TIMES DAILY    LEVOTHYROXINE (SYNTHROID) 25 MCG TABLET    Take 1 tablet (25 mcg total) by mouth before breakfast.    METOPROLOL TARTRATE (LOPRESSOR) 50 MG TABLET    TAKE 1/2 TABLET TWICE DAILY    MIRABEGRON (MYRBETRIQ) 50 MG TB24    Take 1 tablet (50 mg total) by mouth once daily.    MIRTAZAPINE (REMERON) 45 MG TABLET    Take 1 tablet (45 mg total) by mouth every evening.    MUPIROCIN (BACTROBAN) 2 % OINTMENT    Apply topically  "2 (two) times daily.    PEN NEEDLE, DIABETIC 31 GAUGE X 1/4" NDLE    Use nightly    PRAVASTATIN (PRAVACHOL) 40 MG TABLET    TAKE 1 TABLET EVERY DAY    PREMARIN VAGINAL CREAM        SULFAMETHOXAZOLE-TRIMETHOPRIM 400-80MG (BACTRIM,SEPTRA) 400-80 MG PER TABLET    Take 2 tablets for the 1st dose, then 1 tablet every 12 hours    TRAMADOL (ULTRAM) 50 MG TABLET    Take 1 tablet (50 mg total) by mouth every 8 (eight) hours as needed for Pain.    TROSPIUM (SANCTURA XR) 60 MG CP24 CAPSULE    Take 60 mg by mouth once daily.    VENLAFAXINE (EFFEXOR-XR) 150 MG CP24    Take 1 capsule (150 mg total) by mouth once daily.    VENLAFAXINE (EFFEXOR-XR) 75 MG 24 HR CAPSULE    Take 1 capsule (75 mg total) by mouth once daily.    VERAPAMIL (CALAN-SR) 120 MG CR TABLET    TAKE 1 TABLET EVERY NIGHT       Review of patient's allergies indicates:   Allergen Reactions    Amlodipine Edema     Pt stated this medication made her legs swell    Alendronate sodium Other (See Comments)     Reaction: Esophageal bleeding    Fosamax [alendronate] Other (See Comments)     Reaction: Esophageal bleeding  diarrhea    Sorbitol      Diarrhea     Augmentin [amoxicillin-pot clavulanate] Hives and Rash       Past Surgical History:   Procedure Laterality Date    APPENDECTOMY      BREAST LUMPECTOMY Right 10/21/2015    CARDIAC PACEMAKER PLACEMENT  12/3/12    Sinus node dysfunction    CATARACT EXTRACTION W/  INTRAOCULAR LENS IMPLANT Bilateral     COLONOSCOPY      EXCISION OF LESION Left 6/3/2021    Procedure: EXCISION, LESION  forearm;  Surgeon: Saman Quintero MD;  Location: [facility];  Service: General;  Laterality: Left;    excision of squamous cell carcinoma Right 2017    EYE SURGERY Bilateral     PHACO/IOL    FRACTURE SURGERY Right     ankle    HYSTERECTOMY      total 1970's    MASTECTOMY Right 11/2015    OOPHORECTOMY      TONSILLECTOMY      TOTAL HIP ARTHROPLASTY Right 2007       Family History   Problem Relation Age of Onset    Hypertension " Mother     Heart disease Father     Stroke Father         cerebral hemorrhage    Coronary artery disease Brother     Heart disease Brother     Coronary artery disease Brother     COPD Brother     Heart disease Brother     Mitral valve prolapse Daughter     Peripheral vascular disease Son     Cataracts Neg Hx     Glaucoma Neg Hx     Macular degeneration Neg Hx     Retinal detachment Neg Hx     Strabismus Neg Hx        Social History     Socioeconomic History    Marital status:     Number of children: 2   Tobacco Use    Smoking status: Never Smoker    Smokeless tobacco: Never Used   Substance and Sexual Activity    Alcohol use: No    Drug use: No    Sexual activity: Not Currently     Social Determinants of Health     Financial Resource Strain: Unknown    Difficulty of Paying Living Expenses: Patient refused   Food Insecurity: Unknown    Worried About Running Out of Food in the Last Year: Patient refused    Ran Out of Food in the Last Year: Patient refused   Transportation Needs: Unknown    Lack of Transportation (Medical): Patient refused    Lack of Transportation (Non-Medical): Patient refused   Physical Activity: Inactive    Days of Exercise per Week: 0 days    Minutes of Exercise per Session: 10 min   Stress: No Stress Concern Present    Feeling of Stress : Not at all   Social Connections: Unknown    Frequency of Communication with Friends and Family: Patient refused    Frequency of Social Gatherings with Friends and Family: Patient refused    Attends [employer] Services: Never    Active Member of Clubs or Organizations: Patient refused    Attends Club or Organization Meetings: Patient refused    Marital Status:    Housing Stability: Unknown    Unable to Pay for Housing in the Last Year: Patient refused    Number of Places Lived in the Last Year: 1    Unstable Housing in the Last Year: Patient refused       Vitals:    05/03/22 1453   BP: (!) 183/77   Pulse: (!) 59    Weight: 68 kg (149 lb 14.6 oz)   Height: 5' (1.524 m)       Hemoglobin A1C   Date Value Ref Range Status   03/03/2022 8.7 (H) 4.0 - 5.6 % Final     Comment:     ADA Screening Guidelines:  5.7-6.4%  Consistent with prediabetes  >or=6.5%  Consistent with diabetes    High levels of fetal hemoglobin interfere with the HbA1C  assay. Heterozygous hemoglobin variants (HbS, HgC, etc)do  not significantly interfere with this assay.   However, presence of multiple variants may affect accuracy.     12/03/2021 8.9 (H) 4.0 - 5.6 % Final     Comment:     ADA Screening Guidelines:  5.7-6.4%  Consistent with prediabetes  >or=6.5%  Consistent with diabetes    High levels of fetal hemoglobin interfere with the HbA1C  assay. Heterozygous hemoglobin variants (HbS, HgC, etc)do  not significantly interfere with this assay.   However, presence of multiple variants may affect accuracy.     09/06/2021 8.9 (H) 4.6 - 6.2 % Final   02/26/2021 7.0 (H) 4.0 - 5.6 % Final     Comment:     ADA Screening Guidelines:  5.7-6.4%  Consistent with prediabetes  >or=6.5%  Consistent with diabetes    High levels of fetal hemoglobin interfere with the HbA1C  assay. Heterozygous hemoglobin variants (HbS, HgC, etc)do  not significantly interfere with this assay.   However, presence of multiple variants may affect accuracy.         Review of Systems   Constitutional: Negative for chills and fever.   Respiratory: Negative for shortness of breath.    Cardiovascular: Negative for chest pain, palpitations, orthopnea, claudication and leg swelling.   Gastrointestinal: Negative for diarrhea, nausea and vomiting.   Musculoskeletal: Negative for joint pain.   Skin: Negative for rash.   Neurological: Positive for tingling and sensory change.   Psychiatric/Behavioral: Negative.              Objective:      PHYSICAL EXAM: Apperance: Alert and orient in no distress,well developed, and with good attention to grooming and body habits  Patient ambulating in slippers.    Lower Extremity Exam  VASCULAR: Dorsalis pedis pulses 1/4 bilateral and Posterior Tibial pulses 1/4 bilateral. Capillary fill time <4 seconds bilateral. No edema observed bilateral. Varicosities present bilateral. Skin temperature of the lower extremities is warm to warm, proximal to distal. Hair growth absent bilateral. (--) lymphangitis or (--) cellulitis noted bilateral.  DERMATOLOGICAL: (--) edema, (--) erythema, (--) malodor, (--) drainage, (--) warmth to right foot.  Ulcer noted to right medial ankle  with granular base and with a 1 mm yellow/white border and hyperkeratosis surrounding ulcer. Ulcer measurement 1cm x 1cm x 0.1cm.  The ulcer does not extend into deeper tissue and (--) sinus tracts exist. Nails 1,2,3,4,5 bilateral elongated, thickened, and discolored with subungual debris. Webspaces 1,2,3,4 clean, dry and without evidence of break in skin integrity bilateral. Mild hyperkeratotic tissue noted to left medial hallux.   NEUROLOGICAL: Light touch, sharp-dull, proprioception all present and equal bilaterally.   MUSCULOSKELETAL: Muscle strength is 5/5 for foot inverters, everters, plantarflexors, and dorsiflexors. Muscle tone is normal. Fat pad atrophy noted bilateral. Mild tenderness on palpation of left plantar heel.           Assessment:       Encounter Diagnoses   Name Primary?    Pressure ulcer of right ankle, stage 2 Yes    Cellulitis of right ankle     Type 2 diabetes mellitus with diabetic neuropathy, with long-term current use of insulin     Onychomycosis due to dermatophyte          Plan:   Pressure ulcer of right ankle, stage 2  -     Cancel: SUBSEQUENT HOME HEALTH ORDERS    Cellulitis of right ankle  -     Ambulatory referral/consult to Podiatry  -     Cancel: SUBSEQUENT HOME HEALTH ORDERS    Type 2 diabetes mellitus with diabetic neuropathy, with long-term current use of insulin  -     Ambulatory referral/consult to Podiatry  -     Cancel: SUBSEQUENT HOME HEALTH  ORDERS    Onychomycosis due to dermatophyte      I counseled the patient on her conditions, regarding findings of my examination, my impressions, and usual treatment plan.   Greater than 50% of this visit spent on counseling and coordination of care.  Greater than 15 minutes of a 20 minute appointment spent on education about the diabetic foot, neuropathy, and prevention of limb loss.  Shoe inspection. Diabetic Foot Education. Patient reminded of the importance of good nutrition and blood sugar control to help prevent podiatric complications of diabetes. Patient instructed on proper foot hygeine. We discussed wearing proper shoe gear, daily foot inspections, never walking without protective shoe gear, never putting sharp instruments to feet.    With patient's permission, nails 1-5 bilateral were debrided/trimmed in length and thickness aggressively to their soft tissue attachment mechanically and with electric , removing all offending nail and debris. Patient relates relief following the procedure.  With patient's permission, the right ankle wound was irrigated with sterile saline and bleeding was controlled with direct pressure. Minimal blood loss. The patient tolerated this well. Wound was then dressed with MediHoney and Mepilex pad. Patient was given instructions on daily dressing changes. Patient was also instructed on the importance of keeping the wound and dressings clean dry and intact and keeping pressure off the wound area until complete healing of the wound.The patient is alerted to watch for any signs of infection (redness, pus, pain, increased swelling or fever) and call if such occurs.   Home health orders completed.   Patient  will continue to monitor the areas daily, inspect feet, wear protective shoe gear when ambulatory, moisturizer to maintain skin integrity. Patient reminded of the importance of good nutrition and blood sugar control to help prevent podiatric complications of  diabetes.  Patient to return 2 months or sooner if needed.             Luzmaria Valle DPM  [facility]

## 2022-06-06 ENCOUNTER — DOCUMENT SCAN (OUTPATIENT)
Dept: HOME HEALTH SERVICES | Facility: HOSPITAL | Age: 87
End: 2022-06-06
Payer: MEDICARE

## 2022-06-08 DIAGNOSIS — E78.5 HYPERLIPIDEMIA, UNSPECIFIED HYPERLIPIDEMIA TYPE: ICD-10-CM

## 2022-06-08 DIAGNOSIS — Z79.4 TYPE 2 DIABETES MELLITUS WITHOUT COMPLICATION, WITH LONG-TERM CURRENT USE OF INSULIN: ICD-10-CM

## 2022-06-08 DIAGNOSIS — I10 HYPERTENSION, UNSPECIFIED TYPE: Primary | ICD-10-CM

## 2022-06-08 DIAGNOSIS — E11.9 TYPE 2 DIABETES MELLITUS WITHOUT COMPLICATION, WITH LONG-TERM CURRENT USE OF INSULIN: ICD-10-CM

## 2022-06-08 NOTE — TELEPHONE ENCOUNTER
Refill Routing Note   Medication(s) are not appropriate for processing by [facility] for the following reason(s):      - Patient has not been seen in over 15 months by PCP  - Medication not previously prescribed by PCP    [facility] action(s):  Defer          Medication reconciliation completed: No     Appointments  past 12m or future 3m with PCP    Date Provider   Last Visit   Visit date not found Augie Bhagat MD   Next Visit   Visit date not found Augie Bhagat MD   ED visits in past 90 days: 0        Note composed:6:21 PM 06/08/2022

## 2022-06-09 RX ORDER — PRAVASTATIN SODIUM 40 MG/1
TABLET ORAL
Qty: 90 TABLET | Refills: 0 | Status: SHIPPED | OUTPATIENT
Start: 2022-06-09 | End: 2022-12-01

## 2022-06-24 ENCOUNTER — PATIENT MESSAGE (OUTPATIENT)
Dept: SURGERY | Facility: CLINIC | Age: 87
End: 2022-06-24
Payer: MEDICARE

## 2022-06-28 ENCOUNTER — PATIENT MESSAGE (OUTPATIENT)
Dept: SURGERY | Facility: CLINIC | Age: 87
End: 2022-06-28
Payer: MEDICARE

## 2022-06-29 ENCOUNTER — EXTERNAL HOME HEALTH (OUTPATIENT)
Dept: HOME HEALTH SERVICES | Facility: HOSPITAL | Age: 87
End: 2022-06-29
Payer: MEDICARE

## 2022-07-01 ENCOUNTER — PATIENT MESSAGE (OUTPATIENT)
Dept: FAMILY MEDICINE | Facility: CLINIC | Age: 87
End: 2022-07-01
Payer: MEDICARE

## 2022-07-05 ENCOUNTER — CLINICAL SUPPORT (OUTPATIENT)
Dept: CARDIOLOGY | Facility: HOSPITAL | Age: 87
End: 2022-07-05
Attending: INTERNAL MEDICINE
Payer: MEDICARE

## 2022-07-05 ENCOUNTER — OFFICE VISIT (OUTPATIENT)
Dept: CARDIOLOGY | Facility: CLINIC | Age: 87
End: 2022-07-05
Payer: MEDICARE

## 2022-07-05 VITALS
WEIGHT: 149.5 LBS | BODY MASS INDEX: 29.35 KG/M2 | SYSTOLIC BLOOD PRESSURE: 148 MMHG | HEIGHT: 60 IN | DIASTOLIC BLOOD PRESSURE: 65 MMHG | HEART RATE: 59 BPM

## 2022-07-05 DIAGNOSIS — Z95.0 PACEMAKER: ICD-10-CM

## 2022-07-05 DIAGNOSIS — I49.5 SINUS NODE DYSFUNCTION: Primary | ICD-10-CM

## 2022-07-05 DIAGNOSIS — E11.59 HYPERTENSION ASSOCIATED WITH DIABETES: ICD-10-CM

## 2022-07-05 DIAGNOSIS — I15.2 HYPERTENSION ASSOCIATED WITH DIABETES: ICD-10-CM

## 2022-07-05 DIAGNOSIS — I44.7 LBBB (LEFT BUNDLE BRANCH BLOCK): ICD-10-CM

## 2022-07-05 DIAGNOSIS — I48.19 PERSISTENT ATRIAL FIBRILLATION: ICD-10-CM

## 2022-07-05 PROCEDURE — 99214 PR OFFICE/OUTPT VISIT, EST, LEVL IV, 30-39 MIN: ICD-10-PCS | Mod: S$GLB,,, | Performed by: INTERNAL MEDICINE

## 2022-07-05 PROCEDURE — 1126F AMNT PAIN NOTED NONE PRSNT: CPT | Mod: CPTII,S$GLB,, | Performed by: INTERNAL MEDICINE

## 2022-07-05 PROCEDURE — 93280 PM DEVICE PROGR EVAL DUAL: CPT | Mod: 26,,, | Performed by: INTERNAL MEDICINE

## 2022-07-05 PROCEDURE — 3052F PR MOST RECENT HEMOGLOBIN A1C LEVEL 8.0 - < 9.0%: ICD-10-PCS | Mod: CPTII,S$GLB,, | Performed by: INTERNAL MEDICINE

## 2022-07-05 PROCEDURE — 1160F RVW MEDS BY RX/DR IN RCRD: CPT | Mod: CPTII,S$GLB,, | Performed by: INTERNAL MEDICINE

## 2022-07-05 PROCEDURE — 93280 CARDIAC DEVICE CHECK - IN CLINIC & HOSPITAL: ICD-10-PCS | Mod: 26,,, | Performed by: INTERNAL MEDICINE

## 2022-07-05 PROCEDURE — 1126F PR PAIN SEVERITY QUANTIFIED, NO PAIN PRESENT: ICD-10-PCS | Mod: CPTII,S$GLB,, | Performed by: INTERNAL MEDICINE

## 2022-07-05 PROCEDURE — 99999 PR PBB SHADOW E&M-EST. PATIENT-LVL IV: ICD-10-PCS | Mod: PBBFAC,,, | Performed by: INTERNAL MEDICINE

## 2022-07-05 PROCEDURE — 1101F PR PT FALLS ASSESS DOC 0-1 FALLS W/OUT INJ PAST YR: ICD-10-PCS | Mod: CPTII,S$GLB,, | Performed by: INTERNAL MEDICINE

## 2022-07-05 PROCEDURE — 3288F PR FALLS RISK ASSESSMENT DOCUMENTED: ICD-10-PCS | Mod: CPTII,S$GLB,, | Performed by: INTERNAL MEDICINE

## 2022-07-05 PROCEDURE — 1160F PR REVIEW ALL MEDS BY PRESCRIBER/CLIN PHARMACIST DOCUMENTED: ICD-10-PCS | Mod: CPTII,S$GLB,, | Performed by: INTERNAL MEDICINE

## 2022-07-05 PROCEDURE — 1101F PT FALLS ASSESS-DOCD LE1/YR: CPT | Mod: CPTII,S$GLB,, | Performed by: INTERNAL MEDICINE

## 2022-07-05 PROCEDURE — 1159F MED LIST DOCD IN RCRD: CPT | Mod: CPTII,S$GLB,, | Performed by: INTERNAL MEDICINE

## 2022-07-05 PROCEDURE — 3052F HG A1C>EQUAL 8.0%<EQUAL 9.0%: CPT | Mod: CPTII,S$GLB,, | Performed by: INTERNAL MEDICINE

## 2022-07-05 PROCEDURE — 3288F FALL RISK ASSESSMENT DOCD: CPT | Mod: CPTII,S$GLB,, | Performed by: INTERNAL MEDICINE

## 2022-07-05 PROCEDURE — 99214 OFFICE O/P EST MOD 30 MIN: CPT | Mod: S$GLB,,, | Performed by: INTERNAL MEDICINE

## 2022-07-05 PROCEDURE — 1159F PR MEDICATION LIST DOCUMENTED IN MEDICAL RECORD: ICD-10-PCS | Mod: CPTII,S$GLB,, | Performed by: INTERNAL MEDICINE

## 2022-07-05 PROCEDURE — 99999 PR PBB SHADOW E&M-EST. PATIENT-LVL IV: CPT | Mod: PBBFAC,,, | Performed by: INTERNAL MEDICINE

## 2022-07-05 NOTE — PROGRESS NOTES
Subjective:    Patient ID:  Kassi Skelton is a 89 y.o. female who presents for follow-up of atrial fibrillation    HPI  She comes with no complaints, no chest pain, no shortness of breath  BP normal at home      Review of Systems   Constitutional: Negative for decreased appetite, malaise/fatigue, weight gain and weight loss.   Cardiovascular: Negative for chest pain, dyspnea on exertion, leg swelling, palpitations and syncope.   Respiratory: Negative for cough and shortness of breath.    Gastrointestinal: Negative.    Neurological: Negative for weakness.   All other systems reviewed and are negative.       Objective:      Physical Exam  Vitals and nursing note reviewed.   Constitutional:       Appearance: Normal appearance. She is well-developed.   HENT:      Head: Normocephalic.   Eyes:      Pupils: Pupils are equal, round, and reactive to light.   Neck:      Thyroid: No thyromegaly.      Vascular: No carotid bruit or JVD.   Cardiovascular:      Rate and Rhythm: Normal rate and regular rhythm.      Chest Wall: PMI is not displaced.      Pulses: Normal pulses and intact distal pulses.      Heart sounds: Normal heart sounds. No murmur heard.    No gallop.   Pulmonary:      Effort: Pulmonary effort is normal.      Breath sounds: Normal breath sounds.   Abdominal:      Palpations: Abdomen is soft. There is no mass.      Tenderness: There is no abdominal tenderness.   Musculoskeletal:         General: Normal range of motion.      Cervical back: Normal range of motion and neck supple.   Skin:     General: Skin is warm.   Neurological:      Mental Status: She is alert and oriented to person, place, and time.      Sensory: No sensory deficit.      Deep Tendon Reflexes: Reflexes are normal and symmetric.     PPM about to reach [facility]      Assessment:       1. Sinus node dysfunction    2. Persistent atrial fibrillation    3. Pacemaker    4. Hypertension associated with diabetes         Plan:     Continue all cardiac  medications  Regular exercise program  6 m f/u

## 2022-07-09 PROCEDURE — G0179 MD RECERTIFICATION HHA PT: HCPCS | Mod: ,,, | Performed by: PODIATRIST

## 2022-07-09 PROCEDURE — G0179 PR HOME HEALTH MD RECERTIFICATION: ICD-10-PCS | Mod: ,,, | Performed by: PODIATRIST

## 2022-07-19 ENCOUNTER — TELEPHONE (OUTPATIENT)
Dept: FAMILY MEDICINE | Facility: CLINIC | Age: 87
End: 2022-07-19
Payer: MEDICARE

## 2022-07-19 ENCOUNTER — OFFICE VISIT (OUTPATIENT)
Dept: PODIATRY | Facility: CLINIC | Age: 87
End: 2022-07-19
Payer: MEDICARE

## 2022-07-19 VITALS — BODY MASS INDEX: 29.35 KG/M2 | HEIGHT: 60 IN | WEIGHT: 149.5 LBS

## 2022-07-19 DIAGNOSIS — Z79.4 TYPE 2 DIABETES MELLITUS WITH DIABETIC NEUROPATHY, WITH LONG-TERM CURRENT USE OF INSULIN: Primary | ICD-10-CM

## 2022-07-19 DIAGNOSIS — M20.42 HAMMER TOES OF BOTH FEET: ICD-10-CM

## 2022-07-19 DIAGNOSIS — E11.40 TYPE 2 DIABETES MELLITUS WITH DIABETIC NEUROPATHY, WITH LONG-TERM CURRENT USE OF INSULIN: Primary | ICD-10-CM

## 2022-07-19 DIAGNOSIS — L89.512 PRESSURE ULCER OF RIGHT ANKLE, STAGE 2: ICD-10-CM

## 2022-07-19 DIAGNOSIS — L84 PRE-ULCERATIVE CALLUSES: ICD-10-CM

## 2022-07-19 DIAGNOSIS — B35.1 ONYCHOMYCOSIS DUE TO DERMATOPHYTE: ICD-10-CM

## 2022-07-19 DIAGNOSIS — M20.41 HAMMER TOES OF BOTH FEET: ICD-10-CM

## 2022-07-19 DIAGNOSIS — L90.9 FAT PAD ATROPHY OF FOOT: ICD-10-CM

## 2022-07-19 DIAGNOSIS — L97.521 NEUROPATHIC FOOT ULCER, LEFT, LIMITED TO BREAKDOWN OF SKIN: ICD-10-CM

## 2022-07-19 PROCEDURE — 1159F PR MEDICATION LIST DOCUMENTED IN MEDICAL RECORD: ICD-10-PCS | Mod: CPTII,S$GLB,, | Performed by: PODIATRIST

## 2022-07-19 PROCEDURE — 1160F PR REVIEW ALL MEDS BY PRESCRIBER/CLIN PHARMACIST DOCUMENTED: ICD-10-PCS | Mod: CPTII,S$GLB,, | Performed by: PODIATRIST

## 2022-07-19 PROCEDURE — 11721 DEBRIDE NAIL 6 OR MORE: CPT | Mod: Q9,S$GLB,, | Performed by: PODIATRIST

## 2022-07-19 PROCEDURE — 1159F MED LIST DOCD IN RCRD: CPT | Mod: CPTII,S$GLB,, | Performed by: PODIATRIST

## 2022-07-19 PROCEDURE — 99999 PR PBB SHADOW E&M-EST. PATIENT-LVL III: ICD-10-PCS | Mod: PBBFAC,,, | Performed by: PODIATRIST

## 2022-07-19 PROCEDURE — 11721 PR DEBRIDEMENT OF NAILS, 6 OR MORE: ICD-10-PCS | Mod: Q9,S$GLB,, | Performed by: PODIATRIST

## 2022-07-19 PROCEDURE — 3288F FALL RISK ASSESSMENT DOCD: CPT | Mod: CPTII,S$GLB,, | Performed by: PODIATRIST

## 2022-07-19 PROCEDURE — 1160F RVW MEDS BY RX/DR IN RCRD: CPT | Mod: CPTII,S$GLB,, | Performed by: PODIATRIST

## 2022-07-19 PROCEDURE — 99499 RISK ADDL DX/OHS AUDIT: ICD-10-PCS | Mod: S$GLB,,, | Performed by: PODIATRIST

## 2022-07-19 PROCEDURE — 1101F PT FALLS ASSESS-DOCD LE1/YR: CPT | Mod: CPTII,S$GLB,, | Performed by: PODIATRIST

## 2022-07-19 PROCEDURE — 99213 OFFICE O/P EST LOW 20 MIN: CPT | Mod: 25,S$GLB,, | Performed by: PODIATRIST

## 2022-07-19 PROCEDURE — 3288F PR FALLS RISK ASSESSMENT DOCUMENTED: ICD-10-PCS | Mod: CPTII,S$GLB,, | Performed by: PODIATRIST

## 2022-07-19 PROCEDURE — 99213 PR OFFICE/OUTPT VISIT, EST, LEVL III, 20-29 MIN: ICD-10-PCS | Mod: 25,S$GLB,, | Performed by: PODIATRIST

## 2022-07-19 PROCEDURE — 99499 UNLISTED E&M SERVICE: CPT | Mod: S$GLB,,, | Performed by: PODIATRIST

## 2022-07-19 PROCEDURE — 1101F PR PT FALLS ASSESS DOC 0-1 FALLS W/OUT INJ PAST YR: ICD-10-PCS | Mod: CPTII,S$GLB,, | Performed by: PODIATRIST

## 2022-07-19 PROCEDURE — 99999 PR PBB SHADOW E&M-EST. PATIENT-LVL III: CPT | Mod: PBBFAC,,, | Performed by: PODIATRIST

## 2022-07-19 NOTE — TELEPHONE ENCOUNTER
The recent glucose readings that were sent in were elevated.  Recommend continue Lantus as she is doing.  Start using the NovoLog 2 units before breakfast lunch and supper.  Have them send in readings a couple weeks.  Please get hemoglobin A1c at the same time as when she has her appointment with the nephrologist next month

## 2022-07-20 ENCOUNTER — PATIENT MESSAGE (OUTPATIENT)
Dept: NEPHROLOGY | Facility: CLINIC | Age: 87
End: 2022-07-20
Payer: MEDICARE

## 2022-07-20 NOTE — TELEPHONE ENCOUNTER
Spoke with daughter, she is aware of medication change. Daughter states that I need to fax the new orders to [facility] where the pt stays.

## 2022-07-21 ENCOUNTER — EXTERNAL HOME HEALTH (OUTPATIENT)
Dept: HOME HEALTH SERVICES | Facility: HOSPITAL | Age: 87
End: 2022-07-21
Payer: MEDICARE

## 2022-07-21 NOTE — TELEPHONE ENCOUNTER
[facility] received lab orders and is aware patient needs them done before 8/1/2022.

## 2022-07-22 ENCOUNTER — LAB VISIT (OUTPATIENT)
Dept: LAB | Facility: HOSPITAL | Age: 87
End: 2022-07-22
Attending: FAMILY MEDICINE
Payer: MEDICARE

## 2022-07-22 DIAGNOSIS — N18.30 CHRONIC KIDNEY DISEASE, STAGE III (MODERATE): Primary | ICD-10-CM

## 2022-07-22 LAB
ALBUMIN SERPL BCP-MCNC: 3.4 G/DL (ref 3.5–5.2)
ANION GAP SERPL CALC-SCNC: 15 MMOL/L (ref 8–16)
BUN SERPL-MCNC: 34 MG/DL (ref 8–23)
CALCIUM SERPL-MCNC: 10 MG/DL (ref 8.7–10.5)
CHLORIDE SERPL-SCNC: 101 MMOL/L (ref 95–110)
CO2 SERPL-SCNC: 20 MMOL/L (ref 23–29)
CREAT SERPL-MCNC: 2 MG/DL (ref 0.5–1.4)
CREAT UR-MCNC: 123 MG/DL (ref 15–325)
EST. GFR  (AFRICAN AMERICAN): 24.8 ML/MIN/1.73 M^2
EST. GFR  (NON AFRICAN AMERICAN): 21.5 ML/MIN/1.73 M^2
GLUCOSE SERPL-MCNC: 328 MG/DL (ref 70–110)
PHOSPHATE SERPL-MCNC: 4.7 MG/DL (ref 2.7–4.5)
POTASSIUM SERPL-SCNC: 5.1 MMOL/L (ref 3.5–5.1)
PROT UR-MCNC: 73 MG/DL (ref 0–15)
PROT/CREAT UR: 0.59 MG/G{CREAT} (ref 0–0.2)
PTH-INTACT SERPL-MCNC: 138.8 PG/ML (ref 9–77)
SODIUM SERPL-SCNC: 136 MMOL/L (ref 136–145)

## 2022-07-22 PROCEDURE — 82570 ASSAY OF URINE CREATININE: CPT | Performed by: INTERNAL MEDICINE

## 2022-07-22 PROCEDURE — 80069 RENAL FUNCTION PANEL: CPT | Performed by: INTERNAL MEDICINE

## 2022-07-22 PROCEDURE — 83970 ASSAY OF PARATHORMONE: CPT | Performed by: INTERNAL MEDICINE

## 2022-07-25 ENCOUNTER — TELEPHONE (OUTPATIENT)
Dept: PODIATRY | Facility: CLINIC | Age: 87
End: 2022-07-25
Payer: MEDICARE

## 2022-07-25 NOTE — TELEPHONE ENCOUNTER
Spoke with HH to let them know to continue wound care for right foot and to also add left foot with the exact same wound care          ----- Message from Regina Pacheco MA sent at 7/22/2022  3:50 PM CDT -----  Regarding: Updated wound care orders  Contact: [phone]  Rafaela Rogers called stating should they continue with previous wound care orders or new orders? I let her know that y'all were out and would be returning on Monday.   ----- Message -----  From: Elham Guthrie  Sent: 7/22/2022   3:04 PM CDT  To: [facility] Staff    Rafaela from [facility] is calling request updated wound care orders.Please call back at [phone].Thanks

## 2022-07-30 NOTE — PROGRESS NOTES
Subjective:     Patient ID: Kassi Skelton is a 89 y.o. female.    Chief Complaint: Nail Problem (Diabetic pt wears flats, PCP Dr. Cook last seen 3-31-22)    Kassi is a 89 y.o. female who presents to the clinic for evaluation and treatment of high risk feet. Kassi has a past medical history of *Atrial fibrillation (4/19/2012), *Atrial flutter (4/19/2012), Allergy, Ankle fracture (4/19/2012), Anticoagulant long-term use, Anxiety, Arthritis, Bacterial pneumonia, unspecified (12/7/2012), Breast cancer, CKD (chronic kidney disease), stage III (5/11/2012), Dependent on walker for ambulation, Depression (4/19/2012), Diabetes mellitus with stage 3 chronic kidney disease (2/1/2016), Diabetes with neurologic complications, Edentulous, HEARING LOSS, Hip fracture, right (4/19/2012), HLD (hyperlipidemia) (4/19/2012), HTN (hypertension) (4/19/2012), Hypothyroidism (4/19/2012), 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 The patient's chief complaint is long, thick toenails. This patient has documented high risk feet requiring routine maintenance secondary to diabetes mellitis and those secondary complications of diabetes, as mentioned. Patient is accompanied by her daughter. Patient's daughter states home health is still coming out.     PCP: Mor Cook MD    Date Last Seen by PCP: 03/31/2022    Current shoe gear:  Affected Foot: Slip-on shoes     Unaffected Foot: Slip-on shoes    Hemoglobin A1C   Date Value Ref Range Status   03/03/2022 8.7 (H) 4.0 - 5.6 % Final     Comment:     ADA Screening Guidelines:  5.7-6.4%  Consistent with prediabetes  >or=6.5%   Consistent with diabetes    High levels of fetal hemoglobin interfere with the HbA1C  assay. Heterozygous hemoglobin variants (HbS, HgC, etc)do  not significantly interfere with this assay.   However, presence of multiple variants may affect accuracy.     12/03/2021 8.9 (H) 4.0 - 5.6 % Final     Comment:     ADA Screening Guidelines:  5.7-6.4%  Consistent with prediabetes  >or=6.5%  Consistent with diabetes    High levels of fetal hemoglobin interfere with the HbA1C  assay. Heterozygous hemoglobin variants (HbS, HgC, etc)do  not significantly interfere with this assay.   However, presence of multiple variants may affect accuracy.     09/06/2021 8.9 (H) 4.6 - 6.2 % Final   02/26/2021 7.0 (H) 4.0 - 5.6 % Final     Comment:     ADA Screening Guidelines:  5.7-6.4%  Consistent with prediabetes  >or=6.5%  Consistent with diabetes    High levels of fetal hemoglobin interfere with the HbA1C  assay. Heterozygous hemoglobin variants (HbS, HgC, etc)do  not significantly interfere with this assay.   However, presence of multiple variants may affect accuracy.         Patient Active Problem List   Diagnosis    History of atrial flutter    Combined hyperlipidemia associated with type 2 diabetes mellitus    Hypothyroidism    Major depressive disorder, recurrent episode, moderate    Osteoporosis    Benign hypertensive kidney disease with chronic kidney disease    Cyst of kidney, acquired    Persistent atrial fibrillation    Anticoagulation monitoring by pharmacist    LBBB (left bundle branch block)    Sinus node dysfunction    Cardiac pacemaker in situ    Urinary incontinence, mixed    Pacemaker    Orthostatic hypotension    DCIS (ductal carcinoma in situ)    Hypertension associated with diabetes    Diabetes mellitus with stage 4 chronic kidney disease GFR 15-29    Dizziness    Secondary hyperparathyroidism of renal origin    Hearing aid worn    Diabetic neuropathy, type II diabetes mellitus    Mild cognitive  "impairment with memory loss    Primary osteoarthritis    Edentulous    Greater trochanteric bursitis of right hip    Lung granuloma    Osteomyelitis of second toe of left foot       Medication List with Changes/Refills   Current Medications    ACCU-CHEK SHELDON CONTROL SOLN SOLN    Use as directed    BD ALCOHOL SWABS PADM    USE AS NEEDED    BENAZEPRIL (LOTENSIN) 20 MG TABLET    Take 1 tablet (20 mg total) by mouth once daily.    BLOOD SUGAR DIAGNOSTIC (ACCU-CHEK SHELDON PLUS TEST STRP) STRP    Test glucose three times daily    BLOOD-GLUCOSE METER KIT    To check BG once daily, to use with insurance preferred meter    CALCITRIOL (ROCALTROL) 0.25 MCG CAP    TAKE 1 CAPSULE EVERY DAY (NEED MD APPOINTMENT)    DONEPEZIL (ARICEPT) 5 MG TABLET    Take 1 tablet (5 mg total) by mouth every evening.    ELIQUIS 2.5 MG TAB    TAKE 1 TABLET TWICE DAILY    HYDROCHLOROTHIAZIDE (HYDRODIURIL) 25 MG TABLET    TAKE 1 TABLET EVERY DAY    INSULIN (LANTUS SOLOSTAR U-100 INSULIN) GLARGINE 100 UNITS/ML (3ML) SUBQ PEN    Inject 7 Units into the skin every evening.    INSULIN ASPART U-100 (NOVOLOG FLEXPEN U-100 INSULIN) 100 UNIT/ML (3 ML) INPN PEN    Inject 2 Units into the skin before dinner. (supper)    KETOCONAZOLE (NIZORAL) 2 % CREAM    Apply topically 2 (two) times daily. for 14 days    LANCETS (ACCU-CHEK SOFTCLIX LANCETS) MISC    USE TO CHECK BLOOD GLUCOSE THREE TIMES DAILY    LEVOTHYROXINE (SYNTHROID) 25 MCG TABLET    Take 1 tablet (25 mcg total) by mouth before breakfast.    METOPROLOL TARTRATE (LOPRESSOR) 50 MG TABLET    TAKE 1/2 TABLET TWICE DAILY    MIRABEGRON (MYRBETRIQ) 50 MG TB24    Take 1 tablet (50 mg total) by mouth once daily.    MIRTAZAPINE (REMERON) 45 MG TABLET    Take 1 tablet (45 mg total) by mouth every evening.    MUPIROCIN (BACTROBAN) 2 % OINTMENT    Apply topically 2 (two) times daily.    PEN NEEDLE, DIABETIC 31 GAUGE X 1/4" NDLE    Use nightly    PRAVASTATIN (PRAVACHOL) 40 MG TABLET    TAKE 1 TABLET EVERY DAY    " PREMARIN VAGINAL CREAM        SULFAMETHOXAZOLE-TRIMETHOPRIM 400-80MG (BACTRIM,SEPTRA) 400-80 MG PER TABLET    Take 2 tablets for the 1st dose, then 1 tablet every 12 hours    TRAMADOL (ULTRAM) 50 MG TABLET    Take 1 tablet (50 mg total) by mouth every 8 (eight) hours as needed for Pain.    TROSPIUM (SANCTURA XR) 60 MG CP24 CAPSULE    Take 60 mg by mouth once daily.    VENLAFAXINE (EFFEXOR-XR) 150 MG CP24    Take 1 capsule (150 mg total) by mouth once daily.    VENLAFAXINE (EFFEXOR-XR) 75 MG 24 HR CAPSULE    Take 1 capsule (75 mg total) by mouth once daily.    VERAPAMIL (CALAN-SR) 120 MG CR TABLET    TAKE 1 TABLET EVERY NIGHT       Review of patient's allergies indicates:   Allergen Reactions    Amlodipine Edema     Pt stated this medication made her legs swell    Alendronate sodium Other (See Comments)     Reaction: Esophageal bleeding    Fosamax [alendronate] Other (See Comments)     Reaction: Esophageal bleeding  diarrhea    Sorbitol      Diarrhea     Augmentin [amoxicillin-pot clavulanate] Hives and Rash       Past Surgical History:   Procedure Laterality Date    APPENDECTOMY      BREAST LUMPECTOMY Right 10/21/2015    CARDIAC PACEMAKER PLACEMENT  12/3/12    Sinus node dysfunction    CATARACT EXTRACTION W/  INTRAOCULAR LENS IMPLANT Bilateral     COLONOSCOPY      EXCISION OF LESION Left 6/3/2021    Procedure: EXCISION, LESION  forearm;  Surgeon: Saman Quintero MD;  Location: [facility];  Service: General;  Laterality: Left;    excision of squamous cell carcinoma Right 2017    EYE SURGERY Bilateral     PHACO/IOL    FRACTURE SURGERY Right     ankle    HYSTERECTOMY      total 1970's    MASTECTOMY Right 11/2015    OOPHORECTOMY      TONSILLECTOMY      TOTAL HIP ARTHROPLASTY Right 2007       Family History   Problem Relation Age of Onset    Hypertension Mother     Heart disease Father     Stroke Father         cerebral hemorrhage    Coronary artery disease Brother     Heart disease Brother      Coronary artery disease Brother     COPD Brother     Heart disease Brother     Mitral valve prolapse Daughter     Peripheral vascular disease Son     Cataracts Neg Hx     Glaucoma Neg Hx     Macular degeneration Neg Hx     Retinal detachment Neg Hx     Strabismus Neg Hx        Social History     Socioeconomic History    Marital status:     Number of children: 2   Tobacco Use    Smoking status: Never Smoker    Smokeless tobacco: Never Used   Substance and Sexual Activity    Alcohol use: No    Drug use: No    Sexual activity: Not Currently     Social Determinants of Health     Financial Resource Strain: Unknown    Difficulty of Paying Living Expenses: Patient refused   Food Insecurity: Unknown    Worried About Running Out of Food in the Last Year: Patient refused    Ran Out of Food in the Last Year: Patient refused   Transportation Needs: Unknown    Lack of Transportation (Medical): Patient refused    Lack of Transportation (Non-Medical): Patient refused   Physical Activity: Inactive    Days of Exercise per Week: 0 days    Minutes of Exercise per Session: 10 min   Stress: No Stress Concern Present    Feeling of Stress : Not at all   Social Connections: Unknown    Frequency of Communication with Friends and Family: Patient refused    Frequency of Social Gatherings with Friends and Family: Patient refused    Attends Caodaism Services: Never    Active Member of Clubs or Organizations: Patient refused    Attends Club or Organization Meetings: Patient refused    Marital Status:    Housing Stability: Unknown    Unable to Pay for Housing in the Last Year: Patient refused    Number of Places Lived in the Last Year: 1    Unstable Housing in the Last Year: Patient refused       Vitals:    07/19/22 1400   Weight: 67.8 kg (149 lb 7.6 oz)   Height: 5' (1.524 m)       Hemoglobin A1C   Date Value Ref Range Status   03/03/2022 8.7 (H) 4.0 - 5.6 % Final     Comment:     ADA Screening  Guidelines:  5.7-6.4%  Consistent with prediabetes  >or=6.5%  Consistent with diabetes    High levels of fetal hemoglobin interfere with the HbA1C  assay. Heterozygous hemoglobin variants (HbS, HgC, etc)do  not significantly interfere with this assay.   However, presence of multiple variants may affect accuracy.     12/03/2021 8.9 (H) 4.0 - 5.6 % Final     Comment:     ADA Screening Guidelines:  5.7-6.4%  Consistent with prediabetes  >or=6.5%  Consistent with diabetes    High levels of fetal hemoglobin interfere with the HbA1C  assay. Heterozygous hemoglobin variants (HbS, HgC, etc)do  not significantly interfere with this assay.   However, presence of multiple variants may affect accuracy.     09/06/2021 8.9 (H) 4.6 - 6.2 % Final   02/26/2021 7.0 (H) 4.0 - 5.6 % Final     Comment:     ADA Screening Guidelines:  5.7-6.4%  Consistent with prediabetes  >or=6.5%  Consistent with diabetes    High levels of fetal hemoglobin interfere with the HbA1C  assay. Heterozygous hemoglobin variants (HbS, HgC, etc)do  not significantly interfere with this assay.   However, presence of multiple variants may affect accuracy.         Review of Systems   Constitutional: Negative for chills and fever.   Respiratory: Negative for shortness of breath.    Cardiovascular: Negative for chest pain, palpitations, orthopnea, claudication and leg swelling.   Gastrointestinal: Negative for diarrhea, nausea and vomiting.   Musculoskeletal: Negative for joint pain.   Skin: Negative for rash.   Neurological: Positive for tingling and sensory change.   Psychiatric/Behavioral: Negative.              Objective:      PHYSICAL EXAM: Apperance: Alert and orient in no distress,well developed, and with good attention to grooming and body habits  Patient ambulating in slippers.   Lower Extremity Exam  VASCULAR: Dorsalis pedis pulses 1/4 bilateral and Posterior Tibial pulses 1/4 bilateral. Capillary fill time <4 seconds bilateral. No edema observed bilateral.  Varicosities present bilateral. Skin temperature of the lower extremities is warm to warm, proximal to distal. Hair growth absent bilateral. (--) lymphangitis or (--) cellulitis noted bilateral.  DERMATOLOGICAL: (--) edema, (--) erythema, (--) malodor, (--) drainage, (--) warmth to bilateral foot. Mild hyperkeratotic tissue noted to left plantar 1st submetatarsal. Post debridement dry blood noted. Ulcer noted to right medial ankle  with granular base and with a 1 mm yellow/white border and hyperkeratosis surrounding ulcer. Ulcer measurement pinpoint.  The ulcer does not extend into deeper tissue and (--) sinus tracts exist. Nails 1,2,3,4,5 bilateral elongated, thickened, and discolored with subungual debris. Webspaces 1,2,3,4 clean, dry and without evidence of break in skin integrity bilateral. Mild hyperkeratotic tissue noted to left medial hallux.   NEUROLOGICAL: Light touch, sharp-dull, proprioception all present and equal bilaterally.   MUSCULOSKELETAL: Muscle strength is 5/5 for foot inverters, everters, plantarflexors, and dorsiflexors. Muscle tone is normal. Fat pad atrophy noted bilateral. Hammertoes noted bilateral.           Assessment:       ICD-10-CM ICD-9-CM   1. Type 2 diabetes mellitus with diabetic neuropathy, with long-term current use of insulin  E11.40 250.60    Z79.4 357.2     V58.67   2. Pressure ulcer of right ankle, stage 2  L89.512 707.06     707.22   3. Neuropathic foot ulcer, left, limited to breakdown of skin  L97.521 707.15   4. Onychomycosis due to dermatophyte  B35.1 110.1   5. Pre-ulcerative calluses  L84 700   6. Fat pad atrophy of foot  L90.9 701.9   7. Hammer toes of both feet  M20.41 735.4    M20.42        Plan:   Type 2 diabetes mellitus with diabetic neuropathy, with long-term current use of insulin  -     DIABETIC SHOES FOR HOME USE    Pressure ulcer of right ankle, stage 2    Neuropathic foot ulcer, left, limited to breakdown of skin    Onychomycosis due to  dermatophyte    Pre-ulcerative calluses  -     DIABETIC SHOES FOR HOME USE    Fat pad atrophy of foot  -     DIABETIC SHOES FOR HOME USE    Hammer toes of both feet  -     DIABETIC SHOES FOR HOME USE      I counseled the patient on her conditions, regarding findings of my examination, my impressions, and usual treatment plan.   Greater than 15 minutes of a 20 minute appointment spent on education about the diabetic foot, neuropathy, and prevention of limb loss.  Shoe inspection. Diabetic Foot Education. Patient reminded of the importance of good nutrition and blood sugar control to help prevent podiatric complications of diabetes. Patient instructed on proper foot hygeine. We discussed wearing proper shoe gear, daily foot inspections, never walking without protective shoe gear, never putting sharp instruments to feet.    With patient's permission, nails 1-5 bilateral were debrided/trimmed in length and thickness aggressively to their soft tissue attachment mechanically and with electric , removing all offending nail and debris. Patient relates relief following the procedure.  With patient's permission, the right ankle and left 1st plantar submetatarsal wound was irrigated with sterile saline. The patient tolerated this well. Wound was then dressed with MediHoney and Mepilex pad. Patient was given instructions on daily dressing changes. Patient was also instructed on the importance of keeping the wound and dressings clean dry and intact and keeping pressure off the wound area until complete healing of the wound.The patient is alerted to watch for any signs of infection (redness, pus, pain, increased swelling or fever) and call if such occurs.   Home health orders completed.   Prescription written for Diabetic shoes and inserts.   Patient  will continue to monitor the areas daily, inspect feet, wear protective shoe gear when ambulatory, moisturizer to maintain skin integrity. Patient reminded of the importance  of good nutrition and blood sugar control to help prevent podiatric complications of diabetes.  Patient to return 2 months or sooner if needed.        Luzmaria Valle DPM  [facility]

## 2022-08-01 ENCOUNTER — PATIENT MESSAGE (OUTPATIENT)
Dept: NEPHROLOGY | Facility: CLINIC | Age: 87
End: 2022-08-01
Payer: MEDICARE

## 2022-08-03 ENCOUNTER — TELEPHONE (OUTPATIENT)
Dept: FAMILY MEDICINE | Facility: CLINIC | Age: 87
End: 2022-08-03
Payer: MEDICARE

## 2022-08-03 NOTE — TELEPHONE ENCOUNTER
Okay, let us know if any problems arise.  Also see if she is interested in physical therapy through her home health if she is not already getting it.

## 2022-08-03 NOTE — TELEPHONE ENCOUNTER
Received fax from [facility], nurse Heena, resident fell in activities room when transferring self from a regular chair to a standing position using her RW, which was locked. No apparent injuries noted nurse fax [phone]

## 2022-08-11 ENCOUNTER — DOCUMENT SCAN (OUTPATIENT)
Dept: HOME HEALTH SERVICES | Facility: HOSPITAL | Age: 87
End: 2022-08-11
Payer: MEDICARE

## 2022-08-11 RX ORDER — LEVOTHYROXINE SODIUM 25 UG/1
TABLET ORAL
Qty: 90 TABLET | Refills: 0 | Status: SHIPPED | OUTPATIENT
Start: 2022-08-11 | End: 2023-02-13

## 2022-08-11 NOTE — TELEPHONE ENCOUNTER
Care Due:                  Date            Visit Type   Department     Provider  --------------------------------------------------------------------------------                                EP -                              PRIMARY      [facility] FAMILY  Last Visit: 03-      CARE ([facility])   MEDICINE       Mor Cook  Next Visit: None Scheduled  None         None Found                                                            Last  Test          Frequency    Reason                     Performed    Due Date  --------------------------------------------------------------------------------    CBC.........  12 months..  mirtazapine..............  02- 02-    CMP.........  12 months..  mirtazapine, pravastatin.  Not Found    Overdue    HBA1C.......  6 months...  insulin..................  03- 08-    Lipid Panel.  12 months..  mirtazapine, pravastatin.  02- 02-    TSH.........  12 months..  levothyroxine............  08- 08-    [facility] Embedded Care Gaps. Reference number: [id number]. 8/11/2022   12:35:44 AM CDT

## 2022-08-11 NOTE — TELEPHONE ENCOUNTER
Refill Decision Note   Kassi Skelton  is requesting a refill authorization.  Brief Assessment and Rationale for Refill:  Approve    -Medication-Related Problems Identified: Requires labs  Medication Therapy Plan:       Medication Reconciliation Completed: No   Comments:     Provider Staff:     Action is required for this patient.   Please see care gap opportunities below in Care Due Message.     Thanks!  [facility]     Appointments      Date Provider   Last Visit   3/31/2022 Mor Cook MD   Next Visit   Visit date not found Mor Cook MD     Note composed:12:57 PM 08/11/2022           Note composed:12:57 PM 08/11/2022

## 2022-08-17 ENCOUNTER — TELEPHONE (OUTPATIENT)
Dept: FAMILY MEDICINE | Facility: CLINIC | Age: 87
End: 2022-08-17
Payer: MEDICARE

## 2022-08-17 RX ORDER — INSULIN ASPART 100 [IU]/ML
INJECTION, SOLUTION INTRAVENOUS; SUBCUTANEOUS
Qty: 3 ML | Refills: 3 | Status: SHIPPED | OUTPATIENT
Start: 2022-08-17 | End: 2022-11-02

## 2022-08-17 RX ORDER — ACETAMINOPHEN 500 MG
1000 TABLET ORAL EVERY 6 HOURS PRN
COMMUNITY
Start: 2022-08-17

## 2022-08-17 RX ORDER — TRAMADOL HYDROCHLORIDE 50 MG/1
50 TABLET ORAL EVERY 8 HOURS PRN
Qty: 21 TABLET | Refills: 0 | Status: SHIPPED | OUTPATIENT
Start: 2022-08-17 | End: 2023-05-30

## 2022-08-17 NOTE — TELEPHONE ENCOUNTER
Okay to use Tylenol 500 mg 2 tablets every 6 hours as needed for pain.  I sent a refill on the tramadol.  Recommend increase NovoLog insulin 2 units before lunch and supper.

## 2022-08-17 NOTE — TELEPHONE ENCOUNTER
See fax in MD box, from [facility],  with glucose readings and questions about pain medication, asking for order for tylenol prn and refill on tramadol (last seen 3/31/22)

## 2022-08-24 ENCOUNTER — PATIENT MESSAGE (OUTPATIENT)
Dept: FAMILY MEDICINE | Facility: CLINIC | Age: 87
End: 2022-08-24
Payer: MEDICARE

## 2022-08-24 DIAGNOSIS — N39.0 RECURRENT UTI: Primary | ICD-10-CM

## 2022-09-07 PROCEDURE — G0179 PR HOME HEALTH MD RECERTIFICATION: ICD-10-PCS | Mod: ,,, | Performed by: PODIATRIST

## 2022-09-07 PROCEDURE — G0179 MD RECERTIFICATION HHA PT: HCPCS | Mod: ,,, | Performed by: PODIATRIST

## 2022-09-15 ENCOUNTER — OFFICE VISIT (OUTPATIENT)
Dept: NEPHROLOGY | Facility: CLINIC | Age: 87
End: 2022-09-15
Payer: MEDICARE

## 2022-09-15 ENCOUNTER — CLINICAL SUPPORT (OUTPATIENT)
Dept: CARDIOLOGY | Facility: HOSPITAL | Age: 87
End: 2022-09-15
Attending: INTERNAL MEDICINE
Payer: MEDICARE

## 2022-09-15 VITALS
DIASTOLIC BLOOD PRESSURE: 64 MMHG | SYSTOLIC BLOOD PRESSURE: 110 MMHG | WEIGHT: 149 LBS | BODY MASS INDEX: 29.25 KG/M2 | HEIGHT: 60 IN

## 2022-09-15 DIAGNOSIS — N18.32 TYPE 2 DIABETES MELLITUS WITH STAGE 3B CHRONIC KIDNEY DISEASE, WITH LONG-TERM CURRENT USE OF INSULIN: ICD-10-CM

## 2022-09-15 DIAGNOSIS — Z79.4 TYPE 2 DIABETES MELLITUS WITH STAGE 3B CHRONIC KIDNEY DISEASE, WITH LONG-TERM CURRENT USE OF INSULIN: ICD-10-CM

## 2022-09-15 DIAGNOSIS — E11.22 TYPE 2 DIABETES MELLITUS WITH STAGE 3B CHRONIC KIDNEY DISEASE, WITH LONG-TERM CURRENT USE OF INSULIN: ICD-10-CM

## 2022-09-15 DIAGNOSIS — I44.7 LBBB (LEFT BUNDLE BRANCH BLOCK): ICD-10-CM

## 2022-09-15 DIAGNOSIS — R80.9 PROTEINURIA, UNSPECIFIED TYPE: ICD-10-CM

## 2022-09-15 DIAGNOSIS — Z95.0 PACEMAKER: ICD-10-CM

## 2022-09-15 DIAGNOSIS — N18.32 STAGE 3B CHRONIC KIDNEY DISEASE: Primary | ICD-10-CM

## 2022-09-15 DIAGNOSIS — N28.1 ACQUIRED CYST OF KIDNEY: ICD-10-CM

## 2022-09-15 DIAGNOSIS — N25.81 SECONDARY HYPERPARATHYROIDISM OF RENAL ORIGIN: ICD-10-CM

## 2022-09-15 DIAGNOSIS — E11.21 DIABETIC NEPHROPATHY ASSOCIATED WITH TYPE 2 DIABETES MELLITUS: ICD-10-CM

## 2022-09-15 PROCEDURE — 1160F PR REVIEW ALL MEDS BY PRESCRIBER/CLIN PHARMACIST DOCUMENTED: ICD-10-PCS | Mod: CPTII,S$GLB,, | Performed by: INTERNAL MEDICINE

## 2022-09-15 PROCEDURE — 99499 UNLISTED E&M SERVICE: CPT | Mod: HCNC,S$GLB,, | Performed by: INTERNAL MEDICINE

## 2022-09-15 PROCEDURE — 1101F PR PT FALLS ASSESS DOC 0-1 FALLS W/OUT INJ PAST YR: ICD-10-PCS | Mod: CPTII,S$GLB,, | Performed by: INTERNAL MEDICINE

## 2022-09-15 PROCEDURE — 1160F RVW MEDS BY RX/DR IN RCRD: CPT | Mod: CPTII,S$GLB,, | Performed by: INTERNAL MEDICINE

## 2022-09-15 PROCEDURE — 1159F PR MEDICATION LIST DOCUMENTED IN MEDICAL RECORD: ICD-10-PCS | Mod: CPTII,S$GLB,, | Performed by: INTERNAL MEDICINE

## 2022-09-15 PROCEDURE — 99499 RISK ADDL DX/OHS AUDIT: ICD-10-PCS | Mod: HCNC,S$GLB,, | Performed by: INTERNAL MEDICINE

## 2022-09-15 PROCEDURE — 93280 PM DEVICE PROGR EVAL DUAL: CPT | Mod: 26,,, | Performed by: INTERNAL MEDICINE

## 2022-09-15 PROCEDURE — 3288F FALL RISK ASSESSMENT DOCD: CPT | Mod: CPTII,S$GLB,, | Performed by: INTERNAL MEDICINE

## 2022-09-15 PROCEDURE — 1159F MED LIST DOCD IN RCRD: CPT | Mod: CPTII,S$GLB,, | Performed by: INTERNAL MEDICINE

## 2022-09-15 PROCEDURE — 3288F PR FALLS RISK ASSESSMENT DOCUMENTED: ICD-10-PCS | Mod: CPTII,S$GLB,, | Performed by: INTERNAL MEDICINE

## 2022-09-15 PROCEDURE — 93280 CARDIAC DEVICE CHECK - IN CLINIC & HOSPITAL: ICD-10-PCS | Mod: 26,,, | Performed by: INTERNAL MEDICINE

## 2022-09-15 PROCEDURE — 99999 PR PBB SHADOW E&M-EST. PATIENT-LVL IV: ICD-10-PCS | Mod: PBBFAC,,, | Performed by: INTERNAL MEDICINE

## 2022-09-15 PROCEDURE — 1101F PT FALLS ASSESS-DOCD LE1/YR: CPT | Mod: CPTII,S$GLB,, | Performed by: INTERNAL MEDICINE

## 2022-09-15 PROCEDURE — 99214 PR OFFICE/OUTPT VISIT, EST, LEVL IV, 30-39 MIN: ICD-10-PCS | Mod: S$GLB,,, | Performed by: INTERNAL MEDICINE

## 2022-09-15 PROCEDURE — 99999 PR PBB SHADOW E&M-EST. PATIENT-LVL IV: CPT | Mod: PBBFAC,,, | Performed by: INTERNAL MEDICINE

## 2022-09-15 PROCEDURE — 99214 OFFICE O/P EST MOD 30 MIN: CPT | Mod: S$GLB,,, | Performed by: INTERNAL MEDICINE

## 2022-09-15 NOTE — PROGRESS NOTES
"Subjective:       Patient ID: Kassi Skelton is a 89 y.o. White female who presents for return patient evaluation for chronic renal failure.      She has a recent UTI and was on bactrim.  She reportedly has a "sweet tooth" and has had some high glucose readings.        Review of Systems   Constitutional:  Positive for fatigue. Negative for appetite change, chills and fever.   HENT:  Positive for hearing loss.         Dry mouth-chronic   Eyes:  Negative for visual disturbance.   Respiratory:  Negative for cough and shortness of breath.    Cardiovascular:  Positive for leg swelling. Negative for chest pain.   Gastrointestinal:  Negative for abdominal pain, diarrhea, nausea and vomiting.   Genitourinary:  Positive for dysuria (intermittently). Negative for difficulty urinating and hematuria.   Musculoskeletal:  Positive for arthralgias and gait problem. Negative for myalgias.   Skin:  Negative for rash.   Neurological:  Negative for dizziness and headaches.   Psychiatric/Behavioral:  Negative for sleep disturbance.      The past medical, family and social histories were reviewed for this encounter.     /64 (BP Location: Right arm, Patient Position: Sitting)   Ht 5' (1.524 m)   Wt 67.6 kg (149 lb)   BMI 29.10 kg/m²     Objective:      Physical Exam  Vitals reviewed.   Constitutional:       General: She is not in acute distress.     Appearance: She is well-developed.   HENT:      Head: Normocephalic and atraumatic.   Eyes:      General: No scleral icterus.     Conjunctiva/sclera: Conjunctivae normal.   Neck:      Vascular: No JVD.   Cardiovascular:      Rate and Rhythm: Normal rate and regular rhythm.      Heart sounds: Normal heart sounds. No murmur heard.    No friction rub. No gallop.   Pulmonary:      Effort: Pulmonary effort is normal. No respiratory distress.      Breath sounds: Normal breath sounds. No wheezing.   Abdominal:      General: Bowel sounds are normal. There is no distension.      " Palpations: Abdomen is soft.      Tenderness: There is no abdominal tenderness.   Musculoskeletal:      Cervical back: Normal range of motion.      Right lower leg: Edema (trace-1+) present.      Left lower leg: Edema (trace-1+) present.   Skin:     General: Skin is warm and dry.      Findings: No rash.       Assessment:       1. Stage 3b chronic kidney disease    2. Type 2 diabetes mellitus with stage 3b chronic kidney disease, with long-term current use of insulin    3. Acquired cyst of kidney    4. Secondary hyperparathyroidism of renal origin    5. Proteinuria, unspecified type    6. Diabetic nephropathy associated with type 2 diabetes mellitus          Plan:   Return to clinic in 6 months.  Labs for next visit include rp, pth, cbc, upc.  Baseline creatinine is 1.1-1.5 since 2010 but is still in the upper 1.5-2.0 range for the past four years.  She wishes to continue conservative care.  She does not want dialysis.  UPC is 0.59.  PTH is 139 with a calcium of 10.0.  Continue current medications as prescribed and reviewed.   Blood pressure is controlled on the current regimen.  Her daughter Tova's cell is [phone].  Your blood pressure is controlled on your current medications.  K

## 2022-09-19 ENCOUNTER — EXTERNAL HOME HEALTH (OUTPATIENT)
Dept: HOME HEALTH SERVICES | Facility: HOSPITAL | Age: 87
End: 2022-09-19
Payer: MEDICARE

## 2022-10-04 ENCOUNTER — OFFICE VISIT (OUTPATIENT)
Dept: PODIATRY | Facility: CLINIC | Age: 87
End: 2022-10-04
Payer: MEDICARE

## 2022-10-04 DIAGNOSIS — L97.521 NEUROPATHIC FOOT ULCER, LEFT, LIMITED TO BREAKDOWN OF SKIN: ICD-10-CM

## 2022-10-04 DIAGNOSIS — E11.40 TYPE 2 DIABETES MELLITUS WITH DIABETIC NEUROPATHY, WITH LONG-TERM CURRENT USE OF INSULIN: ICD-10-CM

## 2022-10-04 DIAGNOSIS — L89.512 PRESSURE ULCER OF RIGHT ANKLE, STAGE 2: Primary | ICD-10-CM

## 2022-10-04 DIAGNOSIS — Z79.4 TYPE 2 DIABETES MELLITUS WITH DIABETIC NEUROPATHY, WITH LONG-TERM CURRENT USE OF INSULIN: ICD-10-CM

## 2022-10-04 DIAGNOSIS — B35.1 ONYCHOMYCOSIS DUE TO DERMATOPHYTE: ICD-10-CM

## 2022-10-04 PROCEDURE — 1159F PR MEDICATION LIST DOCUMENTED IN MEDICAL RECORD: ICD-10-PCS | Mod: CPTII,S$GLB,, | Performed by: PODIATRIST

## 2022-10-04 PROCEDURE — 99999 PR PBB SHADOW E&M-EST. PATIENT-LVL I: CPT | Mod: PBBFAC,,, | Performed by: PODIATRIST

## 2022-10-04 PROCEDURE — 99499 UNLISTED E&M SERVICE: CPT | Mod: HCNC,S$GLB,, | Performed by: PODIATRIST

## 2022-10-04 PROCEDURE — 99999 PR PBB SHADOW E&M-EST. PATIENT-LVL I: ICD-10-PCS | Mod: PBBFAC,,, | Performed by: PODIATRIST

## 2022-10-04 PROCEDURE — 99213 OFFICE O/P EST LOW 20 MIN: CPT | Mod: 25,S$GLB,, | Performed by: PODIATRIST

## 2022-10-04 PROCEDURE — 1160F RVW MEDS BY RX/DR IN RCRD: CPT | Mod: CPTII,S$GLB,, | Performed by: PODIATRIST

## 2022-10-04 PROCEDURE — 1160F PR REVIEW ALL MEDS BY PRESCRIBER/CLIN PHARMACIST DOCUMENTED: ICD-10-PCS | Mod: CPTII,S$GLB,, | Performed by: PODIATRIST

## 2022-10-04 PROCEDURE — 1159F MED LIST DOCD IN RCRD: CPT | Mod: CPTII,S$GLB,, | Performed by: PODIATRIST

## 2022-10-04 PROCEDURE — 99213 PR OFFICE/OUTPT VISIT, EST, LEVL III, 20-29 MIN: ICD-10-PCS | Mod: 25,S$GLB,, | Performed by: PODIATRIST

## 2022-10-04 PROCEDURE — 11721 DEBRIDE NAIL 6 OR MORE: CPT | Mod: Q9,S$GLB,, | Performed by: PODIATRIST

## 2022-10-04 PROCEDURE — 99499 RISK ADDL DX/OHS AUDIT: ICD-10-PCS | Mod: HCNC,S$GLB,, | Performed by: PODIATRIST

## 2022-10-04 PROCEDURE — 11721 PR DEBRIDEMENT OF NAILS, 6 OR MORE: ICD-10-PCS | Mod: Q9,S$GLB,, | Performed by: PODIATRIST

## 2022-10-05 NOTE — PROGRESS NOTES
Subjective:     Patient ID: Kassi Skelton is a 89 y.o. female.    Chief Complaint: Foot Ulcer (New wound to right ankle.  Home health needs update.  )    Kassi is a 89 y.o. female who presents to the clinic for evaluation and treatment of high risk feet. Kassi has a past medical history of *Atrial fibrillation (4/19/2012), *Atrial flutter (4/19/2012), Allergy, Ankle fracture (4/19/2012), Anticoagulant long-term use, Anxiety, Arthritis, Bacterial pneumonia, unspecified (12/7/2012), Breast cancer, CKD (chronic kidney disease), stage III (5/11/2012), Dependent on walker for ambulation, Depression (4/19/2012), Diabetes mellitus with stage 3 chronic kidney disease (2/1/2016), Diabetes with neurologic complications, Edentulous, HEARING LOSS, Hip fracture, right (4/19/2012), HLD (hyperlipidemia) (4/19/2012), HTN (hypertension) (4/19/2012), Hypothyroidism (4/19/2012), 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 The patient's chief complaint is wound check and long, thick toenails. This patient has documented high risk feet requiring routine maintenance secondary to diabetes mellitis and those secondary complications of diabetes, as mentioned. Patient is accompanied by her daughter. Patient's daughter states home health is still coming out but patient has new wound on right foot and home health needs wound care order changes. Patient has no other pedal complaint at this time.      PCP: Mor Cook MD    Date Last Seen by PCP: 03/31/2022    Current shoe gear:  Affected Foot: Slip-on shoes     Unaffected Foot: Slip-on shoes    Hemoglobin A1C   Date Value  Ref Range Status   03/03/2022 8.7 (H) 4.0 - 5.6 % Final     Comment:     ADA Screening Guidelines:  5.7-6.4%  Consistent with prediabetes  >or=6.5%  Consistent with diabetes    High levels of fetal hemoglobin interfere with the HbA1C  assay. Heterozygous hemoglobin variants (HbS, HgC, etc)do  not significantly interfere with this assay.   However, presence of multiple variants may affect accuracy.     12/03/2021 8.9 (H) 4.0 - 5.6 % Final     Comment:     ADA Screening Guidelines:  5.7-6.4%  Consistent with prediabetes  >or=6.5%  Consistent with diabetes    High levels of fetal hemoglobin interfere with the HbA1C  assay. Heterozygous hemoglobin variants (HbS, HgC, etc)do  not significantly interfere with this assay.   However, presence of multiple variants may affect accuracy.     09/06/2021 8.9 (H) 4.6 - 6.2 % Final   02/26/2021 7.0 (H) 4.0 - 5.6 % Final     Comment:     ADA Screening Guidelines:  5.7-6.4%  Consistent with prediabetes  >or=6.5%  Consistent with diabetes    High levels of fetal hemoglobin interfere with the HbA1C  assay. Heterozygous hemoglobin variants (HbS, HgC, etc)do  not significantly interfere with this assay.   However, presence of multiple variants may affect accuracy.         Patient Active Problem List   Diagnosis    History of atrial flutter    Combined hyperlipidemia associated with type 2 diabetes mellitus    Hypothyroidism    Major depressive disorder, recurrent episode, moderate    Osteoporosis    Benign hypertensive kidney disease with chronic kidney disease    Acquired cyst of kidney    Persistent atrial fibrillation    Anticoagulation monitoring by pharmacist    LBBB (left bundle branch block)    Sinus node dysfunction    Cardiac pacemaker in situ    Urinary incontinence, mixed    Pacemaker    Orthostatic hypotension    DCIS (ductal carcinoma in situ)    Hypertension associated with diabetes    Type 2 diabetes mellitus with stage 3b chronic kidney disease, with long-term current use  of insulin    Dizziness    Secondary hyperparathyroidism of renal origin    Hearing aid worn    Diabetic neuropathy, type II diabetes mellitus    Mild cognitive impairment with memory loss    Primary osteoarthritis    Edentulous    Greater trochanteric bursitis of right hip    Lung granuloma    Osteomyelitis of second toe of left foot    Stage 3b chronic kidney disease    Diabetic nephropathy associated with type 2 diabetes mellitus    Proteinuria       Medication List with Changes/Refills   Current Medications    ACCU-CHEK SHELDON CONTROL SOLN SOLN    Use as directed    ACETAMINOPHEN (TYLENOL) 500 MG TABLET    Take 2 tablets (1,000 mg total) by mouth every 6 (six) hours as needed for Pain.    BD ALCOHOL SWABS PADM    USE AS NEEDED    BENAZEPRIL (LOTENSIN) 20 MG TABLET    Take 1 tablet (20 mg total) by mouth once daily.    BLOOD SUGAR DIAGNOSTIC (ACCU-CHEK SHELDON PLUS TEST STRP) STRP    Test glucose three times daily    BLOOD-GLUCOSE METER KIT    To check BG once daily, to use with insurance preferred meter    CALCITRIOL (ROCALTROL) 0.25 MCG CAP    TAKE 1 CAPSULE EVERY DAY (NEED MD APPOINTMENT)    DONEPEZIL (ARICEPT) 5 MG TABLET    Take 1 tablet (5 mg total) by mouth every evening.    ELIQUIS 2.5 MG TAB    TAKE 1 TABLET TWICE DAILY    HYDROCHLOROTHIAZIDE (HYDRODIURIL) 25 MG TABLET    TAKE 1 TABLET EVERY DAY    INSULIN (LANTUS SOLOSTAR U-100 INSULIN) GLARGINE 100 UNITS/ML (3ML) SUBQ PEN    Inject 7 Units into the skin every evening.    INSULIN ASPART U-100 (NOVOLOG FLEXPEN U-100 INSULIN) 100 UNIT/ML (3 ML) INPN PEN    Inject subcutaneously 2 units before lunch and 2 units before supper.    KETOCONAZOLE (NIZORAL) 2 % CREAM    Apply topically 2 (two) times daily. for 14 days    LANCETS (ACCU-CHEK SOFTCLIX LANCETS) MISC    USE TO CHECK BLOOD GLUCOSE THREE TIMES DAILY    LEVOTHYROXINE (SYNTHROID) 25 MCG TABLET    TAKE 1 TABLET BEFORE BREAKFAST    METOPROLOL TARTRATE (LOPRESSOR) 50 MG TABLET    TAKE 1/2 TABLET TWICE DAILY     "MIRABEGRON (MYRBETRIQ) 50 MG TB24    Take 1 tablet (50 mg total) by mouth once daily.    MIRTAZAPINE (REMERON) 45 MG TABLET    Take 1 tablet (45 mg total) by mouth every evening.    MUPIROCIN (BACTROBAN) 2 % OINTMENT    Apply topically 2 (two) times daily.    PEN NEEDLE, DIABETIC 31 GAUGE X 1/4" NDLE    Use nightly    PRAVASTATIN (PRAVACHOL) 40 MG TABLET    TAKE 1 TABLET EVERY DAY    PREMARIN VAGINAL CREAM        TRAMADOL (ULTRAM) 50 MG TABLET    Take 1 tablet (50 mg total) by mouth every 8 (eight) hours as needed for Pain.    TROSPIUM (SANCTURA XR) 60 MG CP24 CAPSULE    Take 60 mg by mouth once daily.    VENLAFAXINE (EFFEXOR-XR) 150 MG CP24    Take 1 capsule (150 mg total) by mouth once daily.    VENLAFAXINE (EFFEXOR-XR) 75 MG 24 HR CAPSULE    Take 1 capsule (75 mg total) by mouth once daily.    VERAPAMIL (CALAN-SR) 120 MG CR TABLET    TAKE 1 TABLET EVERY NIGHT       Review of patient's allergies indicates:   Allergen Reactions    Amlodipine Edema     Pt stated this medication made her legs swell    Alendronate sodium Other (See Comments)     Reaction: Esophageal bleeding    Fosamax [alendronate] Other (See Comments)     Reaction: Esophageal bleeding  diarrhea    Sorbitol      Diarrhea     Augmentin [amoxicillin-pot clavulanate] Hives and Rash       Past Surgical History:   Procedure Laterality Date    APPENDECTOMY      BREAST LUMPECTOMY Right 10/21/2015    CARDIAC PACEMAKER PLACEMENT  12/3/12    Sinus node dysfunction    CATARACT EXTRACTION W/  INTRAOCULAR LENS IMPLANT Bilateral     COLONOSCOPY      EXCISION OF LESION Left 6/3/2021    Procedure: EXCISION, LESION  forearm;  Surgeon: Saman Quintero MD;  Location: [facility];  Service: General;  Laterality: Left;    excision of squamous cell carcinoma Right 2017    EYE SURGERY Bilateral     PHACO/IOL    FRACTURE SURGERY Right     ankle    HYSTERECTOMY      total 1970's    MASTECTOMY Right 11/2015    OOPHORECTOMY      TONSILLECTOMY      TOTAL HIP ARTHROPLASTY Right " 2007       Family History   Problem Relation Age of Onset    Hypertension Mother     Heart disease Father     Stroke Father         cerebral hemorrhage    Coronary artery disease Brother     Heart disease Brother     Coronary artery disease Brother     COPD Brother     Heart disease Brother     Mitral valve prolapse Daughter     Peripheral vascular disease Son     Cataracts Neg Hx     Glaucoma Neg Hx     Macular degeneration Neg Hx     Retinal detachment Neg Hx     Strabismus Neg Hx        Social History     Socioeconomic History    Marital status:     Number of children: 2   Tobacco Use    Smoking status: Never    Smokeless tobacco: Never   Substance and Sexual Activity    Alcohol use: No    Drug use: No    Sexual activity: Not Currently     Social Determinants of Health     Financial Resource Strain: Unknown    Difficulty of Paying Living Expenses: Patient refused   Food Insecurity: Unknown    Worried About Running Out of Food in the Last Year: Patient refused    Ran Out of Food in the Last Year: Patient refused   Transportation Needs: Unknown    Lack of Transportation (Medical): Patient refused    Lack of Transportation (Non-Medical): Patient refused   Physical Activity: Inactive    Days of Exercise per Week: 0 days    Minutes of Exercise per Session: 10 min   Stress: No Stress Concern Present    Feeling of Stress : Not at all   Social Connections: Unknown    Frequency of Communication with Friends and Family: Patient refused    Frequency of Social Gatherings with Friends and Family: Patient refused    Attends Hoahaoism Services: Never    Active Member of Clubs or Organizations: Patient refused    Attends Club or Organization Meetings: Patient refused    Marital Status:    Housing Stability: Unknown    Unable to Pay for Housing in the Last Year: Patient refused    Number of Places Lived in the Last Year: 1    Unstable Housing in the Last Year: Patient refused       There were no vitals filed for  this visit.      Hemoglobin A1C   Date Value Ref Range Status   03/03/2022 8.7 (H) 4.0 - 5.6 % Final     Comment:     ADA Screening Guidelines:  5.7-6.4%  Consistent with prediabetes  >or=6.5%  Consistent with diabetes    High levels of fetal hemoglobin interfere with the HbA1C  assay. Heterozygous hemoglobin variants (HbS, HgC, etc)do  not significantly interfere with this assay.   However, presence of multiple variants may affect accuracy.     12/03/2021 8.9 (H) 4.0 - 5.6 % Final     Comment:     ADA Screening Guidelines:  5.7-6.4%  Consistent with prediabetes  >or=6.5%  Consistent with diabetes    High levels of fetal hemoglobin interfere with the HbA1C  assay. Heterozygous hemoglobin variants (HbS, HgC, etc)do  not significantly interfere with this assay.   However, presence of multiple variants may affect accuracy.     09/06/2021 8.9 (H) 4.6 - 6.2 % Final   02/26/2021 7.0 (H) 4.0 - 5.6 % Final     Comment:     ADA Screening Guidelines:  5.7-6.4%  Consistent with prediabetes  >or=6.5%  Consistent with diabetes    High levels of fetal hemoglobin interfere with the HbA1C  assay. Heterozygous hemoglobin variants (HbS, HgC, etc)do  not significantly interfere with this assay.   However, presence of multiple variants may affect accuracy.         Review of Systems   Constitutional:  Negative for chills and fever.   Respiratory:  Negative for shortness of breath.    Cardiovascular:  Negative for chest pain, palpitations, orthopnea, claudication and leg swelling.   Gastrointestinal:  Negative for diarrhea, nausea and vomiting.   Musculoskeletal:  Negative for joint pain.   Skin:  Negative for rash.   Neurological:  Positive for tingling and sensory change.   Psychiatric/Behavioral: Negative.             Objective:      PHYSICAL EXAM: Apperance: Alert and orient in no distress,well developed, and with good attention to grooming and body habits  Patient ambulating in slippers.   Lower Extremity Exam  VASCULAR: Dorsalis  pedis pulses 1/4 bilateral and Posterior Tibial pulses 1/4 bilateral. Capillary fill time <4 seconds bilateral. No edema observed bilateral. Varicosities present bilateral. Skin temperature of the lower extremities is warm to warm, proximal to distal. Hair growth absent bilateral. (--) lymphangitis or (--) cellulitis noted bilateral.  DERMATOLOGICAL: (--) edema, (--) erythema, (--) malodor, (--) drainage, (--) warmth to bilateral foot. Mild hyperkeratotic tissue noted to left plantar 1st submetatarsal. Post debridement dry blood noted. Ulcer noted to right medial ankle  with granular base and with a 1 mm yellow/white border and hyperkeratosis surrounding ulcer. Ulcer measurement 0.5cmx0.5cmx0.2cm. Ulcer noted to right lateral ankle. Ulcer measurement pinpoint.  The ulcer does not extend into deeper tissue and (--) sinus tracts exist. Nails 1,2,3,4,5 bilateral elongated, thickened, and discolored with subungual debris. Webspaces 1,2,3,4 clean, dry and without evidence of break in skin integrity bilateral. Mild hyperkeratotic tissue noted to left medial hallux.   NEUROLOGICAL: Light touch, sharp-dull, proprioception all present and equal bilaterally.   MUSCULOSKELETAL: Muscle strength is 5/5 for foot inverters, everters, plantarflexors, and dorsiflexors. Muscle tone is normal. Fat pad atrophy noted bilateral. Hammertoes noted bilateral.     10/04/2022                      Assessment:       ICD-10-CM ICD-9-CM   1. Pressure ulcer of right ankle, stage 2  L89.512 707.06     707.22   2. Neuropathic foot ulcer, left, limited to breakdown of skin  L97.521 707.15   3. Type 2 diabetes mellitus with diabetic neuropathy, with long-term current use of insulin  E11.40 250.60    Z79.4 357.2     V58.67   4. Onychomycosis due to dermatophyte  B35.1 110.1         Plan:   Pressure ulcer of right ankle, stage 2  -     SUBSEQUENT HOME HEALTH ORDERS    Neuropathic foot ulcer, left, limited to breakdown of skin  -     SUBSEQUENT HOME  HEALTH ORDERS    Type 2 diabetes mellitus with diabetic neuropathy, with long-term current use of insulin  -     SUBSEQUENT HOME HEALTH ORDERS    Onychomycosis due to dermatophyte      I counseled the patient on her conditions, regarding findings of my examination, my impressions, and usual treatment plan.   Greater than 15 minutes of a 20 minute appointment spent on education about the diabetic foot, neuropathy, and prevention of limb loss.  Shoe inspection. Diabetic Foot Education. Patient reminded of the importance of good nutrition and blood sugar control to help prevent podiatric complications of diabetes. Patient instructed on proper foot hygeine. We discussed wearing proper shoe gear, daily foot inspections, never walking without protective shoe gear, never putting sharp instruments to feet.    With patient's permission, nails 1-5 bilateral were debrided/trimmed in length and thickness aggressively to their soft tissue attachment mechanically and with electric , removing all offending nail and debris. Patient relates relief following the procedure.  With patient's permission, the right ankle and left 1st plantar submetatarsal wound was irrigated with sterile saline. The patient tolerated this well. Wound was then dressed with Iodosorb and Mepilex pad. Patient was given instructions on daily dressing changes. Patient was also instructed on the importance of keeping the wound and dressings clean dry and intact and keeping pressure off the wound area until complete healing of the wound.The patient is alerted to watch for any signs of infection (redness, pus, pain, increased swelling or fever) and call if such occurs.   Home health orders completed.   Patient  will continue to monitor the areas daily, inspect feet, wear protective shoe gear when ambulatory, moisturizer to maintain skin integrity. Patient reminded of the importance of good nutrition and blood sugar control to help prevent podiatric  complications of diabetes.  Patient to return 2 months or sooner if needed.        Luzmaria Valle DPM  [facility]

## 2022-10-06 ENCOUNTER — TELEPHONE (OUTPATIENT)
Dept: FAMILY MEDICINE | Facility: CLINIC | Age: 87
End: 2022-10-06
Payer: MEDICARE

## 2022-10-06 ENCOUNTER — PATIENT MESSAGE (OUTPATIENT)
Dept: FAMILY MEDICINE | Facility: CLINIC | Age: 87
End: 2022-10-06
Payer: MEDICARE

## 2022-10-06 NOTE — TELEPHONE ENCOUNTER
See 8/17 telephone call.  Per daughter, the nurse never did increase the patient's insulin as ordered, see most recent readings in MD box and complete form so we can fax it back to [facility].

## 2022-10-06 NOTE — TELEPHONE ENCOUNTER
How much insulin as she actually taking now?  We were going to have her try taking 2 units before lunch and supper, but she has on her list after she saw the nephrologist that she was already taking 4 units 3 times a day.  The recent sugar readings do look a bit better, though she could probably use a bit more at breakfast.

## 2022-10-08 ENCOUNTER — DOCUMENT SCAN (OUTPATIENT)
Dept: HOME HEALTH SERVICES | Facility: HOSPITAL | Age: 87
End: 2022-10-08
Payer: MEDICARE

## 2022-11-02 ENCOUNTER — TELEPHONE (OUTPATIENT)
Dept: FAMILY MEDICINE | Facility: CLINIC | Age: 87
End: 2022-11-02
Payer: MEDICARE

## 2022-11-02 RX ORDER — INSULIN ASPART 100 [IU]/ML
INJECTION, SOLUTION INTRAVENOUS; SUBCUTANEOUS
Qty: 12 ML | Refills: 3 | Status: SHIPPED | OUTPATIENT
Start: 2022-11-02 | End: 2023-07-13

## 2022-11-02 RX ORDER — PEN NEEDLE, DIABETIC 30 GX3/16"
NEEDLE, DISPOSABLE MISCELLANEOUS
Qty: 360 EACH | Refills: 3 | Status: SHIPPED | OUTPATIENT
Start: 2022-11-02 | End: 2023-10-13 | Stop reason: SDUPTHER

## 2022-11-02 NOTE — TELEPHONE ENCOUNTER
See paperwork in MD box from [facility] in regards to blood sugar and insulin.  They show insulin being given 4 times a day, we show 3 times in chart.  Please advise as they are asking for refill on needles too.

## 2022-11-02 NOTE — TELEPHONE ENCOUNTER
Reviewed outside record.  No hypoglycemia.  Fasting morning sugars do not look bad.  Pretty much all below 200.  Her sugar does go up however by suppertime and before bed.    Currently using NovoLog 2 units with breakfast, 4 units with lunch and supper.  Using Lantus 7 units at bedtime    Recommend continue Lantus as is 7 units at bedtime.  Change NovoLog 2 units breakfast, 6 units before lunch and 6 units before supper    Send in readings again in a month.  Prescription sent for NovoLog and for pen needles

## 2022-11-06 PROCEDURE — G0179 MD RECERTIFICATION HHA PT: HCPCS | Mod: ,,, | Performed by: PODIATRIST

## 2022-11-06 PROCEDURE — G0179 PR HOME HEALTH MD RECERTIFICATION: ICD-10-PCS | Mod: ,,, | Performed by: PODIATRIST

## 2022-11-15 ENCOUNTER — CLINICAL SUPPORT (OUTPATIENT)
Dept: CARDIOLOGY | Facility: HOSPITAL | Age: 87
End: 2022-11-15
Attending: INTERNAL MEDICINE
Payer: MEDICARE

## 2022-11-15 DIAGNOSIS — I44.7 LBBB (LEFT BUNDLE BRANCH BLOCK): ICD-10-CM

## 2022-11-15 DIAGNOSIS — Z95.0 PACEMAKER: ICD-10-CM

## 2022-11-15 PROCEDURE — 93280 CARDIAC DEVICE CHECK - IN CLINIC & HOSPITAL: ICD-10-PCS | Mod: 26,HCNC,, | Performed by: INTERNAL MEDICINE

## 2022-11-15 PROCEDURE — 93280 PM DEVICE PROGR EVAL DUAL: CPT | Mod: 26,HCNC,, | Performed by: INTERNAL MEDICINE

## 2022-11-17 RX ORDER — BLOOD SUGAR DIAGNOSTIC
STRIP MISCELLANEOUS
Qty: 100 STRIP | Refills: 3 | Status: SHIPPED | OUTPATIENT
Start: 2022-11-17 | End: 2023-02-16

## 2022-11-17 NOTE — TELEPHONE ENCOUNTER
Care Due:                  Date            Visit Type   Department     Provider  --------------------------------------------------------------------------------                                EP -                              PRIMARY      [facility] FAMILY  Last Visit: 03-      CARE ([facility])   MEDICINE       Mor Cook  Next Visit: None Scheduled  None         None Found                                                            Last  Test          Frequency    Reason                     Performed    Due Date  --------------------------------------------------------------------------------    CBC.........  12 months..  mirtazapine..............  02- 02-    CMP.........  12 months..  mirtazapine, pravastatin.  Not Found    Overdue    HBA1C.......  6 months...  insulin..................  03- 08-    Lipid Panel.  12 months..  mirtazapine, pravastatin.  02- 02-    [facility] Embedded Care Gaps. Reference number: [id number]. 11/16/2022   10:04:57 PM CST

## 2022-11-17 NOTE — TELEPHONE ENCOUNTER
Refill Decision Note   Kassi Skelton  is requesting a refill authorization.  Brief Assessment and Rationale for Refill:  Approve    -Medication-Related Problems Identified: Requires labs  Medication Therapy Plan:       Medication Reconciliation Completed: No   Comments:     Provider Staff:     Action is required for this patient.   Please see care gap opportunities below in Care Due Message.     Thanks!  [facility]     Appointments      Date Provider   Last Visit   3/31/2022 Mor Cook MD   Next Visit   Visit date not found Mor Cook MD     Note composed:5:13 AM 11/17/2022           Note composed:5:13 AM 11/17/2022

## 2022-11-22 ENCOUNTER — EXTERNAL HOME HEALTH (OUTPATIENT)
Dept: HOME HEALTH SERVICES | Facility: HOSPITAL | Age: 87
End: 2022-11-22
Payer: MEDICARE

## 2022-11-30 ENCOUNTER — PATIENT MESSAGE (OUTPATIENT)
Dept: PODIATRY | Facility: CLINIC | Age: 87
End: 2022-11-30
Payer: MEDICARE

## 2022-12-01 DIAGNOSIS — E11.22 DIABETES MELLITUS WITH STAGE 4 CHRONIC KIDNEY DISEASE GFR 15-29: Primary | ICD-10-CM

## 2022-12-01 DIAGNOSIS — E78.5 HYPERLIPIDEMIA, UNSPECIFIED HYPERLIPIDEMIA TYPE: ICD-10-CM

## 2022-12-01 DIAGNOSIS — N18.4 DIABETES MELLITUS WITH STAGE 4 CHRONIC KIDNEY DISEASE GFR 15-29: Primary | ICD-10-CM

## 2022-12-01 RX ORDER — PRAVASTATIN SODIUM 40 MG/1
TABLET ORAL
Qty: 90 TABLET | Refills: 0 | Status: SHIPPED | OUTPATIENT
Start: 2022-12-01 | End: 2023-04-26

## 2022-12-01 NOTE — TELEPHONE ENCOUNTER
Refill Routing Note   Medication(s) are not appropriate for processing by [facility] for the following reason(s):      - Required laboratory values are outdated    [facility] action(s):  Defer Medication-related problems identified: Requires labs        Medication reconciliation completed: No     Appointments  past 12m or future 3m with PCP    Date Provider   Last Visit   3/31/2022 Mor Cook MD   Next Visit   Visit date not found Mor Cook MD   ED visits in past 90 days: 0        Note composed:2:21 PM 12/01/2022

## 2022-12-01 NOTE — TELEPHONE ENCOUNTER
No new care gaps identified.  [facility] Embedded Care Gaps. Reference number: [id number]. 12/01/2022   2:46:47 AM CST

## 2022-12-02 ENCOUNTER — PATIENT MESSAGE (OUTPATIENT)
Dept: FAMILY MEDICINE | Facility: CLINIC | Age: 87
End: 2022-12-02
Payer: MEDICARE

## 2022-12-02 NOTE — TELEPHONE ENCOUNTER
I pended cmp and urine micro for Dr Cook to sign . I sent a message via portal asking when it would be convenient to come I for lab and urine . Will wait for her response .

## 2022-12-05 ENCOUNTER — DOCUMENT SCAN (OUTPATIENT)
Dept: HOME HEALTH SERVICES | Facility: HOSPITAL | Age: 87
End: 2022-12-05
Payer: MEDICARE

## 2022-12-12 ENCOUNTER — PATIENT MESSAGE (OUTPATIENT)
Dept: FAMILY MEDICINE | Facility: CLINIC | Age: 87
End: 2022-12-12
Payer: MEDICARE

## 2022-12-13 ENCOUNTER — TELEPHONE (OUTPATIENT)
Dept: CARDIOLOGY | Facility: HOSPITAL | Age: 87
End: 2022-12-13
Payer: MEDICARE

## 2022-12-13 ENCOUNTER — PATIENT MESSAGE (OUTPATIENT)
Dept: FAMILY MEDICINE | Facility: CLINIC | Age: 87
End: 2022-12-13
Payer: MEDICARE

## 2022-12-13 NOTE — TELEPHONE ENCOUNTER
Pt's daughter called to reschedule her mom (she wasn't feeling well).  Pt's daughter agreed to new date and time 12/28/22 @11:00

## 2022-12-14 ENCOUNTER — E-VISIT (OUTPATIENT)
Dept: FAMILY MEDICINE | Facility: CLINIC | Age: 87
End: 2022-12-14
Payer: MEDICARE

## 2022-12-14 ENCOUNTER — TELEPHONE (OUTPATIENT)
Dept: FAMILY MEDICINE | Facility: CLINIC | Age: 87
End: 2022-12-14

## 2022-12-14 ENCOUNTER — TELEPHONE (OUTPATIENT)
Dept: FAMILY MEDICINE | Facility: CLINIC | Age: 87
End: 2022-12-14
Payer: MEDICARE

## 2022-12-14 DIAGNOSIS — U07.1 COVID-19 VIRUS INFECTION: Primary | ICD-10-CM

## 2022-12-14 PROCEDURE — 99421 PR E&M, ONLINE DIGIT, EST, < 7 DAYS, 5-10 MINS: ICD-10-PCS | Mod: S$GLB,,, | Performed by: FAMILY MEDICINE

## 2022-12-14 PROCEDURE — 99421 OL DIG E/M SVC 5-10 MIN: CPT | Mod: S$GLB,,, | Performed by: FAMILY MEDICINE

## 2022-12-14 NOTE — TELEPHONE ENCOUNTER
Pt daughter was advised that an antiviral med was sent to the pharmacy and to be sure to  med today to give to her . She asked me to fax a copy of the rx to [facility] . I faxed to [phone] , [phone]

## 2022-12-14 NOTE — TELEPHONE ENCOUNTER
----- Message from Mor Cook MD sent at 12/12/2022  6:03 PM CST -----  lipid panel, CMP, hemoglobin A1c,  urine microalbumin.  ----- Message -----  From: Pau Haynes MA  Sent: 12/12/2022   4:59 PM CST  To: Mor Cook MD    I sent her a message based on a refill approval . I cannot find the message . Just want to verify a CMP and a Urine micro ? Please advise, thanks   ----- Message -----  From: Pau Haynes MA  Sent: 12/12/2022   4:42 PM CST  To: Pau Haynes MA    Can he please send orders to [facility] for this please.  They go there twice a week to do wound care and since its a fasting blood work it would be much easier for them to do it.  Their number [phone].     Thanks,  Tova Sánchez (Daughter)  [phone]      You  Kassi Skelton 10 days ago    SP  Good morning, Dr Cook approved her refills. He said that it is time to come in for some fasting lab and give a urine sample . When would it be convenient for you ? Please advise, thanks

## 2022-12-14 NOTE — TELEPHONE ENCOUNTER
Spoke w/ Toribio at [facility] . I faxed over lab and urine orders and I let them know that she tested positive for covid on 12/13/22. Faxed to [phone]

## 2022-12-14 NOTE — PROGRESS NOTES
Patient ID: Kassi Skelton is a 89 y.o. female.    Chief Complaint: Nasal Congestion    The patient initiated a request through [employer] on 12/14/2022 for evaluation and management with a chief complaint of Nasal Congestion     I evaluated the questionnaire submission on 12/14/22.    [facility] Peq Evisit Upper Respitatory/Cough Questionnaire    12/14/2022 12:10 PM CST - Filed by Patient   Do you agree to participate in an E-Visit? Yes   If you have any of the following symptoms, please present to your local ER or call 911:  I acknowledge   What is the main issue that you would like for your doctor to address today? Tested positive for Covid.  Test was done Monday(12-12) and lab results came back Tuesday   Are you able to take your vital signs? No   What symptoms do you currently have?  Nasal Congestion   Have you had a fever? No   When did your symptoms first appear? 12/12/2022   In the last two weeks, have you been in close contact with someone who has COVID-19 or the Flu? Yes , Covid-19   In the last two weeks, have you worked or volunteered in a healthcare facility or as a ? Healthcare facilities include a hospital, medical or dental clinic, long-term care facility, or nursing home No   Do you live in a long-term care facility, nursing home, or homeless shelter? Yes   List what you have done or taken to help your symptoms. Nothing   How severe are your symptoms? Mild   Have you taken an at home Covid test? No   Have you taken a Flu test? No   Have you been fully vaccinated for COVID? (2 Pfizer, 2 Moderna or 1 Parminder & Parminder vaccine injections) Yes   Have you received a booster? Yes   Have you recieved a Flu shot? Yes   When did you recieve your Flu shot? 10/15/2022   Do you have transportation to get tested for COVID if it is indicated and ordered for you at an [facility] location? Yes   Are you currently pregnant, could you be pregnant, or are you breast feeding? None of the above   Provide any  information you feel is important to your history not asked above    Please attach any relevant images or files           Active Problem List with Overview Notes    Diagnosis Date Noted    Stage 3b chronic kidney disease 09/15/2022    Diabetic nephropathy associated with type 2 diabetes mellitus 09/15/2022    Proteinuria 09/15/2022    Osteomyelitis of second toe of left foot 12/03/2021    Greater trochanteric bursitis of right hip 02/13/2020    Edentulous     Primary osteoarthritis 07/17/2018    Mild cognitive impairment with memory loss 06/15/2017    Diabetic neuropathy, type II diabetes mellitus 03/03/2017    Hearing aid worn      wears hearing aides      Lung granuloma 11/22/2016     HCC code noted on CXR 11/22/16      Secondary hyperparathyroidism of renal origin      Lab Results   Component Value Date    .0 (H) 08/03/2017    CALCIUM 9.3 08/03/2017    PHOS 4.2 08/03/2017           Dizziness 03/17/2016    Type 2 diabetes mellitus with stage 3b chronic kidney disease, with long-term current use of insulin 02/01/2016       Lab Results   Component Value Date    HGBA1C 6.1 (H) 02/15/2018           Hypertension associated with diabetes 11/30/2015    DCIS (ductal carcinoma in situ) 10/21/2015    Orthostatic hypotension 10/16/2013    Cardiac pacemaker in situ 12/07/2012    Urinary incontinence, mixed 12/07/2012    Sinus node dysfunction     Pacemaker 11/01/2012     yo/chantell      LBBB (left bundle branch block) 10/19/2012    Persistent atrial fibrillation 07/06/2012    Anticoagulation monitoring by pharmacist 07/06/2012     Lab Results   Component Value Date    INR 3.7 (H) 10/02/2017    INR 4.3 (H) 09/25/2017    INR 1.5 (H) 09/18/2017           Benign hypertensive kidney disease with chronic kidney disease 05/11/2012    Acquired cyst of kidney 05/11/2012    History of atrial flutter 04/19/2012    Combined hyperlipidemia associated with type 2 diabetes mellitus 04/19/2012     Lab Results   Component Value Date     CHOL 191 07/14/2017    CHOL 233 (H) 03/15/2017    CHOL 178 02/03/2016     Lab Results   Component Value Date    HDL 80 (H) 07/14/2017    HDL 74 03/15/2017    HDL 56 02/03/2016     Lab Results   Component Value Date    LDLCALC 95.2 07/14/2017    LDLCALC 137.6 03/15/2017    LDLCALC 94.4 02/03/2016     Lab Results   Component Value Date    TRIG 79 07/14/2017    TRIG 107 03/15/2017    TRIG 138 02/03/2016     Lab Results   Component Value Date    CHOLHDL 41.9 07/14/2017    CHOLHDL 31.8 03/15/2017    CHOLHDL 31.5 02/03/2016           Hypothyroidism 04/19/2012       Lab Results   Component Value Date    TSH 1.489 04/25/2018           Major depressive disorder, recurrent episode, moderate 04/19/2012    Osteoporosis 04/19/2012     Narrative     DEXA scanning was performed over the left hip and lumbar spine.  Review of the images confirms satisfactory positioning and technique.    The L1 to L4 vertebral bone mineral density is equal to 0.971 g/cm squared with a T score of -0.7 and a Z score of 2.2, this may be artificially elevated by levocurvature.    The left femoral neck bone mineral density is equal to 0.548 g/cm squared with a T score of -2.7 and a Z score of -0.2.   Impression      Osteoporosis with a T score of -2.7     Consider FDA approved medical therapies in postmenopausal women age 50 years and older, based on the following:    -- A hip or vertebral (clinical or morphometric) fracture  -- T score less than or equal to -2.5 at the femoral neck or spine after appropriate evaluation to exclude secondary causes.  -- Low bone mass -- also known as osteopenia (T score between -1.0 and -2.5 at the femoral neck or spine) and a 10 year probability of hip fracture greater than or equal to 3% or a 10 year probability of major osteoporosis-related fracture greater than 20% based on the [place] adaptive WHO algorithm.  -- Clinician's judgment and/or patient preference is may indicate treatment for people with 10 year fracture  probability is above or below these levels.    ______________________________________     Electronically signed by: Fabien Cabello MD  Date: 08/19/16  Time: 12:34              Recent Labs Obtained:  No visits with results within 7 Day(s) from this visit.   Latest known visit with results is:   Lab Visit on 07/22/2022   Component Date Value Ref Range Status    PTH, Intact 07/22/2022 138.8 (H)  9.0 - 77.0 pg/mL Final    Glucose 07/22/2022 328 (H)  70 - 110 mg/dL Final    Sodium 07/22/2022 136  136 - 145 mmol/L Final    Potassium 07/22/2022 5.1  3.5 - 5.1 mmol/L Final    Chloride 07/22/2022 101  95 - 110 mmol/L Final    CO2 07/22/2022 20 (L)  23 - 29 mmol/L Final    BUN 07/22/2022 34 (H)  8 - 23 mg/dL Final    Calcium 07/22/2022 10.0  8.7 - 10.5 mg/dL Final    Creatinine 07/22/2022 2.0 (H)  0.5 - 1.4 mg/dL Final    Albumin 07/22/2022 3.4 (L)  3.5 - 5.2 g/dL Final    Phosphorus 07/22/2022 4.7 (H)  2.7 - 4.5 mg/dL Final    eGFR if African American 07/22/2022 24.8 (A)  >60 mL/min/1.73 m^2 Final    eGFR if non African American 07/22/2022 21.5 (A)  >60 mL/min/1.73 m^2 Final    Comment: Calculation used to obtain the estimated glomerular filtration  rate (eGFR) is the CKD-EPI equation.       Anion Gap 07/22/2022 15  8 - 16 mmol/L Final    Protein, Urine Random 07/22/2022 73 (H)  0 - 15 mg/dL Final    Creatinine, Urine 07/22/2022 123.0  15.0 - 325.0 mg/dL Final    Prot/Creat Ratio, Urine 07/22/2022 0.59 (H)  0.00 - 0.20 Final       Encounter Diagnosis   Name Primary?    COVID-19 virus infection Yes        No orders of the defined types were placed in this encounter.     Medications Ordered This Encounter   Medications    molnupiravir 200 mg capsule (EUA)     Sig: Take 4 capsules (800 mg total) by mouth every 12 (twelve) hours. for 5 days     Dispense:  40 capsule     Refill:  0     Order Specific Question:   I have informed patients with partners of childbearing potential to use a reliable method of contraception during therapy  and for 3 months after the last dose of molnupiravir. I have informed them molnupiravir may cause fetal harm.     Answer:   Acknowledge      I reviewed the questionnaire that you sent regarding your symptoms and positive test for COVID-19.  Due to your medical history you would be a candidate for antiviral therapy with molnupiravir.  I sent prescription to your pharmacy.  Let us know if you have any trouble getting medication.  You should isolate  at home for 5 days after onset of symptoms.  After that if symptoms have improved with no fever for least 24 hours then you could go out, but should wear mask around other persons for another 5 days.  Let us know if any issues.  Thanks,  Dr. Cook  E-Visit Time Tracking:    Day 1 Time (in minutes): 7     Total Time (in minutes): 7

## 2022-12-23 ENCOUNTER — LAB VISIT (OUTPATIENT)
Dept: LAB | Facility: HOSPITAL | Age: 87
End: 2022-12-23
Attending: FAMILY MEDICINE
Payer: MEDICARE

## 2022-12-23 DIAGNOSIS — E78.2 MIXED HYPERLIPIDEMIA: ICD-10-CM

## 2022-12-23 DIAGNOSIS — N18.4 CHRONIC KIDNEY DISEASE, STAGE IV (SEVERE): ICD-10-CM

## 2022-12-23 DIAGNOSIS — E03.9 HYPOTHYROIDISM: ICD-10-CM

## 2022-12-23 DIAGNOSIS — E11.22 DIABETES MELLITUS WITH CHRONIC KIDNEY DISEASE: Primary | ICD-10-CM

## 2022-12-23 LAB
ALBUMIN SERPL BCP-MCNC: 3.7 G/DL (ref 3.5–5.2)
ALBUMIN/CREAT UR: 323 UG/MG (ref 0–30)
ALP SERPL-CCNC: 81 U/L (ref 55–135)
ALT SERPL W/O P-5'-P-CCNC: 14 U/L (ref 10–44)
ANION GAP SERPL CALC-SCNC: 11 MMOL/L (ref 8–16)
AST SERPL-CCNC: 19 U/L (ref 10–40)
BILIRUB SERPL-MCNC: 0.4 MG/DL (ref 0.1–1)
BUN SERPL-MCNC: 46 MG/DL (ref 8–23)
CALCIUM SERPL-MCNC: 9.9 MG/DL (ref 8.7–10.5)
CHLORIDE SERPL-SCNC: 108 MMOL/L (ref 95–110)
CHOLEST SERPL-MCNC: 165 MG/DL (ref 120–199)
CHOLEST/HDLC SERPL: 2.7 {RATIO} (ref 2–5)
CO2 SERPL-SCNC: 22 MMOL/L (ref 23–29)
CREAT SERPL-MCNC: 2.2 MG/DL (ref 0.5–1.4)
CREAT UR-MCNC: 183 MG/DL (ref 15–325)
EST. GFR  (NO RACE VARIABLE): 20.8 ML/MIN/1.73 M^2
ESTIMATED AVG GLUCOSE: 223 MG/DL (ref 68–131)
GLUCOSE SERPL-MCNC: 189 MG/DL (ref 70–110)
HBA1C MFR BLD: 9.4 % (ref 4–5.6)
HDLC SERPL-MCNC: 62 MG/DL (ref 40–75)
HDLC SERPL: 37.6 % (ref 20–50)
LDLC SERPL CALC-MCNC: 76.2 MG/DL (ref 63–159)
MICROALBUMIN UR DL<=1MG/L-MCNC: 591 UG/ML
NONHDLC SERPL-MCNC: 103 MG/DL
POTASSIUM SERPL-SCNC: 5 MMOL/L (ref 3.5–5.1)
PROT SERPL-MCNC: 7.2 G/DL (ref 6–8.4)
SODIUM SERPL-SCNC: 141 MMOL/L (ref 136–145)
TRIGL SERPL-MCNC: 134 MG/DL (ref 30–150)

## 2022-12-23 PROCEDURE — 80061 LIPID PANEL: CPT | Mod: HCNC | Performed by: FAMILY MEDICINE

## 2022-12-23 PROCEDURE — 82570 ASSAY OF URINE CREATININE: CPT | Mod: HCNC | Performed by: FAMILY MEDICINE

## 2022-12-23 PROCEDURE — 80053 COMPREHEN METABOLIC PANEL: CPT | Mod: HCNC | Performed by: FAMILY MEDICINE

## 2022-12-23 PROCEDURE — 83036 HEMOGLOBIN GLYCOSYLATED A1C: CPT | Mod: HCNC | Performed by: FAMILY MEDICINE

## 2022-12-28 ENCOUNTER — CLINICAL SUPPORT (OUTPATIENT)
Dept: CARDIOLOGY | Facility: HOSPITAL | Age: 87
End: 2022-12-28
Attending: INTERNAL MEDICINE
Payer: MEDICARE

## 2022-12-28 ENCOUNTER — DOCUMENT SCAN (OUTPATIENT)
Dept: HOME HEALTH SERVICES | Facility: HOSPITAL | Age: 87
End: 2022-12-28
Payer: MEDICARE

## 2022-12-28 ENCOUNTER — TELEPHONE (OUTPATIENT)
Dept: CARDIOLOGY | Facility: HOSPITAL | Age: 87
End: 2022-12-28
Payer: MEDICARE

## 2022-12-28 DIAGNOSIS — I44.7 LBBB (LEFT BUNDLE BRANCH BLOCK): ICD-10-CM

## 2022-12-28 DIAGNOSIS — Z95.0 PACEMAKER: ICD-10-CM

## 2022-12-28 PROCEDURE — 93280 CARDIAC DEVICE CHECK - IN CLINIC & HOSPITAL: ICD-10-PCS | Mod: 26,HCNC,, | Performed by: INTERNAL MEDICINE

## 2022-12-28 PROCEDURE — 93280 PM DEVICE PROGR EVAL DUAL: CPT | Mod: 26,HCNC,, | Performed by: INTERNAL MEDICINE

## 2022-12-28 NOTE — TELEPHONE ENCOUNTER
The patient's LISA PPM has reached ELECTIVE REPLACEMENT.     Current Device  and Model:   Reply  W2.92 [id number]    Date of MYRON, if known: unknown    Is this replacement indicator early or unexpected due to an advisory:  No    Pacemaker Dependent: YES, Vpaced 100%, underlying rhythm AF with escape 32bpm    Anticoagulation Status: Eliquis    Last EF: 65% EF 6/13/2019    Model of Leads:     Any known lead recalls:  unknown    Leads MRI compatible:  No    Any history of treated ventricular arrhythmias (ATP or shocks)?  No    Any history of device treated atrial arrhythmias (atrial ATP)?  No

## 2022-12-29 ENCOUNTER — PATIENT MESSAGE (OUTPATIENT)
Dept: FAMILY MEDICINE | Facility: CLINIC | Age: 87
End: 2022-12-29
Payer: MEDICARE

## 2023-01-03 ENCOUNTER — OFFICE VISIT (OUTPATIENT)
Dept: FAMILY MEDICINE | Facility: CLINIC | Age: 88
End: 2023-01-03
Payer: MEDICARE

## 2023-01-03 ENCOUNTER — OFFICE VISIT (OUTPATIENT)
Dept: PODIATRY | Facility: CLINIC | Age: 88
End: 2023-01-03
Payer: MEDICARE

## 2023-01-03 VITALS
SYSTOLIC BLOOD PRESSURE: 130 MMHG | TEMPERATURE: 97 F | HEIGHT: 60 IN | HEART RATE: 69 BPM | BODY MASS INDEX: 29.1 KG/M2 | DIASTOLIC BLOOD PRESSURE: 70 MMHG | OXYGEN SATURATION: 95 %

## 2023-01-03 VITALS — WEIGHT: 149.06 LBS | BODY MASS INDEX: 29.26 KG/M2 | HEIGHT: 60 IN

## 2023-01-03 DIAGNOSIS — Z79.4 TYPE 2 DIABETES MELLITUS WITH DIABETIC NEUROPATHY, WITH LONG-TERM CURRENT USE OF INSULIN: ICD-10-CM

## 2023-01-03 DIAGNOSIS — J84.10 LUNG GRANULOMA: ICD-10-CM

## 2023-01-03 DIAGNOSIS — E11.40 TYPE 2 DIABETES MELLITUS WITH DIABETIC NEUROPATHY, WITH LONG-TERM CURRENT USE OF INSULIN: ICD-10-CM

## 2023-01-03 DIAGNOSIS — L97.521 NEUROPATHIC FOOT ULCER, LEFT, LIMITED TO BREAKDOWN OF SKIN: ICD-10-CM

## 2023-01-03 DIAGNOSIS — L89.512 PRESSURE ULCER OF RIGHT ANKLE, STAGE 2: ICD-10-CM

## 2023-01-03 DIAGNOSIS — N25.81 SECONDARY HYPERPARATHYROIDISM OF RENAL ORIGIN: ICD-10-CM

## 2023-01-03 DIAGNOSIS — N18.4 DIABETES MELLITUS WITH STAGE 4 CHRONIC KIDNEY DISEASE GFR 15-29: ICD-10-CM

## 2023-01-03 DIAGNOSIS — L89.892 PRESSURE INJURY OF DORSUM OF RIGHT FOOT, STAGE 2: Primary | ICD-10-CM

## 2023-01-03 DIAGNOSIS — E11.22 DIABETES MELLITUS WITH STAGE 4 CHRONIC KIDNEY DISEASE GFR 15-29: ICD-10-CM

## 2023-01-03 DIAGNOSIS — I48.19 PERSISTENT ATRIAL FIBRILLATION: ICD-10-CM

## 2023-01-03 DIAGNOSIS — B35.1 ONYCHOMYCOSIS DUE TO DERMATOPHYTE: ICD-10-CM

## 2023-01-03 DIAGNOSIS — F33.1 MAJOR DEPRESSIVE DISORDER, RECURRENT EPISODE, MODERATE: ICD-10-CM

## 2023-01-03 PROCEDURE — 3288F FALL RISK ASSESSMENT DOCD: CPT | Mod: HCNC,CPTII,S$GLB, | Performed by: PODIATRIST

## 2023-01-03 PROCEDURE — 1159F PR MEDICATION LIST DOCUMENTED IN MEDICAL RECORD: ICD-10-PCS | Mod: HCNC,CPTII,S$GLB, | Performed by: PODIATRIST

## 2023-01-03 PROCEDURE — 3288F PR FALLS RISK ASSESSMENT DOCUMENTED: ICD-10-PCS | Mod: HCNC,CPTII,S$GLB, | Performed by: PODIATRIST

## 2023-01-03 PROCEDURE — 99213 PR OFFICE/OUTPT VISIT, EST, LEVL III, 20-29 MIN: ICD-10-PCS | Mod: 25,HCNC,S$GLB, | Performed by: PODIATRIST

## 2023-01-03 PROCEDURE — 3072F PR LOW RISK FOR RETINOPATHY: ICD-10-PCS | Mod: HCNC,CPTII,S$GLB, | Performed by: FAMILY MEDICINE

## 2023-01-03 PROCEDURE — 99214 PR OFFICE/OUTPT VISIT, EST, LEVL IV, 30-39 MIN: ICD-10-PCS | Mod: HCNC,S$GLB,, | Performed by: FAMILY MEDICINE

## 2023-01-03 PROCEDURE — 99999 PR PBB SHADOW E&M-EST. PATIENT-LVL V: ICD-10-PCS | Mod: PBBFAC,HCNC,, | Performed by: FAMILY MEDICINE

## 2023-01-03 PROCEDURE — 1101F PT FALLS ASSESS-DOCD LE1/YR: CPT | Mod: HCNC,CPTII,S$GLB, | Performed by: PODIATRIST

## 2023-01-03 PROCEDURE — 99214 OFFICE O/P EST MOD 30 MIN: CPT | Mod: HCNC,S$GLB,, | Performed by: FAMILY MEDICINE

## 2023-01-03 PROCEDURE — 3288F PR FALLS RISK ASSESSMENT DOCUMENTED: ICD-10-PCS | Mod: HCNC,CPTII,S$GLB, | Performed by: FAMILY MEDICINE

## 2023-01-03 PROCEDURE — 3072F PR LOW RISK FOR RETINOPATHY: ICD-10-PCS | Mod: HCNC,CPTII,S$GLB, | Performed by: PODIATRIST

## 2023-01-03 PROCEDURE — 1126F AMNT PAIN NOTED NONE PRSNT: CPT | Mod: HCNC,CPTII,S$GLB, | Performed by: PODIATRIST

## 2023-01-03 PROCEDURE — 1160F PR REVIEW ALL MEDS BY PRESCRIBER/CLIN PHARMACIST DOCUMENTED: ICD-10-PCS | Mod: HCNC,CPTII,S$GLB, | Performed by: PODIATRIST

## 2023-01-03 PROCEDURE — 1159F MED LIST DOCD IN RCRD: CPT | Mod: HCNC,CPTII,S$GLB, | Performed by: PODIATRIST

## 2023-01-03 PROCEDURE — 1160F RVW MEDS BY RX/DR IN RCRD: CPT | Mod: HCNC,CPTII,S$GLB, | Performed by: PODIATRIST

## 2023-01-03 PROCEDURE — 1126F PR PAIN SEVERITY QUANTIFIED, NO PAIN PRESENT: ICD-10-PCS | Mod: HCNC,CPTII,S$GLB, | Performed by: PODIATRIST

## 2023-01-03 PROCEDURE — 99999 PR PBB SHADOW E&M-EST. PATIENT-LVL III: CPT | Mod: PBBFAC,HCNC,, | Performed by: PODIATRIST

## 2023-01-03 PROCEDURE — 1101F PT FALLS ASSESS-DOCD LE1/YR: CPT | Mod: HCNC,CPTII,S$GLB, | Performed by: FAMILY MEDICINE

## 2023-01-03 PROCEDURE — 11721 DEBRIDE NAIL 6 OR MORE: CPT | Mod: Q9,HCNC,S$GLB, | Performed by: PODIATRIST

## 2023-01-03 PROCEDURE — 3072F LOW RISK FOR RETINOPATHY: CPT | Mod: HCNC,CPTII,S$GLB, | Performed by: PODIATRIST

## 2023-01-03 PROCEDURE — 11721 PR DEBRIDEMENT OF NAILS, 6 OR MORE: ICD-10-PCS | Mod: Q9,HCNC,S$GLB, | Performed by: PODIATRIST

## 2023-01-03 PROCEDURE — 1126F PR PAIN SEVERITY QUANTIFIED, NO PAIN PRESENT: ICD-10-PCS | Mod: HCNC,CPTII,S$GLB, | Performed by: FAMILY MEDICINE

## 2023-01-03 PROCEDURE — 99213 OFFICE O/P EST LOW 20 MIN: CPT | Mod: 25,HCNC,S$GLB, | Performed by: PODIATRIST

## 2023-01-03 PROCEDURE — 3288F FALL RISK ASSESSMENT DOCD: CPT | Mod: HCNC,CPTII,S$GLB, | Performed by: FAMILY MEDICINE

## 2023-01-03 PROCEDURE — 1101F PR PT FALLS ASSESS DOC 0-1 FALLS W/OUT INJ PAST YR: ICD-10-PCS | Mod: HCNC,CPTII,S$GLB, | Performed by: PODIATRIST

## 2023-01-03 PROCEDURE — 99999 PR PBB SHADOW E&M-EST. PATIENT-LVL V: CPT | Mod: PBBFAC,HCNC,, | Performed by: FAMILY MEDICINE

## 2023-01-03 PROCEDURE — 99999 PR PBB SHADOW E&M-EST. PATIENT-LVL III: ICD-10-PCS | Mod: PBBFAC,HCNC,, | Performed by: PODIATRIST

## 2023-01-03 PROCEDURE — 1101F PR PT FALLS ASSESS DOC 0-1 FALLS W/OUT INJ PAST YR: ICD-10-PCS | Mod: HCNC,CPTII,S$GLB, | Performed by: FAMILY MEDICINE

## 2023-01-03 PROCEDURE — 1126F AMNT PAIN NOTED NONE PRSNT: CPT | Mod: HCNC,CPTII,S$GLB, | Performed by: FAMILY MEDICINE

## 2023-01-03 PROCEDURE — 3072F LOW RISK FOR RETINOPATHY: CPT | Mod: HCNC,CPTII,S$GLB, | Performed by: FAMILY MEDICINE

## 2023-01-03 RX ORDER — INSULIN GLARGINE 100 [IU]/ML
9 INJECTION, SOLUTION SUBCUTANEOUS NIGHTLY
Qty: 9 ML | Refills: 3 | COMMUNITY
Start: 2023-01-03 | End: 2023-05-30

## 2023-01-03 NOTE — PROGRESS NOTES
Subjective:     Patient ID: Kassi Skelton is a 89 y.o. female.    Chief Complaint: Nail Care and Wound Care (BL wound care, pt c/o 0/10 pain, PCP Dr. Cook last seen 1-3-22)    Kassi is a 89 y.o. female who presents to the clinic for evaluation and treatment of high risk feet. Kassi has a past medical history of *Atrial fibrillation (4/19/2012), *Atrial flutter (4/19/2012), Allergy, Ankle fracture (4/19/2012), Anticoagulant long-term use, Anxiety, Arthritis, Bacterial pneumonia, unspecified (12/7/2012), Breast cancer, CKD (chronic kidney disease), stage III (5/11/2012), Dependent on walker for ambulation, Depression (4/19/2012), Diabetes mellitus with stage 3 chronic kidney disease (2/1/2016), Diabetes with neurologic complications, Edentulous, HEARING LOSS, Hip fracture, right (4/19/2012), HLD (hyperlipidemia) (4/19/2012), HTN (hypertension) (4/19/2012), Hypothyroidism (4/19/2012), 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 The patient's chief complaint is wound check and long, thick toenails. This patient has documented high risk feet requiring routine maintenance secondary to diabetes mellitis and those secondary complications of diabetes, as mentioned. Patient is accompanied by her daughter. Patient's daughter states home health is still coming out but patient has new wound on right foot and home health needs wound care order changes. Patient has no other pedal complaint at this time.      PCP: Mor Cook MD    Date Last Seen by PCP: 01/03/2023    Current shoe gear:  Affected Foot: Slip-on shoes     Unaffected Foot: Slip-on  anjel    Hemoglobin A1C   Date Value Ref Range Status   12/23/2022 9.4 (H) 4.0 - 5.6 % Final     Comment:     ADA Screening Guidelines:  5.7-6.4%  Consistent with prediabetes  >or=6.5%  Consistent with diabetes    High levels of fetal hemoglobin interfere with the HbA1C  assay. Heterozygous hemoglobin variants (HbS, HgC, etc)do  not significantly interfere with this assay.   However, presence of multiple variants may affect accuracy.     03/03/2022 8.7 (H) 4.0 - 5.6 % Final     Comment:     ADA Screening Guidelines:  5.7-6.4%  Consistent with prediabetes  >or=6.5%  Consistent with diabetes    High levels of fetal hemoglobin interfere with the HbA1C  assay. Heterozygous hemoglobin variants (HbS, HgC, etc)do  not significantly interfere with this assay.   However, presence of multiple variants may affect accuracy.     12/03/2021 8.9 (H) 4.0 - 5.6 % Final     Comment:     ADA Screening Guidelines:  5.7-6.4%  Consistent with prediabetes  >or=6.5%  Consistent with diabetes    High levels of fetal hemoglobin interfere with the HbA1C  assay. Heterozygous hemoglobin variants (HbS, HgC, etc)do  not significantly interfere with this assay.   However, presence of multiple variants may affect accuracy.         Patient Active Problem List   Diagnosis    History of atrial flutter    Combined hyperlipidemia associated with type 2 diabetes mellitus    Hypothyroidism    Major depressive disorder, recurrent episode, moderate    Osteoporosis    Benign hypertensive kidney disease with chronic kidney disease    Acquired cyst of kidney    Persistent atrial fibrillation    Anticoagulation monitoring by pharmacist    LBBB (left bundle branch block)    Sinus node dysfunction    Cardiac pacemaker in situ    Urinary incontinence, mixed    Pacemaker    Orthostatic hypotension    DCIS (ductal carcinoma in situ)    Hypertension associated with diabetes    Diabetes mellitus with stage 4 chronic kidney disease GFR 15-29    Dizziness    Secondary  hyperparathyroidism of renal origin    Hearing aid worn    Diabetic neuropathy, type II diabetes mellitus    Mild cognitive impairment with memory loss    Primary osteoarthritis    Edentulous    Greater trochanteric bursitis of right hip    Lung granuloma    Osteomyelitis of second toe of left foot    Stage 3b chronic kidney disease    Diabetic nephropathy associated with type 2 diabetes mellitus    Proteinuria       Medication List with Changes/Refills   Current Medications    ACCU-CHEK SHELDON CONTROL SOLN SOLN    Use as directed    ACCU-CHEK SHELDON PLUS TEST STRP STRP    USE TO CHECK BLOOD GLUCOSE EVERY DAY    ACETAMINOPHEN (TYLENOL) 500 MG TABLET    Take 2 tablets (1,000 mg total) by mouth every 6 (six) hours as needed for Pain.    BD ALCOHOL SWABS PADM    USE AS NEEDED    BENAZEPRIL (LOTENSIN) 20 MG TABLET    TAKE 1 TABLET (20 MG TOTAL) BY MOUTH ONCE DAILY.    BLOOD-GLUCOSE METER KIT    To check BG once daily, to use with insurance preferred meter    CALCITRIOL (ROCALTROL) 0.25 MCG CAP    TAKE 1 CAPSULE EVERY DAY (NEED MD APPOINTMENT)    DONEPEZIL (ARICEPT) 5 MG TABLET    Take 1 tablet (5 mg total) by mouth every evening.    ELIQUIS 2.5 MG TAB    TAKE 1 TABLET TWICE DAILY    HYDROCHLOROTHIAZIDE (HYDRODIURIL) 25 MG TABLET    TAKE 1 TABLET EVERY DAY    INSULIN ASPART U-100 (NOVOLOG FLEXPEN U-100 INSULIN) 100 UNIT/ML (3 ML) INPN PEN    Inject subcutaneously 2 units before breakfast and 6 units before lunch and 6 units before supper.    INSULIN GLARGINE 100 UNITS/ML SUBQ PEN    Inject 9 Units into the skin every evening.    KETOCONAZOLE (NIZORAL) 2 % CREAM    Apply topically 2 (two) times daily. for 14 days    LANCETS (ACCU-CHEK SOFTCLIX LANCETS) MISC    USE TO CHECK BLOOD GLUCOSE EVERY DAY    LEVOTHYROXINE (SYNTHROID) 25 MCG TABLET    TAKE 1 TABLET BEFORE BREAKFAST    METOPROLOL TARTRATE (LOPRESSOR) 50 MG TABLET    TAKE 1/2 TABLET TWICE DAILY    MIRTAZAPINE (REMERON) 45 MG TABLET    Take 1 tablet (45 mg total) by  "mouth every evening.    MUPIROCIN (BACTROBAN) 2 % OINTMENT    Apply topically 2 (two) times daily.    MYRBETRIQ 50 MG TB24    TAKE 1 TABLET (50 MG TOTAL) BY MOUTH ONCE DAILY.    PEN NEEDLE, DIABETIC (PEN NEEDLE) 31 GAUGE X 3/16" NDLE    Use as directed 4 times daily    PRAVASTATIN (PRAVACHOL) 40 MG TABLET    TAKE 1 TABLET EVERY DAY    PREMARIN VAGINAL CREAM        TRAMADOL (ULTRAM) 50 MG TABLET    Take 1 tablet (50 mg total) by mouth every 8 (eight) hours as needed for Pain.    TROSPIUM (SANCTURA XR) 60 MG CP24 CAPSULE    Take 60 mg by mouth once daily.    VENLAFAXINE (EFFEXOR-XR) 150 MG CP24    Take 1 capsule (150 mg total) by mouth once daily.    VENLAFAXINE (EFFEXOR-XR) 75 MG 24 HR CAPSULE    Take 1 capsule (75 mg total) by mouth once daily.    VERAPAMIL (CALAN-SR) 120 MG CR TABLET    TAKE 1 TABLET EVERY NIGHT       Review of patient's allergies indicates:   Allergen Reactions    Amlodipine Edema     Pt stated this medication made her legs swell    Alendronate sodium Other (See Comments)     Reaction: Esophageal bleeding    Fosamax [alendronate] Other (See Comments)     Reaction: Esophageal bleeding  diarrhea    Sorbitol      Diarrhea     Augmentin [amoxicillin-pot clavulanate] Hives and Rash       Past Surgical History:   Procedure Laterality Date    APPENDECTOMY      BREAST LUMPECTOMY Right 10/21/2015    CARDIAC PACEMAKER PLACEMENT  12/3/12    Sinus node dysfunction    CATARACT EXTRACTION W/  INTRAOCULAR LENS IMPLANT Bilateral     COLONOSCOPY      EXCISION OF LESION Left 6/3/2021    Procedure: EXCISION, LESION  forearm;  Surgeon: Saman Quintero MD;  Location: [facility];  Service: General;  Laterality: Left;    excision of squamous cell carcinoma Right 2017    EYE SURGERY Bilateral     PHACO/IOL    FRACTURE SURGERY Right     ankle    HYSTERECTOMY      total 1970's    MASTECTOMY Right 11/2015    OOPHORECTOMY      TONSILLECTOMY      TOTAL HIP ARTHROPLASTY Right 2007       Family History   Problem Relation Age of " Onset    Hypertension Mother     Heart disease Father     Stroke Father         cerebral hemorrhage    Coronary artery disease Brother     Heart disease Brother     Coronary artery disease Brother     COPD Brother     Heart disease Brother     Mitral valve prolapse Daughter     Peripheral vascular disease Son     Cataracts Neg Hx     Glaucoma Neg Hx     Macular degeneration Neg Hx     Retinal detachment Neg Hx     Strabismus Neg Hx        Social History     Socioeconomic History    Marital status:     Number of children: 2   Tobacco Use    Smoking status: Never    Smokeless tobacco: Never   Substance and Sexual Activity    Alcohol use: No    Drug use: No    Sexual activity: Not Currently     Social Determinants of Health     Financial Resource Strain: Unknown    Difficulty of Paying Living Expenses: Patient refused   Food Insecurity: Unknown    Worried About Running Out of Food in the Last Year: Patient refused    Ran Out of Food in the Last Year: Patient refused   Transportation Needs: Unknown    Lack of Transportation (Medical): Patient refused    Lack of Transportation (Non-Medical): Patient refused   Physical Activity: Unknown    Days of Exercise per Week: 0 days   Stress: No Stress Concern Present    Feeling of Stress : Not at all   Social Connections: Unknown    Frequency of Communication with Friends and Family: Patient refused    Frequency of Social Gatherings with Friends and Family: Patient refused    Active Member of Clubs or Organizations: Patient refused    Attends Club or Organization Meetings: Patient refused    Marital Status:    Housing Stability: Unknown    Unable to Pay for Housing in the Last Year: Patient refused    Unstable Housing in the Last Year: Patient refused       Vitals:    01/03/23 1011   Weight: 67.6 kg (149 lb 0.5 oz)   Height: 5' (1.524 m)   PainSc: 0-No pain         Hemoglobin A1C   Date Value Ref Range Status   12/23/2022 9.4 (H) 4.0 - 5.6 % Final     Comment:      ADA Screening Guidelines:  5.7-6.4%  Consistent with prediabetes  >or=6.5%  Consistent with diabetes    High levels of fetal hemoglobin interfere with the HbA1C  assay. Heterozygous hemoglobin variants (HbS, HgC, etc)do  not significantly interfere with this assay.   However, presence of multiple variants may affect accuracy.     03/03/2022 8.7 (H) 4.0 - 5.6 % Final     Comment:     ADA Screening Guidelines:  5.7-6.4%  Consistent with prediabetes  >or=6.5%  Consistent with diabetes    High levels of fetal hemoglobin interfere with the HbA1C  assay. Heterozygous hemoglobin variants (HbS, HgC, etc)do  not significantly interfere with this assay.   However, presence of multiple variants may affect accuracy.     12/03/2021 8.9 (H) 4.0 - 5.6 % Final     Comment:     ADA Screening Guidelines:  5.7-6.4%  Consistent with prediabetes  >or=6.5%  Consistent with diabetes    High levels of fetal hemoglobin interfere with the HbA1C  assay. Heterozygous hemoglobin variants (HbS, HgC, etc)do  not significantly interfere with this assay.   However, presence of multiple variants may affect accuracy.         Review of Systems   Constitutional:  Negative for chills and fever.   Respiratory:  Negative for shortness of breath.    Cardiovascular:  Negative for chest pain, palpitations, orthopnea, claudication and leg swelling.   Gastrointestinal:  Negative for diarrhea, nausea and vomiting.   Musculoskeletal:  Negative for joint pain.   Skin:  Negative for rash.   Neurological:  Positive for tingling and sensory change.   Psychiatric/Behavioral: Negative.             Objective:      PHYSICAL EXAM: Apperance: Alert and orient in no distress,well developed, and with good attention to grooming and body habits  Patient ambulating in slippers.   Lower Extremity Exam  VASCULAR: Dorsalis pedis pulses 1/4 bilateral and Posterior Tibial pulses 1/4 bilateral. Capillary fill time <4 seconds bilateral. No edema observed bilateral. Varicosities  present bilateral. Skin temperature of the lower extremities is warm to warm, proximal to distal. Hair growth absent bilateral. (--) lymphangitis or (--) cellulitis noted bilateral.  DERMATOLOGICAL: (--) edema, (--) erythema, (--) malodor, (--) drainage, (--) warmth to bilateral foot. Mild hyperkeratotic tissue noted to left plantar 1st submetatarsal. Post debridement no open area noted. Ulcer noted to right medial ankle  with granular/slough base and with a 1 mm yellow/white border and hyperkeratosis surrounding ulcer. Ulcer measurement 1qpf2oyg4.1cm. Ulcer noted to right lateral ankle. Ulcer measurement pinpoint.  The ulcer does not extend into deeper tissue and (--) sinus tracts exist. Ulcer noted to right lateral/dorsal styloid process with granular/slough base and with a 1 mm yellow/white border and hyperkeratosis surrounding ulcer. Ulcer measurement 1.2cmx1.2cmx0.1cm. The ulcer does not extend into deeper tissue and (--) sinus tracts exist. Nails 1,2,3,4,5 bilateral elongated, thickened, and discolored with subungual debris. Webspaces 1,2,3,4 clean, dry and without evidence of break in skin integrity bilateral.  NEUROLOGICAL: Light touch, sharp-dull, proprioception all present and equal bilaterally.   MUSCULOSKELETAL: Muscle strength is 5/5 for foot inverters, everters, plantarflexors, and dorsiflexors. Muscle tone is normal. Fat pad atrophy noted bilateral. Hammertoes noted bilateral.     01/03/2023                10/04/2022                      Assessment:       ICD-10-CM ICD-9-CM   1. Pressure injury of dorsum of right foot, stage 2  L89.892 707.09     707.22   2. Pressure ulcer of right ankle, stage 2  L89.512 707.06     707.22   3. Neuropathic foot ulcer, left, limited to breakdown of skin  L97.521 707.15   4. Onychomycosis due to dermatophyte  B35.1 110.1   5. Type 2 diabetes mellitus with diabetic neuropathy, with long-term current use of insulin  E11.40 250.60    Z79.4 357.2     V58.67           Plan:    Pressure injury of dorsum of right foot, stage 2  -     SUBSEQUENT HOME HEALTH ORDERS    Pressure ulcer of right ankle, stage 2  -     SUBSEQUENT HOME HEALTH ORDERS    Neuropathic foot ulcer, left, limited to breakdown of skin  -     SUBSEQUENT HOME HEALTH ORDERS    Onychomycosis due to dermatophyte    Type 2 diabetes mellitus with diabetic neuropathy, with long-term current use of insulin          I counseled the patient on her conditions, regarding findings of my examination, my impressions, and usual treatment plan.   Shoe inspection. Diabetic Foot Education. Patient reminded of the importance of good nutrition and blood sugar control to help prevent podiatric complications of diabetes. Patient instructed on proper foot hygeine. We discussed wearing proper shoe gear, daily foot inspections, never walking without protective shoe gear, never putting sharp instruments to feet.    With patient's permission, nails 1-5 bilateral were debrided/trimmed in length and thickness aggressively to their soft tissue attachment mechanically and with electric , removing all offending nail and debris. Patient relates relief following the procedure.  With patient's permission, the right ankle and left 1st plantar submetatarsal wound was irrigated with sterile saline. The patient tolerated this well. Wound was then dressed with Iodosorb and Mepilex pad. Patient was given instructions on daily dressing changes. Patient was also instructed on the importance of keeping the wound and dressings clean dry and intact and keeping pressure off the wound area until complete healing of the wound.The patient is alerted to watch for any signs of infection (redness, pus, pain, increased swelling or fever) and call if such occurs.   Home health orders to be updated.   Patient  will continue to monitor the areas daily, inspect feet, wear protective shoe gear when ambulatory, moisturizer to maintain skin integrity. Patient reminded of the  importance of good nutrition and blood sugar control to help prevent podiatric complications of diabetes.  Patient to return 2 months or sooner if needed.        Luzmaria Valle DPM  [facility]

## 2023-01-04 NOTE — PROGRESS NOTES
Follow-up diabetes.  Sugar not well controlled.  Recent hemoglobin A1c 9.4.  Compliant with insulin at assisted living.  Not compliant with diet.  She does have stage 4 chronic kidney disease followed by Nephrology with secondary hyperparathyroidism.  Depression stable.  Atrial fibrillation on anticoagulation.  Previous lung granuloma  Sugars elevated throughout the day.  No hypoglycemia.    Kassi was seen today for follow-up.    Diagnoses and all orders for this visit:    Type 2 diabetes mellitus with diabetic neuropathy, with long-term current use of insulin    Secondary hyperparathyroidism of renal origin    Major depressive disorder, recurrent episode, moderate    Persistent atrial fibrillation    Lung granuloma    Diabetes mellitus with stage 4 chronic kidney disease GFR 15-29      Increase Lantus 9 units at bedtime.  Change sugar checks to 3 times daily before meals.  Recheck hemoglobin A1c 3 months.  Send in home glucose readings in 2 weeks.          Diabetes Management Status    Statin: Taking  ACE/ARB: Taking    Screening or Prevention Patient's value Goal Complete/Controlled?   HgA1C Testing and Control   Lab Results   Component Value Date    HGBA1C 9.4 (H) 12/23/2022      Annually/Less than 8% No     Lipid profile : 12/23/2022 Annually Yes     LDL control Lab Results   Component Value Date    LDLCALC 76.2 12/23/2022    Annually/Less than 100 mg/dl  Yes     Nephropathy screening Lab Results   Component Value Date    LABMICR 591.0 12/23/2022     Lab Results   Component Value Date    PROTEINUA Negative 04/14/2022    Annually Yes     Blood pressure BP Readings from Last 1 Encounters:   01/03/23 130/70    Less than 140/90 Yes     Dilated retinal exam : 01/05/2022 Annually Yes     Foot exam   Most Recent Foot Exam Date: Not Found Annually Yes         Past Medical History:  Past Medical History:   Diagnosis Date    *Atrial fibrillation 4/19/2012    *Atrial flutter 4/19/2012    Allergy     Ankle fracture  4/19/2012    Anticoagulant long-term use     Anxiety     Arthritis     Bacterial pneumonia, unspecified 12/7/2012    Breast cancer     LEFT    CKD (chronic kidney disease), stage III 5/11/2012    Followed by Dr. 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    LBBB (left bundle branch block) 10/19/2012    Mild cognitive impairment with memory loss 6/15/2017    Osteopenia 4/19/2012    Osteoporosis     Otitis media     Pacemaker 11/2012    yo/chantell    Secondary hyperparathyroidism of renal origin     Simple renal cyst     Sinus node dysfunction     Squamous cell carcinoma skin of arm     Urinary incontinence 4/19/2012     Past Surgical History:   Procedure Laterality Date    APPENDECTOMY      BREAST LUMPECTOMY Right 10/21/2015    CARDIAC PACEMAKER PLACEMENT  12/3/12    Sinus node dysfunction    CATARACT EXTRACTION W/  INTRAOCULAR LENS IMPLANT Bilateral     COLONOSCOPY      EXCISION OF LESION Left 6/3/2021    Procedure: EXCISION, LESION  forearm;  Surgeon: Saman Quintero MD;  Location: [facility];  Service: General;  Laterality: Left;    excision of squamous cell carcinoma Right 2017    EYE SURGERY Bilateral     PHACO/IOL    FRACTURE SURGERY Right     ankle    HYSTERECTOMY      total 1970's    MASTECTOMY Right 11/2015    OOPHORECTOMY      TONSILLECTOMY      TOTAL HIP ARTHROPLASTY Right 2007     Review of patient's allergies indicates:   Allergen Reactions    Amlodipine Edema     Pt stated this medication made her legs swell    Alendronate sodium Other (See Comments)     Reaction: Esophageal bleeding    Fosamax [alendronate] Other (See  "Comments)     Reaction: Esophageal bleeding  diarrhea    Sorbitol      Diarrhea     Augmentin [amoxicillin-pot clavulanate] Hives and Rash     Current Outpatient Medications on File Prior to Visit   Medication Sig Dispense Refill    ACCU-CHEK SHELDON CONTROL SOLN Soln Use as directed 3 each 3    ACCU-CHEK SHELDON PLUS TEST STRP Strp USE TO CHECK BLOOD GLUCOSE EVERY  strip 3    acetaminophen (TYLENOL) 500 MG tablet Take 2 tablets (1,000 mg total) by mouth every 6 (six) hours as needed for Pain.      BD ALCOHOL SWABS PadM USE AS NEEDED 1 each 3    benazepriL (LOTENSIN) 20 MG tablet TAKE 1 TABLET (20 MG TOTAL) BY MOUTH ONCE DAILY. 90 tablet 3    calcitRIOL (ROCALTROL) 0.25 MCG Cap TAKE 1 CAPSULE EVERY DAY (NEED MD APPOINTMENT) 90 capsule 1    donepeziL (ARICEPT) 5 MG tablet Take 1 tablet (5 mg total) by mouth every evening. 90 tablet 3    ELIQUIS 2.5 mg Tab TAKE 1 TABLET TWICE DAILY 180 tablet 3    hydroCHLOROthiazide (HYDRODIURIL) 25 MG tablet TAKE 1 TABLET EVERY DAY 90 tablet 3    insulin aspart U-100 (NOVOLOG FLEXPEN U-100 INSULIN) 100 unit/mL (3 mL) InPn pen Inject subcutaneously 2 units before breakfast and 6 units before lunch and 6 units before supper. 12 mL 3    lancets (ACCU-CHEK SOFTCLIX LANCETS) Misc USE TO CHECK BLOOD GLUCOSE EVERY  each 11    levothyroxine (SYNTHROID) 25 MCG tablet TAKE 1 TABLET BEFORE BREAKFAST 90 tablet 0    metoprolol tartrate (LOPRESSOR) 50 MG tablet TAKE 1/2 TABLET TWICE DAILY 90 tablet 3    mirtazapine (REMERON) 45 MG tablet Take 1 tablet (45 mg total) by mouth every evening. 90 tablet 3    mupirocin (BACTROBAN) 2 % ointment Apply topically 2 (two) times daily. 30 g 1    MYRBETRIQ 50 mg Tb24 TAKE 1 TABLET (50 MG TOTAL) BY MOUTH ONCE DAILY. 90 tablet 1    pen needle, diabetic (PEN NEEDLE) 31 gauge x 3/16" Ndle Use as directed 4 times daily 360 each 3    pravastatin (PRAVACHOL) 40 MG tablet TAKE 1 TABLET EVERY DAY 90 tablet 0    traMADoL (ULTRAM) 50 mg tablet Take 1 tablet " (50 mg total) by mouth every 8 (eight) hours as needed for Pain. 21 tablet 0    venlafaxine (EFFEXOR-XR) 150 MG Cp24 Take 1 capsule (150 mg total) by mouth once daily. 90 capsule 3    venlafaxine (EFFEXOR-XR) 75 MG 24 hr capsule Take 1 capsule (75 mg total) by mouth once daily. 90 capsule 3    verapamiL (CALAN-SR) 120 MG CR tablet TAKE 1 TABLET EVERY NIGHT 90 tablet 3    [DISCONTINUED] insulin (LANTUS SOLOSTAR U-100 INSULIN) glargine 100 units/mL (3mL) SubQ pen Inject 7 Units into the skin every evening. 9 mL 3    blood-glucose meter kit To check BG once daily, to use with insurance preferred meter 1 each 0    insulin glargine 100 units/mL SubQ pen Inject 9 Units into the skin every evening. 9 mL 3    ketoconazole (NIZORAL) 2 % cream Apply topically 2 (two) times daily. for 14 days 60 g 1    PREMARIN vaginal cream       trospium (SANCTURA XR) 60 mg Cp24 capsule Take 60 mg by mouth once daily.       No current facility-administered medications on file prior to visit.     Social History     Socioeconomic History    Marital status:     Number of children: 2   Tobacco Use    Smoking status: Never    Smokeless tobacco: Never   Substance and Sexual Activity    Alcohol use: No    Drug use: No    Sexual activity: Not Currently     Social Determinants of Health     Financial Resource Strain: Unknown    Difficulty of Paying Living Expenses: Patient refused   Food Insecurity: Unknown    Worried About Running Out of Food in the Last Year: Patient refused    Ran Out of Food in the Last Year: Patient refused   Transportation Needs: Unknown    Lack of Transportation (Medical): Patient refused    Lack of Transportation (Non-Medical): Patient refused   Physical Activity: Unknown    Days of Exercise per Week: 0 days   Stress: No Stress Concern Present    Feeling of Stress : Not at all   Social Connections: Unknown    Frequency of Communication with Friends and Family: Patient refused    Frequency of Social Gatherings with  Friends and Family: Patient refused    Active Member of Clubs or Organizations: Patient refused    Attends Club or Organization Meetings: Patient refused    Marital Status:    Housing Stability: Unknown    Unable to Pay for Housing in the Last Year: Patient refused    Unstable Housing in the Last Year: Patient refused     Family History   Problem Relation Age of Onset    Hypertension Mother     Heart disease Father     Stroke Father         cerebral hemorrhage    Coronary artery disease Brother     Heart disease Brother     Coronary artery disease Brother     COPD Brother     Heart disease Brother     Mitral valve prolapse Daughter     Peripheral vascular disease Son     Cataracts Neg Hx     Glaucoma Neg Hx     Macular degeneration Neg Hx     Retinal detachment Neg Hx     Strabismus Neg Hx            ROS:  GENERAL: No fever, chills,  or significant weight changes.   CARDIOVASCULAR: Denies chest pain, PND, orthopnea or reduced exercise tolerance.  ABDOMEN: Appetite fine. Denies diarrhea, abdominal pain, hematemesis or blood in stool.  URINARY: No flank pain, dysuria or hematuria.    Vitals:    01/03/23 0844   BP: 130/70   Pulse: 69   Temp: 97.2 °F (36.2 °C)   TempSrc: Temporal   SpO2: 95%   Height: 5' (1.524 m)       Wt Readings from Last 3 Encounters:   01/03/23 67.6 kg (149 lb 0.5 oz)   09/15/22 67.6 kg (149 lb)   07/19/22 67.8 kg (149 lb 7.6 oz)       APPEARANCE: Well nourished, well developed, in no acute distress.    HEAD: Normocephalic.  Atraumatic.  EYES:   Right eye: Pupil reactive.  Conjunctiva clear.    Left eye: Pupil reactive.  Conjunctiva clear.    NECK: Supple. No bruits.  No JVD.  No cervical lymphadenopathy.  No thyromegaly.    CHEST: Breath sounds clear bilaterally.  Normal respiratory effort  CARDIOVASCULAR: Normal rate.  Regular rhythm.  No murmurs.  No rub.  No gallops.   No edema.  MENTAL STATUS: Alert.  Oriented x 3.

## 2023-01-05 PROCEDURE — G0179 MD RECERTIFICATION HHA PT: HCPCS | Mod: ,,, | Performed by: PODIATRIST

## 2023-01-05 PROCEDURE — G0179 PR HOME HEALTH MD RECERTIFICATION: ICD-10-PCS | Mod: ,,, | Performed by: PODIATRIST

## 2023-01-06 ENCOUNTER — DOCUMENT SCAN (OUTPATIENT)
Dept: HOME HEALTH SERVICES | Facility: HOSPITAL | Age: 88
End: 2023-01-06
Payer: MEDICARE

## 2023-01-13 ENCOUNTER — TELEPHONE (OUTPATIENT)
Dept: CARDIOLOGY | Facility: CLINIC | Age: 88
End: 2023-01-13
Payer: MEDICARE

## 2023-01-13 DIAGNOSIS — I48.0 PAROXYSMAL ATRIAL FIBRILLATION: ICD-10-CM

## 2023-01-13 DIAGNOSIS — Z95.0 CARDIAC PACEMAKER IN SITU: ICD-10-CM

## 2023-01-13 DIAGNOSIS — Z45.010 PACEMAKER AT END OF BATTERY LIFE: Primary | ICD-10-CM

## 2023-01-13 RX ORDER — SODIUM CHLORIDE 9 MG/ML
INJECTION, SOLUTION INTRAVENOUS ONCE
Status: CANCELLED | OUTPATIENT
Start: 2023-01-13 | End: 2023-01-13

## 2023-01-13 RX ORDER — SODIUM CHLORIDE 0.9 % (FLUSH) 0.9 %
10 SYRINGE (ML) INJECTION
Status: DISCONTINUED | OUTPATIENT
Start: 2023-01-13 | End: 2023-05-30

## 2023-01-13 NOTE — TELEPHONE ENCOUNTER
Pacemaker GEN CHANGE    Arrive for procedure at: [facility] THURS. 1/26/23 @ 9 AM.  YOU MAY ENTER THROUGH THE MAIN ENTRANCE.  LET THEM KNOW YOU ARE THERE FOR AN OUTPATIENT PROCEDURE.  THE PROCEDURE WILL START AT 11 AM WITH DR. KHADIJAH CALLES.    You will receive a phone call from [facility] Pre-Op Department with further instructions prior to your scheduled procedure.      FASTING: You MAY NOT have anything to eat or drink AFTER MIDNIGHT the day before your procedure. If your procedure is scheduled in the afternoon, you may have a LIGHT BREAKFAST 6-8 hours prior to your procedure.  For example: Two slices of toast; black coffee or black tea.    MEDICATIONS: You may take your regular morning medications with water. If there are any medications that you should not take, you will be instructed to hold them for that morning.    CARDIOLOGY PRE-PROCEDURE MEDICATION ORDERS:  ** Please hold any medications that are checked below:    HOLD   # OF DAYS TO HOLD      Eliquis    DAY BEFORE & DAY OF   Short acting insulin   Morning of procedure    CONTINUE ALL OTHER MEDICATIONS AS PRESCRIBED         WHAT TO EXPECT:    How long will the procedure take?  The procedure will take an average of 1 - 2 hours to perform.  After the procedure, you will need to lay flat for around 4 - 6 hours to minimize bleeding from the puncture site. If the wrist is accessed you will need to keep your arm still as instructed by the nurse.    When can I go home?  You may be able to be discharged home that same afternoon if there were no complications.  If you have one of the following: balloon; stent; pacemaker or defibrillator procedures, you may spend one night for observation.  Your doctor will determine your discharge based upon your progress.  The results of your procedure will be discussed with you before you are discharged.  Any further testing or procedures will be scheduled for you either before you leave or you will be instructed  to call for a future appointment.      TRANSPORTATION:  PLEASE ARRANGE TO HAVE SOMEONE DRIVE YOU HOME FOLLOWING YOUR PROCEDURE, YOU WILL NOT BE ALLOWED TO DRIVE.

## 2023-01-13 NOTE — TELEPHONE ENCOUNTER
SPOKE W/ PT DTR / PHONE, GEN CHANGE SCHEDULED FOR 1/26/23, INFO  FAXED TO [facility]

## 2023-01-18 ENCOUNTER — DOCUMENT SCAN (OUTPATIENT)
Dept: HOME HEALTH SERVICES | Facility: HOSPITAL | Age: 88
End: 2023-01-18
Payer: MEDICARE

## 2023-01-24 ENCOUNTER — DOCUMENT SCAN (OUTPATIENT)
Dept: HOME HEALTH SERVICES | Facility: HOSPITAL | Age: 88
End: 2023-01-24
Payer: MEDICARE

## 2023-01-24 ENCOUNTER — TELEPHONE (OUTPATIENT)
Dept: ADMINISTRATIVE | Facility: HOSPITAL | Age: 88
End: 2023-01-24
Payer: MEDICARE

## 2023-01-26 ENCOUNTER — EXTERNAL HOME HEALTH (OUTPATIENT)
Dept: HOME HEALTH SERVICES | Facility: HOSPITAL | Age: 88
End: 2023-01-26
Payer: MEDICARE

## 2023-01-26 ENCOUNTER — DOCUMENT SCAN (OUTPATIENT)
Dept: HOME HEALTH SERVICES | Facility: HOSPITAL | Age: 88
End: 2023-01-26
Payer: MEDICARE

## 2023-01-26 DIAGNOSIS — Z95.0 PACEMAKER: Primary | ICD-10-CM

## 2023-01-26 DIAGNOSIS — I44.7 LBBB (LEFT BUNDLE BRANCH BLOCK): ICD-10-CM

## 2023-01-26 DIAGNOSIS — I48.91 ATRIAL FIBRILLATION, UNSPECIFIED TYPE: ICD-10-CM

## 2023-01-26 PROCEDURE — 99499 UNLISTED E&M SERVICE: CPT | Mod: ,,, | Performed by: PODIATRIST

## 2023-01-26 PROCEDURE — 99499 NO LOS: ICD-10-PCS | Mod: ,,, | Performed by: PODIATRIST

## 2023-02-02 ENCOUNTER — DOCUMENTATION ONLY (OUTPATIENT)
Dept: CARDIOLOGY | Facility: HOSPITAL | Age: 88
End: 2023-02-02
Payer: MEDICARE

## 2023-02-06 ENCOUNTER — CLINICAL SUPPORT (OUTPATIENT)
Dept: CARDIOLOGY | Facility: HOSPITAL | Age: 88
End: 2023-02-06
Attending: INTERNAL MEDICINE
Payer: MEDICARE

## 2023-02-06 DIAGNOSIS — I44.7 LBBB (LEFT BUNDLE BRANCH BLOCK): ICD-10-CM

## 2023-02-06 DIAGNOSIS — Z95.0 PACEMAKER: ICD-10-CM

## 2023-02-06 PROCEDURE — 93280 CARDIAC DEVICE CHECK - IN CLINIC & HOSPITAL: ICD-10-PCS | Mod: 26,HCNC,, | Performed by: INTERNAL MEDICINE

## 2023-02-06 PROCEDURE — 93280 PM DEVICE PROGR EVAL DUAL: CPT | Mod: 26,HCNC,, | Performed by: INTERNAL MEDICINE

## 2023-02-07 DIAGNOSIS — Z00.00 ENCOUNTER FOR MEDICARE ANNUAL WELLNESS EXAM: ICD-10-CM

## 2023-02-09 DIAGNOSIS — Z00.00 ENCOUNTER FOR MEDICARE ANNUAL WELLNESS EXAM: ICD-10-CM

## 2023-02-11 NOTE — TELEPHONE ENCOUNTER
No new care gaps identified.  [facility] Embedded Care Gaps. Reference number: [id number]. 2/11/2023   11:45:48 AM CST

## 2023-02-11 NOTE — TELEPHONE ENCOUNTER
Refill Routing Note   Medication(s) are not appropriate for processing by [facility] for the following reason(s):         Requires lab  Required vitals abnormal    [facility] action(s):  Defer Care gaps identified: Yes     Medication Therapy Plan: Labs (TSH) oudated, Vitals (BP) abnormal: 01/26/23 (!) 154/71    Appointments  past 12m or future 3m with PCP    Date Provider   Last Visit   1/3/2023 Mor Cook MD   Next Visit   Visit date not found Mor Cook MD   ED visits in past 90 days: 0        Note composed:11:45 AM 02/11/2023

## 2023-02-13 RX ORDER — LEVOTHYROXINE SODIUM 25 UG/1
TABLET ORAL
Qty: 90 TABLET | Refills: 0 | Status: SHIPPED | OUTPATIENT
Start: 2023-02-13 | End: 2023-07-05

## 2023-02-13 RX ORDER — VENLAFAXINE HYDROCHLORIDE 75 MG/1
CAPSULE, EXTENDED RELEASE ORAL
Qty: 90 CAPSULE | Refills: 3 | Status: ON HOLD | OUTPATIENT
Start: 2023-02-13 | End: 2023-12-13 | Stop reason: HOSPADM

## 2023-02-14 ENCOUNTER — TELEPHONE (OUTPATIENT)
Dept: FAMILY MEDICINE | Facility: CLINIC | Age: 88
End: 2023-02-14

## 2023-02-14 DIAGNOSIS — N25.81 SECONDARY HYPERPARATHYROIDISM OF RENAL ORIGIN: ICD-10-CM

## 2023-02-14 DIAGNOSIS — E03.9 HYPOTHYROIDISM, UNSPECIFIED TYPE: Primary | Chronic | ICD-10-CM

## 2023-02-16 RX ORDER — BLOOD SUGAR DIAGNOSTIC
STRIP MISCELLANEOUS
Qty: 300 STRIP | Refills: 3 | Status: SHIPPED | OUTPATIENT
Start: 2023-02-16 | End: 2023-09-06

## 2023-02-16 NOTE — TELEPHONE ENCOUNTER
Refill Decision Note   Kassi Skelton  is requesting a refill authorization.  Brief Assessment and Rationale for Refill:  Approve     Medication Therapy Plan:  DIRECTIONS WERE CHANGED TO TID LOV JANUARY 2023    Medication Reconciliation Completed: No   Comments:     No Care Gaps recommended.     Note composed:10:43 AM 02/16/2023

## 2023-02-16 NOTE — TELEPHONE ENCOUNTER
No new care gaps identified.  [facility] Embedded Care Gaps. Reference number: [id number]. 2/16/2023   6:19:33 AM CST

## 2023-02-22 ENCOUNTER — OFFICE VISIT (OUTPATIENT)
Dept: HOME HEALTH SERVICES | Facility: CLINIC | Age: 88
End: 2023-02-22
Payer: MEDICARE

## 2023-02-22 VITALS
BODY MASS INDEX: 29.29 KG/M2 | HEART RATE: 99 BPM | SYSTOLIC BLOOD PRESSURE: 114 MMHG | WEIGHT: 150 LBS | OXYGEN SATURATION: 95 % | DIASTOLIC BLOOD PRESSURE: 74 MMHG

## 2023-02-22 DIAGNOSIS — N39.46 URINARY INCONTINENCE, MIXED: Chronic | ICD-10-CM

## 2023-02-22 DIAGNOSIS — M81.0 OSTEOPOROSIS, UNSPECIFIED OSTEOPOROSIS TYPE, UNSPECIFIED PATHOLOGICAL FRACTURE PRESENCE: ICD-10-CM

## 2023-02-22 DIAGNOSIS — N25.81 SECONDARY HYPERPARATHYROIDISM OF RENAL ORIGIN: ICD-10-CM

## 2023-02-22 DIAGNOSIS — E03.9 HYPOTHYROIDISM, UNSPECIFIED TYPE: Chronic | ICD-10-CM

## 2023-02-22 DIAGNOSIS — G31.84 MILD COGNITIVE IMPAIRMENT WITH MEMORY LOSS: ICD-10-CM

## 2023-02-22 DIAGNOSIS — Z95.0 CARDIAC PACEMAKER IN SITU: Chronic | ICD-10-CM

## 2023-02-22 DIAGNOSIS — J84.10 LUNG GRANULOMA: ICD-10-CM

## 2023-02-22 DIAGNOSIS — F33.1 MAJOR DEPRESSIVE DISORDER, RECURRENT EPISODE, MODERATE: ICD-10-CM

## 2023-02-22 DIAGNOSIS — N18.4 DIABETES MELLITUS WITH STAGE 4 CHRONIC KIDNEY DISEASE GFR 15-29: ICD-10-CM

## 2023-02-22 DIAGNOSIS — I48.19 PERSISTENT ATRIAL FIBRILLATION: ICD-10-CM

## 2023-02-22 DIAGNOSIS — E11.69 COMBINED HYPERLIPIDEMIA ASSOCIATED WITH TYPE 2 DIABETES MELLITUS: ICD-10-CM

## 2023-02-22 DIAGNOSIS — Z00.00 ENCOUNTER FOR PREVENTIVE HEALTH EXAMINATION: Primary | ICD-10-CM

## 2023-02-22 DIAGNOSIS — E11.22 DIABETES MELLITUS WITH STAGE 4 CHRONIC KIDNEY DISEASE GFR 15-29: ICD-10-CM

## 2023-02-22 DIAGNOSIS — E11.59 HYPERTENSION ASSOCIATED WITH DIABETES: ICD-10-CM

## 2023-02-22 DIAGNOSIS — L97.521 NEUROPATHIC FOOT ULCER, LEFT, LIMITED TO BREAKDOWN OF SKIN: ICD-10-CM

## 2023-02-22 DIAGNOSIS — I15.2 HYPERTENSION ASSOCIATED WITH DIABETES: ICD-10-CM

## 2023-02-22 DIAGNOSIS — D69.2 SENILE PURPURA: ICD-10-CM

## 2023-02-22 DIAGNOSIS — E78.2 COMBINED HYPERLIPIDEMIA ASSOCIATED WITH TYPE 2 DIABETES MELLITUS: ICD-10-CM

## 2023-02-22 PROCEDURE — 1159F MED LIST DOCD IN RCRD: CPT | Mod: CPTII,S$GLB,, | Performed by: NURSE PRACTITIONER

## 2023-02-22 PROCEDURE — 1126F AMNT PAIN NOTED NONE PRSNT: CPT | Mod: CPTII,S$GLB,, | Performed by: NURSE PRACTITIONER

## 2023-02-22 PROCEDURE — 1160F PR REVIEW ALL MEDS BY PRESCRIBER/CLIN PHARMACIST DOCUMENTED: ICD-10-PCS | Mod: CPTII,S$GLB,, | Performed by: NURSE PRACTITIONER

## 2023-02-22 PROCEDURE — 1160F RVW MEDS BY RX/DR IN RCRD: CPT | Mod: CPTII,S$GLB,, | Performed by: NURSE PRACTITIONER

## 2023-02-22 PROCEDURE — 3288F FALL RISK ASSESSMENT DOCD: CPT | Mod: CPTII,S$GLB,, | Performed by: NURSE PRACTITIONER

## 2023-02-22 PROCEDURE — 1101F PT FALLS ASSESS-DOCD LE1/YR: CPT | Mod: CPTII,S$GLB,, | Performed by: NURSE PRACTITIONER

## 2023-02-22 PROCEDURE — G0439 PPPS, SUBSEQ VISIT: HCPCS | Mod: S$GLB,,, | Performed by: NURSE PRACTITIONER

## 2023-02-22 PROCEDURE — 3288F PR FALLS RISK ASSESSMENT DOCUMENTED: ICD-10-PCS | Mod: CPTII,S$GLB,, | Performed by: NURSE PRACTITIONER

## 2023-02-22 PROCEDURE — 1159F PR MEDICATION LIST DOCUMENTED IN MEDICAL RECORD: ICD-10-PCS | Mod: CPTII,S$GLB,, | Performed by: NURSE PRACTITIONER

## 2023-02-22 PROCEDURE — 1101F PR PT FALLS ASSESS DOC 0-1 FALLS W/OUT INJ PAST YR: ICD-10-PCS | Mod: CPTII,S$GLB,, | Performed by: NURSE PRACTITIONER

## 2023-02-22 PROCEDURE — 3072F PR LOW RISK FOR RETINOPATHY: ICD-10-PCS | Mod: CPTII,S$GLB,, | Performed by: NURSE PRACTITIONER

## 2023-02-22 PROCEDURE — 1126F PR PAIN SEVERITY QUANTIFIED, NO PAIN PRESENT: ICD-10-PCS | Mod: CPTII,S$GLB,, | Performed by: NURSE PRACTITIONER

## 2023-02-22 PROCEDURE — 3072F LOW RISK FOR RETINOPATHY: CPT | Mod: CPTII,S$GLB,, | Performed by: NURSE PRACTITIONER

## 2023-02-22 PROCEDURE — G0439 PR MEDICARE ANNUAL WELLNESS SUBSEQUENT VISIT: ICD-10-PCS | Mod: S$GLB,,, | Performed by: NURSE PRACTITIONER

## 2023-02-22 NOTE — PROGRESS NOTES
Kassi Skelton presented for a  Medicare AWV and comprehensive Health Risk Assessment today. The following components were reviewed and updated:    Medical history  Family History  Social history  Allergies and Current Medications  Health Risk Assessment  Health Maintenance  Care Team         ** See Completed Assessments for Annual Wellness Visit within the encounter summary.**         The following assessments were completed:  Living Situation  CAGE  Depression Screening  Timed Get Up and Go  Whisper Test  Cognitive Function Screening - not performed  Nutrition Screening  ADL Screening  PAQ Screening    Review for Opioid Screening: Patient does not have rx for Opioids.  Review for Substance Use Disorders: Patient does not use substance.      Vitals:    02/22/23 0954   BP: 114/74   Pulse: 99   SpO2: 95%   Weight: 68 kg (150 lb)     Body mass index is 29.29 kg/m².  Physical Exam  Vitals reviewed.   HENT:      Head: Normocephalic.      Ears:      Comments: Hearing aids  Eyes:      Pupils: Pupils are equal, round, and reactive to light.   Cardiovascular:      Rate and Rhythm: Normal rate and regular rhythm.      Heart sounds: Normal heart sounds.      Comments: pacemaker  Pulmonary:      Effort: Pulmonary effort is normal.      Breath sounds: Normal breath sounds.   Abdominal:      General: Bowel sounds are normal.      Palpations: Abdomen is soft.   Musculoskeletal:         General: Normal range of motion.      Cervical back: Normal range of motion.      Right lower leg: No edema.      Left lower leg: No edema.   Skin:     General: Skin is warm and dry.      Comments: Right ankle wound, dressed   Neurological:      Mental Status: She is alert and oriented to person, place, and time.      Motor: Weakness present.      Gait: Gait abnormal (walker).   Psychiatric:         Behavior: Behavior normal.         Cognition and Memory: Memory is impaired.             Diagnoses and health risks identified today and  associated recommendations/orders:    1. Encounter for preventive health examination  - Above assessments completed. Preventive measures and health maintenance reviewed with patient and daughter.    2. Diabetes mellitus with stage 4 chronic kidney disease GFR 15-29  Stable, followed by PCP  -A1C 9.4, on insulin    3. Secondary hyperparathyroidism of renal origin  Stable, followed by Nephrology  -on calcitriol    4. Neuropathic foot ulcer, left, limited to breakdown of skin  Stable, followed by Podiatry    5. Persistent atrial fibrillation  Stable, followed by Cardiology  -on eliquis and metoprolol    6. Major depressive disorder, recurrent episode, moderate  Stable, followed by PCP  -on venlafaxine    7. Mild cognitive impairment with memory loss  Stable, followed by PCP  -on donepezil    8. Lung granuloma  Stable, followed by PCP    9. Senile purpura  Stable, followed by PCP    10. Cardiac pacemaker in situ  Stable, followed by Cardiology    11. Hypertension associated with diabetes  Stable, followed by PCP  -on verapamil, hctz, and benazepril    12. Combined hyperlipidemia associated with type 2 diabetes mellitus  Stable, followed by PCP    13. Urinary incontinence, mixed  Stable, followed by PCP  -on trospium, myrbetriq    14. Hypothyroidism, unspecified type  Stable, followed by PCP  -on levothyroxine    15. Osteoporosis, unspecified osteoporosis type, unspecified pathological fracture presence  Stable, followed by PCP  -no falls, encouraged Alcides/D, weight bearing exercises    Provided Kassi with a 5-10 year written screening schedule and personal prevention plan. Recommendations were developed using the USPSTF age appropriate recommendations. Education, counseling, and referrals were provided as needed. After Visit Summary printed and given to patient which includes a list of additional screenings\tests needed.    Follow up in about 1 year (around 2/22/2024) for your next annual wellness visit.    [place]  LULU Huertas      I offered to discuss advanced care planning, including how to pick a person who would make decisions for you if you were unable to make them for yourself, called a health care power of , and what kind of decisions you might make such as use of life sustaining treatments such as ventilators and tube feeding when faced with a life limiting illness recorded on a living will that they will need to know. (How you want to be cared for as you near the end of your natural life)     X  Patient is unable to engage in a discussion regarding advanced directives due to severe physical and/or cognitive impairment.

## 2023-02-26 PROBLEM — L97.521: Status: ACTIVE | Noted: 2023-01-03

## 2023-02-26 NOTE — PATIENT INSTRUCTIONS
Counseling and Referral of Other Preventative  (Italic type indicates deductible and co-insurance are waived)    Patient Name: Kassi Skelton  Today's Date: 2/26/2023    Health Maintenance       Date Due Completion Date    TETANUS VACCINE Never done ---    Shingles Vaccine (1 of 2) 11/20/2012 9/25/2012    COVID-19 Vaccine (5 - Booster for Moderna series) 08/11/2022 6/16/2022    Influenza Vaccine (1) 09/01/2022 10/19/2021    Eye Exam 01/05/2023 1/5/2022    Hemoglobin A1c 03/23/2023 12/23/2022    Diabetes Urine Screening 12/23/2023 12/23/2022    Lipid Panel 12/23/2023 12/23/2022        No orders of the defined types were placed in this encounter.    The following information is provided to all patients.  This information is to help you find resources for any of the problems found today that may be affecting your health:                Living healthy guide: www.[facility].[place].[facility]      Understanding Diabetes: www.diabetes.org      Eating healthy: www.cdc.gov/healthyweight      CDC home safety checklist: www.cdc.gov/steadi/patient.html      Agency on Aging: www.goea.[place].gov      Alcoholics anonymous (AA): www.aa.org      Physical Activity: www.ezekiel.nih.gov/ld1dlqw      Tobacco use: www.quitwithusla.org

## 2023-03-06 PROCEDURE — G0179 PR HOME HEALTH MD RECERTIFICATION: ICD-10-PCS | Mod: ,,, | Performed by: PODIATRIST

## 2023-03-06 PROCEDURE — G0179 MD RECERTIFICATION HHA PT: HCPCS | Mod: ,,, | Performed by: PODIATRIST

## 2023-03-13 ENCOUNTER — PATIENT MESSAGE (OUTPATIENT)
Dept: FAMILY MEDICINE | Facility: CLINIC | Age: 88
End: 2023-03-13
Payer: MEDICARE

## 2023-03-20 ENCOUNTER — EXTERNAL HOME HEALTH (OUTPATIENT)
Dept: HOME HEALTH SERVICES | Facility: HOSPITAL | Age: 88
End: 2023-03-20
Payer: MEDICARE

## 2023-03-21 ENCOUNTER — LAB VISIT (OUTPATIENT)
Dept: LAB | Facility: HOSPITAL | Age: 88
End: 2023-03-21
Attending: INTERNAL MEDICINE
Payer: MEDICARE

## 2023-03-21 ENCOUNTER — OFFICE VISIT (OUTPATIENT)
Dept: PODIATRY | Facility: CLINIC | Age: 88
End: 2023-03-21
Payer: MEDICARE

## 2023-03-21 VITALS — WEIGHT: 149.94 LBS | HEIGHT: 60 IN | BODY MASS INDEX: 29.44 KG/M2

## 2023-03-21 DIAGNOSIS — N18.32 STAGE 3B CHRONIC KIDNEY DISEASE: ICD-10-CM

## 2023-03-21 DIAGNOSIS — B35.1 ONYCHOMYCOSIS DUE TO DERMATOPHYTE: ICD-10-CM

## 2023-03-21 DIAGNOSIS — E11.40 TYPE 2 DIABETES MELLITUS WITH DIABETIC NEUROPATHY, WITH LONG-TERM CURRENT USE OF INSULIN: ICD-10-CM

## 2023-03-21 DIAGNOSIS — L97.512 NEUROPATHIC FOOT ULCER, RIGHT, WITH FAT LAYER EXPOSED: ICD-10-CM

## 2023-03-21 DIAGNOSIS — Z79.4 TYPE 2 DIABETES MELLITUS WITH DIABETIC NEUROPATHY, WITH LONG-TERM CURRENT USE OF INSULIN: ICD-10-CM

## 2023-03-21 DIAGNOSIS — L89.512 PRESSURE ULCER OF RIGHT ANKLE, STAGE 2: Primary | ICD-10-CM

## 2023-03-21 LAB
ALBUMIN SERPL BCP-MCNC: 3.8 G/DL (ref 3.5–5.2)
ANION GAP SERPL CALC-SCNC: 16 MMOL/L (ref 8–16)
BUN SERPL-MCNC: 41 MG/DL (ref 8–23)
CALCIUM SERPL-MCNC: 9.9 MG/DL (ref 8.7–10.5)
CHLORIDE SERPL-SCNC: 103 MMOL/L (ref 95–110)
CO2 SERPL-SCNC: 19 MMOL/L (ref 23–29)
CREAT SERPL-MCNC: 1.9 MG/DL (ref 0.5–1.4)
EST. GFR  (NO RACE VARIABLE): 24.8 ML/MIN/1.73 M^2
GLUCOSE SERPL-MCNC: 122 MG/DL (ref 70–110)
PHOSPHATE SERPL-MCNC: 4.3 MG/DL (ref 2.7–4.5)
POTASSIUM SERPL-SCNC: 4.7 MMOL/L (ref 3.5–5.1)
SODIUM SERPL-SCNC: 138 MMOL/L (ref 136–145)

## 2023-03-21 PROCEDURE — 36415 COLL VENOUS BLD VENIPUNCTURE: CPT | Mod: HCNC,PO | Performed by: INTERNAL MEDICINE

## 2023-03-21 PROCEDURE — 80069 RENAL FUNCTION PANEL: CPT | Mod: HCNC | Performed by: INTERNAL MEDICINE

## 2023-03-21 PROCEDURE — 3288F FALL RISK ASSESSMENT DOCD: CPT | Mod: HCNC,CPTII,S$GLB, | Performed by: PODIATRIST

## 2023-03-21 PROCEDURE — 83970 ASSAY OF PARATHORMONE: CPT | Mod: HCNC | Performed by: INTERNAL MEDICINE

## 2023-03-21 PROCEDURE — 11721 DEBRIDE NAIL 6 OR MORE: CPT | Mod: Q9,HCNC,S$GLB, | Performed by: PODIATRIST

## 2023-03-21 PROCEDURE — 1160F RVW MEDS BY RX/DR IN RCRD: CPT | Mod: HCNC,CPTII,S$GLB, | Performed by: PODIATRIST

## 2023-03-21 PROCEDURE — 1160F PR REVIEW ALL MEDS BY PRESCRIBER/CLIN PHARMACIST DOCUMENTED: ICD-10-PCS | Mod: HCNC,CPTII,S$GLB, | Performed by: PODIATRIST

## 2023-03-21 PROCEDURE — 1101F PR PT FALLS ASSESS DOC 0-1 FALLS W/OUT INJ PAST YR: ICD-10-PCS | Mod: HCNC,CPTII,S$GLB, | Performed by: PODIATRIST

## 2023-03-21 PROCEDURE — 99499 UNLISTED E&M SERVICE: CPT | Mod: HCNC,S$GLB,, | Performed by: PODIATRIST

## 2023-03-21 PROCEDURE — 3072F LOW RISK FOR RETINOPATHY: CPT | Mod: HCNC,CPTII,S$GLB, | Performed by: PODIATRIST

## 2023-03-21 PROCEDURE — 99499 NO LOS: ICD-10-PCS | Mod: HCNC,S$GLB,, | Performed by: PODIATRIST

## 2023-03-21 PROCEDURE — 1101F PT FALLS ASSESS-DOCD LE1/YR: CPT | Mod: HCNC,CPTII,S$GLB, | Performed by: PODIATRIST

## 2023-03-21 PROCEDURE — 1159F PR MEDICATION LIST DOCUMENTED IN MEDICAL RECORD: ICD-10-PCS | Mod: HCNC,CPTII,S$GLB, | Performed by: PODIATRIST

## 2023-03-21 PROCEDURE — 11721 PR DEBRIDEMENT OF NAILS, 6 OR MORE: ICD-10-PCS | Mod: Q9,HCNC,S$GLB, | Performed by: PODIATRIST

## 2023-03-21 PROCEDURE — 1159F MED LIST DOCD IN RCRD: CPT | Mod: HCNC,CPTII,S$GLB, | Performed by: PODIATRIST

## 2023-03-21 PROCEDURE — 3288F PR FALLS RISK ASSESSMENT DOCUMENTED: ICD-10-PCS | Mod: HCNC,CPTII,S$GLB, | Performed by: PODIATRIST

## 2023-03-21 PROCEDURE — 99999 PR PBB SHADOW E&M-EST. PATIENT-LVL IV: ICD-10-PCS | Mod: PBBFAC,HCNC,, | Performed by: PODIATRIST

## 2023-03-21 PROCEDURE — 99999 PR PBB SHADOW E&M-EST. PATIENT-LVL IV: CPT | Mod: PBBFAC,HCNC,, | Performed by: PODIATRIST

## 2023-03-21 PROCEDURE — 3072F PR LOW RISK FOR RETINOPATHY: ICD-10-PCS | Mod: HCNC,CPTII,S$GLB, | Performed by: PODIATRIST

## 2023-03-22 LAB — PTH-INTACT SERPL-MCNC: 146.4 PG/ML (ref 9–77)

## 2023-03-27 NOTE — PROGRESS NOTES
Subjective:     Patient ID: Kassi Skelton is a 89 y.o. female.    Chief Complaint: Nail Care (Right foot wound care, Diabetic pt wears slippers,pt c/o 0/10 pain,  PCP Dr. Cook last seen 2-22-23) and Wound Care    Kassi is a 89 y.o. female who presents to the clinic for evaluation and treatment of high risk feet. Kassi has a past medical history of *Atrial fibrillation (4/19/2012), *Atrial flutter (4/19/2012), Allergy, Ankle fracture (4/19/2012), Anticoagulant long-term use, Anxiety, Arthritis, Bacterial pneumonia, unspecified (12/7/2012), Breast cancer, CKD (chronic kidney disease), stage III (5/11/2012), Dependent on walker for ambulation, Depression (4/19/2012), Diabetes mellitus with stage 3 chronic kidney disease (2/1/2016), Diabetes with neurologic complications, Edentulous, HEARING LOSS, Hip fracture, right (4/19/2012), HLD (hyperlipidemia) (4/19/2012), HTN (hypertension) (4/19/2012), Hypothyroidism (4/19/2012), 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 The patient's chief complaint is wound check and long, thick toenails. This patient has documented high risk feet requiring routine maintenance secondary to diabetes mellitis and those secondary complications of diabetes, as mentioned. Patient is accompanied by her daughter. Patient's daughter states home health is still coming out. Patient has no other pedal complaint at this time.      PCP: Mor Cook MD    Date Last Seen by PCP: 01/03/2023    Current shoe gear:  Affected Foot: Slip-on shoes     Unaffected Foot: Slip-on shoes    Hemoglobin A1C   Date Value Ref Range Status    12/23/2022 9.4 (H) 4.0 - 5.6 % Final     Comment:     ADA Screening Guidelines:  5.7-6.4%  Consistent with prediabetes  >or=6.5%  Consistent with diabetes    High levels of fetal hemoglobin interfere with the HbA1C  assay. Heterozygous hemoglobin variants (HbS, HgC, etc)do  not significantly interfere with this assay.   However, presence of multiple variants may affect accuracy.     03/03/2022 8.7 (H) 4.0 - 5.6 % Final     Comment:     ADA Screening Guidelines:  5.7-6.4%  Consistent with prediabetes  >or=6.5%  Consistent with diabetes    High levels of fetal hemoglobin interfere with the HbA1C  assay. Heterozygous hemoglobin variants (HbS, HgC, etc)do  not significantly interfere with this assay.   However, presence of multiple variants may affect accuracy.     12/03/2021 8.9 (H) 4.0 - 5.6 % Final     Comment:     ADA Screening Guidelines:  5.7-6.4%  Consistent with prediabetes  >or=6.5%  Consistent with diabetes    High levels of fetal hemoglobin interfere with the HbA1C  assay. Heterozygous hemoglobin variants (HbS, HgC, etc)do  not significantly interfere with this assay.   However, presence of multiple variants may affect accuracy.         Patient Active Problem List   Diagnosis    History of atrial flutter    Combined hyperlipidemia associated with type 2 diabetes mellitus    Hypothyroidism    Major depressive disorder, recurrent episode, moderate    Osteoporosis    Benign hypertensive kidney disease with chronic kidney disease    Acquired cyst of kidney    Persistent atrial fibrillation    Anticoagulation monitoring by pharmacist    LBBB (left bundle branch block)    Sinus node dysfunction    Cardiac pacemaker in situ    Urinary incontinence, mixed    Pacemaker    Orthostatic hypotension    DCIS (ductal carcinoma in situ)    Hypertension associated with diabetes    Diabetes mellitus with stage 4 chronic kidney disease GFR 15-29    Dizziness    Secondary hyperparathyroidism of renal origin    Hearing aid worn     Diabetic neuropathy, type II diabetes mellitus    Mild cognitive impairment with memory loss    Primary osteoarthritis    Edentulous    Greater trochanteric bursitis of right hip    Lung granuloma    Osteomyelitis of second toe of left foot    Stage 3b chronic kidney disease    Diabetic nephropathy associated with type 2 diabetes mellitus    Proteinuria    Neuropathic foot ulcer, left, limited to breakdown of skin       Medication List with Changes/Refills   Current Medications    ACCU-CHEK SHELDON CONTROL SOLN SOLN    Use as directed    ACCU-CHEK SHELDON PLUS TEST STRP STRP    TEST BLOOD SUGAR THREE TIMES DAILY    ACETAMINOPHEN (TYLENOL) 500 MG TABLET    Take 2 tablets (1,000 mg total) by mouth every 6 (six) hours as needed for Pain.    BD ALCOHOL SWABS PADM    USE AS NEEDED    BENAZEPRIL (LOTENSIN) 20 MG TABLET    TAKE 1 TABLET (20 MG TOTAL) BY MOUTH ONCE DAILY.    BLOOD-GLUCOSE METER KIT    To check BG once daily, to use with insurance preferred meter    CALCITRIOL (ROCALTROL) 0.25 MCG CAP    TAKE 1 CAPSULE EVERY DAY (NEED MD APPOINTMENT)    DONEPEZIL (ARICEPT) 5 MG TABLET    TAKE 1 TABLET EVERY EVENING    ELIQUIS 2.5 MG TAB    TAKE 1 TABLET TWICE DAILY    HYDROCHLOROTHIAZIDE (HYDRODIURIL) 25 MG TABLET    TAKE 1 TABLET EVERY DAY    INSULIN ASPART U-100 (NOVOLOG FLEXPEN U-100 INSULIN) 100 UNIT/ML (3 ML) INPN PEN    Inject subcutaneously 2 units before breakfast and 6 units before lunch and 6 units before supper.    INSULIN GLARGINE 100 UNITS/ML SUBQ PEN    Inject 9 Units into the skin every evening.    KETOCONAZOLE (NIZORAL) 2 % CREAM    Apply topically 2 (two) times daily. for 14 days    LANCETS (ACCU-CHEK SOFTCLIX LANCETS) MISC    TEST BLOOD SUGAR THREE TIMES DAILY before meals    LEVOTHYROXINE (SYNTHROID) 25 MCG TABLET    TAKE 1 TABLET BEFORE BREAKFAST    METOPROLOL TARTRATE (LOPRESSOR) 50 MG TABLET    TAKE 1/2 TABLET TWICE DAILY    MIRTAZAPINE (REMERON) 45 MG TABLET    TAKE 1 TABLET EVERY EVENING    MUPIROCIN  "(BACTROBAN) 2 % OINTMENT    Apply topically 2 (two) times daily.    MYRBETRIQ 50 MG TB24    TAKE 1 TABLET (50 MG TOTAL) BY MOUTH ONCE DAILY.    PEN NEEDLE, DIABETIC (PEN NEEDLE) 31 GAUGE X 3/16" NDLE    Use as directed 4 times daily    PRAVASTATIN (PRAVACHOL) 40 MG TABLET    TAKE 1 TABLET EVERY DAY    TRAMADOL (ULTRAM) 50 MG TABLET    Take 1 tablet (50 mg total) by mouth every 8 (eight) hours as needed for Pain.    TROSPIUM (SANCTURA XR) 60 MG CP24 CAPSULE    Take 60 mg by mouth once daily.    VENLAFAXINE (EFFEXOR-XR) 150 MG CP24    TAKE 1 CAPSULE EVERY DAY    VENLAFAXINE (EFFEXOR-XR) 75 MG 24 HR CAPSULE    TAKE 1 CAPSULE EVERY DAY    VERAPAMIL (CALAN-SR) 120 MG CR TABLET    TAKE 1 TABLET EVERY NIGHT       Review of patient's allergies indicates:   Allergen Reactions    Amlodipine Edema     Pt stated this medication made her legs swell    Alendronate sodium Other (See Comments)     Reaction: Esophageal bleeding    Fosamax [alendronate] Other (See Comments)     Reaction: Esophageal bleeding  diarrhea    Sorbitol      Diarrhea     Augmentin [amoxicillin-pot clavulanate] Rash     Yeast infection, give oral anti-fungal       Past Surgical History:   Procedure Laterality Date    APPENDECTOMY      BREAST LUMPECTOMY Right 10/21/2015    CARDIAC PACEMAKER PLACEMENT  12/3/12    Sinus node dysfunction    CATARACT EXTRACTION W/  INTRAOCULAR LENS IMPLANT Bilateral     COLONOSCOPY      EXCISION OF LESION Left 6/3/2021    Procedure: EXCISION, LESION  forearm;  Surgeon: Saman Quintero MD;  Location: [facility] OR;  Service: General;  Laterality: Left;    excision of squamous cell carcinoma Right 2017    EYE SURGERY Bilateral     PHACO/IOL    FRACTURE SURGERY Right     ankle    HYSTERECTOMY      total 1970's    INSERTION, PACEMAKER, TEMPORARY TRANSVENOUS  1/26/2023    Procedure: Insertion, Pacemaker, Temporary Transvenous;  Surgeon: Jaime Swanson MD;  Location: [facility];  Service: Cardiology;;    MASTECTOMY Right 11/2015    " OOPHORECTOMY      REPLACEMENT OF PACEMAKER GENERATOR N/A 1/26/2023    Procedure: REPLACEMENT, PACEMAKER GENERATOR;  Surgeon: Jaime Swanson MD;  Location: [facility];  Service: Cardiology;  Laterality: N/A;    TONSILLECTOMY      TOTAL HIP ARTHROPLASTY Right 2007       Family History   Problem Relation Age of Onset    Hypertension Mother     Heart disease Father     Stroke Father         cerebral hemorrhage    Coronary artery disease Brother     Heart disease Brother     Coronary artery disease Brother     COPD Brother     Heart disease Brother     Mitral valve prolapse Daughter     Peripheral vascular disease Son     Cataracts Neg Hx     Glaucoma Neg Hx     Macular degeneration Neg Hx     Retinal detachment Neg Hx     Strabismus Neg Hx        Social History     Socioeconomic History    Marital status:     Number of children: 2   Tobacco Use    Smoking status: Never    Smokeless tobacco: Never   Substance and Sexual Activity    Alcohol use: No    Drug use: No    Sexual activity: Not Currently     Social Determinants of Health     Financial Resource Strain: Low Risk     Difficulty of Paying Living Expenses: Not hard at all   Food Insecurity: No Food Insecurity    Worried About Running Out of Food in the Last Year: Never true    Ran Out of Food in the Last Year: Never true   Transportation Needs: No Transportation Needs    Lack of Transportation (Medical): No    Lack of Transportation (Non-Medical): No   Physical Activity: Inactive    Days of Exercise per Week: 0 days    Minutes of Exercise per Session: 0 min   Housing Stability: Unknown    Unable to Pay for Housing in the Last Year: No    Unstable Housing in the Last Year: No       Vitals:    03/21/23 1454   Weight: 68 kg (149 lb 14.6 oz)   Height: 5' (1.524 m)         Hemoglobin A1C   Date Value Ref Range Status   12/23/2022 9.4 (H) 4.0 - 5.6 % Final     Comment:     ADA Screening Guidelines:  5.7-6.4%  Consistent with prediabetes  >or=6.5%  Consistent  with diabetes    High levels of fetal hemoglobin interfere with the HbA1C  assay. Heterozygous hemoglobin variants (HbS, HgC, etc)do  not significantly interfere with this assay.   However, presence of multiple variants may affect accuracy.     03/03/2022 8.7 (H) 4.0 - 5.6 % Final     Comment:     ADA Screening Guidelines:  5.7-6.4%  Consistent with prediabetes  >or=6.5%  Consistent with diabetes    High levels of fetal hemoglobin interfere with the HbA1C  assay. Heterozygous hemoglobin variants (HbS, HgC, etc)do  not significantly interfere with this assay.   However, presence of multiple variants may affect accuracy.     12/03/2021 8.9 (H) 4.0 - 5.6 % Final     Comment:     ADA Screening Guidelines:  5.7-6.4%  Consistent with prediabetes  >or=6.5%  Consistent with diabetes    High levels of fetal hemoglobin interfere with the HbA1C  assay. Heterozygous hemoglobin variants (HbS, HgC, etc)do  not significantly interfere with this assay.   However, presence of multiple variants may affect accuracy.         Review of Systems   Constitutional:  Negative for chills and fever.   Respiratory:  Negative for shortness of breath.    Cardiovascular:  Negative for chest pain, palpitations, orthopnea, claudication and leg swelling.   Gastrointestinal:  Negative for diarrhea, nausea and vomiting.   Musculoskeletal:  Negative for joint pain.   Skin:  Negative for rash.   Neurological:  Positive for tingling and sensory change.   Psychiatric/Behavioral: Negative.             Objective:      PHYSICAL EXAM: Apperance: Alert and orient in no distress,well developed, and with good attention to grooming and body habits  Patient ambulating in slippers.   Lower Extremity Exam  VASCULAR: Dorsalis pedis pulses 1/4 bilateral and Posterior Tibial pulses 1/4 bilateral. Capillary fill time <4 seconds bilateral. No edema observed bilateral. Varicosities present bilateral. Skin temperature of the lower extremities is warm to warm, proximal to  distal. Hair growth absent bilateral. (--) lymphangitis or (--) cellulitis noted bilateral.  DERMATOLOGICAL: (--) edema, (--) erythema, (--) malodor, (--) drainage, (--) warmth to bilateral foot. Mild hyperkeratotic tissue noted to left plantar 1st submetatarsal. Post debridement no open area noted. Ulcer noted to right medial ankle  with granular/slough base and with a 1 mm yellow/white border and hyperkeratosis surrounding ulcer. Ulcer measurement [id number]. Previous ulcer noted to right lateral ankle closed with thin epithealized tissue. Ulcer noted to right lateral/dorsal styloid process with granular/slough base and with a 1 mm yellow/white border and hyperkeratosis surrounding ulcer. Ulcer measurement [id number]. The ulcer does not extend into deeper tissue and (--) sinus tracts exist. Nails 1,2,3,4,5 bilateral elongated, thickened, and discolored with subungual debris. Webspaces 1,2,3,4 clean, dry and without evidence of break in skin integrity bilateral.  NEUROLOGICAL: Light touch, sharp-dull, proprioception all present and equal bilaterally.   MUSCULOSKELETAL: Muscle strength is 5/5 for foot inverters, everters, plantarflexors, and dorsiflexors. Muscle tone is normal. Fat pad atrophy noted bilateral. Hammertoes noted bilateral.     01/03/2023                10/04/2022                      Assessment:       ICD-10-CM ICD-9-CM   1. Pressure ulcer of right ankle, stage 2 - Right Foot  L89.512 707.06     707.22   2. Neuropathic foot ulcer, right, with fat layer exposed - Right Foot  L97.512 707.15   3. Onychomycosis due to dermatophyte  B35.1 110.1   4. Type 2 diabetes mellitus with diabetic neuropathy, with long-term current use of insulin  E11.40 250.60    Z79.4 357.2     V58.67             Plan:   Pressure ulcer of right ankle, stage 2 - Right Foot    Neuropathic foot ulcer, right, with fat layer exposed - Right Foot    Onychomycosis due to dermatophyte    Type 2 diabetes mellitus with diabetic  neuropathy, with long-term current use of insulin        I counseled the patient on her conditions, regarding findings of my examination, my impressions, and usual treatment plan.   Shoe inspection. Diabetic Foot Education. Patient reminded of the importance of good nutrition and blood sugar control to help prevent podiatric complications of diabetes. Patient instructed on proper foot hygeine. We discussed wearing proper shoe gear, daily foot inspections, never walking without protective shoe gear, never putting sharp instruments to feet.    With patient's permission, nails 1-5 bilateral were debrided/trimmed in length and thickness aggressively to their soft tissue attachment mechanically and with electric , removing all offending nail and debris. Patient relates relief following the procedure.  With patient's permission, the right ankle and left 1st plantar submetatarsal wound was irrigated with sterile saline. The patient tolerated this well. Wound was then dressed with Iodosorb and Mepilex pad. Patient was given instructions on daily dressing changes. Patient was also instructed on the importance of keeping the wound and dressings clean dry and intact and keeping pressure off the wound area until complete healing of the wound.The patient is alerted to watch for any signs of infection (redness, pus, pain, increased swelling or fever) and call if such occurs.   Home health orders to be updated.   Patient  will continue to monitor the areas daily, inspect feet, wear protective shoe gear when ambulatory, moisturizer to maintain skin integrity. Patient reminded of the importance of good nutrition and blood sugar control to help prevent podiatric complications of diabetes.  Patient to return 2 months or sooner if needed.        Luzmaria Valle DPM  [facility]

## 2023-03-29 ENCOUNTER — OFFICE VISIT (OUTPATIENT)
Dept: NEPHROLOGY | Facility: CLINIC | Age: 88
End: 2023-03-29
Payer: MEDICARE

## 2023-03-29 VITALS
SYSTOLIC BLOOD PRESSURE: 136 MMHG | WEIGHT: 149 LBS | HEIGHT: 60 IN | OXYGEN SATURATION: 95 % | BODY MASS INDEX: 29.25 KG/M2 | DIASTOLIC BLOOD PRESSURE: 70 MMHG | HEART RATE: 69 BPM

## 2023-03-29 DIAGNOSIS — N18.32 TYPE 2 DIABETES MELLITUS WITH STAGE 3B CHRONIC KIDNEY DISEASE, WITH LONG-TERM CURRENT USE OF INSULIN: ICD-10-CM

## 2023-03-29 DIAGNOSIS — N18.32 STAGE 3B CHRONIC KIDNEY DISEASE: Primary | ICD-10-CM

## 2023-03-29 DIAGNOSIS — E11.22 TYPE 2 DIABETES MELLITUS WITH STAGE 3B CHRONIC KIDNEY DISEASE, WITH LONG-TERM CURRENT USE OF INSULIN: ICD-10-CM

## 2023-03-29 DIAGNOSIS — N25.81 SECONDARY HYPERPARATHYROIDISM OF RENAL ORIGIN: ICD-10-CM

## 2023-03-29 DIAGNOSIS — R80.9 PROTEINURIA, UNSPECIFIED TYPE: ICD-10-CM

## 2023-03-29 DIAGNOSIS — Z79.4 TYPE 2 DIABETES MELLITUS WITH STAGE 3B CHRONIC KIDNEY DISEASE, WITH LONG-TERM CURRENT USE OF INSULIN: ICD-10-CM

## 2023-03-29 DIAGNOSIS — E11.21 DIABETIC NEPHROPATHY ASSOCIATED WITH TYPE 2 DIABETES MELLITUS: ICD-10-CM

## 2023-03-29 DIAGNOSIS — N28.1 ACQUIRED CYST OF KIDNEY: ICD-10-CM

## 2023-03-29 PROCEDURE — 99214 OFFICE O/P EST MOD 30 MIN: CPT | Mod: HCNC,S$GLB,, | Performed by: INTERNAL MEDICINE

## 2023-03-29 PROCEDURE — 99999 PR PBB SHADOW E&M-EST. PATIENT-LVL IV: ICD-10-PCS | Mod: PBBFAC,HCNC,, | Performed by: INTERNAL MEDICINE

## 2023-03-29 PROCEDURE — 3072F LOW RISK FOR RETINOPATHY: CPT | Mod: HCNC,CPTII,S$GLB, | Performed by: INTERNAL MEDICINE

## 2023-03-29 PROCEDURE — 3288F FALL RISK ASSESSMENT DOCD: CPT | Mod: HCNC,CPTII,S$GLB, | Performed by: INTERNAL MEDICINE

## 2023-03-29 PROCEDURE — 1101F PT FALLS ASSESS-DOCD LE1/YR: CPT | Mod: HCNC,CPTII,S$GLB, | Performed by: INTERNAL MEDICINE

## 2023-03-29 PROCEDURE — 1101F PR PT FALLS ASSESS DOC 0-1 FALLS W/OUT INJ PAST YR: ICD-10-PCS | Mod: HCNC,CPTII,S$GLB, | Performed by: INTERNAL MEDICINE

## 2023-03-29 PROCEDURE — 99214 PR OFFICE/OUTPT VISIT, EST, LEVL IV, 30-39 MIN: ICD-10-PCS | Mod: HCNC,S$GLB,, | Performed by: INTERNAL MEDICINE

## 2023-03-29 PROCEDURE — 1159F MED LIST DOCD IN RCRD: CPT | Mod: HCNC,CPTII,S$GLB, | Performed by: INTERNAL MEDICINE

## 2023-03-29 PROCEDURE — 3288F PR FALLS RISK ASSESSMENT DOCUMENTED: ICD-10-PCS | Mod: HCNC,CPTII,S$GLB, | Performed by: INTERNAL MEDICINE

## 2023-03-29 PROCEDURE — 1159F PR MEDICATION LIST DOCUMENTED IN MEDICAL RECORD: ICD-10-PCS | Mod: HCNC,CPTII,S$GLB, | Performed by: INTERNAL MEDICINE

## 2023-03-29 PROCEDURE — 99999 PR PBB SHADOW E&M-EST. PATIENT-LVL IV: CPT | Mod: PBBFAC,HCNC,, | Performed by: INTERNAL MEDICINE

## 2023-03-29 PROCEDURE — 3072F PR LOW RISK FOR RETINOPATHY: ICD-10-PCS | Mod: HCNC,CPTII,S$GLB, | Performed by: INTERNAL MEDICINE

## 2023-03-29 NOTE — PROGRESS NOTES
"  Subjective:       Patient ID: Kassi Skelton is a 89 y.o. White female who presents for return patient evaluation for chronic renal failure.      She has a recent UTI and was seen in the [place] ER.  She continue to have a "sweet tooth" and has had high glucose readings.        Review of Systems   Constitutional:  Positive for fatigue. Negative for appetite change, chills and fever.   HENT:  Positive for hearing loss.         Dry mouth-chronic   Eyes:  Negative for visual disturbance.   Respiratory:  Negative for cough and shortness of breath.    Cardiovascular:  Positive for leg swelling. Negative for chest pain.   Gastrointestinal:  Negative for abdominal pain, diarrhea, nausea and vomiting.   Genitourinary:  Positive for dysuria (intermittently). Negative for difficulty urinating and hematuria.   Musculoskeletal:  Positive for arthralgias and gait problem. Negative for myalgias.   Skin:  Negative for rash.   Neurological:  Negative for dizziness and headaches.   Psychiatric/Behavioral:  Negative for sleep disturbance.        The past medical, family and social histories were reviewed for this encounter.    The patient's last visit with me was on 9/15/2022.      /70 (BP Location: Right arm, Patient Position: Sitting)   Pulse 69   Ht 5' (1.524 m)   Wt 67.6 kg (149 lb)   SpO2 95%   BMI 29.10 kg/m²     Objective:      Physical Exam  Vitals reviewed.   Constitutional:       General: She is not in acute distress.     Appearance: She is well-developed.   HENT:      Head: Normocephalic and atraumatic.   Eyes:      General: No scleral icterus.     Conjunctiva/sclera: Conjunctivae normal.   Neck:      Vascular: No JVD.   Cardiovascular:      Rate and Rhythm: Normal rate and regular rhythm.      Heart sounds: Normal heart sounds. No murmur heard.    No friction rub. No gallop.   Pulmonary:      Effort: Pulmonary effort is normal. No respiratory distress.      Breath sounds: Normal breath sounds. No " wheezing.   Abdominal:      General: Bowel sounds are normal. There is no distension.      Palpations: Abdomen is soft.      Tenderness: There is no abdominal tenderness.   Musculoskeletal:      Cervical back: Normal range of motion.      Right lower leg: Edema (trace-1+) present.      Left lower leg: Edema (trace-1+) present.   Skin:     General: Skin is warm and dry.      Findings: No rash.       Assessment:       1. Stage 3b chronic kidney disease    2. Type 2 diabetes mellitus with stage 3b chronic kidney disease, with long-term current use of insulin    3. Acquired cyst of kidney    4. Secondary hyperparathyroidism of renal origin    5. Proteinuria, unspecified type    6. Diabetic nephropathy associated with type 2 diabetes mellitus       Lab Results   Component Value Date    CREATININE 1.9 (H) 03/21/2023    BUN 41 (H) 03/21/2023     03/21/2023    K 4.7 03/21/2023     03/21/2023    CO2 19 (L) 03/21/2023     Lab Results   Component Value Date    .4 (H) 03/21/2023    CALCIUM 9.9 03/21/2023    PHOS 4.3 03/21/2023     Lab Results   Component Value Date    HCT 37.2 01/26/2023     Prot/Creat Ratio, Urine   Date Value Ref Range Status   07/22/2022 0.59 (H) 0.00 - 0.20 Final   04/25/2019 Unable to calculate 0.00 - 0.20 Final   02/17/2010 0.22 0.05 - 1.07 Final      Plan:   Return to clinic in 6 months.  Labs for next visit include rp, pth, upc per standing orders .  Baseline creatinine is 1.1-1.5 since 2010 but is still in the upper 1.5-2.0 range for the past four years.  She wishes to continue conservative care.  She does not want dialysis.  UPC is 0.59.  PTH is 146 with a calcium of 9.9.  Continue current medications as prescribed and reviewed.   Blood pressure is controlled on the current regimen.  Her daughter Tova's cell is [phone].  Your blood pressure is controlled on your current medications.   I asked her to drink more water and take an azo pill once her urine starts smelling  funny.

## 2023-03-30 ENCOUNTER — PATIENT MESSAGE (OUTPATIENT)
Dept: FAMILY MEDICINE | Facility: CLINIC | Age: 88
End: 2023-03-30
Payer: MEDICARE

## 2023-04-03 ENCOUNTER — LAB VISIT (OUTPATIENT)
Dept: LAB | Facility: HOSPITAL | Age: 88
End: 2023-04-03
Attending: FAMILY MEDICINE
Payer: MEDICARE

## 2023-04-03 DIAGNOSIS — E03.9 HYPOTHYROIDISM: Primary | ICD-10-CM

## 2023-04-03 DIAGNOSIS — E11.40 DIABETIC NEUROPATHY: ICD-10-CM

## 2023-04-03 LAB
ESTIMATED AVG GLUCOSE: 229 MG/DL (ref 68–131)
HBA1C MFR BLD: 9.6 % (ref 4–5.6)
TSH SERPL DL<=0.005 MIU/L-ACNC: 3.04 UIU/ML (ref 0.4–4)

## 2023-04-03 PROCEDURE — 83036 HEMOGLOBIN GLYCOSYLATED A1C: CPT | Mod: HCNC | Performed by: FAMILY MEDICINE

## 2023-04-03 PROCEDURE — 84443 ASSAY THYROID STIM HORMONE: CPT | Mod: HCNC | Performed by: FAMILY MEDICINE

## 2023-04-04 ENCOUNTER — TELEPHONE (OUTPATIENT)
Dept: FAMILY MEDICINE | Facility: CLINIC | Age: 88
End: 2023-04-04
Payer: MEDICARE

## 2023-04-04 ENCOUNTER — PATIENT MESSAGE (OUTPATIENT)
Dept: FAMILY MEDICINE | Facility: CLINIC | Age: 88
End: 2023-04-04
Payer: MEDICARE

## 2023-04-04 RX ORDER — FLUCONAZOLE 150 MG/1
150 TABLET ORAL ONCE
Qty: 1 TABLET | Refills: 0 | Status: SHIPPED | OUTPATIENT
Start: 2023-04-04 | End: 2023-04-04

## 2023-04-04 NOTE — TELEPHONE ENCOUNTER
----- Message from Naina Nunez sent at 4/4/2023  8:48 AM CDT -----  Contact: [phone]  Calling regarding pt, home health nurse states pt is having redness, irritation and yeast of vagina area, pt completed antibiotics a week ago . Please call back at [phone] . Thanks/dj

## 2023-04-04 NOTE — TELEPHONE ENCOUNTER
I sent in 1 dose of Diflucan to pharmacy.  In addition she could use an over-the-counter yeast medication such as Monistat.  Her recent hemoglobin A1c was significantly elevated.  Need follow-up so that we can adjust her diabetes medication..

## 2023-04-21 ENCOUNTER — DOCUMENT SCAN (OUTPATIENT)
Dept: HOME HEALTH SERVICES | Facility: HOSPITAL | Age: 88
End: 2023-04-21
Payer: MEDICARE

## 2023-04-24 ENCOUNTER — TELEPHONE (OUTPATIENT)
Dept: FAMILY MEDICINE | Facility: CLINIC | Age: 88
End: 2023-04-24
Payer: MEDICARE

## 2023-04-24 ENCOUNTER — DOCUMENT SCAN (OUTPATIENT)
Dept: HOME HEALTH SERVICES | Facility: HOSPITAL | Age: 88
End: 2023-04-24
Payer: MEDICARE

## 2023-04-27 ENCOUNTER — DOCUMENT SCAN (OUTPATIENT)
Dept: HOME HEALTH SERVICES | Facility: HOSPITAL | Age: 88
End: 2023-04-27
Payer: MEDICARE

## 2023-05-05 PROCEDURE — G0179 MD RECERTIFICATION HHA PT: HCPCS | Mod: ,,, | Performed by: PODIATRIST

## 2023-05-05 PROCEDURE — G0179 PR HOME HEALTH MD RECERTIFICATION: ICD-10-PCS | Mod: ,,, | Performed by: PODIATRIST

## 2023-05-07 ENCOUNTER — CLINICAL SUPPORT (OUTPATIENT)
Dept: CARDIOLOGY | Facility: HOSPITAL | Age: 88
End: 2023-05-07
Payer: MEDICARE

## 2023-05-07 DIAGNOSIS — Z95.0 PRESENCE OF CARDIAC PACEMAKER: ICD-10-CM

## 2023-05-07 PROCEDURE — 93294 CARDIAC DEVICE CHECK - REMOTE: ICD-10-PCS | Mod: HCNC,,, | Performed by: INTERNAL MEDICINE

## 2023-05-07 PROCEDURE — 93294 REM INTERROG EVL PM/LDLS PM: CPT | Mod: HCNC,,, | Performed by: INTERNAL MEDICINE

## 2023-05-07 PROCEDURE — 93296 REM INTERROG EVL PM/IDS: CPT | Mod: HCNC,PO | Performed by: INTERNAL MEDICINE

## 2023-05-12 ENCOUNTER — EXTERNAL HOME HEALTH (OUTPATIENT)
Dept: HOME HEALTH SERVICES | Facility: HOSPITAL | Age: 88
End: 2023-05-12
Payer: MEDICARE

## 2023-05-18 ENCOUNTER — PATIENT MESSAGE (OUTPATIENT)
Dept: FAMILY MEDICINE | Facility: CLINIC | Age: 88
End: 2023-05-18
Payer: MEDICARE

## 2023-05-26 RX ORDER — CALCITRIOL 0.25 UG/1
CAPSULE ORAL
Qty: 90 CAPSULE | Refills: 1 | Status: SHIPPED | OUTPATIENT
Start: 2023-05-26 | End: 2024-01-08

## 2023-05-30 ENCOUNTER — OFFICE VISIT (OUTPATIENT)
Dept: FAMILY MEDICINE | Facility: CLINIC | Age: 88
End: 2023-05-30
Payer: MEDICARE

## 2023-05-30 ENCOUNTER — OFFICE VISIT (OUTPATIENT)
Dept: PODIATRY | Facility: CLINIC | Age: 88
End: 2023-05-30
Payer: MEDICARE

## 2023-05-30 VITALS — WEIGHT: 164 LBS | HEIGHT: 60 IN | BODY MASS INDEX: 32.2 KG/M2

## 2023-05-30 VITALS
TEMPERATURE: 97 F | WEIGHT: 164 LBS | SYSTOLIC BLOOD PRESSURE: 136 MMHG | HEIGHT: 60 IN | HEART RATE: 62 BPM | BODY MASS INDEX: 32.2 KG/M2 | DIASTOLIC BLOOD PRESSURE: 80 MMHG

## 2023-05-30 DIAGNOSIS — N18.4 TYPE 2 DIABETES MELLITUS WITH STAGE 4 CHRONIC KIDNEY DISEASE, WITH LONG-TERM CURRENT USE OF INSULIN: ICD-10-CM

## 2023-05-30 DIAGNOSIS — N18.4 DIABETES MELLITUS WITH STAGE 4 CHRONIC KIDNEY DISEASE GFR 15-29: Primary | ICD-10-CM

## 2023-05-30 DIAGNOSIS — N18.4 CHRONIC KIDNEY DISEASE, STAGE IV (SEVERE): ICD-10-CM

## 2023-05-30 DIAGNOSIS — L97.512 NEUROPATHIC FOOT ULCER, RIGHT, WITH FAT LAYER EXPOSED: Primary | ICD-10-CM

## 2023-05-30 DIAGNOSIS — Z87.2 HEALED ULCER OF RIGHT FOOT: ICD-10-CM

## 2023-05-30 DIAGNOSIS — Z79.4 TYPE 2 DIABETES MELLITUS WITH DIABETIC NEUROPATHY, WITH LONG-TERM CURRENT USE OF INSULIN: ICD-10-CM

## 2023-05-30 DIAGNOSIS — E11.22 TYPE 2 DIABETES MELLITUS WITH STAGE 4 CHRONIC KIDNEY DISEASE, WITH LONG-TERM CURRENT USE OF INSULIN: ICD-10-CM

## 2023-05-30 DIAGNOSIS — Z79.4 TYPE 2 DIABETES MELLITUS WITH STAGE 4 CHRONIC KIDNEY DISEASE, WITH LONG-TERM CURRENT USE OF INSULIN: ICD-10-CM

## 2023-05-30 DIAGNOSIS — E11.22 DIABETES MELLITUS WITH STAGE 4 CHRONIC KIDNEY DISEASE GFR 15-29: Primary | ICD-10-CM

## 2023-05-30 DIAGNOSIS — E11.40 TYPE 2 DIABETES MELLITUS WITH DIABETIC NEUROPATHY, WITH LONG-TERM CURRENT USE OF INSULIN: ICD-10-CM

## 2023-05-30 DIAGNOSIS — B35.1 ONYCHOMYCOSIS DUE TO DERMATOPHYTE: ICD-10-CM

## 2023-05-30 PROBLEM — N18.32 STAGE 3B CHRONIC KIDNEY DISEASE: Status: RESOLVED | Noted: 2022-09-15 | Resolved: 2023-05-30

## 2023-05-30 PROCEDURE — 99213 OFFICE O/P EST LOW 20 MIN: CPT | Mod: S$GLB,,, | Performed by: FAMILY MEDICINE

## 2023-05-30 PROCEDURE — 11721 PR DEBRIDEMENT OF NAILS, 6 OR MORE: ICD-10-PCS | Mod: Q9,S$GLB,, | Performed by: PODIATRIST

## 2023-05-30 PROCEDURE — 1101F PR PT FALLS ASSESS DOC 0-1 FALLS W/OUT INJ PAST YR: ICD-10-PCS | Mod: CPTII,S$GLB,, | Performed by: FAMILY MEDICINE

## 2023-05-30 PROCEDURE — 3072F LOW RISK FOR RETINOPATHY: CPT | Mod: CPTII,S$GLB,, | Performed by: FAMILY MEDICINE

## 2023-05-30 PROCEDURE — 99499 NO LOS: ICD-10-PCS | Mod: S$GLB,,, | Performed by: PODIATRIST

## 2023-05-30 PROCEDURE — 1126F AMNT PAIN NOTED NONE PRSNT: CPT | Mod: CPTII,S$GLB,, | Performed by: FAMILY MEDICINE

## 2023-05-30 PROCEDURE — 11721 DEBRIDE NAIL 6 OR MORE: CPT | Mod: Q9,S$GLB,, | Performed by: PODIATRIST

## 2023-05-30 PROCEDURE — 99999 PR PBB SHADOW E&M-EST. PATIENT-LVL V: ICD-10-PCS | Mod: PBBFAC,,, | Performed by: FAMILY MEDICINE

## 2023-05-30 PROCEDURE — 3288F FALL RISK ASSESSMENT DOCD: CPT | Mod: CPTII,S$GLB,, | Performed by: FAMILY MEDICINE

## 2023-05-30 PROCEDURE — 99999 PR PBB SHADOW E&M-EST. PATIENT-LVL III: ICD-10-PCS | Mod: PBBFAC,,, | Performed by: PODIATRIST

## 2023-05-30 PROCEDURE — 1126F PR PAIN SEVERITY QUANTIFIED, NO PAIN PRESENT: ICD-10-PCS | Mod: CPTII,S$GLB,, | Performed by: FAMILY MEDICINE

## 2023-05-30 PROCEDURE — 3072F PR LOW RISK FOR RETINOPATHY: ICD-10-PCS | Mod: CPTII,S$GLB,, | Performed by: FAMILY MEDICINE

## 2023-05-30 PROCEDURE — 1101F PT FALLS ASSESS-DOCD LE1/YR: CPT | Mod: CPTII,S$GLB,, | Performed by: FAMILY MEDICINE

## 2023-05-30 PROCEDURE — 99213 PR OFFICE/OUTPT VISIT, EST, LEVL III, 20-29 MIN: ICD-10-PCS | Mod: S$GLB,,, | Performed by: FAMILY MEDICINE

## 2023-05-30 PROCEDURE — 99499 UNLISTED E&M SERVICE: CPT | Mod: S$GLB,,, | Performed by: PODIATRIST

## 2023-05-30 PROCEDURE — 3288F PR FALLS RISK ASSESSMENT DOCUMENTED: ICD-10-PCS | Mod: CPTII,S$GLB,, | Performed by: FAMILY MEDICINE

## 2023-05-30 PROCEDURE — 99999 PR PBB SHADOW E&M-EST. PATIENT-LVL III: CPT | Mod: PBBFAC,,, | Performed by: PODIATRIST

## 2023-05-30 PROCEDURE — 99999 PR PBB SHADOW E&M-EST. PATIENT-LVL V: CPT | Mod: PBBFAC,,, | Performed by: FAMILY MEDICINE

## 2023-05-30 RX ORDER — INSULIN GLARGINE 100 [IU]/ML
11 INJECTION, SOLUTION SUBCUTANEOUS NIGHTLY
Qty: 9 ML | Refills: 3 | Status: SHIPPED | OUTPATIENT
Start: 2023-05-30 | End: 2023-06-01

## 2023-05-30 NOTE — PATIENT INSTRUCTIONS
Please send in sugar readings in 1 week    Please increase Lantus 11 units at bedtime    Diabetic education staff will call you to schedule an appt, if you don't hear from them, please let me know.  Thank you

## 2023-05-30 NOTE — PROGRESS NOTES
Follow-up on diabetes.  Recent hemoglobin A1c elevated.  Compliant with insulin in assisted living.  She does eat a lot of sweets which are available to her there.  Asymptomatic.  Previous or medications discontinued due to chronic kidney disease.      Kassi was seen today for follow-up.    Diagnoses and all orders for this visit:    Diabetes mellitus with stage 4 chronic kidney disease GFR 15-29  -     Ambulatory referral/consult to Diabetic Advanced Practice Providers (Medical Management); Future  -     insulin glargine 100 units/mL SubQ pen; Inject 11 Units into the skin every evening.    Chronic kidney disease, stage IV (severe)    Type 2 diabetes mellitus with stage 4 chronic kidney disease, with long-term current use of insulin      We will increase her Lantus as above.  She did bring in some home glucose readings which are attached, but no clear pattern.  Send in readings again in a week.  We will try to maximize Lantus as much as we can avoiding hypoglycemia then we can start adjusting the mealtime insulin.  I would like to get her readings mostly just below 200 and prefer A1c less than 8.5 possible.  Does not need tight control.          Diabetes Management Status    Statin: Taking  ACE/ARB: Taking    Screening or Prevention Patient's value Goal Complete/Controlled?   HgA1C Testing and Control   Lab Results   Component Value Date    HGBA1C 9.6 (H) 04/03/2023      Annually/Less than 8% No   Lipid profile : 12/23/2022 Annually Yes   LDL control Lab Results   Component Value Date    LDLCALC 76.2 12/23/2022    Annually/Less than 100 mg/dl  Yes   Nephropathy screening Lab Results   Component Value Date    LABMICR 591.0 12/23/2022     Lab Results   Component Value Date    PROTEINUA Negative 04/14/2022    Annually Yes   Blood pressure BP Readings from Last 1 Encounters:   05/30/23 136/80    Less than 140/90 Yes   Dilated retinal exam : 01/05/2022 Annually Yes   Foot exam   Most Recent Foot Exam Date: Not Found  Annually Yes       Past Medical History:  Past Medical History:   Diagnosis Date    *Atrial fibrillation 4/19/2012    *Atrial flutter 4/19/2012    Allergy     Ankle fracture 4/19/2012    Anticoagulant long-term use     Anxiety     Arthritis     Bacterial pneumonia, unspecified 12/7/2012    Breast cancer     LEFT    CKD (chronic kidney disease), stage III 5/11/2012    Followed by Dr. 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    LBBB (left bundle branch block) 10/19/2012    Mild cognitive impairment with memory loss 6/15/2017    Osteopenia 4/19/2012    Osteoporosis     Otitis media     Pacemaker 11/2012    yo/chantell    Secondary hyperparathyroidism of renal origin     Simple renal cyst     Sinus node dysfunction     Squamous cell carcinoma skin of arm     Urinary incontinence 4/19/2012     Past Surgical History:   Procedure Laterality Date    APPENDECTOMY      BREAST LUMPECTOMY Right 10/21/2015    CARDIAC PACEMAKER PLACEMENT  12/3/12    Sinus node dysfunction    CATARACT EXTRACTION W/  INTRAOCULAR LENS IMPLANT Bilateral     COLONOSCOPY      EXCISION OF LESION Left 6/3/2021    Procedure: EXCISION, LESION  forearm;  Surgeon: Saman Quintero MD;  Location: [facility] OR;  Service: General;  Laterality: Left;    excision of squamous cell carcinoma Right 2017    EYE SURGERY Bilateral     PHACO/IOL    FRACTURE SURGERY Right     ankle    HYSTERECTOMY      total 1970's    INSERTION, PACEMAKER, TEMPORARY TRANSVENOUS  1/26/2023    Procedure: Insertion, Pacemaker, Temporary Transvenous;  Surgeon: Jaime Swanson MD;  Location: [facility];   Service: Cardiology;;    MASTECTOMY Right 11/2015    OOPHORECTOMY      REPLACEMENT OF PACEMAKER GENERATOR N/A 1/26/2023    Procedure: REPLACEMENT, PACEMAKER GENERATOR;  Surgeon: Jaime Swanson MD;  Location: [facility];  Service: Cardiology;  Laterality: N/A;    TONSILLECTOMY      TOTAL HIP ARTHROPLASTY Right 2007     Review of patient's allergies indicates:   Allergen Reactions    Amlodipine Edema     Pt stated this medication made her legs swell    Alendronate sodium Other (See Comments)     Reaction: Esophageal bleeding    Fosamax [alendronate] Other (See Comments)     Reaction: Esophageal bleeding  diarrhea    Sorbitol      Diarrhea     Augmentin [amoxicillin-pot clavulanate] Rash     Yeast infection, give oral anti-fungal     Current Outpatient Medications on File Prior to Visit   Medication Sig Dispense Refill    ACCU-CHEK SHELDON CONTROL SOLN Soln Use as directed 3 each 3    ACCU-CHEK SHELDON PLUS TEST STRP Strp TEST BLOOD SUGAR THREE TIMES DAILY 300 strip 3    acetaminophen (TYLENOL) 500 MG tablet Take 2 tablets (1,000 mg total) by mouth every 6 (six) hours as needed for Pain.      BD ALCOHOL SWABS PadM USE AS NEEDED 1 each 3    benazepriL (LOTENSIN) 20 MG tablet TAKE 1 TABLET (20 MG TOTAL) BY MOUTH ONCE DAILY. 90 tablet 3    calcitRIOL (ROCALTROL) 0.25 MCG Cap TAKE 1 CAPSULE EVERY DAY (NEED MD APPOINTMENT) 90 capsule 1    donepeziL (ARICEPT) 5 MG tablet TAKE 1 TABLET EVERY EVENING 90 tablet 3    ELIQUIS 2.5 mg Tab TAKE 1 TABLET TWICE DAILY 180 tablet 3    hydroCHLOROthiazide (HYDRODIURIL) 25 MG tablet TAKE 1 TABLET EVERY DAY 90 tablet 3    insulin aspart U-100 (NOVOLOG FLEXPEN U-100 INSULIN) 100 unit/mL (3 mL) InPn pen Inject subcutaneously 2 units before breakfast and 6 units before lunch and 6 units before supper. 12 mL 3    lancets (ACCU-CHEK SOFTCLIX LANCETS) Misc TEST BLOOD SUGAR THREE TIMES DAILY before meals 300 each 3    levothyroxine (SYNTHROID) 25 MCG tablet TAKE 1 TABLET BEFORE BREAKFAST 90  "tablet 0    metoprolol tartrate (LOPRESSOR) 50 MG tablet TAKE 1/2 TABLET TWICE DAILY 90 tablet 3    mirtazapine (REMERON) 45 MG tablet TAKE 1 TABLET EVERY EVENING 90 tablet 3    MYRBETRIQ 50 mg Tb24 TAKE 1 TABLET (50 MG TOTAL) BY MOUTH ONCE DAILY. 90 tablet 1    pen needle, diabetic (PEN NEEDLE) 31 gauge x 3/16" Ndle Use as directed 4 times daily 360 each 3    pravastatin (PRAVACHOL) 40 MG tablet TAKE 1 TABLET EVERY DAY 90 tablet 2    venlafaxine (EFFEXOR-XR) 150 MG Cp24 TAKE 1 CAPSULE EVERY DAY 90 capsule 3    venlafaxine (EFFEXOR-XR) 75 MG 24 hr capsule TAKE 1 CAPSULE EVERY DAY 90 capsule 3    verapamiL (CALAN-SR) 120 MG CR tablet TAKE 1 TABLET EVERY NIGHT 90 tablet 3    [DISCONTINUED] insulin glargine 100 units/mL SubQ pen Inject 9 Units into the skin every evening. 9 mL 3    blood-glucose meter kit To check BG once daily, to use with insurance preferred meter 1 each 0    [DISCONTINUED] miconazole nitrate 200 mg- 2 % (9 gram) Kit Place vaginally. Use as directed, prn yeast infection      [DISCONTINUED] mupirocin (BACTROBAN) 2 % ointment Apply topically 2 (two) times daily. (Patient not taking: Reported on 5/30/2023) 30 g 1    [DISCONTINUED] traMADoL (ULTRAM) 50 mg tablet Take 1 tablet (50 mg total) by mouth every 8 (eight) hours as needed for Pain. (Patient not taking: Reported on 5/30/2023) 21 tablet 0    [DISCONTINUED] trospium (SANCTURA XR) 60 mg Cp24 capsule Take 60 mg by mouth once daily.       Current Facility-Administered Medications on File Prior to Visit   Medication Dose Route Frequency Provider Last Rate Last Admin    [DISCONTINUED] sodium chloride 0.9% flush 10 mL  10 mL Intravenous PRN Jaime Swanson MD         Social History     Socioeconomic History    Marital status:     Number of children: 2   Tobacco Use    Smoking status: Never    Smokeless tobacco: Never   Substance and Sexual Activity    Alcohol use: No    Drug use: No    Sexual activity: Not Currently     Social Determinants " of Health     Financial Resource Strain: Low Risk     Difficulty of Paying Living Expenses: Not hard at all   Food Insecurity: No Food Insecurity    Worried About Running Out of Food in the Last Year: Never true    Ran Out of Food in the Last Year: Never true   Transportation Needs: No Transportation Needs    Lack of Transportation (Medical): No    Lack of Transportation (Non-Medical): No   Physical Activity: Inactive    Days of Exercise per Week: 0 days    Minutes of Exercise per Session: 0 min   Housing Stability: Unknown    Unable to Pay for Housing in the Last Year: No    Unstable Housing in the Last Year: No     Family History   Problem Relation Age of Onset    Hypertension Mother     Heart disease Father     Stroke Father         cerebral hemorrhage    Coronary artery disease Brother     Heart disease Brother     Coronary artery disease Brother     COPD Brother     Heart disease Brother     Mitral valve prolapse Daughter     Peripheral vascular disease Son     Cataracts Neg Hx     Glaucoma Neg Hx     Macular degeneration Neg Hx     Retinal detachment Neg Hx     Strabismus Neg Hx            ROS:  GENERAL: No fever, chills,  or significant weight changes.   CARDIOVASCULAR: Denies chest pain, PND, orthopnea or reduced exercise tolerance.  ABDOMEN: Appetite fine. Denies diarrhea, abdominal pain, hematemesis or blood in stool.  URINARY: No flank pain, dysuria or hematuria.    Vitals:    05/30/23 1405   BP: 136/80   Pulse: 62   Temp: 97.3 °F (36.3 °C)   Weight: 74.4 kg (164 lb)   Height: 5' (1.524 m)       Wt Readings from Last 3 Encounters:   05/30/23 74.4 kg (164 lb 0.4 oz)   05/30/23 74.4 kg (164 lb)   03/29/23 67.6 kg (149 lb)       APPEARANCE: Well nourished, well developed, in no acute distress.    HEAD: Normocephalic.  Atraumatic.  EYES:   Right eye: Pupil reactive.  Conjunctiva clear.    Left eye: Pupil reactive.  Conjunctiva clear.    NECK: Supple. No bruits.  No JVD.  No cervical lymphadenopathy.  No  thyromegaly.    CHEST: Breath sounds clear bilaterally.  Normal respiratory effort  CARDIOVASCULAR: Normal rate.  Regular rhythm.  No murmurs.  No rub.  No gallops.   No edema.  MENTAL STATUS: Alert.  Oriented x 3.

## 2023-06-01 ENCOUNTER — OFFICE VISIT (OUTPATIENT)
Dept: DIABETES | Facility: CLINIC | Age: 88
End: 2023-06-01
Payer: MEDICARE

## 2023-06-01 DIAGNOSIS — E11.22 DIABETES MELLITUS WITH STAGE 4 CHRONIC KIDNEY DISEASE GFR 15-29: ICD-10-CM

## 2023-06-01 DIAGNOSIS — E11.9 TYPE 2 DIABETES MELLITUS WITHOUT COMPLICATION, WITHOUT LONG-TERM CURRENT USE OF INSULIN: ICD-10-CM

## 2023-06-01 DIAGNOSIS — N18.4 DIABETES MELLITUS WITH STAGE 4 CHRONIC KIDNEY DISEASE GFR 15-29: ICD-10-CM

## 2023-06-01 PROCEDURE — 3072F PR LOW RISK FOR RETINOPATHY: ICD-10-PCS | Mod: CPTII,95,, | Performed by: NURSE PRACTITIONER

## 2023-06-01 PROCEDURE — 3072F LOW RISK FOR RETINOPATHY: CPT | Mod: CPTII,95,, | Performed by: NURSE PRACTITIONER

## 2023-06-01 PROCEDURE — 99214 OFFICE O/P EST MOD 30 MIN: CPT | Mod: 95,,, | Performed by: NURSE PRACTITIONER

## 2023-06-01 PROCEDURE — 99214 PR OFFICE/OUTPT VISIT, EST, LEVL IV, 30-39 MIN: ICD-10-PCS | Mod: 95,,, | Performed by: NURSE PRACTITIONER

## 2023-06-01 RX ORDER — INSULIN GLARGINE 100 [IU]/ML
11 INJECTION, SOLUTION SUBCUTANEOUS DAILY
Qty: 9 ML | Refills: 3
Start: 2023-06-01 | End: 2023-06-15 | Stop reason: ALTCHOICE

## 2023-06-01 RX ORDER — ISOPROPYL ALCOHOL 70 ML/100ML
1 SWAB TOPICAL 4 TIMES DAILY
Qty: 360 EACH | Refills: 3 | Status: SHIPPED | OUTPATIENT
Start: 2023-06-01 | End: 2023-06-15

## 2023-06-01 NOTE — Clinical Note
Hi Dr. Cook, I met with Mrs. Solitario's daughter, Tova, this morning regarding her diabetes management. Unfortunately due to the patient living in assisted living, a CGM would not be the best option for her, as whoever the staff is working would need to know how to use the  for alerts and Ms. Bernardo does not feel the patient would be able to understand how to use it. I will have [facility] fax her glucose logs to me next week to review based on the recent insulin changes from your visit. The only changes I have made today were to dose the Lantus in the morning and to check glucose 4 times daily. I would like to keep her glucose above 80 and less than 200-250 based on her age and comorbidities. Please let me know if you have any questions and thank you so much for the referral.   Akosua Hall, [facility]

## 2023-06-01 NOTE — PROGRESS NOTES
Telemedicine Visit    The patient location is home  The chief complaint leading to consultation is: Diabetes    Visit type: audiovisual    Face to Face time with patient: 30  60 minutes of total time spent on the encounter, which includes face to face time and non-face to face time preparing to see the patient (eg, review of tests), Obtaining and/or reviewing separately obtained history, Documenting clinical information in the electronic or other health record, Independently interpreting results (not separately reported) and communicating results to the patient/family/caregiver, or Care coordination (not separately reported).  Each patient to whom he or she provides medical services by telemedicine is:  (1) informed of the relationship between the physician and patient and the respective role of any other health care provider with respect to management of the patient; and (2) notified that he or she may decline to receive medical services by telemedicine and may withdraw from such care at any time.  Notes:         Kassi Skelton is a 89 y.o. female who  has a past medical history of *Atrial fibrillation (4/19/2012), *Atrial flutter (4/19/2012), Allergy, Ankle fracture (4/19/2012), Anticoagulant long-term use, Anxiety, Arthritis, Bacterial pneumonia, unspecified (12/7/2012), Breast cancer, CKD (chronic kidney disease), stage III (5/11/2012), Dependent on walker for ambulation, Depression (4/19/2012), Diabetes mellitus with stage 3 chronic kidney disease (2/1/2016), Diabetes with neurologic complications, Edentulous, HEARING LOSS, Hip fracture, right (4/19/2012), HLD (hyperlipidemia) (4/19/2012), HTN (hypertension) (4/19/2012), Hypothyroidism (4/19/2012), 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     CHIEF COMPLAINT: Diabetes Consultation    PCP: Mor Cook MD     The patient was initially diagnosed with diabetes several years ago.   Patient lives in assisted living. Visit is completed with her daughter Tova Sánchez.     Previous failed treatments include:  Oral medications.     Social Documentation:  Patient lives Patient lives in assisted living, [place].   Exercise: limited due to age and chronic medical conditions.   Edentulous, slightly pureed foods.   Patient is ambulatory with a walker.  Patient does have a life alert she wears and emergency buttons in bedroom and bathroom for assistance.   Has cognitive decline with memory loss at times.     Current monitoring regimen: capillary blood glucose monitoring with finger sticks.   No records to review today. Will have assisted living fax them to our office.    Current Diabetes related symptoms/problems include hyperglycemia and hypoglycemia  and have been unchanged.     Diabetes related complications:   nephropathy.   denies Pancreatitis  denies Gastroparesis  denies DKA  denies Hx/family Hx of MEN2/MTC  denies Frequent UTIs/yeast infections    Cardiovascular Risk Factors: advanced age (>55 for men, >65 for women), dyslipidemia, hypertension, microalbuminuria, obesity (BMI >30 kg/m2), and sedentary lifestyle.    Any episodes of hypoglycemia?  Yes. Hypoglycemia treatment reviewed with patient and education to be provided in AVS.   Hypoglycemia Unawareness? No  Severe hypoglycemia requiring 3rd party? Yes    Seen by Diabetes Education in last year? no    Diabetes Medications               insulin aspart U-100 (NOVOLOG FLEXPEN U-100 INSULIN) 100 unit/mL (3 mL) InPn pen Inject subcutaneously 2 units before breakfast and 6 units  before lunch and 6 units before supper.    insulin glargine 100 units/mL SubQ pen Inject 11 Units into the skin every evening.     DIABETES DISEASE MANAGEMENT STATUS  Statin: Taking  ACE/ARB: Taking  Screening or Prevention Patient's value Goal Complete/Controlled?   HgA1C Testing and Control   Lab Results   Component Value Date    HGBA1C 9.6 (H) 04/03/2023      Annually/Less than 8% No   Lipid profile : 12/23/2022 Annually Yes   LDL control Lab Results   Component Value Date    LDLCALC 76.2 12/23/2022    Annually/Less than 100 mg/dl  Yes   Nephropathy screening Lab Results   Component Value Date    LABMICR 591.0 12/23/2022     Lab Results   Component Value Date    PROTEINUA Negative 04/14/2022     Lab Results   Component Value Date    UTPCR 0.59 (H) 07/22/2022      Annually Yes   Blood pressure BP Readings from Last 1 Encounters:   05/30/23 136/80    Less than 140/90 Yes   Dilated retinal exam : 01/05/2022 Annually No   Foot exam   Most Recent Foot Exam Date: Not Found Annually No   Patient's medications, allergies, past medical, surgical, social and family histories were reviewed and updated as appropriate.     Review of Systems   Constitutional:  Negative for weight loss.   Eyes:  Negative for blurred vision and double vision.   Cardiovascular:  Negative for chest pain.   Gastrointestinal:  Negative for nausea and vomiting.   Genitourinary:  Negative for frequency.   Musculoskeletal:  Negative for falls.   Neurological:  Negative for dizziness and weakness.   Endo/Heme/Allergies:  Negative for polydipsia.   Psychiatric/Behavioral:  Negative for depression.    All other systems reviewed and are negative.          Physical Exam  Constitutional:       Appearance: Normal appearance.   HENT:      Head: Normocephalic and atraumatic.   Pulmonary:      Effort: No respiratory distress.   Musculoskeletal:      Cervical back: Normal range of motion.   Neurological:      Mental Status: She is alert and oriented to person,  place, and time.   Psychiatric:         Mood and Affect: Mood normal.         Behavior: Behavior normal.      There were no vitals taken for this visit.  Wt Readings from Last 3 Encounters:   05/30/23 74.4 kg (164 lb 0.4 oz)   05/30/23 74.4 kg (164 lb)   03/29/23 67.6 kg (149 lb)     LAB REVIEW  Lab Results   Component Value Date     03/21/2023    K 4.7 03/21/2023     03/21/2023    CO2 19 (L) 03/21/2023    BUN 41 (H) 03/21/2023    CREATININE 1.9 (H) 03/21/2023    CALCIUM 9.9 03/21/2023    ANIONGAP 16 03/21/2023    EGFRNORACEVR 24.8 (A) 03/21/2023     No results found for: CPEPTIDE, GLUTAMICACID, INSLNABS  Hemoglobin A1C   Date Value Ref Range Status   04/03/2023 9.6 (H) 4.0 - 5.6 % Final     Comment:     ADA Screening Guidelines:  5.7-6.4%  Consistent with prediabetes  >or=6.5%  Consistent with diabetes    High levels of fetal hemoglobin interfere with the HbA1C  assay. Heterozygous hemoglobin variants (HbS, HgC, etc)do  not significantly interfere with this assay.   However, presence of multiple variants may affect accuracy.     12/23/2022 9.4 (H) 4.0 - 5.6 % Final     Comment:     ADA Screening Guidelines:  5.7-6.4%  Consistent with prediabetes  >or=6.5%  Consistent with diabetes    High levels of fetal hemoglobin interfere with the HbA1C  assay. Heterozygous hemoglobin variants (HbS, HgC, etc)do  not significantly interfere with this assay.   However, presence of multiple variants may affect accuracy.     03/03/2022 8.7 (H) 4.0 - 5.6 % Final     Comment:     ADA Screening Guidelines:  5.7-6.4%  Consistent with prediabetes  >or=6.5%  Consistent with diabetes    High levels of fetal hemoglobin interfere with the HbA1C  assay. Heterozygous hemoglobin variants (HbS, HgC, etc)do  not significantly interfere with this assay.   However, presence of multiple variants may affect accuracy.         ASSESSMENT    ICD-10-CM ICD-9-CM   1. Type 2 diabetes mellitus without complication, without long-term current use  of insulin  E11.9 250.00   2. Diabetes mellitus with stage 4 chronic kidney disease GFR 15-29  E11.22 250.40    N18.4 585.4        PLAN  Diagnoses and all orders for this visit:    Type 2 diabetes mellitus without complication, without long-term current use of insulin  -     alcohol swabs (BD ALCOHOL SWABS) PadM; Apply 1 each topically 4 (four) times daily.    Diabetes mellitus with stage 4 chronic kidney disease GFR 15-29  -     insulin glargine 100 units/mL SubQ pen; Inject 11 Units into the skin once daily.        Reviewed pathophysiology of diabetes, complications related to the disease, importance of annual dilated eye exam and daily foot examination. Explained MOA, SE, dosage of medications. Written instructions given and reviewed with patient and patient verbalizes understanding.     2023 - Will need to fax patient instructions to [facility] [phone]    PATIENT INSTRUCTIONS    Fax glucose logs to [facility] [phone] on 23 and 23.   Notify patient's daughter, Tova Sánchez, for any glucose level less than 80.     Change Lantus to 11 units subcutaneously every morning.   Continue Novolog three times daily with meals   2 units before breakfast.   6 units before lunch  6 units before supper    Check blood sugar 4 times daily daily. Before each meal and at bedtime.    Blood Sugar Goals:       Fastin-150.       1-2 hours after a meal: Less than 200.       Follow up in about 2 weeks (around 6/15/2023) for VIRTUAL.    Portions of this note were prepared with [employer] Naturally Speaking voice recognition transcription software. Grammatical errors, including garbled syntax, mangle pronouns, and other bizarre constructions may be attributed to that software system.

## 2023-06-01 NOTE — PATIENT INSTRUCTIONS
PATIENT INSTRUCTIONS    Fax glucose logs to [facility] [phone] on 23 and 23.   Notify patient's daughter, Tova Sánchez, for any glucose level less than 80.     Change Lantus to 11 units subcutaneously every morning.   Continue Novolog three times daily with meals   2 units before breakfast.   6 units before lunch  6 units before supper    Check blood sugar 4 times daily daily. Before each meal and at bedtime.    Blood Sugar Goals:       Fastin-150.       1-2 hours after a meal: Less than 200.

## 2023-06-04 NOTE — PROGRESS NOTES
Subjective:     Patient ID: Kassi Skelton is a 89 y.o. female.    Chief Complaint: Wound Care (Right ankle ulcer f/u, pt c/o  0/10 pain, diabetic pt wears slippers, PCP Dr. Cook last seen 5-30-23)    Kassi is a 89 y.o. female who presents to the clinic for evaluation and treatment of high risk feet. Kassi has a past medical history of *Atrial fibrillation (4/19/2012), *Atrial flutter (4/19/2012), Allergy, Ankle fracture (4/19/2012), Anticoagulant long-term use, Anxiety, Arthritis, Bacterial pneumonia, unspecified (12/7/2012), Breast cancer, CKD (chronic kidney disease), stage III (5/11/2012), Dependent on walker for ambulation, Depression (4/19/2012), Diabetes mellitus with stage 3 chronic kidney disease (2/1/2016), Diabetes with neurologic complications, Edentulous, HEARING LOSS, Hip fracture, right (4/19/2012), HLD (hyperlipidemia) (4/19/2012), HTN (hypertension) (4/19/2012), Hypothyroidism (4/19/2012), 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 The patient's chief complaint is wound check and long, thick toenails. This patient has documented high risk feet requiring routine maintenance secondary to diabetes mellitis and those secondary complications of diabetes, as mentioned. Patient is accompanied by her daughter. Patient's daughter states home health is still coming out. Patient has no other pedal complaint at this time.      PCP: Mor Cook MD    Date Last Seen by PCP: 05/30/2023    Current shoe gear:  Affected Foot: Slip-on shoes     Unaffected Foot: Slip-on shoes    Hemoglobin A1C   Date Value Ref Range Status   04/03/2023 9.6 (H)  4.0 - 5.6 % Final     Comment:     ADA Screening Guidelines:  5.7-6.4%  Consistent with prediabetes  >or=6.5%  Consistent with diabetes    High levels of fetal hemoglobin interfere with the HbA1C  assay. Heterozygous hemoglobin variants (HbS, HgC, etc)do  not significantly interfere with this assay.   However, presence of multiple variants may affect accuracy.     12/23/2022 9.4 (H) 4.0 - 5.6 % Final     Comment:     ADA Screening Guidelines:  5.7-6.4%  Consistent with prediabetes  >or=6.5%  Consistent with diabetes    High levels of fetal hemoglobin interfere with the HbA1C  assay. Heterozygous hemoglobin variants (HbS, HgC, etc)do  not significantly interfere with this assay.   However, presence of multiple variants may affect accuracy.     03/03/2022 8.7 (H) 4.0 - 5.6 % Final     Comment:     ADA Screening Guidelines:  5.7-6.4%  Consistent with prediabetes  >or=6.5%  Consistent with diabetes    High levels of fetal hemoglobin interfere with the HbA1C  assay. Heterozygous hemoglobin variants (HbS, HgC, etc)do  not significantly interfere with this assay.   However, presence of multiple variants may affect accuracy.         Patient Active Problem List   Diagnosis    History of atrial flutter    Combined hyperlipidemia associated with type 2 diabetes mellitus    Hypothyroidism    Major depressive disorder, recurrent episode, moderate    Osteoporosis    Benign hypertensive kidney disease with chronic kidney disease    Acquired cyst of kidney    Persistent atrial fibrillation    Anticoagulation monitoring by pharmacist    LBBB (left bundle branch block)    Sinus node dysfunction    Cardiac pacemaker in situ    Urinary incontinence, mixed    Pacemaker    Orthostatic hypotension    DCIS (ductal carcinoma in situ)    Chronic kidney disease, stage IV (severe)    Hypertension associated with diabetes    Diabetes mellitus with stage 4 chronic kidney disease GFR 15-29    Dizziness    Secondary hyperparathyroidism of renal  "origin    Hearing aid worn    Diabetic neuropathy, type II diabetes mellitus    Mild cognitive impairment with memory loss    Primary osteoarthritis    Edentulous    Greater trochanteric bursitis of right hip    Lung granuloma    Osteomyelitis of second toe of left foot    Diabetic nephropathy associated with type 2 diabetes mellitus    Proteinuria    Neuropathic foot ulcer, left, limited to breakdown of skin       Medication List with Changes/Refills   Current Medications    ACCU-CHEK SHELDON CONTROL SOLN SOLN    Use as directed    ACCU-CHEK SHELDON PLUS TEST STRP STRP    TEST BLOOD SUGAR THREE TIMES DAILY    ACETAMINOPHEN (TYLENOL) 500 MG TABLET    Take 2 tablets (1,000 mg total) by mouth every 6 (six) hours as needed for Pain.    BENAZEPRIL (LOTENSIN) 20 MG TABLET    TAKE 1 TABLET (20 MG TOTAL) BY MOUTH ONCE DAILY.    BLOOD-GLUCOSE METER KIT    To check BG once daily, to use with insurance preferred meter    CALCITRIOL (ROCALTROL) 0.25 MCG CAP    TAKE 1 CAPSULE EVERY DAY (NEED MD APPOINTMENT)    DONEPEZIL (ARICEPT) 5 MG TABLET    TAKE 1 TABLET EVERY EVENING    ELIQUIS 2.5 MG TAB    TAKE 1 TABLET TWICE DAILY    HYDROCHLOROTHIAZIDE (HYDRODIURIL) 25 MG TABLET    TAKE 1 TABLET EVERY DAY    INSULIN ASPART U-100 (NOVOLOG FLEXPEN U-100 INSULIN) 100 UNIT/ML (3 ML) INPN PEN    Inject subcutaneously 2 units before breakfast and 6 units before lunch and 6 units before supper.    LANCETS (ACCU-CHEK SOFTCLIX LANCETS) MISC    TEST BLOOD SUGAR THREE TIMES DAILY before meals    LEVOTHYROXINE (SYNTHROID) 25 MCG TABLET    TAKE 1 TABLET BEFORE BREAKFAST    METOPROLOL TARTRATE (LOPRESSOR) 50 MG TABLET    TAKE 1/2 TABLET TWICE DAILY    MIRTAZAPINE (REMERON) 45 MG TABLET    TAKE 1 TABLET EVERY EVENING    MYRBETRIQ 50 MG TB24    TAKE 1 TABLET (50 MG TOTAL) BY MOUTH ONCE DAILY.    PEN NEEDLE, DIABETIC (PEN NEEDLE) 31 GAUGE X 3/16" NDLE    Use as directed 4 times daily    PRAVASTATIN (PRAVACHOL) 40 MG TABLET    TAKE 1 TABLET EVERY DAY    " VENLAFAXINE (EFFEXOR-XR) 150 MG CP24    TAKE 1 CAPSULE EVERY DAY    VENLAFAXINE (EFFEXOR-XR) 75 MG 24 HR CAPSULE    TAKE 1 CAPSULE EVERY DAY    VERAPAMIL (CALAN-SR) 120 MG CR TABLET    TAKE 1 TABLET EVERY NIGHT   Changed and/or Refilled Medications    Modified Medication Previous Medication    ALCOHOL SWABS (BD ALCOHOL SWABS) PADM BD ALCOHOL SWABS PadM       Apply 1 each topically 4 (four) times daily.    USE AS NEEDED    INSULIN GLARGINE 100 UNITS/ML SUBQ PEN insulin glargine 100 units/mL SubQ pen       Inject 11 Units into the skin once daily.    Inject 11 Units into the skin every evening.       Review of patient's allergies indicates:   Allergen Reactions    Amlodipine Edema     Pt stated this medication made her legs swell    Alendronate sodium Other (See Comments)     Reaction: Esophageal bleeding    Fosamax [alendronate] Other (See Comments)     Reaction: Esophageal bleeding  diarrhea    Sorbitol      Diarrhea     Augmentin [amoxicillin-pot clavulanate] Rash     Yeast infection, give oral anti-fungal       Past Surgical History:   Procedure Laterality Date    APPENDECTOMY      BREAST LUMPECTOMY Right 10/21/2015    CARDIAC PACEMAKER PLACEMENT  12/3/12    Sinus node dysfunction    CATARACT EXTRACTION W/  INTRAOCULAR LENS IMPLANT Bilateral     COLONOSCOPY      EXCISION OF LESION Left 6/3/2021    Procedure: EXCISION, LESION  forearm;  Surgeon: Saman Quintero MD;  Location: [facility] OR;  Service: General;  Laterality: Left;    excision of squamous cell carcinoma Right 2017    EYE SURGERY Bilateral     PHACO/IOL    FRACTURE SURGERY Right     ankle    HYSTERECTOMY      total 1970's    INSERTION, PACEMAKER, TEMPORARY TRANSVENOUS  1/26/2023    Procedure: Insertion, Pacemaker, Temporary Transvenous;  Surgeon: Jaime Swanson MD;  Location: [facility];  Service: Cardiology;;    MASTECTOMY Right 11/2015    OOPHORECTOMY      REPLACEMENT OF PACEMAKER GENERATOR N/A 1/26/2023    Procedure: REPLACEMENT, PACEMAKER  GENERATOR;  Surgeon: Jaime Swanson MD;  Location: [facility];  Service: Cardiology;  Laterality: N/A;    TONSILLECTOMY      TOTAL HIP ARTHROPLASTY Right 2007       Family History   Problem Relation Age of Onset    Hypertension Mother     Heart disease Father     Stroke Father         cerebral hemorrhage    Coronary artery disease Brother     Heart disease Brother     Coronary artery disease Brother     COPD Brother     Heart disease Brother     Mitral valve prolapse Daughter     Peripheral vascular disease Son     Cataracts Neg Hx     Glaucoma Neg Hx     Macular degeneration Neg Hx     Retinal detachment Neg Hx     Strabismus Neg Hx        Social History     Socioeconomic History    Marital status:     Number of children: 2   Tobacco Use    Smoking status: Never    Smokeless tobacco: Never   Substance and Sexual Activity    Alcohol use: No    Drug use: No    Sexual activity: Not Currently     Social Determinants of Health     Financial Resource Strain: Low Risk     Difficulty of Paying Living Expenses: Not hard at all   Food Insecurity: No Food Insecurity    Worried About Running Out of Food in the Last Year: Never true    Ran Out of Food in the Last Year: Never true   Transportation Needs: No Transportation Needs    Lack of Transportation (Medical): No    Lack of Transportation (Non-Medical): No   Physical Activity: Inactive    Days of Exercise per Week: 0 days    Minutes of Exercise per Session: 0 min   Housing Stability: Unknown    Unable to Pay for Housing in the Last Year: No    Unstable Housing in the Last Year: No       Vitals:    05/30/23 1525   Weight: 74.4 kg (164 lb 0.4 oz)   Height: 5' (1.524 m)   PainSc: 0-No pain         Hemoglobin A1C   Date Value Ref Range Status   04/03/2023 9.6 (H) 4.0 - 5.6 % Final     Comment:     ADA Screening Guidelines:  5.7-6.4%  Consistent with prediabetes  >or=6.5%  Consistent with diabetes    High levels of fetal hemoglobin interfere with the HbA1C  assay.  Heterozygous hemoglobin variants (HbS, HgC, etc)do  not significantly interfere with this assay.   However, presence of multiple variants may affect accuracy.     12/23/2022 9.4 (H) 4.0 - 5.6 % Final     Comment:     ADA Screening Guidelines:  5.7-6.4%  Consistent with prediabetes  >or=6.5%  Consistent with diabetes    High levels of fetal hemoglobin interfere with the HbA1C  assay. Heterozygous hemoglobin variants (HbS, HgC, etc)do  not significantly interfere with this assay.   However, presence of multiple variants may affect accuracy.     03/03/2022 8.7 (H) 4.0 - 5.6 % Final     Comment:     ADA Screening Guidelines:  5.7-6.4%  Consistent with prediabetes  >or=6.5%  Consistent with diabetes    High levels of fetal hemoglobin interfere with the HbA1C  assay. Heterozygous hemoglobin variants (HbS, HgC, etc)do  not significantly interfere with this assay.   However, presence of multiple variants may affect accuracy.         Review of Systems   Constitutional:  Negative for chills and fever.   Respiratory:  Negative for shortness of breath.    Cardiovascular:  Negative for chest pain, palpitations, orthopnea, claudication and leg swelling.   Gastrointestinal:  Negative for diarrhea, nausea and vomiting.   Musculoskeletal:  Negative for joint pain.   Skin:  Negative for rash.   Neurological:  Positive for tingling and sensory change.   Psychiatric/Behavioral: Negative.             Objective:      PHYSICAL EXAM: Apperance: Alert and orient in no distress,well developed, and with good attention to grooming and body habits  Patient ambulating in slippers.   Lower Extremity Exam  VASCULAR: Dorsalis pedis pulses 1/4 bilateral and Posterior Tibial pulses 1/4 bilateral. Capillary fill time <4 seconds bilateral. Mild edema observed bilateral. Varicosities present bilateral. Skin temperature of the lower extremities is warm to warm, proximal to distal. Hair growth absent bilateral. (--) lymphangitis or (--) cellulitis noted  bilateral.  DERMATOLOGICAL: (--) edema, (--) erythema, (--) malodor, (--) drainage, (--) warmth to bilateral foot. Mild hyperkeratotic tissue noted to left plantar 1st submetatarsal. Post debridement no open area noted. Previous ulcer noted to right medial ankle thin epithealized tissue. Previous ulcer noted to right lateral ankle closed with thin epithealized tissue. Ulcer noted to right lateral/dorsal styloid process with granular/slough base and with a 1 mm yellow/white border and hyperkeratosis surrounding ulcer. Ulcer measurement [id number]. The ulcer does not extend into deeper tissue and (--) sinus tracts exist. Nails 1,2,3,4,5 bilateral elongated, thickened, and discolored with subungual debris. Webspaces 1,2,3,4 clean, dry and without evidence of break in skin integrity bilateral.  NEUROLOGICAL: Light touch, sharp-dull, proprioception all present and equal bilaterally.   MUSCULOSKELETAL: Muscle strength is 5/5 for foot inverters, everters, plantarflexors, and dorsiflexors. Muscle tone is normal. Fat pad atrophy noted bilateral. Hammertoes noted bilateral.     05/30/2023                01/03/2023                10/04/2022                      Assessment:       ICD-10-CM ICD-9-CM   1. Neuropathic foot ulcer, right, with fat layer exposed - Right Foot  L97.512 707.15   2. Healed ulcer of right foot  Z87.2 V13.3   3. Onychomycosis due to dermatophyte  B35.1 110.1   4. Type 2 diabetes mellitus with diabetic neuropathy, with long-term current use of insulin  E11.40 250.60    Z79.4 357.2     V58.67             Plan:   Neuropathic foot ulcer, right, with fat layer exposed - Right Foot    Healed ulcer of right foot    Onychomycosis due to dermatophyte    Type 2 diabetes mellitus with diabetic neuropathy, with long-term current use of insulin        I counseled the patient on her conditions, regarding findings of my examination, my impressions, and usual treatment plan.   Shoe inspection. Diabetic Foot Education.  Patient reminded of the importance of good nutrition and blood sugar control to help prevent podiatric complications of diabetes. Patient instructed on proper foot hygeine. We discussed wearing proper shoe gear, daily foot inspections, never walking without protective shoe gear, never putting sharp instruments to feet.    With patient's permission, nails 1-5 bilateral were debrided/trimmed in length and thickness aggressively to their soft tissue attachment mechanically and with electric , removing all offending nail and debris. Patient relates relief following the procedure.  With patient's permission, the right lateral foot ulcer wound was irrigated with sterile saline. The patient tolerated this well. Wound was then dressed with Iodosorb and Mepilex pad. Patient was given instructions on daily dressing changes. Patient was also instructed on the importance of keeping the wound and dressings clean dry and intact and keeping pressure off the wound area until complete healing of the wound.The patient is alerted to watch for any signs of infection (redness, pus, pain, increased swelling or fever) and call if such occurs.   Home health orders to be updated.   Patient  will continue to monitor the areas daily, inspect feet, wear protective shoe gear when ambulatory, moisturizer to maintain skin integrity. Patient reminded of the importance of good nutrition and blood sugar control to help prevent podiatric complications of diabetes.  Patient to return 2 months or sooner if needed.        Luzmaria Valle DPM  [facility]

## 2023-06-12 RX ORDER — METOPROLOL TARTRATE 50 MG/1
TABLET ORAL
Qty: 90 TABLET | Refills: 3 | Status: SHIPPED | OUTPATIENT
Start: 2023-06-12

## 2023-06-12 RX ORDER — VERAPAMIL HYDROCHLORIDE 120 MG/1
TABLET, FILM COATED, EXTENDED RELEASE ORAL
Qty: 90 TABLET | Refills: 3 | Status: SHIPPED | OUTPATIENT
Start: 2023-06-12

## 2023-06-15 ENCOUNTER — TELEPHONE (OUTPATIENT)
Dept: DIABETES | Facility: CLINIC | Age: 88
End: 2023-06-15
Payer: MEDICARE

## 2023-06-15 ENCOUNTER — OFFICE VISIT (OUTPATIENT)
Dept: DIABETES | Facility: CLINIC | Age: 88
End: 2023-06-15
Payer: MEDICARE

## 2023-06-15 DIAGNOSIS — E11.9 TYPE 2 DIABETES MELLITUS WITHOUT COMPLICATION, WITHOUT LONG-TERM CURRENT USE OF INSULIN: ICD-10-CM

## 2023-06-15 DIAGNOSIS — E11.9 TYPE 2 DIABETES MELLITUS WITHOUT COMPLICATION, WITHOUT LONG-TERM CURRENT USE OF INSULIN: Primary | ICD-10-CM

## 2023-06-15 PROCEDURE — 3072F PR LOW RISK FOR RETINOPATHY: ICD-10-PCS | Mod: CPTII,95,, | Performed by: NURSE PRACTITIONER

## 2023-06-15 PROCEDURE — 3072F LOW RISK FOR RETINOPATHY: CPT | Mod: CPTII,95,, | Performed by: NURSE PRACTITIONER

## 2023-06-15 PROCEDURE — 99214 PR OFFICE/OUTPT VISIT, EST, LEVL IV, 30-39 MIN: ICD-10-PCS | Mod: 95,,, | Performed by: NURSE PRACTITIONER

## 2023-06-15 PROCEDURE — 99214 OFFICE O/P EST MOD 30 MIN: CPT | Mod: 95,,, | Performed by: NURSE PRACTITIONER

## 2023-06-15 RX ORDER — ISOPROPYL ALCOHOL 70 ML/100ML
1 SWAB TOPICAL 4 TIMES DAILY
Qty: 360 EACH | Refills: 3 | Status: SHIPPED | OUTPATIENT
Start: 2023-06-15 | End: 2024-06-14

## 2023-06-15 RX ORDER — INSULIN DEGLUDEC 100 U/ML
13 INJECTION, SOLUTION SUBCUTANEOUS DAILY
Qty: 11.7 ML | Refills: 3 | Status: SHIPPED | OUTPATIENT
Start: 2023-06-15 | End: 2024-02-06

## 2023-06-15 RX ORDER — INSULIN GLARGINE 100 [IU]/ML
13 INJECTION, SOLUTION SUBCUTANEOUS DAILY
Qty: 12 ML | Refills: 3 | Status: SHIPPED | OUTPATIENT
Start: 2023-06-15 | End: 2023-06-15 | Stop reason: CLARIF

## 2023-06-15 NOTE — TELEPHONE ENCOUNTER
----- Message from KAMAR Muniz sent at 6/15/2023 10:56 AM CDT -----  VIRTUAL, 4 weeks    Fax patient instructions to [facility] [phone]

## 2023-06-15 NOTE — PATIENT INSTRUCTIONS
PATIENT INSTRUCTIONS     Fax glucose logs to [facility] [phone] on 23.   Notify patient's daughter, Tova Sánchez, for any glucose level less than 80.      Increase Lantus to 13 units subcutaneously every morning.   Change Novolog three times daily with meals   2 units before breakfast. - Increase to 4 units before breakfast.   6 units before lunch  6 units before supper     Check blood sugar 4 times daily daily. Before each meal and at bedtime.    Blood Sugar Goals:       Fastin-150.       1-2 hours after a meal: Less than 200.

## 2023-06-15 NOTE — PROGRESS NOTES
The patient location is: Home  The chief complaint leading to consultation is: Diabetes    Visit type: audiovisual    Face to Face time with patient: 15  30 minutes of total time spent on the encounter, which includes face to face time and non-face to face time preparing to see the patient (eg, review of tests), Obtaining and/or reviewing separately obtained history, Documenting clinical information in the electronic or other health record, Independently interpreting results (not separately reported) and communicating results to the patient/family/caregiver, or Care coordination (not separately reported).  Each patient to whom he or she provides medical services by telemedicine is:  (1) informed of the relationship between the physician and patient and the respective role of any other health care provider with respect to management of the patient; and (2) notified that he or she may decline to receive medical services by telemedicine and may withdraw from such care at any time.    Notes:    Kassi Skelton is a 89 y.o. female who presents for a follow up evaluation of Type 2 diabetes mellitus.     CHIEF COMPLAINT: Diabetes Consultation    PCP: Mor Cook MD      Initial visit with me - 6/1/2023    The patient was initially diagnosed with diabetes several years ago.   Patient lives in assisted living. Visit is completed with assistance of her daughter Tova Sánchez.      Previous failed treatments include:  Oral medications.      Social Documentation:  [facility]   Exercise: limited due to age and chronic medical conditions.   Edentulous, slightly pureed foods.   Patient is ambulatory with a walker.  Patient does have a life alert she wears and emergency buttons in bedroom and bathroom for assistance.   Has cognitive decline with memory loss at times.       Diabetes related complications:   nephropathy.   denies Pancreatitis  denies  Gastroparesis  denies DKA  denies Hx/family Hx of MEN2/MTC  denies Frequent UTIs/yeast infections     Cardiovascular Risk Factors: advanced age (>55 for men, >65 for women), dyslipidemia, hypertension, microalbuminuria, obesity (BMI >30 kg/m2), and sedentary lifestyle.    Diabetes Medications               insulin aspart U-100 (NOVOLOG FLEXPEN U-100 INSULIN) 100 unit/mL (3 mL) InPn pen Inject subcutaneously 2 units before breakfast and 6 units before lunch and 6 units before supper.    insulin glargine 100 units/mL SubQ pen Inject 11 Units into the skin once daily.     Current monitoring regimen: capillary blood glucose monitoring with finger sticks.    Recent hypoglycemic episodes: No.   Patient compliant with glucose checks and medication administration? Yes    DIABETES MANAGEMENT STATUS  Statin: Taking  ACE/ARB: Taking  Screening or Prevention Patient's value Goal Complete/Controlled?   HgA1C Testing and Control   Lab Results   Component Value Date    HGBA1C 9.6 (H) 04/03/2023      Annually/Less than 8% No   Lipid profile : 12/23/2022 Annually Yes   LDL control Lab Results   Component Value Date    LDLCALC 76.2 12/23/2022    Annually/Less than 100 mg/dl  Yes   Nephropathy screening Lab Results   Component Value Date    LABMICR 591.0 12/23/2022     Lab Results   Component Value Date    PROTEINUA Negative 04/14/2022     Lab Results   Component Value Date    UTPCR 0.59 (H) 07/22/2022      Annually Yes   Blood pressure BP Readings from Last 1 Encounters:   05/30/23 136/80    Less than 140/90 Yes   Dilated retinal exam : 01/05/2022 Annually No   Foot exam   Most Recent Foot Exam Date: Not Found Annually No   Patient's medications, allergies, surgical, social and family histories were reviewed and updated as appropriate.     Review of Systems   Constitutional:  Negative for weight loss.   Eyes:  Negative for blurred vision and double vision.   Cardiovascular:  Negative for chest pain.   Gastrointestinal:  Negative for  nausea and vomiting.   Genitourinary:  Negative for frequency.   Musculoskeletal:  Negative for falls.   Neurological:  Negative for dizziness and weakness.   Endo/Heme/Allergies:  Negative for polydipsia.   Psychiatric/Behavioral:  Negative for depression.    All other systems reviewed and are negative.     Physical Exam  Constitutional:       Appearance: Normal appearance.   HENT:      Head: Normocephalic and atraumatic.   Pulmonary:      Effort: No respiratory distress.   Musculoskeletal:      Cervical back: Normal range of motion.   Neurological:      Mental Status: She is alert and oriented to person, place, and time.   Psychiatric:         Mood and Affect: Mood normal.         Behavior: Behavior normal.      There were no vitals taken for this visit.  Wt Readings from Last 3 Encounters:   05/30/23 74.4 kg (164 lb 0.4 oz)   05/30/23 74.4 kg (164 lb)   03/29/23 67.6 kg (149 lb)       LAB REVIEW  Lab Results   Component Value Date     03/21/2023    K 4.7 03/21/2023     03/21/2023    CO2 19 (L) 03/21/2023    BUN 41 (H) 03/21/2023    CREATININE 1.9 (H) 03/21/2023    CALCIUM 9.9 03/21/2023    ANIONGAP 16 03/21/2023    EGFRNORACEVR 24.8 (A) 03/21/2023     No results found for: CPEPTIDE, GLUTAMICACID, INSLNABS  Hemoglobin A1C   Date Value Ref Range Status   04/03/2023 9.6 (H) 4.0 - 5.6 % Final     Comment:     ADA Screening Guidelines:  5.7-6.4%  Consistent with prediabetes  >or=6.5%  Consistent with diabetes    High levels of fetal hemoglobin interfere with the HbA1C  assay. Heterozygous hemoglobin variants (HbS, HgC, etc)do  not significantly interfere with this assay.   However, presence of multiple variants may affect accuracy.     12/23/2022 9.4 (H) 4.0 - 5.6 % Final     Comment:     ADA Screening Guidelines:  5.7-6.4%  Consistent with prediabetes  >or=6.5%  Consistent with diabetes    High levels of fetal hemoglobin interfere with the HbA1C  assay. Heterozygous hemoglobin variants (HbS, HgC,  etc)do  not significantly interfere with this assay.   However, presence of multiple variants may affect accuracy.     03/03/2022 8.7 (H) 4.0 - 5.6 % Final     Comment:     ADA Screening Guidelines:  5.7-6.4%  Consistent with prediabetes  >or=6.5%  Consistent with diabetes    High levels of fetal hemoglobin interfere with the HbA1C  assay. Heterozygous hemoglobin variants (HbS, HgC, etc)do  not significantly interfere with this assay.   However, presence of multiple variants may affect accuracy.          ASSESSMENT    ICD-10-CM ICD-9-CM   1. Type 2 diabetes mellitus without complication, without long-term current use of insulin  E11.9 250.00       PLAN  Diagnoses and all orders for this visit:    Type 2 diabetes mellitus without complication, without long-term current use of insulin  -     insulin (LANTUS SOLOSTAR U-100 INSULIN) glargine 100 units/mL SubQ pen; Inject 13 Units into the skin once daily.  -     alcohol swabs (BD ALCOHOL SWABS) PadM; Apply 1 each topically 4 (four) times daily.        Reviewed pathophysiology of diabetes, complications related to the disease, importance of annual dilated eye exam and daily foot examination. Explained MOA, SE, dosage of medications. Written instructions given and reviewed with patient and patient verbalizes understanding.     6/1/2023 - Will need to fax patient instructions to [facility] [phone]    6/15/2023 - Fax patient instructions to [facility] [phone]. Will increase Lantus to 13 units daily, and novolog to 4 units before breakfast. Fasting ranging between 150-200, higher during the day. Will increase breakfast novolog to 4 units. F/u 4 weeks. Lantus changed to Tresiba due to formulary changes.      PATIENT INSTRUCTIONS     Fax glucose logs to [facility] [phone] on Wednesday 7/12/23.   Notify patient's daughter, Tova Sánchez, for any glucose level less than 80.      Change Lantus to Tresiba and increase to 13 units  subcutaneously every morning.   Change Novolog three times daily with meals   2 units before breakfast. - Increase to 4 units before breakfast.   6 units before lunch  6 units before supper     Check blood sugar 4 times daily daily. Before each meal and at bedtime.    Blood Sugar Goals:       Fastin-150.       1-2 hours after a meal: Less than 200.      Follow up in about 4 weeks (around 2023) for VIRTUAL.    Portions of this note were prepared with [email or web address] voice recognition transcription software. Grammatical errors, including garbled syntax, mangle pronouns, and other bizarre constructions may be attributed to that software system.

## 2023-06-16 RX ORDER — INSULIN DEGLUDEC 100 U/ML
INJECTION, SOLUTION SUBCUTANEOUS
Refills: 0 | OUTPATIENT
Start: 2023-06-16

## 2023-07-04 PROCEDURE — G0179 MD RECERTIFICATION HHA PT: HCPCS | Mod: ,,, | Performed by: PODIATRIST

## 2023-07-04 PROCEDURE — G0179 PR HOME HEALTH MD RECERTIFICATION: ICD-10-PCS | Mod: ,,, | Performed by: PODIATRIST

## 2023-07-05 RX ORDER — MIRABEGRON 25 MG/1
25 TABLET, FILM COATED, EXTENDED RELEASE ORAL DAILY
Qty: 90 TABLET | Refills: 3 | Status: SHIPPED | OUTPATIENT
Start: 2023-07-05 | End: 2024-07-04

## 2023-07-05 RX ORDER — MIRABEGRON 50 MG/1
1 TABLET, FILM COATED, EXTENDED RELEASE ORAL DAILY
Qty: 90 TABLET | Refills: 3 | Status: SHIPPED | OUTPATIENT
Start: 2023-07-05 | End: 2023-07-05 | Stop reason: DRUGHIGH

## 2023-07-05 RX ORDER — LEVOTHYROXINE SODIUM 25 UG/1
25 TABLET ORAL
Qty: 90 TABLET | Refills: 2 | Status: SHIPPED | OUTPATIENT
Start: 2023-07-05

## 2023-07-05 NOTE — TELEPHONE ENCOUNTER
Refill Routing Note   Medication(s) are not appropriate for processing by [facility] for the following reason(s):         Requires labs  Required labs abnormal    [facility] action(s):  Defer  Approve           Appointments  past 12m or future 3m with PCP    Date Provider   Last Visit   5/30/2023 Mor Cook MD   Next Visit   Visit date not found Mor Cook MD   ED visits in past 90 days: 0        Note composed:10:49 AM 07/05/2023

## 2023-07-05 NOTE — TELEPHONE ENCOUNTER
Care Due:                  Date            Visit Type   Department     Provider  --------------------------------------------------------------------------------                                EP -                              PRIMARY      [facility] FAMILY  Last Visit: 05-      CARE ([facility])   MEDICINE       Mor Cook  Next Visit: None Scheduled  None         None Found                                                            Last  Test          Frequency    Reason                     Performed    Due Date  --------------------------------------------------------------------------------    HBA1C.......  6 months...  insulin..................  04- 09-    [facility] Embedded Care Due Messages. Reference number: [id number].   7/05/2023 2:07:54 AM CDT

## 2023-07-05 NOTE — TELEPHONE ENCOUNTER
I initially sent the Myrbetriq 50 mg refill as requested however we need to decrease this to 25 mg daily due to her kidney function.  I sent in a new prescription for 25 mg.  Please cancel the 50 mg.

## 2023-07-05 NOTE — TELEPHONE ENCOUNTER
Refill Routing Note   Medication(s) are not appropriate for processing by [facility] for the following reason(s):      Required labs abnormal: eGFR 24.8    [facility] action(s):  Defer  Approve Care Due:  None identified   Patient may benefit dose adjustment due to renal impairment: 25 mg daily; FLOS 8/30/23    Pharmacist review requested: Yes     Appointments  past 12m or future 3m with PCP    Date Provider   Last Visit   5/30/2023 Mor Cook MD   Next Visit   Visit date not found Mor Cook MD   ED visits in past 90 days: 0        Note composed:1:03 PM 07/05/2023

## 2023-07-13 ENCOUNTER — OFFICE VISIT (OUTPATIENT)
Dept: DIABETES | Facility: CLINIC | Age: 88
End: 2023-07-13
Payer: MEDICARE

## 2023-07-13 DIAGNOSIS — E11.9 TYPE 2 DIABETES MELLITUS WITHOUT COMPLICATION, WITHOUT LONG-TERM CURRENT USE OF INSULIN: Primary | ICD-10-CM

## 2023-07-13 PROCEDURE — 3072F PR LOW RISK FOR RETINOPATHY: ICD-10-PCS | Mod: HCNC,CPTII,95, | Performed by: NURSE PRACTITIONER

## 2023-07-13 PROCEDURE — 3072F LOW RISK FOR RETINOPATHY: CPT | Mod: HCNC,CPTII,95, | Performed by: NURSE PRACTITIONER

## 2023-07-13 PROCEDURE — 99214 OFFICE O/P EST MOD 30 MIN: CPT | Mod: HCNC,95,, | Performed by: NURSE PRACTITIONER

## 2023-07-13 PROCEDURE — 99214 PR OFFICE/OUTPT VISIT, EST, LEVL IV, 30-39 MIN: ICD-10-PCS | Mod: HCNC,95,, | Performed by: NURSE PRACTITIONER

## 2023-07-13 RX ORDER — INSULIN ASPART 100 [IU]/ML
INJECTION, SOLUTION INTRAVENOUS; SUBCUTANEOUS
Qty: 15 ML | Refills: 3 | Status: SHIPPED | OUTPATIENT
Start: 2023-07-13 | End: 2024-02-06

## 2023-07-13 NOTE — PROGRESS NOTES
The patient location is: Home  The chief complaint leading to consultation is: Diabetes    Visit type: audiovisual    Face to Face time with patient: 15  30 minutes of total time spent on the encounter, which includes face to face time and non-face to face time preparing to see the patient (eg, review of tests), Obtaining and/or reviewing separately obtained history, Documenting clinical information in the electronic or other health record, Independently interpreting results (not separately reported) and communicating results to the patient/family/caregiver, or Care coordination (not separately reported).  Each patient to whom he or she provides medical services by telemedicine is:  (1) informed of the relationship between the physician and patient and the respective role of any other health care provider with respect to management of the patient; and (2) notified that he or she may decline to receive medical services by telemedicine and may withdraw from such care at any time.    Notes:    Kassi Skelton is a 89 y.o. female who presents for a follow up evaluation of Type 2 diabetes mellitus.     CHIEF COMPLAINT: Diabetes Consultation    PCP: Mor Cook MD      Initial visit with me - 6/1/2023    The patient was initially diagnosed with diabetes several years ago.   Patient lives in assisted living. Visit is completed with assistance of her daughter Tova Sánchez.      Previous failed treatments include:  Oral medications.      Social Documentation:  [facility]   Exercise: limited due to age and chronic medical conditions.   Edentulous, slightly pureed foods.   Patient is ambulatory with a walker.  Patient does have a life alert she wears and emergency buttons in bedroom and bathroom for assistance.   Has cognitive decline with memory loss at times.       Diabetes related complications:   nephropathy.   denies Pancreatitis  denies  Gastroparesis  denies DKA  denies Hx/family Hx of MEN2/MTC  denies Frequent UTIs/yeast infections     Cardiovascular Risk Factors: advanced age (>55 for men, >65 for women), dyslipidemia, hypertension, microalbuminuria, obesity (BMI >30 kg/m2), and sedentary lifestyle.    Diabetes Medications               insulin aspart U-100 (NOVOLOG FLEXPEN U-100 INSULIN) 100 unit/mL (3 mL) InPn pen Inject subcutaneously 4 units before breakfast and 6 units before lunch and 6 units before supper.    insulin degludec (TRESIBA FLEXTOUCH U-100) 100 unit/mL (3 mL) insulin pen Inject 13 Units into the skin once daily.     Current monitoring regimen: capillary blood glucose monitoring with finger sticks.    Recent hypoglycemic episodes: No.   Patient compliant with glucose checks and medication administration? Yes    DIABETES MANAGEMENT STATUS  Statin: Taking  ACE/ARB: Taking  Screening or Prevention Patient's value Goal Complete/Controlled?   HgA1C Testing and Control   Lab Results   Component Value Date    HGBA1C 9.6 (H) 04/03/2023      Annually/Less than 8% No   Lipid profile : 12/23/2022 Annually Yes   LDL control Lab Results   Component Value Date    LDLCALC 76.2 12/23/2022    Annually/Less than 100 mg/dl  Yes   Nephropathy screening Lab Results   Component Value Date    LABMICR 591.0 12/23/2022     Lab Results   Component Value Date    PROTEINUA Negative 04/14/2022     Lab Results   Component Value Date    UTPCR 0.59 (H) 07/22/2022      Annually Yes   Blood pressure BP Readings from Last 1 Encounters:   05/30/23 136/80    Less than 140/90 Yes   Dilated retinal exam : 01/05/2022 Annually No   Foot exam   Most Recent Foot Exam Date: Not Found Annually No   Patient's medications, allergies, surgical, social and family histories were reviewed and updated as appropriate.     Review of Systems   Constitutional:  Negative for weight loss.   Eyes:  Negative for blurred vision and double vision.   Cardiovascular:  Negative for chest pain.    Gastrointestinal:  Negative for nausea and vomiting.   Genitourinary:  Negative for frequency.   Musculoskeletal:  Negative for falls.   Neurological:  Negative for dizziness and weakness.   Endo/Heme/Allergies:  Negative for polydipsia.   Psychiatric/Behavioral:  Negative for depression.    All other systems reviewed and are negative.     Physical Exam  Constitutional:       Appearance: Normal appearance.   HENT:      Head: Normocephalic and atraumatic.   Pulmonary:      Effort: No respiratory distress.   Musculoskeletal:      Cervical back: Normal range of motion.   Neurological:      Mental Status: She is alert and oriented to person, place, and time.   Psychiatric:         Mood and Affect: Mood normal.         Behavior: Behavior normal.      There were no vitals taken for this visit.  Wt Readings from Last 3 Encounters:   05/30/23 74.4 kg (164 lb 0.4 oz)   05/30/23 74.4 kg (164 lb)   03/29/23 67.6 kg (149 lb)       LAB REVIEW  Lab Results   Component Value Date     03/21/2023    K 4.7 03/21/2023     03/21/2023    CO2 19 (L) 03/21/2023    BUN 41 (H) 03/21/2023    CREATININE 1.9 (H) 03/21/2023    CALCIUM 9.9 03/21/2023    ANIONGAP 16 03/21/2023    EGFRNORACEVR 24.8 (A) 03/21/2023     No results found for: CPEPTIDE, GLUTAMICACID, INSLNABS  Hemoglobin A1C   Date Value Ref Range Status   04/03/2023 9.6 (H) 4.0 - 5.6 % Final     Comment:     ADA Screening Guidelines:  5.7-6.4%  Consistent with prediabetes  >or=6.5%  Consistent with diabetes    High levels of fetal hemoglobin interfere with the HbA1C  assay. Heterozygous hemoglobin variants (HbS, HgC, etc)do  not significantly interfere with this assay.   However, presence of multiple variants may affect accuracy.     12/23/2022 9.4 (H) 4.0 - 5.6 % Final     Comment:     ADA Screening Guidelines:  5.7-6.4%  Consistent with prediabetes  >or=6.5%  Consistent with diabetes    High levels of fetal hemoglobin interfere with the HbA1C  assay. Heterozygous  hemoglobin variants (HbS, HgC, etc)do  not significantly interfere with this assay.   However, presence of multiple variants may affect accuracy.     03/03/2022 8.7 (H) 4.0 - 5.6 % Final     Comment:     ADA Screening Guidelines:  5.7-6.4%  Consistent with prediabetes  >or=6.5%  Consistent with diabetes    High levels of fetal hemoglobin interfere with the HbA1C  assay. Heterozygous hemoglobin variants (HbS, HgC, etc)do  not significantly interfere with this assay.   However, presence of multiple variants may affect accuracy.          ASSESSMENT    ICD-10-CM ICD-9-CM   1. Type 2 diabetes mellitus without complication, without long-term current use of insulin  E11.9 250.00         PLAN  Diagnoses and all orders for this visit:    Type 2 diabetes mellitus without complication, without long-term current use of insulin  -     insulin aspart U-100 (NOVOLOG FLEXPEN U-100 INSULIN) 100 unit/mL (3 mL) InPn pen; Inject subcutaneously 4 units before breakfast and 6 units before lunch and 6 units before supper.          Reviewed pathophysiology of diabetes, complications related to the disease, importance of annual dilated eye exam and daily foot examination. Explained MOA, SE, dosage of medications. Written instructions given and reviewed with patient and patient verbalizes understanding.     6/1/2023 - Will need to fax [facility] instructions to [facility] [phone]    6/15/2023 - Fax patient instructions to [facility] [phone]. Will increase Lantus to 13 units daily, and novolog to 4 units before breakfast. Fasting ranging between 150-200, higher during the day. Will increase breakfast novolog to 4 units. F/u 4 weeks. Lantus changed to Tresiba due to formulary changes.      PATIENT INSTRUCTIONS     Fax glucose logs to [facility] [phone] prior to follow up visit.  Notify patient's daughter, Tova Sánchez, for any glucose level less than 80.      Continue Lantus 13 units  subcutaneously every morning. (May start using Tresiba once all Lantus is used).  Continue Novolog subcutaneously three times daily with meals   4 units before breakfast.    6 units before lunch  6 units before supper     Check blood sugar 4 times daily daily. Before each meal and at bedtime.    Blood Sugar Goals:       Fastin-150.       1-2 hours after a meal: Less than 200.      Follow up in about 3 months (around 10/13/2023) for VIRTUAL.    Portions of this note were prepared with [employer] voice recognition transcription software. Grammatical errors, including garbled syntax, mangle pronouns, and other bizarre constructions may be attributed to that software system.

## 2023-07-13 NOTE — Clinical Note
VIRTUAL, 3 months  Fax patient instructions to [facility] [phone].

## 2023-07-13 NOTE — PATIENT INSTRUCTIONS
PATIENT INSTRUCTIONS     Fax glucose logs to [facility] [phone] prior to follow up visit.  Notify patient's daughter, Tova Sánchez, for any glucose level less than 80.      Continue Lantus 13 units subcutaneously every morning. (May start using Tresiba once all Lantus is used).  Continue Novolog subcutaneously three times daily with meals   4 units before breakfast.    6 units before lunch  6 units before supper     Check blood sugar 4 times daily daily. Before each meal and at bedtime.    Blood Sugar Goals:       Fastin-150.       1-2 hours after a meal: Less than 200.

## 2023-07-14 ENCOUNTER — TELEPHONE (OUTPATIENT)
Dept: FAMILY MEDICINE | Facility: CLINIC | Age: 88
End: 2023-07-14
Payer: MEDICARE

## 2023-07-14 NOTE — TELEPHONE ENCOUNTER
----- Message from Josephine Kohler sent at 7/14/2023  4:04 PM CDT -----  Regarding: home health  Name of Who is Calling: [facility]          What is the request in detail: pt have a new wound left forearm and nurse needs order to treat           Can the clinic reply by [facility]: no           What Number to Call Back if not in [facility]: [phone]    
At visit today , HH nurse noticed patient has dressing on LUE, dressing removed and patient has large skin tear with some mild drainage noted, asking for order to evaluate and treat as they are currently treating the wound on her foot, please advise  
Order given  
ok  
80

## 2023-07-18 ENCOUNTER — OFFICE VISIT (OUTPATIENT)
Dept: PODIATRY | Facility: CLINIC | Age: 88
End: 2023-07-18
Payer: MEDICARE

## 2023-07-18 ENCOUNTER — DOCUMENT SCAN (OUTPATIENT)
Dept: HOME HEALTH SERVICES | Facility: HOSPITAL | Age: 88
End: 2023-07-18
Payer: MEDICARE

## 2023-07-18 ENCOUNTER — EXTERNAL HOME HEALTH (OUTPATIENT)
Dept: HOME HEALTH SERVICES | Facility: HOSPITAL | Age: 88
End: 2023-07-18
Payer: MEDICARE

## 2023-07-18 VITALS — BODY MASS INDEX: 32.2 KG/M2 | HEIGHT: 60 IN | WEIGHT: 164 LBS

## 2023-07-18 DIAGNOSIS — L97.511 NEUROPATHIC FOOT ULCER, RIGHT, LIMITED TO BREAKDOWN OF SKIN: Primary | ICD-10-CM

## 2023-07-18 DIAGNOSIS — Z79.4 TYPE 2 DIABETES MELLITUS WITH DIABETIC NEUROPATHY, WITH LONG-TERM CURRENT USE OF INSULIN: ICD-10-CM

## 2023-07-18 DIAGNOSIS — E11.40 TYPE 2 DIABETES MELLITUS WITH DIABETIC NEUROPATHY, WITH LONG-TERM CURRENT USE OF INSULIN: ICD-10-CM

## 2023-07-18 PROCEDURE — 3288F PR FALLS RISK ASSESSMENT DOCUMENTED: ICD-10-PCS | Mod: HCNC,CPTII,S$GLB, | Performed by: PODIATRIST

## 2023-07-18 PROCEDURE — 1160F PR REVIEW ALL MEDS BY PRESCRIBER/CLIN PHARMACIST DOCUMENTED: ICD-10-PCS | Mod: HCNC,CPTII,S$GLB, | Performed by: PODIATRIST

## 2023-07-18 PROCEDURE — 1101F PT FALLS ASSESS-DOCD LE1/YR: CPT | Mod: HCNC,CPTII,S$GLB, | Performed by: PODIATRIST

## 2023-07-18 PROCEDURE — 1159F MED LIST DOCD IN RCRD: CPT | Mod: HCNC,CPTII,S$GLB, | Performed by: PODIATRIST

## 2023-07-18 PROCEDURE — 3072F PR LOW RISK FOR RETINOPATHY: ICD-10-PCS | Mod: HCNC,CPTII,S$GLB, | Performed by: PODIATRIST

## 2023-07-18 PROCEDURE — 3288F FALL RISK ASSESSMENT DOCD: CPT | Mod: HCNC,CPTII,S$GLB, | Performed by: PODIATRIST

## 2023-07-18 PROCEDURE — 1101F PR PT FALLS ASSESS DOC 0-1 FALLS W/OUT INJ PAST YR: ICD-10-PCS | Mod: HCNC,CPTII,S$GLB, | Performed by: PODIATRIST

## 2023-07-18 PROCEDURE — 1159F PR MEDICATION LIST DOCUMENTED IN MEDICAL RECORD: ICD-10-PCS | Mod: HCNC,CPTII,S$GLB, | Performed by: PODIATRIST

## 2023-07-18 PROCEDURE — 1126F AMNT PAIN NOTED NONE PRSNT: CPT | Mod: HCNC,CPTII,S$GLB, | Performed by: PODIATRIST

## 2023-07-18 PROCEDURE — 1126F PR PAIN SEVERITY QUANTIFIED, NO PAIN PRESENT: ICD-10-PCS | Mod: HCNC,CPTII,S$GLB, | Performed by: PODIATRIST

## 2023-07-18 PROCEDURE — 99213 OFFICE O/P EST LOW 20 MIN: CPT | Mod: HCNC,S$GLB,, | Performed by: PODIATRIST

## 2023-07-18 PROCEDURE — 99213 PR OFFICE/OUTPT VISIT, EST, LEVL III, 20-29 MIN: ICD-10-PCS | Mod: HCNC,S$GLB,, | Performed by: PODIATRIST

## 2023-07-18 PROCEDURE — 3072F LOW RISK FOR RETINOPATHY: CPT | Mod: HCNC,CPTII,S$GLB, | Performed by: PODIATRIST

## 2023-07-18 PROCEDURE — 99999 PR PBB SHADOW E&M-EST. PATIENT-LVL IV: CPT | Mod: PBBFAC,HCNC,, | Performed by: PODIATRIST

## 2023-07-18 PROCEDURE — 99999 PR PBB SHADOW E&M-EST. PATIENT-LVL IV: ICD-10-PCS | Mod: PBBFAC,HCNC,, | Performed by: PODIATRIST

## 2023-07-18 PROCEDURE — 1160F RVW MEDS BY RX/DR IN RCRD: CPT | Mod: HCNC,CPTII,S$GLB, | Performed by: PODIATRIST

## 2023-07-19 NOTE — PROGRESS NOTES
Subjective:     Patient ID: Kassi Skelton is a 89 y.o. female.    Chief Complaint: Foot Ulcer (Pt is diabetic 0/10 pain wearing slip on shoes pcp Dr Cook last seen 05/30/23.)    Kassi is a 89 y.o. female who presents to the clinic for evaluation and treatment of high risk feet. Kassi has a past medical history of *Atrial fibrillation (4/19/2012), *Atrial flutter (4/19/2012), Allergy, Ankle fracture (4/19/2012), Anticoagulant long-term use, Anxiety, Arthritis, Bacterial pneumonia, unspecified (12/7/2012), Breast cancer, CKD (chronic kidney disease), stage III (5/11/2012), Dependent on walker for ambulation, Depression (4/19/2012), Diabetes mellitus with stage 3 chronic kidney disease (2/1/2016), Diabetes with neurologic complications, Edentulous, HEARING LOSS, Hip fracture, right (4/19/2012), HLD (hyperlipidemia) (4/19/2012), HTN (hypertension) (4/19/2012), Hypothyroidism (4/19/2012), 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 The patient's chief complaint is wound check . This patient has documented high risk feet requiring routine maintenance secondary to diabetes mellitis and those secondary complications of diabetes, as mentioned. Patient is accompanied by her daughter. Patient's daughter states home health is still coming out. Patient has no other pedal complaint at this time.      PCP: Mor Cook MD    Date Last Seen by PCP: 05/30/2023    Current shoe gear:  Affected Foot: Slip-on shoes     Unaffected Foot: Slip-on shoes    Hemoglobin A1C   Date Value Ref Range Status   04/03/2023 9.6 (H) 4.0 - 5.6 % Final     Comment:     ADA  Screening Guidelines:  5.7-6.4%  Consistent with prediabetes  >or=6.5%  Consistent with diabetes    High levels of fetal hemoglobin interfere with the HbA1C  assay. Heterozygous hemoglobin variants (HbS, HgC, etc)do  not significantly interfere with this assay.   However, presence of multiple variants may affect accuracy.     12/23/2022 9.4 (H) 4.0 - 5.6 % Final     Comment:     ADA Screening Guidelines:  5.7-6.4%  Consistent with prediabetes  >or=6.5%  Consistent with diabetes    High levels of fetal hemoglobin interfere with the HbA1C  assay. Heterozygous hemoglobin variants (HbS, HgC, etc)do  not significantly interfere with this assay.   However, presence of multiple variants may affect accuracy.     03/03/2022 8.7 (H) 4.0 - 5.6 % Final     Comment:     ADA Screening Guidelines:  5.7-6.4%  Consistent with prediabetes  >or=6.5%  Consistent with diabetes    High levels of fetal hemoglobin interfere with the HbA1C  assay. Heterozygous hemoglobin variants (HbS, HgC, etc)do  not significantly interfere with this assay.   However, presence of multiple variants may affect accuracy.         Patient Active Problem List   Diagnosis    History of atrial flutter    Combined hyperlipidemia associated with type 2 diabetes mellitus    Hypothyroidism    Major depressive disorder, recurrent episode, moderate    Osteoporosis    Benign hypertensive kidney disease with chronic kidney disease    Acquired cyst of kidney    Persistent atrial fibrillation    Anticoagulation monitoring by pharmacist    LBBB (left bundle branch block)    Sinus node dysfunction    Cardiac pacemaker in situ    Urinary incontinence, mixed    Pacemaker    Orthostatic hypotension    DCIS (ductal carcinoma in situ)    Chronic kidney disease, stage IV (severe)    Hypertension associated with diabetes    Diabetes mellitus with stage 4 chronic kidney disease GFR 15-29    Dizziness    Secondary hyperparathyroidism of renal origin    Hearing aid worn    Diabetic  neuropathy, type II diabetes mellitus    Mild cognitive impairment with memory loss    Primary osteoarthritis    Edentulous    Greater trochanteric bursitis of right hip    Lung granuloma    Osteomyelitis of second toe of left foot    Diabetic nephropathy associated with type 2 diabetes mellitus    Proteinuria    Neuropathic foot ulcer, left, limited to breakdown of skin       Medication List with Changes/Refills   Current Medications    ACCU-CHEK SHELDON CONTROL SOLN SOLN    Use as directed    ACCU-CHEK SHELDON PLUS TEST STRP STRP    TEST BLOOD SUGAR THREE TIMES DAILY    ACETAMINOPHEN (TYLENOL) 500 MG TABLET    Take 2 tablets (1,000 mg total) by mouth every 6 (six) hours as needed for Pain.    ALCOHOL SWABS (BD ALCOHOL SWABS) PADM    Apply 1 each topically 4 (four) times daily.    BENAZEPRIL (LOTENSIN) 20 MG TABLET    TAKE 1 TABLET (20 MG TOTAL) BY MOUTH ONCE DAILY.    BLOOD-GLUCOSE METER KIT    To check BG once daily, to use with insurance preferred meter    CALCITRIOL (ROCALTROL) 0.25 MCG CAP    TAKE 1 CAPSULE EVERY DAY (NEED MD APPOINTMENT)    DONEPEZIL (ARICEPT) 5 MG TABLET    TAKE 1 TABLET EVERY EVENING    ELIQUIS 2.5 MG TAB    TAKE 1 TABLET TWICE DAILY    HYDROCHLOROTHIAZIDE (HYDRODIURIL) 25 MG TABLET    TAKE 1 TABLET EVERY DAY    INSULIN ASPART U-100 (NOVOLOG FLEXPEN U-100 INSULIN) 100 UNIT/ML (3 ML) INPN PEN    Inject subcutaneously 4 units before breakfast and 6 units before lunch and 6 units before supper.    INSULIN DEGLUDEC (TRESIBA FLEXTOUCH U-100) 100 UNIT/ML (3 ML) INSULIN PEN    Inject 13 Units into the skin once daily.    LANCETS (ACCU-CHEK SOFTCLIX LANCETS) MISC    TEST BLOOD SUGAR THREE TIMES DAILY before meals    LEVOTHYROXINE (SYNTHROID) 25 MCG TABLET    Take 1 tablet (25 mcg total) by mouth before breakfast.    METOPROLOL TARTRATE (LOPRESSOR) 50 MG TABLET    TAKE 1/2 TABLET TWICE DAILY    MIRABEGRON (MYRBETRIQ) 25 MG TB24 ER TABLET    Take 1 tablet (25 mg total) by mouth once daily.    MIRTAZAPINE  "(REMERON) 45 MG TABLET    TAKE 1 TABLET EVERY EVENING    PEN NEEDLE, DIABETIC (PEN NEEDLE) 31 GAUGE X 3/16" NDLE    Use as directed 4 times daily    PRAVASTATIN (PRAVACHOL) 40 MG TABLET    TAKE 1 TABLET EVERY DAY    VENLAFAXINE (EFFEXOR-XR) 150 MG CP24    TAKE 1 CAPSULE EVERY DAY    VENLAFAXINE (EFFEXOR-XR) 75 MG 24 HR CAPSULE    TAKE 1 CAPSULE EVERY DAY    VERAPAMIL (CALAN-SR) 120 MG CR TABLET    TAKE 1 TABLET EVERY NIGHT       Review of patient's allergies indicates:   Allergen Reactions    Amlodipine Edema     Pt stated this medication made her legs swell    Alendronate sodium Other (See Comments)     Reaction: Esophageal bleeding    Fosamax [alendronate] Other (See Comments)     Reaction: Esophageal bleeding  diarrhea    Sorbitol      Diarrhea     Augmentin [amoxicillin-pot clavulanate] Rash     Yeast infection, give oral anti-fungal       Past Surgical History:   Procedure Laterality Date    APPENDECTOMY      BREAST LUMPECTOMY Right 10/21/2015    CARDIAC PACEMAKER PLACEMENT  12/3/12    Sinus node dysfunction    CATARACT EXTRACTION W/  INTRAOCULAR LENS IMPLANT Bilateral     COLONOSCOPY      EXCISION OF LESION Left 6/3/2021    Procedure: EXCISION, LESION  forearm;  Surgeon: Saman Quintero MD;  Location: [facility] OR;  Service: General;  Laterality: Left;    excision of squamous cell carcinoma Right 2017    EYE SURGERY Bilateral     PHACO/IOL    FRACTURE SURGERY Right     ankle    HYSTERECTOMY      total 1970's    INSERTION, PACEMAKER, TEMPORARY TRANSVENOUS  1/26/2023    Procedure: Insertion, Pacemaker, Temporary Transvenous;  Surgeon: Jaime Swanson MD;  Location: [facility] CATH;  Service: Cardiology;;    MASTECTOMY Right 11/2015    OOPHORECTOMY      REPLACEMENT OF PACEMAKER GENERATOR N/A 1/26/2023    Procedure: REPLACEMENT, PACEMAKER GENERATOR;  Surgeon: Jaime Swanson MD;  Location: [facility] CATH;  Service: Cardiology;  Laterality: N/A;    TONSILLECTOMY      TOTAL HIP ARTHROPLASTY Right 2007       Family " History   Problem Relation Age of Onset    Hypertension Mother     Heart disease Father     Stroke Father         cerebral hemorrhage    Coronary artery disease Brother     Heart disease Brother     Coronary artery disease Brother     COPD Brother     Heart disease Brother     Mitral valve prolapse Daughter     Peripheral vascular disease Son     Cataracts Neg Hx     Glaucoma Neg Hx     Macular degeneration Neg Hx     Retinal detachment Neg Hx     Strabismus Neg Hx        Social History     Socioeconomic History    Marital status:     Number of children: 2   Tobacco Use    Smoking status: Never    Smokeless tobacco: Never   Substance and Sexual Activity    Alcohol use: No    Drug use: No    Sexual activity: Not Currently     Social Determinants of Health     Financial Resource Strain: Low Risk     Difficulty of Paying Living Expenses: Not hard at all   Food Insecurity: No Food Insecurity    Worried About Running Out of Food in the Last Year: Never true    Ran Out of Food in the Last Year: Never true   Transportation Needs: No Transportation Needs    Lack of Transportation (Medical): No    Lack of Transportation (Non-Medical): No   Physical Activity: Inactive    Days of Exercise per Week: 0 days    Minutes of Exercise per Session: 0 min   Housing Stability: Unknown    Unable to Pay for Housing in the Last Year: No    Unstable Housing in the Last Year: No       Vitals:    07/18/23 1557   Weight: 74.4 kg (164 lb 0.4 oz)   Height: 5' (1.524 m)   PainSc: 0-No pain         Hemoglobin A1C   Date Value Ref Range Status   04/03/2023 9.6 (H) 4.0 - 5.6 % Final     Comment:     ADA Screening Guidelines:  5.7-6.4%  Consistent with prediabetes  >or=6.5%  Consistent with diabetes    High levels of fetal hemoglobin interfere with the HbA1C  assay. Heterozygous hemoglobin variants (HbS, HgC, etc)do  not significantly interfere with this assay.   However, presence of multiple variants may affect accuracy.     12/23/2022 9.4  (H) 4.0 - 5.6 % Final     Comment:     ADA Screening Guidelines:  5.7-6.4%  Consistent with prediabetes  >or=6.5%  Consistent with diabetes    High levels of fetal hemoglobin interfere with the HbA1C  assay. Heterozygous hemoglobin variants (HbS, HgC, etc)do  not significantly interfere with this assay.   However, presence of multiple variants may affect accuracy.     03/03/2022 8.7 (H) 4.0 - 5.6 % Final     Comment:     ADA Screening Guidelines:  5.7-6.4%  Consistent with prediabetes  >or=6.5%  Consistent with diabetes    High levels of fetal hemoglobin interfere with the HbA1C  assay. Heterozygous hemoglobin variants (HbS, HgC, etc)do  not significantly interfere with this assay.   However, presence of multiple variants may affect accuracy.         Review of Systems   Constitutional:  Negative for chills and fever.   Respiratory:  Negative for shortness of breath.    Cardiovascular:  Negative for chest pain, palpitations, orthopnea, claudication and leg swelling.   Gastrointestinal:  Negative for diarrhea, nausea and vomiting.   Musculoskeletal:  Negative for joint pain.   Skin:  Negative for rash.   Neurological:  Positive for tingling and sensory change.   Psychiatric/Behavioral: Negative.             Objective:      PHYSICAL EXAM: Apperance: Alert and orient in no distress,well developed, and with good attention to grooming and body habits  Patient ambulating in slippers.   Lower Extremity Exam  VASCULAR: Dorsalis pedis pulses 1/4 bilateral and Posterior Tibial pulses 1/4 bilateral. Capillary fill time <4 seconds bilateral. Mild edema observed bilateral. Varicosities present bilateral. Skin temperature of the lower extremities is warm to warm, proximal to distal. Hair growth absent bilateral. (--) lymphangitis or (--) cellulitis noted bilateral.  DERMATOLOGICAL: (--) edema, (--) erythema, (--) malodor, (--) drainage, (--) warmth to bilateral foot. Mild hyperkeratotic tissue noted to left plantar 1st  submetatarsal. Post debridement no open area noted. Previous ulcer noted to right medial ankle thin epithealized tissue. Previous ulcer noted to right lateral ankle closed with thin epithealized tissue. Ulcer noted to right lateral/dorsal styloid process with granularbase and with a 1 mm yellow/white border and hyperkeratosis surrounding ulcer. Ulcer measurement 0.3cmx0.3cmx0.1cm. The ulcer does not extend into deeper tissue and (--) sinus tracts exist. Nails 1,2,3,4,5 bilateral elongated, thickened, and discolored with subungual debris. Webspaces 1,2,3,4 clean, dry and without evidence of break in skin integrity bilateral.  NEUROLOGICAL: Light touch, sharp-dull, proprioception all present and equal bilaterally.   MUSCULOSKELETAL: Muscle strength is 5/5 for foot inverters, everters, plantarflexors, and dorsiflexors. Muscle tone is normal. Fat pad atrophy noted bilateral. Hammertoes noted bilateral.             Assessment:       ICD-10-CM ICD-9-CM   1. Neuropathic foot ulcer, right, limited to breakdown of skin - Right Foot  L97.511 707.15   2. Type 2 diabetes mellitus with diabetic neuropathy, with long-term current use of insulin  E11.40 250.60    Z79.4 357.2     V58.67       Plan:   Neuropathic foot ulcer, right, limited to breakdown of skin - Right Foot    Type 2 diabetes mellitus with diabetic neuropathy, with long-term current use of insulin        I counseled the patient on her conditions, regarding findings of my examination, my impressions, and usual treatment plan.   Shoe inspection. Diabetic Foot Education. Patient reminded of the importance of good nutrition and blood sugar control to help prevent podiatric complications of diabetes. Patient instructed on proper foot hygeine. We discussed wearing proper shoe gear, daily foot inspections, never walking without protective shoe gear, never putting sharp instruments to feet.    With patient's permission, the right lateral foot ulcer wound was irrigated with  sterile saline. The patient tolerated this well. Wound was then dressed with Iodosorb and Mepilex pad. Patient was given instructions on daily dressing changes. Patient was also instructed on the importance of keeping the wound and dressings clean dry and intact and keeping pressure off the wound area until complete healing of the wound.The patient is alerted to watch for any signs of infection (redness, pus, pain, increased swelling or fever) and call if such occurs.   Home health orders to continue.   Patient  will continue to monitor the areas daily, inspect feet, wear protective shoe gear when ambulatory, moisturizer to maintain skin integrity. Patient reminded of the importance of good nutrition and blood sugar control to help prevent podiatric complications of diabetes.  Patient to return 1 months or sooner if needed.        Luzmaria Valle DPM  [facility]

## 2023-07-21 ENCOUNTER — DOCUMENT SCAN (OUTPATIENT)
Dept: HOME HEALTH SERVICES | Facility: HOSPITAL | Age: 88
End: 2023-07-21
Payer: MEDICARE

## 2023-07-26 ENCOUNTER — DOCUMENT SCAN (OUTPATIENT)
Dept: HOME HEALTH SERVICES | Facility: HOSPITAL | Age: 88
End: 2023-07-26
Payer: MEDICARE

## 2023-08-05 ENCOUNTER — CLINICAL SUPPORT (OUTPATIENT)
Dept: CARDIOLOGY | Facility: HOSPITAL | Age: 88
End: 2023-08-05
Payer: MEDICARE

## 2023-08-05 DIAGNOSIS — Z95.0 PRESENCE OF CARDIAC PACEMAKER: ICD-10-CM

## 2023-08-05 PROCEDURE — 93296 REM INTERROG EVL PM/IDS: CPT | Mod: HCNC,PO | Performed by: INTERNAL MEDICINE

## 2023-08-29 ENCOUNTER — PATIENT MESSAGE (OUTPATIENT)
Dept: FAMILY MEDICINE | Facility: CLINIC | Age: 88
End: 2023-08-29
Payer: MEDICARE

## 2023-09-01 ENCOUNTER — PATIENT MESSAGE (OUTPATIENT)
Dept: FAMILY MEDICINE | Facility: CLINIC | Age: 88
End: 2023-09-01
Payer: MEDICARE

## 2023-09-02 PROCEDURE — G0179 PR HOME HEALTH MD RECERTIFICATION: ICD-10-PCS | Mod: ,,, | Performed by: PODIATRIST

## 2023-09-02 PROCEDURE — G0179 MD RECERTIFICATION HHA PT: HCPCS | Mod: ,,, | Performed by: PODIATRIST

## 2023-09-05 ENCOUNTER — DOCUMENT SCAN (OUTPATIENT)
Dept: HOME HEALTH SERVICES | Facility: HOSPITAL | Age: 88
End: 2023-09-05
Payer: MEDICARE

## 2023-09-06 ENCOUNTER — PATIENT MESSAGE (OUTPATIENT)
Dept: DIABETES | Facility: CLINIC | Age: 88
End: 2023-09-06
Payer: MEDICARE

## 2023-09-06 DIAGNOSIS — E11.9 TYPE 2 DIABETES MELLITUS WITHOUT COMPLICATION, WITHOUT LONG-TERM CURRENT USE OF INSULIN: Primary | ICD-10-CM

## 2023-09-08 ENCOUNTER — TELEPHONE (OUTPATIENT)
Dept: FAMILY MEDICINE | Facility: CLINIC | Age: 88
End: 2023-09-08
Payer: MEDICARE

## 2023-09-11 ENCOUNTER — E-VISIT (OUTPATIENT)
Dept: FAMILY MEDICINE | Facility: CLINIC | Age: 88
End: 2023-09-11
Payer: MEDICARE

## 2023-09-11 ENCOUNTER — PATIENT MESSAGE (OUTPATIENT)
Dept: FAMILY MEDICINE | Facility: CLINIC | Age: 88
End: 2023-09-11

## 2023-09-11 ENCOUNTER — TELEPHONE (OUTPATIENT)
Dept: FAMILY MEDICINE | Facility: CLINIC | Age: 88
End: 2023-09-11

## 2023-09-11 ENCOUNTER — LAB VISIT (OUTPATIENT)
Dept: LAB | Facility: HOSPITAL | Age: 88
End: 2023-09-11
Attending: FAMILY MEDICINE
Payer: MEDICARE

## 2023-09-11 DIAGNOSIS — N39.0 URINARY TRACT INFECTION WITHOUT HEMATURIA, SITE UNSPECIFIED: Primary | ICD-10-CM

## 2023-09-11 DIAGNOSIS — R35.0 URINARY FREQUENCY: ICD-10-CM

## 2023-09-11 LAB
BACTERIA #/AREA URNS HPF: ABNORMAL /HPF
BILIRUB UR QL STRIP: NEGATIVE
CLARITY UR: ABNORMAL
COLOR UR: YELLOW
GLUCOSE UR QL STRIP: NEGATIVE
HGB UR QL STRIP: ABNORMAL
HYALINE CASTS #/AREA URNS LPF: 0 /LPF
KETONES UR QL STRIP: NEGATIVE
LEUKOCYTE ESTERASE UR QL STRIP: ABNORMAL
MICROSCOPIC COMMENT: ABNORMAL
NITRITE UR QL STRIP: POSITIVE
PH UR STRIP: 6 [PH] (ref 5–8)
PROT UR QL STRIP: ABNORMAL
RBC #/AREA URNS HPF: 10 /HPF (ref 0–4)
SP GR UR STRIP: 1.02 (ref 1–1.03)
URN SPEC COLLECT METH UR: ABNORMAL
WBC #/AREA URNS HPF: >100 /HPF (ref 0–5)

## 2023-09-11 PROCEDURE — 87088 URINE BACTERIA CULTURE: CPT | Mod: HCNC | Performed by: FAMILY MEDICINE

## 2023-09-11 PROCEDURE — 81000 URINALYSIS NONAUTO W/SCOPE: CPT | Mod: HCNC,PO | Performed by: FAMILY MEDICINE

## 2023-09-11 PROCEDURE — 87077 CULTURE AEROBIC IDENTIFY: CPT | Mod: HCNC | Performed by: FAMILY MEDICINE

## 2023-09-11 PROCEDURE — 99421 PR E&M, ONLINE DIGIT, EST, < 7 DAYS, 5-10 MINS: ICD-10-PCS | Mod: ,,, | Performed by: FAMILY MEDICINE

## 2023-09-11 PROCEDURE — 99421 OL DIG E/M SVC 5-10 MIN: CPT | Mod: ,,, | Performed by: FAMILY MEDICINE

## 2023-09-11 PROCEDURE — 87186 SC STD MICRODIL/AGAR DIL: CPT | Mod: HCNC | Performed by: FAMILY MEDICINE

## 2023-09-11 PROCEDURE — 87086 URINE CULTURE/COLONY COUNT: CPT | Mod: HCNC | Performed by: FAMILY MEDICINE

## 2023-09-11 RX ORDER — SULFAMETHOXAZOLE AND TRIMETHOPRIM 400; 80 MG/1; MG/1
1 TABLET ORAL 2 TIMES DAILY
Qty: 14 TABLET | Refills: 0 | Status: SHIPPED | OUTPATIENT
Start: 2023-09-11 | End: 2023-09-11 | Stop reason: SDUPTHER

## 2023-09-11 RX ORDER — SULFAMETHOXAZOLE AND TRIMETHOPRIM 400; 80 MG/1; MG/1
1 TABLET ORAL 2 TIMES DAILY
Qty: 14 TABLET | Refills: 0 | Status: SHIPPED | OUTPATIENT
Start: 2023-09-11 | End: 2023-09-18

## 2023-09-11 NOTE — TELEPHONE ENCOUNTER
Sent a message to pts daughter via portal asking if she could come in and give a urine sample per E Visit done today . Will wait for a response.

## 2023-09-11 NOTE — PROGRESS NOTES
Patient ID: Kassi Skelton is a 89 y.o. female.    Chief Complaint: Urinary Tract Infection (Entered automatically based on patient selection in Patient Portal.)    The patient initiated a request through [employer] on 9/11/2023 for evaluation and management with a chief complaint of Urinary Tract Infection (Entered automatically based on patient selection in Patient Portal.)     I evaluated the questionnaire submission on 9/11/23.    [facility] Evisit Uti Questionnaire    9/11/2023 10:29 AM CDT - Filed by Patient   Do you agree to participate in an E-Visit? Yes   If you have any of the following problems, please present to your local ER or call 911:  I acknowledge   What is the main issue that you would like for your doctor to address today? Need to order a kidney specimen   Are you able to take your vital signs? No   What symptoms do you currently have? Change in urine appearance or smell   When did your symptoms first appear? 9/10/2023   List what you have done or taken to help your symptoms. Nothing   Please indicate whether you have had the following symptoms during the past 24 hours     Urgent urination (a sudden and uncontrollable urge to urinate) None   Frequent urination of small amounts of urine (going to the toilet very often) Moderate   Burning pain when urinating None   Incomplete bladder emptying (still feel like you need to urinate again after urination) None   Pain not associated with urination in the lower abdomen below the belly button) None   What does your urine look like? I am not sure   Blood seen in the urine None   Flank pain (pain in one or both sides of the lower back) None   Abnormal Vaginal Discharge (abnormal amount, color and/or odor) None   Discharge from the urethra (urinary opening) without urination None   High body temperature/fever? None-<99.5   Please rate how much discomfort you have experience because of the symptoms in the past 24 hours: None   Please indicate how the  symptoms have interfered with your every day activities/work in the past 24 hours: None   Please indicate how these symptoms have interfered with your social activities (visiting people, meeting with friends, etc.) in the past 24 hours? None   Are you a diabetic? Yes   Please indicate whether you have the following at the time of completion of this questionnaire: None of the above   Provide any information you feel is important to your history not asked above Slight to Moderate Confusion   Please attach any relevant images or files (if you have performed a home test for UTI, please submit a photo of results)          Recent Labs Obtained:  Lab Visit on 09/11/2023   Component Date Value Ref Range Status    Specimen UA 09/11/2023 Urine, Clean Catch   Final    Color, UA 09/11/2023 Yellow  Yellow, Straw, Tatyana Final    Appearance, UA 09/11/2023 Cloudy (A)  Clear Final    pH, UA 09/11/2023 6.0  5.0 - 8.0 Final    Specific Gravity, UA 09/11/2023 1.025  1.005 - 1.030 Final    Protein, UA 09/11/2023 3+ (A)  Negative Final    Comment: Recommend a 24 hour urine protein or a urine   protein/creatinine ratio if globulin induced proteinuria is  clinically suspected.      Glucose, UA 09/11/2023 Negative  Negative Final    Ketones, UA 09/11/2023 Negative  Negative Final    Bilirubin (UA) 09/11/2023 Negative  Negative Final    Occult Blood UA 09/11/2023 3+ (A)  Negative Final    Nitrite, UA 09/11/2023 Positive (A)  Negative Final    Leukocytes, UA 09/11/2023 3+ (A)  Negative Final    RBC, UA 09/11/2023 10 (H)  0 - 4 /hpf Final    WBC, UA 09/11/2023 >100 (H)  0 - 5 /hpf Final    Bacteria 09/11/2023 Many (A)  None-Occ /hpf Final    Hyaline Casts, UA 09/11/2023 0  0-1/lpf /lpf Final    Microscopic Comment 09/11/2023 SEE COMMENT   Final    Comment: Other formed elements not mentioned in the report are not   present in the microscopic examination.          Encounter Diagnoses   Name Primary?    Urinary frequency     Urinary tract  infection without hematuria, site unspecified Yes        Orders Placed This Encounter   Procedures    Urinalysis, Reflex to Urine Culture Urine, Clean Catch     Standing Status:   Future     Number of Occurrences:   1     Standing Expiration Date:   11/9/2024     Order Specific Question:   Preferred Collection Type     Answer:   Urine, Clean Catch     Order Specific Question:   Specimen Source     Answer:   Urine      Medications Ordered This Encounter   Medications    sulfamethoxazole-trimethoprim 400-80mg (BACTRIM,SEPTRA) 400-80 mg per tablet     Sig: Take 1 tablet by mouth 2 (two) times daily. for 7 days     Dispense:  14 tablet     Refill:  0        No follow-ups on file.  Symptoms consistent with UTI and urine sample is consistent with UTI.  I sent in a prescription for Bactrim take 1 twice daily pending urine culture results.  Please follow-up if symptoms worsen or not improving in the next 3 days.  Thanks,   Dr. Cook    E-Visit Time Tracking:    Day 1 Time (in minutes): 6     Total Time (in minutes): 6

## 2023-09-12 ENCOUNTER — LAB VISIT (OUTPATIENT)
Dept: LAB | Facility: HOSPITAL | Age: 88
End: 2023-09-12
Attending: FAMILY MEDICINE
Payer: MEDICARE

## 2023-09-12 DIAGNOSIS — N18.4 CHRONIC KIDNEY DISEASE, STAGE IV (SEVERE): ICD-10-CM

## 2023-09-12 DIAGNOSIS — E11.22 DIABETES MELLITUS WITH CHRONIC KIDNEY DISEASE: Primary | ICD-10-CM

## 2023-09-12 LAB
ESTIMATED AVG GLUCOSE: 189 MG/DL (ref 68–131)
HBA1C MFR BLD: 8.2 % (ref 4–5.6)

## 2023-09-12 PROCEDURE — 83036 HEMOGLOBIN GLYCOSYLATED A1C: CPT | Mod: HCNC | Performed by: FAMILY MEDICINE

## 2023-09-14 ENCOUNTER — TELEPHONE (OUTPATIENT)
Dept: FAMILY MEDICINE | Facility: CLINIC | Age: 88
End: 2023-09-14
Payer: MEDICARE

## 2023-09-14 DIAGNOSIS — E11.22 DIABETES MELLITUS WITH STAGE 4 CHRONIC KIDNEY DISEASE GFR 15-29: Primary | ICD-10-CM

## 2023-09-14 DIAGNOSIS — N18.4 DIABETES MELLITUS WITH STAGE 4 CHRONIC KIDNEY DISEASE GFR 15-29: Primary | ICD-10-CM

## 2023-09-14 LAB
BACTERIA UR CULT: ABNORMAL
BACTERIA UR CULT: ABNORMAL

## 2023-09-14 NOTE — TELEPHONE ENCOUNTER
----- Message from Mor Cook MD sent at 9/13/2023  8:33 AM CDT -----  Sugar elevated, but acceptable for age and medical condition. .Schedule TSH, lipid panel, CMP, hemoglobin A1c,  urine microalbumin within 6 months of the date of this last test.  My nurse will contact you to arrange.  Thanks,  Dr. Cook

## 2023-09-15 ENCOUNTER — DOCUMENT SCAN (OUTPATIENT)
Dept: HOME HEALTH SERVICES | Facility: HOSPITAL | Age: 88
End: 2023-09-15
Payer: MEDICARE

## 2023-09-19 ENCOUNTER — DOCUMENT SCAN (OUTPATIENT)
Dept: HOME HEALTH SERVICES | Facility: HOSPITAL | Age: 88
End: 2023-09-19
Payer: MEDICARE

## 2023-09-20 ENCOUNTER — PATIENT MESSAGE (OUTPATIENT)
Dept: NEPHROLOGY | Facility: CLINIC | Age: 88
End: 2023-09-20
Payer: MEDICARE

## 2023-09-25 ENCOUNTER — EXTERNAL HOME HEALTH (OUTPATIENT)
Dept: HOME HEALTH SERVICES | Facility: HOSPITAL | Age: 88
End: 2023-09-25
Payer: MEDICARE

## 2023-09-26 ENCOUNTER — LAB VISIT (OUTPATIENT)
Dept: LAB | Facility: HOSPITAL | Age: 88
End: 2023-09-26
Attending: INTERNAL MEDICINE
Payer: MEDICARE

## 2023-09-26 DIAGNOSIS — N18.32 CHRONIC KIDNEY DISEASE (CKD) STAGE G3B/A1, MODERATELY DECREASED GLOMERULAR FILTRATION RATE (GFR) BETWEEN 30-44 ML/MIN/1.73 SQUARE METER AND ALBUMINURIA CREATININE RATIO LESS THAN 30 MG/G: Primary | ICD-10-CM

## 2023-09-26 LAB
ALBUMIN SERPL BCP-MCNC: 4 G/DL (ref 3.5–5.2)
ANION GAP SERPL CALC-SCNC: 12 MMOL/L (ref 8–16)
BUN SERPL-MCNC: 60 MG/DL (ref 10–30)
CALCIUM SERPL-MCNC: 10.1 MG/DL (ref 8.7–10.5)
CHLORIDE SERPL-SCNC: 109 MMOL/L (ref 95–110)
CO2 SERPL-SCNC: 19 MMOL/L (ref 23–29)
CREAT SERPL-MCNC: 2.1 MG/DL (ref 0.5–1.4)
CREAT UR-MCNC: 95 MG/DL (ref 15–325)
EST. GFR  (NO RACE VARIABLE): 21.8 ML/MIN/1.73 M^2
GLUCOSE SERPL-MCNC: 115 MG/DL (ref 70–110)
PHOSPHATE SERPL-MCNC: 4.6 MG/DL (ref 2.7–4.5)
POTASSIUM SERPL-SCNC: 4.8 MMOL/L (ref 3.5–5.1)
PROT UR-MCNC: 13 MG/DL (ref 0–15)
PROT/CREAT UR: 0.14 MG/G{CREAT} (ref 0–0.2)
PTH-INTACT SERPL-MCNC: 119.2 PG/ML (ref 9–77)
SODIUM SERPL-SCNC: 140 MMOL/L (ref 136–145)

## 2023-09-26 PROCEDURE — 83970 ASSAY OF PARATHORMONE: CPT | Mod: HCNC | Performed by: INTERNAL MEDICINE

## 2023-09-26 PROCEDURE — 80069 RENAL FUNCTION PANEL: CPT | Mod: HCNC | Performed by: INTERNAL MEDICINE

## 2023-09-26 PROCEDURE — 84156 ASSAY OF PROTEIN URINE: CPT | Mod: HCNC | Performed by: INTERNAL MEDICINE

## 2023-09-28 ENCOUNTER — TELEPHONE (OUTPATIENT)
Dept: NEPHROLOGY | Facility: CLINIC | Age: 88
End: 2023-09-28
Payer: MEDICARE

## 2023-10-09 ENCOUNTER — PATIENT MESSAGE (OUTPATIENT)
Dept: NEPHROLOGY | Facility: CLINIC | Age: 88
End: 2023-10-09
Payer: MEDICARE

## 2023-10-10 ENCOUNTER — TELEPHONE (OUTPATIENT)
Dept: PODIATRY | Facility: CLINIC | Age: 88
End: 2023-10-10
Payer: MEDICARE

## 2023-10-10 ENCOUNTER — TELEPHONE (OUTPATIENT)
Dept: NEPHROLOGY | Facility: CLINIC | Age: 88
End: 2023-10-10
Payer: MEDICARE

## 2023-10-10 NOTE — TELEPHONE ENCOUNTER
Called the  nurse no answer, called the pt to rescheduled her appointment.            ----- Message from Sindy Skinner sent at 10/10/2023 12:17 PM CDT -----  Pts [facility] home health nurse is calling to let the nurse know the patients wound is getting bigger. It is measuring a 1 x 1.5 x 0.4 in depth. Call back at [phone]

## 2023-10-10 NOTE — TELEPHONE ENCOUNTER
----- Message from Harvey Barkley sent at 10/10/2023  2:52 PM CDT -----  Regarding: duplicate home health orders  Contact: Home health  Type:  Needs Medical Advice    Who Called: Tram hobbs/ [facility]    Would the patient rather a call back or a response via [facility]? Call back    Best Call Back Number: [phone]    Additional Information: Sts she needs to talk to the nurse about a possible duplicate order they received just to verify.   Please advise -- Thank you

## 2023-10-11 ENCOUNTER — OFFICE VISIT (OUTPATIENT)
Dept: DIABETES | Facility: CLINIC | Age: 88
End: 2023-10-11
Payer: MEDICARE

## 2023-10-11 DIAGNOSIS — E11.9 TYPE 2 DIABETES MELLITUS WITHOUT COMPLICATION, WITHOUT LONG-TERM CURRENT USE OF INSULIN: Primary | ICD-10-CM

## 2023-10-11 PROCEDURE — 3072F LOW RISK FOR RETINOPATHY: CPT | Mod: HCNC,CPTII,95, | Performed by: NURSE PRACTITIONER

## 2023-10-11 PROCEDURE — 3072F PR LOW RISK FOR RETINOPATHY: ICD-10-PCS | Mod: HCNC,CPTII,95, | Performed by: NURSE PRACTITIONER

## 2023-10-11 PROCEDURE — 99214 PR OFFICE/OUTPT VISIT, EST, LEVL IV, 30-39 MIN: ICD-10-PCS | Mod: HCNC,95,, | Performed by: NURSE PRACTITIONER

## 2023-10-11 PROCEDURE — 99214 OFFICE O/P EST MOD 30 MIN: CPT | Mod: HCNC,95,, | Performed by: NURSE PRACTITIONER

## 2023-10-11 NOTE — PATIENT INSTRUCTIONS
PATIENT INSTRUCTIONS     Fax glucose logs to [facility] [phone] prior to follow up visit.  Notify patient's daughter, Tova Sánchez, for any glucose level less than 90 or greater than 400.      Continue Tresiba 13 units subcutaneously every morning.    Continue Novolog subcutaneously three times daily with meals   4 units before breakfast.    6 units before lunch  6 units before supper     Check blood sugar 4 times daily daily. Before each meal and at bedtime.    Blood Sugar Goals:       Fastin-150.       1-2 hours after a meal: Less than 200.

## 2023-10-11 NOTE — Clinical Note
Virtual, 3 months  fax patient instructions to [facility] - [phone]

## 2023-10-11 NOTE — PROGRESS NOTES
The patient location is: Home  The chief complaint leading to consultation is: Diabetes    Visit type: audiovisual    Face to Face time with patient: 15  30 minutes of total time spent on the encounter, which includes face to face time and non-face to face time preparing to see the patient (eg, review of tests), Obtaining and/or reviewing separately obtained history, Documenting clinical information in the electronic or other health record, Independently interpreting results (not separately reported) and communicating results to the patient/family/caregiver, or Care coordination (not separately reported).  Each patient to whom he or she provides medical services by telemedicine is:  (1) informed of the relationship between the physician and patient and the respective role of any other health care provider with respect to management of the patient; and (2) notified that he or she may decline to receive medical services by telemedicine and may withdraw from such care at any time.    Notes:    Kassi Skelton is a 89 y.o. female who presents for a follow up evaluation of Type 2 diabetes mellitus.     CHIEF COMPLAINT: Diabetes Consultation    PCP: Mor Cook MD      Initial visit with me - 6/1/2023    The patient was initially diagnosed with diabetes several years ago.   Patient lives in assisted living. Visit is completed with assistance of her daughter Tova Sánchez.      Previous failed treatments include:  Oral medications.      Social Documentation:  [facility]   Exercise: limited due to age and chronic medical conditions.   Edentulous, slightly pureed foods.   Patient is ambulatory with a walker.  Patient does have a life alert she wears and emergency buttons in bedroom and bathroom for assistance.   Has cognitive decline with memory loss at times.       Diabetes related complications:   nephropathy.   denies Pancreatitis  denies  Gastroparesis  denies DKA  denies Hx/family Hx of MEN2/MTC  denies Frequent UTIs/yeast infections     Cardiovascular Risk Factors: advanced age (>55 for men, >65 for women), dyslipidemia, hypertension, microalbuminuria, obesity (BMI >30 kg/m2), and sedentary lifestyle.    Diabetes Medications               insulin aspart U-100 (NOVOLOG FLEXPEN U-100 INSULIN) 100 unit/mL (3 mL) InPn pen Inject subcutaneously 4 units before breakfast and 6 units before lunch and 6 units before supper.    insulin degludec (TRESIBA FLEXTOUCH U-100) 100 unit/mL (3 mL) insulin pen Inject 13 Units into the skin once daily.     Current monitoring regimen: capillary blood glucose monitoring with finger sticks.      Recent hypoglycemic episodes: No.   Patient compliant with glucose checks and medication administration? Yes    DIABETES MANAGEMENT STATUS  Statin: Taking  ACE/ARB: Taking  Screening or Prevention Patient's value Goal Complete/Controlled?   HgA1C Testing and Control   Lab Results   Component Value Date    HGBA1C 8.2 (H) 09/12/2023      Annually/Less than 8% No   Lipid profile : 12/23/2022 Annually Yes   LDL control Lab Results   Component Value Date    LDLCALC 76.2 12/23/2022    Annually/Less than 100 mg/dl  Yes   Nephropathy screening Lab Results   Component Value Date    LABMICR 591.0 12/23/2022     Lab Results   Component Value Date    PROTEINUA 3+ (A) 09/11/2023     Lab Results   Component Value Date    UTPCR 0.14 09/26/2023      Annually Yes   Blood pressure BP Readings from Last 1 Encounters:   05/30/23 136/80    Less than 140/90 Yes   Dilated retinal exam : 01/05/2022 Annually No   Foot exam   Most Recent Foot Exam Date: Not Found Annually No   Patient's medications, allergies, surgical, social and family histories were reviewed and updated as appropriate.     Review of Systems   Constitutional:  Negative for weight loss.   Eyes:  Negative for blurred vision and double vision.   Cardiovascular:  Negative for chest pain.  "  Gastrointestinal:  Negative for nausea and vomiting.   Genitourinary:  Negative for frequency.   Musculoskeletal:  Negative for falls.   Neurological:  Negative for dizziness and weakness.   Endo/Heme/Allergies:  Negative for polydipsia.   Psychiatric/Behavioral:  Negative for depression.    All other systems reviewed and are negative.         There were no vitals taken for this visit.  Wt Readings from Last 3 Encounters:   07/18/23 74.4 kg (164 lb 0.4 oz)   05/30/23 74.4 kg (164 lb 0.4 oz)   05/30/23 74.4 kg (164 lb)       LAB REVIEW  Lab Results   Component Value Date     09/26/2023    K 4.8 09/26/2023     09/26/2023    CO2 19 (L) 09/26/2023    BUN 60 (H) 09/26/2023    CREATININE 2.1 (H) 09/26/2023    CALCIUM 10.1 09/26/2023    ANIONGAP 12 09/26/2023    EGFRNORACEVR 21.8 (A) 09/26/2023     No results found for: "CPEPTIDE", "GLUTAMICACID", "INSLNABS"  Hemoglobin A1C   Date Value Ref Range Status   09/12/2023 8.2 (H) 4.0 - 5.6 % Final     Comment:     ADA Screening Guidelines:  5.7-6.4%  Consistent with prediabetes  >or=6.5%  Consistent with diabetes    High levels of fetal hemoglobin interfere with the HbA1C  assay. Heterozygous hemoglobin variants (HbS, HgC, etc)do  not significantly interfere with this assay.   However, presence of multiple variants may affect accuracy.     04/03/2023 9.6 (H) 4.0 - 5.6 % Final     Comment:     ADA Screening Guidelines:  5.7-6.4%  Consistent with prediabetes  >or=6.5%  Consistent with diabetes    High levels of fetal hemoglobin interfere with the HbA1C  assay. Heterozygous hemoglobin variants (HbS, HgC, etc)do  not significantly interfere with this assay.   However, presence of multiple variants may affect accuracy.     12/23/2022 9.4 (H) 4.0 - 5.6 % Final     Comment:     ADA Screening Guidelines:  5.7-6.4%  Consistent with prediabetes  >or=6.5%  Consistent with diabetes    High levels of fetal hemoglobin interfere with the HbA1C  assay. Heterozygous hemoglobin " variants (HbS, HgC, etc)do  not significantly interfere with this assay.   However, presence of multiple variants may affect accuracy.          ASSESSMENT  No diagnosis found.        PLAN  There are no diagnoses linked to this encounter.        Reviewed pathophysiology of diabetes, complications related to the disease, importance of annual dilated eye exam and daily foot examination. Explained MOA, SE, dosage of medications. Written instructions given and reviewed with patient and patient verbalizes understanding.     2023 - Will need to fax patient instructions to [facility] [phone]    6/15/2023 - Fax patient instructions to [facility] [phone]. Will increase Lantus to 13 units daily, and novolog to 4 units before breakfast. Fasting ranging between 150-200, higher during the day. Will increase breakfast novolog to 4 units. F/u 4 weeks. Lantus changed to Tresiba due to formulary changes.     10/11/2023 - A1c at 8.2 with no recorded hypoglycemia.      PATIENT INSTRUCTIONS     Fax glucose logs to [facility] [phone] prior to follow up visit.  Notify patient's daughter, Tova Sánchez, for any glucose level less than 80.      Continue Tresiba 13 units subcutaneously every morning.    Continue Novolog subcutaneously three times daily with meals   4 units before breakfast.    6 units before lunch  6 units before supper     Check blood sugar 4 times daily daily. Before each meal and at bedtime.    Blood Sugar Goals:       Fastin-150.       1-2 hours after a meal: Less than 200.      Follow up in about 3 months (around 2024) for Virtual.    Portions of this note were prepared with [employer] voice recognition transcription software. Grammatical errors, including garbled syntax, mangle pronouns, and other bizarre constructions may be attributed to that software system.

## 2023-10-13 RX ORDER — PEN NEEDLE, DIABETIC 30 GX3/16"
NEEDLE, DISPOSABLE MISCELLANEOUS
Qty: 360 EACH | Refills: 3 | Status: SHIPPED | OUTPATIENT
Start: 2023-10-13 | End: 2023-10-23 | Stop reason: SDUPTHER

## 2023-10-13 NOTE — TELEPHONE ENCOUNTER
"----- Message from Hailey Bonilla sent at 10/13/2023  2:24 PM CDT -----  Contact: Debra calling [phone]  Requesting an RX refill or new RX.    Is this a refill or new RX: --Refill--    RX name and strength (copy/paste from chart):    1.pen needle, diabetic (PEN NEEDLE) 31 gauge x 3/16" Ndle    Is this a 30 day or 90 day RX: --90--day-    Pharmacy name and phone # (copy/paste from chart):    [facility]  [address] [place] [address]  Phone: [phone]      Comment:Debra calling to see if the needles listed above can be sent today? I informed of the 72 hour policy, but pt is out of them. Please advise.     "

## 2023-10-13 NOTE — TELEPHONE ENCOUNTER
Care Due:                  Date            Visit Type   Department     Provider  --------------------------------------------------------------------------------                                EP -                              PRIMARY      [facility] FAMILY  Last Visit: 05-      CARE ([facility])   MEDICINE       Mor Cook  Next Visit: None Scheduled  None         None Found                                                            Last  Test          Frequency    Reason                     Performed    Due Date  --------------------------------------------------------------------------------    Lipid Panel.  12 months..  pravastatin..............  12- 12-    [facility] Embedded Care Due Messages. Reference number: [id number].   10/13/2023 2:35:18 PM CDT

## 2023-10-17 ENCOUNTER — OFFICE VISIT (OUTPATIENT)
Dept: PODIATRY | Facility: CLINIC | Age: 88
End: 2023-10-17
Payer: MEDICARE

## 2023-10-17 VITALS — BODY MASS INDEX: 32.2 KG/M2 | WEIGHT: 164 LBS | HEIGHT: 60 IN

## 2023-10-17 DIAGNOSIS — E11.40 TYPE 2 DIABETES MELLITUS WITH DIABETIC NEUROPATHY, WITH LONG-TERM CURRENT USE OF INSULIN: ICD-10-CM

## 2023-10-17 DIAGNOSIS — L84 PRE-ULCERATIVE CALLUSES: ICD-10-CM

## 2023-10-17 DIAGNOSIS — L97.512 NEUROPATHIC FOOT ULCER, RIGHT, WITH FAT LAYER EXPOSED: Primary | ICD-10-CM

## 2023-10-17 DIAGNOSIS — Z79.4 TYPE 2 DIABETES MELLITUS WITH DIABETIC NEUROPATHY, WITH LONG-TERM CURRENT USE OF INSULIN: ICD-10-CM

## 2023-10-17 DIAGNOSIS — B35.1 ONYCHOMYCOSIS DUE TO DERMATOPHYTE: ICD-10-CM

## 2023-10-17 PROCEDURE — 3072F PR LOW RISK FOR RETINOPATHY: ICD-10-PCS | Mod: HCNC,CPTII,S$GLB, | Performed by: PODIATRIST

## 2023-10-17 PROCEDURE — 1101F PR PT FALLS ASSESS DOC 0-1 FALLS W/OUT INJ PAST YR: ICD-10-PCS | Mod: HCNC,CPTII,S$GLB, | Performed by: PODIATRIST

## 2023-10-17 PROCEDURE — 1126F PR PAIN SEVERITY QUANTIFIED, NO PAIN PRESENT: ICD-10-PCS | Mod: HCNC,CPTII,S$GLB, | Performed by: PODIATRIST

## 2023-10-17 PROCEDURE — 1159F PR MEDICATION LIST DOCUMENTED IN MEDICAL RECORD: ICD-10-PCS | Mod: HCNC,CPTII,S$GLB, | Performed by: PODIATRIST

## 2023-10-17 PROCEDURE — 3288F FALL RISK ASSESSMENT DOCD: CPT | Mod: HCNC,CPTII,S$GLB, | Performed by: PODIATRIST

## 2023-10-17 PROCEDURE — 11721 DEBRIDE NAIL 6 OR MORE: CPT | Mod: Q9,HCNC,S$GLB, | Performed by: PODIATRIST

## 2023-10-17 PROCEDURE — 11721 PR DEBRIDEMENT OF NAILS, 6 OR MORE: ICD-10-PCS | Mod: Q9,HCNC,S$GLB, | Performed by: PODIATRIST

## 2023-10-17 PROCEDURE — 1101F PT FALLS ASSESS-DOCD LE1/YR: CPT | Mod: HCNC,CPTII,S$GLB, | Performed by: PODIATRIST

## 2023-10-17 PROCEDURE — 99999 PR PBB SHADOW E&M-EST. PATIENT-LVL III: CPT | Mod: PBBFAC,HCNC,, | Performed by: PODIATRIST

## 2023-10-17 PROCEDURE — 1126F AMNT PAIN NOTED NONE PRSNT: CPT | Mod: HCNC,CPTII,S$GLB, | Performed by: PODIATRIST

## 2023-10-17 PROCEDURE — 1160F PR REVIEW ALL MEDS BY PRESCRIBER/CLIN PHARMACIST DOCUMENTED: ICD-10-PCS | Mod: HCNC,CPTII,S$GLB, | Performed by: PODIATRIST

## 2023-10-17 PROCEDURE — 3288F PR FALLS RISK ASSESSMENT DOCUMENTED: ICD-10-PCS | Mod: HCNC,CPTII,S$GLB, | Performed by: PODIATRIST

## 2023-10-17 PROCEDURE — 99999 PR PBB SHADOW E&M-EST. PATIENT-LVL III: ICD-10-PCS | Mod: PBBFAC,HCNC,, | Performed by: PODIATRIST

## 2023-10-17 PROCEDURE — 1159F MED LIST DOCD IN RCRD: CPT | Mod: HCNC,CPTII,S$GLB, | Performed by: PODIATRIST

## 2023-10-17 PROCEDURE — 99213 OFFICE O/P EST LOW 20 MIN: CPT | Mod: 25,HCNC,S$GLB, | Performed by: PODIATRIST

## 2023-10-17 PROCEDURE — 99213 PR OFFICE/OUTPT VISIT, EST, LEVL III, 20-29 MIN: ICD-10-PCS | Mod: 25,HCNC,S$GLB, | Performed by: PODIATRIST

## 2023-10-17 PROCEDURE — 1160F RVW MEDS BY RX/DR IN RCRD: CPT | Mod: HCNC,CPTII,S$GLB, | Performed by: PODIATRIST

## 2023-10-17 PROCEDURE — 3072F LOW RISK FOR RETINOPATHY: CPT | Mod: HCNC,CPTII,S$GLB, | Performed by: PODIATRIST

## 2023-10-17 NOTE — PROGRESS NOTES
Subjective:     Patient ID: Kassi Skelton is a 89 y.o. female.    Chief Complaint: Wound Care (Right foot ulcer / left foot callous, diabetic pt wears slippers, PCP Dr. Cook 5-30-23) and Callouses    Kassi is a 89 y.o. female who presents to the clinic for evaluation and treatment of high risk feet. Kassi has a past medical history of *Atrial fibrillation (4/19/2012), *Atrial flutter (4/19/2012), Allergy, Ankle fracture (4/19/2012), Anticoagulant long-term use, Anxiety, Arthritis, Bacterial pneumonia, unspecified (12/7/2012), Breast cancer, CKD (chronic kidney disease), stage III (5/11/2012), Dependent on walker for ambulation, Depression (4/19/2012), Diabetes mellitus with stage 3 chronic kidney disease (2/1/2016), Diabetes with neurologic complications, Edentulous, HEARING LOSS, Hip fracture, right (4/19/2012), HLD (hyperlipidemia) (4/19/2012), HTN (hypertension) (4/19/2012), Hypothyroidism (4/19/2012), 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 The patient's chief complaint is wound check. This patient has documented high risk feet requiring routine maintenance secondary to diabetes mellitis and those secondary complications of diabetes, as mentioned. Patient is accompanied by her daughter. Patient's daughter states home health is still coming out, but noted right foot ulcer on the outside is worse. Patient has no other pedal complaint at this time.      PCP: Mor Cook MD    Date Last Seen by PCP: 05/30/2023    Current shoe gear:  Affected Foot: Slip-on shoes     Unaffected Foot: Slip-on shoes    Hemoglobin A1C   Date Value Ref Range  Status   09/12/2023 8.2 (H) 4.0 - 5.6 % Final     Comment:     ADA Screening Guidelines:  5.7-6.4%  Consistent with prediabetes  >or=6.5%  Consistent with diabetes    High levels of fetal hemoglobin interfere with the HbA1C  assay. Heterozygous hemoglobin variants (HbS, HgC, etc)do  not significantly interfere with this assay.   However, presence of multiple variants may affect accuracy.     04/03/2023 9.6 (H) 4.0 - 5.6 % Final     Comment:     ADA Screening Guidelines:  5.7-6.4%  Consistent with prediabetes  >or=6.5%  Consistent with diabetes    High levels of fetal hemoglobin interfere with the HbA1C  assay. Heterozygous hemoglobin variants (HbS, HgC, etc)do  not significantly interfere with this assay.   However, presence of multiple variants may affect accuracy.     12/23/2022 9.4 (H) 4.0 - 5.6 % Final     Comment:     ADA Screening Guidelines:  5.7-6.4%  Consistent with prediabetes  >or=6.5%  Consistent with diabetes    High levels of fetal hemoglobin interfere with the HbA1C  assay. Heterozygous hemoglobin variants (HbS, HgC, etc)do  not significantly interfere with this assay.   However, presence of multiple variants may affect accuracy.         Patient Active Problem List   Diagnosis    History of atrial flutter    Combined hyperlipidemia associated with type 2 diabetes mellitus    Hypothyroidism    Major depressive disorder, recurrent episode, moderate    Osteoporosis    Benign hypertensive kidney disease with chronic kidney disease    Acquired cyst of kidney    Persistent atrial fibrillation    Anticoagulation monitoring by pharmacist    LBBB (left bundle branch block)    Sinus node dysfunction    Cardiac pacemaker in situ    Urinary incontinence, mixed    Pacemaker    Orthostatic hypotension    DCIS (ductal carcinoma in situ)    Chronic kidney disease, stage IV (severe)    Hypertension associated with diabetes    Diabetes mellitus with stage 4 chronic kidney disease GFR 15-29    Dizziness    Secondary  hyperparathyroidism of renal origin    Hearing aid worn    Diabetic neuropathy, type II diabetes mellitus    Mild cognitive impairment with memory loss    Primary osteoarthritis    Edentulous    Greater trochanteric bursitis of right hip    Lung granuloma    Osteomyelitis of second toe of left foot    Diabetic nephropathy associated with type 2 diabetes mellitus    Proteinuria    Neuropathic foot ulcer, left, limited to breakdown of skin       Medication List with Changes/Refills   Current Medications    ACETAMINOPHEN (TYLENOL) 500 MG TABLET    Take 2 tablets (1,000 mg total) by mouth every 6 (six) hours as needed for Pain.    ALCOHOL SWABS (BD ALCOHOL SWABS) PADM    Apply 1 each topically 4 (four) times daily.    BENAZEPRIL (LOTENSIN) 20 MG TABLET    TAKE 1 TABLET (20 MG TOTAL) BY MOUTH ONCE DAILY.    BLOOD SUGAR DIAGNOSTIC (BLOOD GLUCOSE TEST) STRP    1 strip by Misc.(Non-Drug; Combo Route) route 4 (four) times daily.    BLOOD-GLUCOSE METER KIT    To check BG once daily, to use with insurance preferred meter    CALCITRIOL (ROCALTROL) 0.25 MCG CAP    TAKE 1 CAPSULE EVERY DAY (NEED MD APPOINTMENT)    DONEPEZIL (ARICEPT) 5 MG TABLET    TAKE 1 TABLET EVERY EVENING    ELIQUIS 2.5 MG TAB    TAKE 1 TABLET TWICE DAILY    HYDROCHLOROTHIAZIDE (HYDRODIURIL) 25 MG TABLET    TAKE 1 TABLET EVERY DAY    INSULIN ASPART U-100 (NOVOLOG FLEXPEN U-100 INSULIN) 100 UNIT/ML (3 ML) INPN PEN    Inject subcutaneously 4 units before breakfast and 6 units before lunch and 6 units before supper.    INSULIN DEGLUDEC (TRESIBA FLEXTOUCH U-100) 100 UNIT/ML (3 ML) INSULIN PEN    Inject 13 Units into the skin once daily.    LANCETS (ACCU-CHEK SOFTCLIX LANCETS) MISC    TEST BLOOD SUGAR THREE TIMES DAILY before meals    LEVOTHYROXINE (SYNTHROID) 25 MCG TABLET    Take 1 tablet (25 mcg total) by mouth before breakfast.    METOPROLOL TARTRATE (LOPRESSOR) 50 MG TABLET    TAKE 1/2 TABLET TWICE DAILY    MIRABEGRON (MYRBETRIQ) 25 MG TB24 ER TABLET    Take  "1 tablet (25 mg total) by mouth once daily.    MIRTAZAPINE (REMERON) 45 MG TABLET    TAKE 1 TABLET EVERY EVENING    PRAVASTATIN (PRAVACHOL) 40 MG TABLET    TAKE 1 TABLET EVERY DAY    VENLAFAXINE (EFFEXOR-XR) 150 MG CP24    TAKE 1 CAPSULE EVERY DAY    VENLAFAXINE (EFFEXOR-XR) 75 MG 24 HR CAPSULE    TAKE 1 CAPSULE EVERY DAY    VERAPAMIL (CALAN-SR) 120 MG CR TABLET    TAKE 1 TABLET EVERY NIGHT   Changed and/or Refilled Medications    Modified Medication Previous Medication    PEN NEEDLE, DIABETIC (PEN NEEDLE) 31 GAUGE X 3/16" NDLE pen needle, diabetic (PEN NEEDLE) 31 gauge x 3/16" Ndle       Use as directed 4 times daily    Use as directed 4 times daily       Review of patient's allergies indicates:   Allergen Reactions    Amlodipine Edema     Pt stated this medication made her legs swell    Alendronate sodium Other (See Comments)     Reaction: Esophageal bleeding    Fosamax [alendronate] Other (See Comments)     Reaction: Esophageal bleeding  diarrhea    Sorbitol      Diarrhea     Augmentin [amoxicillin-pot clavulanate] Rash     Yeast infection, give oral anti-fungal       Past Surgical History:   Procedure Laterality Date    APPENDECTOMY      BREAST LUMPECTOMY Right 10/21/2015    CARDIAC PACEMAKER PLACEMENT  12/3/12    Sinus node dysfunction    CATARACT EXTRACTION W/  INTRAOCULAR LENS IMPLANT Bilateral     COLONOSCOPY      EXCISION OF LESION Left 6/3/2021    Procedure: EXCISION, LESION  forearm;  Surgeon: Saman Quintero MD;  Location: [facility] OR;  Service: General;  Laterality: Left;    excision of squamous cell carcinoma Right 2017    EYE SURGERY Bilateral     PHACO/IOL    FRACTURE SURGERY Right     ankle    HYSTERECTOMY      total 1970's    INSERTION, PACEMAKER, TEMPORARY TRANSVENOUS  1/26/2023    Procedure: Insertion, Pacemaker, Temporary Transvenous;  Surgeon: Jaime Swanson MD;  Location: [facility];  Service: Cardiology;;    MASTECTOMY Right 11/2015    OOPHORECTOMY      REPLACEMENT OF PACEMAKER GENERATOR " N/A 1/26/2023    Procedure: REPLACEMENT, PACEMAKER GENERATOR;  Surgeon: Jaime Swanson MD;  Location: [facility];  Service: Cardiology;  Laterality: N/A;    TONSILLECTOMY      TOTAL HIP ARTHROPLASTY Right 2007       Family History   Problem Relation Age of Onset    Hypertension Mother     Heart disease Father     Stroke Father         cerebral hemorrhage    Coronary artery disease Brother     Heart disease Brother     Coronary artery disease Brother     COPD Brother     Heart disease Brother     Mitral valve prolapse Daughter     Peripheral vascular disease Son     Cataracts Neg Hx     Glaucoma Neg Hx     Macular degeneration Neg Hx     Retinal detachment Neg Hx     Strabismus Neg Hx        Social History     Socioeconomic History    Marital status:     Number of children: 2   Tobacco Use    Smoking status: Never    Smokeless tobacco: Never   Substance and Sexual Activity    Alcohol use: No    Drug use: No    Sexual activity: Not Currently     Social Determinants of Health     Financial Resource Strain: Low Risk  (2/22/2023)    Overall Financial Resource Strain (CARDIA)     Difficulty of Paying Living Expenses: Not hard at all   Food Insecurity: No Food Insecurity (2/22/2023)    Hunger Vital Sign     Worried About Running Out of Food in the Last Year: Never true     Ran Out of Food in the Last Year: Never true   Transportation Needs: No Transportation Needs (2/22/2023)    PRAPARE - Transportation     Lack of Transportation (Medical): No     Lack of Transportation (Non-Medical): No   Physical Activity: Inactive (2/22/2023)    Exercise Vital Sign     Days of Exercise per Week: 0 days     Minutes of Exercise per Session: 0 min   Stress: No Stress Concern Present (3/9/2022)    [employer] - Occupational Stress Questionnaire     Feeling of Stress : Not at all   Social Connections: Unknown (3/9/2022)    Social Connection and Isolation Panel [NHANES]     Frequency of Communication  with Friends and Family: Patient refused     Frequency of Social Gatherings with Friends and Family: Patient refused     Active Member of Clubs or Organizations: Patient refused     Attends Club or Organization Meetings: Patient refused     Marital Status:    Housing Stability: Unknown (2/22/2023)    Housing Stability Vital Sign     Unable to Pay for Housing in the Last Year: No     Unstable Housing in the Last Year: No       Vitals:    10/17/23 1303   Weight: 74.4 kg (164 lb 0.4 oz)   Height: 5' (1.524 m)   PainSc: 0-No pain       Hemoglobin A1C   Date Value Ref Range Status   09/12/2023 8.2 (H) 4.0 - 5.6 % Final     Comment:     ADA Screening Guidelines:  5.7-6.4%  Consistent with prediabetes  >or=6.5%  Consistent with diabetes    High levels of fetal hemoglobin interfere with the HbA1C  assay. Heterozygous hemoglobin variants (HbS, HgC, etc)do  not significantly interfere with this assay.   However, presence of multiple variants may affect accuracy.     04/03/2023 9.6 (H) 4.0 - 5.6 % Final     Comment:     ADA Screening Guidelines:  5.7-6.4%  Consistent with prediabetes  >or=6.5%  Consistent with diabetes    High levels of fetal hemoglobin interfere with the HbA1C  assay. Heterozygous hemoglobin variants (HbS, HgC, etc)do  not significantly interfere with this assay.   However, presence of multiple variants may affect accuracy.     12/23/2022 9.4 (H) 4.0 - 5.6 % Final     Comment:     ADA Screening Guidelines:  5.7-6.4%  Consistent with prediabetes  >or=6.5%  Consistent with diabetes    High levels of fetal hemoglobin interfere with the HbA1C  assay. Heterozygous hemoglobin variants (HbS, HgC, etc)do  not significantly interfere with this assay.   However, presence of multiple variants may affect accuracy.         Review of Systems   Constitutional:  Negative for chills and fever.   Respiratory:  Negative for shortness of breath.    Cardiovascular:  Negative for chest pain, palpitations, orthopnea,  claudication and leg swelling.   Gastrointestinal:  Negative for diarrhea, nausea and vomiting.   Musculoskeletal:  Negative for joint pain.   Skin:  Negative for rash.   Neurological:  Positive for tingling and sensory change.   Psychiatric/Behavioral: Negative.           Objective:      PHYSICAL EXAM: Apperance: Alert and orient in no distress,well developed, and with good attention to grooming and body habits  Patient ambulating in slippers.   Lower Extremity Exam  VASCULAR: Dorsalis pedis pulses 1/4 bilateral and Posterior Tibial pulses 1/4 bilateral. Capillary fill time <4 seconds bilateral. Mild edema observed bilateral. Varicosities present bilateral. Skin temperature of the lower extremities is warm to warm, proximal to distal. Hair growth absent bilateral. (--) lymphangitis or (--) cellulitis noted bilateral.  DERMATOLOGICAL: (--) edema, (--) erythema, (--) malodor, (--) drainage, (--) warmth to bilateral foot. Mild hyperkeratotic tissue noted to left plantar 1st submetatarsal. Post debridement no open area noted. Previous ulcer noted to right medial ankle thin epithealized tissue. Previous ulcer noted to right lateral ankle closed with thin epithealized tissue. Ulcer noted to right lateral/dorsal styloid process with granularbase and with a 1 mm yellow/white border and hyperkeratosis surrounding ulcer. Ulcer measurement 1cmx1.2cmx0.3cm. The ulcer does not extend into deeper tissue and (--) sinus tracts exist. Nails 1,2,3,4,5 bilateral elongated, thickened, and discolored with subungual debris. Webspaces 1,2,3,4 clean, dry and without evidence of break in skin integrity bilateral.  NEUROLOGICAL: Light touch, sharp-dull, proprioception all present and equal bilaterally.   MUSCULOSKELETAL: Muscle strength is 5/5 for foot inverters, everters, plantarflexors, and dorsiflexors. Muscle tone is normal. Fat pad atrophy noted bilateral. Hammertoes noted bilateral.         Assessment:       ICD-10-CM ICD-9-CM   1.  Neuropathic foot ulcer, right, with fat layer exposed - Right Foot  L97.512 707.15   2. Type 2 diabetes mellitus with diabetic neuropathy, with long-term current use of insulin  E11.40 250.60    Z79.4 357.2     V58.67   3. Onychomycosis due to dermatophyte  B35.1 110.1   4. Pre-ulcerative calluses - Left Foot  L84 700         Plan:   Neuropathic foot ulcer, right, with fat layer exposed - Right Foot    Type 2 diabetes mellitus with diabetic neuropathy, with long-term current use of insulin    Onychomycosis due to dermatophyte    Pre-ulcerative calluses - Left Foot    I counseled the patient on her conditions, regarding findings of my examination, my impressions, and usual treatment plan.   Shoe inspection. Diabetic Foot Education. Patient reminded of the importance of good nutrition and blood sugar control to help prevent podiatric complications of diabetes. Patient instructed on proper foot hygeine. We discussed wearing proper shoe gear, daily foot inspections, never walking without protective shoe gear, never putting sharp instruments to feet.    With patient's permission, the right lateral foot ulcer wound was irrigated with sterile saline. The patient tolerated this well. Wound was then dressed with Iodosorb and Mepilex pad. Patient was given instructions on daily dressing changes. Patient was also instructed on the importance of keeping the wound and dressings clean dry and intact and keeping pressure off the wound area until complete healing of the wound.The patient is alerted to watch for any signs of infection (redness, pus, pain, increased swelling or fever) and call if such occurs.   Home health orders to continue with Iodosorb to right lateral foot   With patient's permission, nails 1-5 bilateral were debrided/trimmed in length and thickness aggressively to their soft tissue attachment mechanically and with electric , removing all offending nail and debris. Patient relates relief following the  procedure.  Patient  will continue to monitor the areas daily, inspect feet, wear protective shoe gear when ambulatory, moisturizer to maintain skin integrity. Patient reminded of the importance of good nutrition and blood sugar control to help prevent podiatric complications of diabetes.  Patient to return 1 months or sooner if needed.        Luzmaria Valle DPM  [facility]

## 2023-10-23 ENCOUNTER — PATIENT MESSAGE (OUTPATIENT)
Dept: DIABETES | Facility: CLINIC | Age: 88
End: 2023-10-23
Payer: MEDICARE

## 2023-10-24 RX ORDER — PEN NEEDLE, DIABETIC 30 GX3/16"
NEEDLE, DISPOSABLE MISCELLANEOUS
Qty: 360 EACH | Refills: 3 | Status: SHIPPED | OUTPATIENT
Start: 2023-10-24 | End: 2024-02-06

## 2023-11-01 PROCEDURE — G0179 MD RECERTIFICATION HHA PT: HCPCS | Mod: ,,, | Performed by: PODIATRIST

## 2023-11-01 PROCEDURE — G0179 PR HOME HEALTH MD RECERTIFICATION: ICD-10-PCS | Mod: ,,, | Performed by: PODIATRIST

## 2023-11-03 ENCOUNTER — CLINICAL SUPPORT (OUTPATIENT)
Dept: CARDIOLOGY | Facility: HOSPITAL | Age: 88
End: 2023-11-03
Payer: MEDICARE

## 2023-11-03 DIAGNOSIS — Z95.0 PRESENCE OF CARDIAC PACEMAKER: ICD-10-CM

## 2023-11-03 PROCEDURE — 93296 REM INTERROG EVL PM/IDS: CPT | Mod: HCNC,PO | Performed by: INTERNAL MEDICINE

## 2023-11-03 PROCEDURE — 93294 CARDIAC DEVICE CHECK - REMOTE: ICD-10-PCS | Mod: HCNC,,, | Performed by: INTERNAL MEDICINE

## 2023-11-03 PROCEDURE — 93294 REM INTERROG EVL PM/LDLS PM: CPT | Mod: HCNC,,, | Performed by: INTERNAL MEDICINE

## 2023-11-14 ENCOUNTER — EXTERNAL HOME HEALTH (OUTPATIENT)
Dept: HOME HEALTH SERVICES | Facility: HOSPITAL | Age: 88
End: 2023-11-14
Payer: MEDICARE

## 2023-11-15 ENCOUNTER — PATIENT MESSAGE (OUTPATIENT)
Dept: NEPHROLOGY | Facility: CLINIC | Age: 88
End: 2023-11-15
Payer: MEDICARE

## 2023-11-15 DIAGNOSIS — N18.4 CHRONIC KIDNEY DISEASE, STAGE IV (SEVERE): Primary | ICD-10-CM

## 2023-11-15 DIAGNOSIS — E11.22 TYPE 2 DIABETES MELLITUS WITH STAGE 3B CHRONIC KIDNEY DISEASE, WITH LONG-TERM CURRENT USE OF INSULIN: ICD-10-CM

## 2023-11-15 DIAGNOSIS — N18.32 STAGE 3B CHRONIC KIDNEY DISEASE: ICD-10-CM

## 2023-11-15 DIAGNOSIS — Z79.4 TYPE 2 DIABETES MELLITUS WITH STAGE 3B CHRONIC KIDNEY DISEASE, WITH LONG-TERM CURRENT USE OF INSULIN: ICD-10-CM

## 2023-11-15 DIAGNOSIS — N18.32 TYPE 2 DIABETES MELLITUS WITH STAGE 3B CHRONIC KIDNEY DISEASE, WITH LONG-TERM CURRENT USE OF INSULIN: ICD-10-CM

## 2023-11-29 DIAGNOSIS — I48.19 PERSISTENT ATRIAL FIBRILLATION: ICD-10-CM

## 2023-11-29 DIAGNOSIS — I10 HYPERTENSION, UNSPECIFIED TYPE: Primary | ICD-10-CM

## 2023-11-29 RX ORDER — APIXABAN 2.5 MG/1
TABLET, FILM COATED ORAL
Qty: 180 TABLET | Refills: 3 | Status: SHIPPED | OUTPATIENT
Start: 2023-11-29

## 2023-11-29 RX ORDER — HYDROCHLOROTHIAZIDE 25 MG/1
TABLET ORAL
Qty: 90 TABLET | Refills: 3 | Status: ON HOLD | OUTPATIENT
Start: 2023-11-29 | End: 2023-12-13 | Stop reason: HOSPADM

## 2023-11-29 RX ORDER — BENAZEPRIL HYDROCHLORIDE 20 MG/1
TABLET ORAL
Qty: 90 TABLET | Refills: 3 | Status: SHIPPED | OUTPATIENT
Start: 2023-11-29

## 2023-12-01 ENCOUNTER — DOCUMENT SCAN (OUTPATIENT)
Dept: HOME HEALTH SERVICES | Facility: HOSPITAL | Age: 88
End: 2023-12-01
Payer: MEDICARE

## 2023-12-04 ENCOUNTER — TELEPHONE (OUTPATIENT)
Dept: FAMILY MEDICINE | Facility: CLINIC | Age: 88
End: 2023-12-04
Payer: MEDICARE

## 2023-12-04 ENCOUNTER — PATIENT MESSAGE (OUTPATIENT)
Dept: FAMILY MEDICINE | Facility: CLINIC | Age: 88
End: 2023-12-04
Payer: MEDICARE

## 2023-12-04 DIAGNOSIS — M25.519 SHOULDER PAIN, UNSPECIFIED CHRONICITY, UNSPECIFIED LATERALITY: Primary | ICD-10-CM

## 2023-12-05 ENCOUNTER — DOCUMENT SCAN (OUTPATIENT)
Dept: HOME HEALTH SERVICES | Facility: HOSPITAL | Age: 88
End: 2023-12-05
Payer: MEDICARE

## 2023-12-05 NOTE — TELEPHONE ENCOUNTER
Per [facility]  staff, if you put in an order for the xray, they can set it up to be done at [facility], see original message and advise.

## 2023-12-10 PROBLEM — N39.0 UTI (URINARY TRACT INFECTION): Status: ACTIVE | Noted: 2023-12-10

## 2023-12-10 PROBLEM — I50.9 CHF (CONGESTIVE HEART FAILURE): Status: ACTIVE | Noted: 2023-12-10

## 2023-12-10 PROBLEM — R79.89 TROPONIN I ABOVE REFERENCE RANGE: Status: ACTIVE | Noted: 2023-12-10

## 2023-12-10 PROBLEM — F03.90 DEMENTIA: Chronic | Status: ACTIVE | Noted: 2023-12-10

## 2023-12-10 PROBLEM — E87.29 HIGH ANION GAP METABOLIC ACIDOSIS: Status: ACTIVE | Noted: 2023-12-10

## 2023-12-10 PROBLEM — D72.829 LEUCOCYTOSIS: Status: ACTIVE | Noted: 2023-12-10

## 2023-12-10 PROBLEM — R60.0 EDEMA OF RIGHT UPPER ARM: Status: ACTIVE | Noted: 2023-12-10

## 2023-12-11 ENCOUNTER — PATIENT MESSAGE (OUTPATIENT)
Dept: DIABETES | Facility: CLINIC | Age: 88
End: 2023-12-11
Payer: MEDICARE

## 2023-12-11 ENCOUNTER — PATIENT MESSAGE (OUTPATIENT)
Dept: CARDIOLOGY | Facility: CLINIC | Age: 88
End: 2023-12-11

## 2023-12-11 PROBLEM — I27.20 PULMONARY HYPERTENSION: Status: ACTIVE | Noted: 2023-12-11

## 2023-12-14 PROBLEM — Z02.9 DISCHARGE PLANNING ISSUES: Status: ACTIVE | Noted: 2023-12-14

## 2023-12-14 PROBLEM — Z75.8 DISCHARGE PLANNING ISSUES: Status: ACTIVE | Noted: 2023-12-14

## 2023-12-19 ENCOUNTER — OUTPATIENT CASE MANAGEMENT (OUTPATIENT)
Dept: ADMINISTRATIVE | Facility: OTHER | Age: 88
End: 2023-12-19
Payer: MEDICARE

## 2023-12-19 NOTE — PROGRESS NOTES
Outpatient Care Management  Patient Not Qualified    Patient: Kassi Skelton  MRN:  [id number]  Date of Service:  12/19/2023  Completed by:  Cecelia Stuart RN    Chief Complaint   Patient presents with    [facility] Enrollment Call    Nursing Assessment    Case Closure     Spoke to daughter Tova Sánchez to see what plans are for pt who is currently in SNF. Due to age, pt will transition from SNF to permanent placement in NH.        Patient Summary           Reason Not Qualified:  Followed by SNF

## 2023-12-30 NOTE — TELEPHONE ENCOUNTER
Care Due:                  Date            Visit Type   Department     Provider  --------------------------------------------------------------------------------                                EP -                              PRIMARY      [facility] FAMILY  Last Visit: 05-      CARE ([facility])   MEDICINE       Mor Cook  Next Visit: None Scheduled  None         None Found                                                            Last  Test          Frequency    Reason                     Performed    Due Date  --------------------------------------------------------------------------------    Lipid Panel.  12 months..  pravastatin..............  12- 12-    [facility] Embedded Care Due Messages. Reference number: [id number].   12/30/2023 1:25:23 AM CST

## 2024-01-02 ENCOUNTER — PATIENT MESSAGE (OUTPATIENT)
Dept: NEPHROLOGY | Facility: CLINIC | Age: 89
End: 2024-01-02
Payer: MEDICARE

## 2024-01-02 ENCOUNTER — PATIENT MESSAGE (OUTPATIENT)
Dept: CARDIOLOGY | Facility: CLINIC | Age: 89
End: 2024-01-02
Payer: MEDICARE

## 2024-01-02 RX ORDER — VENLAFAXINE HYDROCHLORIDE 150 MG/1
150 CAPSULE, EXTENDED RELEASE ORAL DAILY
Qty: 90 CAPSULE | Refills: 3 | OUTPATIENT
Start: 2024-01-02

## 2024-01-02 RX ORDER — VENLAFAXINE HYDROCHLORIDE 150 MG/1
CAPSULE, EXTENDED RELEASE ORAL
Qty: 90 CAPSULE | Refills: 1 | Status: SHIPPED | OUTPATIENT
Start: 2024-01-02

## 2024-01-02 RX ORDER — MIRTAZAPINE 45 MG/1
45 TABLET, FILM COATED ORAL NIGHTLY
Qty: 90 TABLET | Refills: 3 | OUTPATIENT
Start: 2024-01-02

## 2024-01-02 RX ORDER — MIRTAZAPINE 45 MG/1
TABLET, FILM COATED ORAL
Qty: 90 TABLET | Refills: 1 | Status: SHIPPED | OUTPATIENT
Start: 2024-01-02

## 2024-01-02 NOTE — TELEPHONE ENCOUNTER
No care due was identified.  [facility] Embedded Care Due Messages. Reference number: [id number].   1/02/2024 8:43:44 AM CST

## 2024-01-02 NOTE — TELEPHONE ENCOUNTER
Patient can not do the VV Friday She needs a hospital follow up scheduled. I am sending this to Kelli for scheduling.

## 2024-01-05 NOTE — TELEPHONE ENCOUNTER
No care due was identified.  [facility] Embedded Care Due Messages. Reference number: [id number].   1/05/2024 5:30:10 PM CST

## 2024-01-08 RX ORDER — LANCETS
EACH MISCELLANEOUS
Qty: 300 EACH | Refills: 3 | Status: SHIPPED | OUTPATIENT
Start: 2024-01-08

## 2024-01-08 RX ORDER — CALCITRIOL 0.25 UG/1
CAPSULE ORAL
Qty: 90 CAPSULE | Refills: 1 | Status: SHIPPED | OUTPATIENT
Start: 2024-01-08 | End: 2024-07-06

## 2024-01-18 ENCOUNTER — PATIENT MESSAGE (OUTPATIENT)
Dept: CARDIOLOGY | Facility: CLINIC | Age: 89
End: 2024-01-18
Payer: MEDICARE

## 2024-01-18 DIAGNOSIS — I50.31 ACUTE DIASTOLIC CONGESTIVE HEART FAILURE: Primary | ICD-10-CM

## 2024-01-18 PROCEDURE — G0180 MD CERTIFICATION HHA PATIENT: HCPCS | Mod: ,,, | Performed by: FAMILY MEDICINE

## 2024-01-18 RX ORDER — FUROSEMIDE 20 MG/1
20 TABLET ORAL DAILY
Qty: 30 TABLET | Refills: 0 | Status: SHIPPED | OUTPATIENT
Start: 2024-01-18 | End: 2025-01-17

## 2024-01-18 RX ORDER — SODIUM BICARBONATE 650 MG/1
650 TABLET ORAL DAILY
Qty: 90 TABLET | Refills: 3 | Status: SHIPPED | OUTPATIENT
Start: 2024-02-01 | End: 2025-01-31

## 2024-01-18 RX ORDER — SODIUM BICARBONATE 650 MG/1
650 TABLET ORAL DAILY
Qty: 30 TABLET | Refills: 0 | Status: SHIPPED | OUTPATIENT
Start: 2024-01-18 | End: 2024-01-31 | Stop reason: SDUPTHER

## 2024-01-18 RX ORDER — FUROSEMIDE 20 MG/1
20 TABLET ORAL DAILY
Qty: 90 TABLET | Refills: 3 | Status: SHIPPED | OUTPATIENT
Start: 2024-02-01 | End: 2024-01-31 | Stop reason: SDUPTHER

## 2024-01-22 ENCOUNTER — PATIENT MESSAGE (OUTPATIENT)
Dept: FAMILY MEDICINE | Facility: CLINIC | Age: 89
End: 2024-01-22
Payer: MEDICARE

## 2024-01-22 ENCOUNTER — PATIENT MESSAGE (OUTPATIENT)
Dept: NEPHROLOGY | Facility: CLINIC | Age: 89
End: 2024-01-22
Payer: MEDICARE

## 2024-01-27 ENCOUNTER — PATIENT MESSAGE (OUTPATIENT)
Dept: PODIATRY | Facility: CLINIC | Age: 89
End: 2024-01-27
Payer: MEDICARE

## 2024-01-27 ENCOUNTER — PATIENT MESSAGE (OUTPATIENT)
Dept: DIABETES | Facility: CLINIC | Age: 89
End: 2024-01-27
Payer: MEDICARE

## 2024-01-29 ENCOUNTER — OFFICE VISIT (OUTPATIENT)
Dept: NEPHROLOGY | Facility: CLINIC | Age: 89
End: 2024-01-29
Payer: MEDICARE

## 2024-01-29 DIAGNOSIS — N18.4 TYPE 2 DIABETES MELLITUS WITH STAGE 4 CHRONIC KIDNEY DISEASE, WITH LONG-TERM CURRENT USE OF INSULIN: ICD-10-CM

## 2024-01-29 DIAGNOSIS — I50.31 ACUTE DIASTOLIC (CONGESTIVE) HEART FAILURE: ICD-10-CM

## 2024-01-29 DIAGNOSIS — R80.9 PROTEINURIA, UNSPECIFIED TYPE: ICD-10-CM

## 2024-01-29 DIAGNOSIS — Z79.4 TYPE 2 DIABETES MELLITUS WITH STAGE 4 CHRONIC KIDNEY DISEASE, WITH LONG-TERM CURRENT USE OF INSULIN: ICD-10-CM

## 2024-01-29 DIAGNOSIS — N18.4 CHRONIC KIDNEY DISEASE, STAGE 4 (SEVERE): Primary | ICD-10-CM

## 2024-01-29 DIAGNOSIS — N28.1 ACQUIRED CYST OF KIDNEY: ICD-10-CM

## 2024-01-29 DIAGNOSIS — E11.21 DIABETIC NEPHROPATHY ASSOCIATED WITH TYPE 2 DIABETES MELLITUS: ICD-10-CM

## 2024-01-29 DIAGNOSIS — E11.22 TYPE 2 DIABETES MELLITUS WITH STAGE 4 CHRONIC KIDNEY DISEASE, WITH LONG-TERM CURRENT USE OF INSULIN: ICD-10-CM

## 2024-01-29 DIAGNOSIS — N25.81 SECONDARY HYPERPARATHYROIDISM OF RENAL ORIGIN: ICD-10-CM

## 2024-01-29 PROBLEM — I50.9 CHF (CONGESTIVE HEART FAILURE): Status: RESOLVED | Noted: 2023-12-10 | Resolved: 2024-01-29

## 2024-01-29 PROBLEM — I27.20 PULMONARY HYPERTENSION: Status: RESOLVED | Noted: 2023-12-11 | Resolved: 2024-01-29

## 2024-01-29 PROCEDURE — 1160F RVW MEDS BY RX/DR IN RCRD: CPT | Mod: HCNC,CPTII,95, | Performed by: INTERNAL MEDICINE

## 2024-01-29 PROCEDURE — 99214 OFFICE O/P EST MOD 30 MIN: CPT | Mod: HCNC,95,, | Performed by: INTERNAL MEDICINE

## 2024-01-29 PROCEDURE — 1159F MED LIST DOCD IN RCRD: CPT | Mod: HCNC,CPTII,95, | Performed by: INTERNAL MEDICINE

## 2024-01-29 PROCEDURE — 1157F ADVNC CARE PLAN IN RCRD: CPT | Mod: HCNC,CPTII,95, | Performed by: INTERNAL MEDICINE

## 2024-01-29 NOTE — PROGRESS NOTES
"    Subjective:       Patient ID: Kassi Skelton is a 89 y.o. White female who presents for return patient evaluation for chronic renal failure.    The patient location is:  Patient Home   The chief complaint leading to consultation is: CKD  Visit type: Virtual visit with synchronous audio and video  Total time spent with patient: 12 minutes  Each patient to whom he or she provides medical services by telemedicine is:  (1) informed of the relationship between the physician and patient and the respective role of any other health care provider with respect to management of the patient; and (2) notified that he or she may decline to receive medical services by telemedicine and may withdraw from such care at any time.        She was in Rehab and is not ambulatory now.  She continue to have a "sweet tooth" and has had high glucose readings.        Review of Systems   Constitutional:  Positive for fatigue. Negative for appetite change, chills and fever.   HENT:  Positive for hearing loss.         Dry mouth-chronic   Eyes:  Negative for visual disturbance.   Respiratory:  Negative for cough and shortness of breath.    Cardiovascular:  Positive for leg swelling. Negative for chest pain.   Gastrointestinal:  Negative for abdominal pain, diarrhea, nausea and vomiting.   Genitourinary:  Positive for dysuria (intermittently). Negative for difficulty urinating and hematuria.   Musculoskeletal:  Positive for arthralgias and gait problem. Negative for myalgias.   Skin:  Negative for rash.   Neurological:  Negative for dizziness and headaches.   Psychiatric/Behavioral:  Negative for sleep disturbance.        The past medical, family and social histories were reviewed for this encounter.     There were no vitals taken for this visit.    Objective:      Physical Exam  Vitals reviewed.   Constitutional:       General: She is not in acute distress.     Appearance: She is well-developed.   HENT:      Head: Normocephalic and " atraumatic.   Eyes:      General: No scleral icterus.  Pulmonary:      Effort: Pulmonary effort is normal.   Neurological:      Mental Status: She is alert and oriented to person, place, and time.   Psychiatric:         Mood and Affect: Mood normal.         Behavior: Behavior normal.         Assessment:       1. Stage 3b chronic kidney disease    2. Type 2 diabetes mellitus with stage 3b chronic kidney disease, with long-term current use of insulin    3. Diabetic nephropathy associated with type 2 diabetes mellitus    4. Proteinuria, unspecified type    5. Acquired cyst of kidney    6. Secondary hyperparathyroidism of renal origin       Lab Results   Component Value Date    CREATININE 2.53 (H) 12/13/2023    BUN 79 (H) 12/13/2023     (L) 12/13/2023    K 4.2 12/13/2023     12/13/2023    CO2 19 (L) 12/13/2023     Lab Results   Component Value Date    .2 (H) 09/26/2023    CALCIUM 8.7 12/13/2023    PHOS 4.6 (H) 09/26/2023     Lab Results   Component Value Date    HCT 33.4 (L) 12/13/2023     Prot/Creat Ratio, Urine   Date Value Ref Range Status   09/26/2023 0.14 0.00 - 0.20 Final   07/22/2022 0.59 (H) 0.00 - 0.20 Final   04/25/2019 Unable to calculate 0.00 - 0.20 Final      Plan:   Return to clinic in 4 months.  Labs for next visit include rp, pth, upc per standing orders .  Baseline creatinine is 1.1-1.5 since 2010 but is still in the upper 1.5-2.0 range for the past four years.  She wishes to continue conservative care.  She does not want dialysis.  UPC is 0.14.  PTH is 119 with a calcium of 8.7.  Continue current medications as prescribed and reviewed.   She has diastolic heart failure and pulmonary HTN.  She needs to do daily weights and adjust her diuretics as needed.  Her target weight should be determined and thus her diuretic can be held or given.

## 2024-01-31 ENCOUNTER — OFFICE VISIT (OUTPATIENT)
Dept: FAMILY MEDICINE | Facility: CLINIC | Age: 89
End: 2024-01-31
Payer: MEDICARE

## 2024-01-31 VITALS
HEART RATE: 59 BPM | WEIGHT: 171.19 LBS | HEIGHT: 62 IN | DIASTOLIC BLOOD PRESSURE: 65 MMHG | SYSTOLIC BLOOD PRESSURE: 138 MMHG | TEMPERATURE: 98 F | BODY MASS INDEX: 31.5 KG/M2

## 2024-01-31 DIAGNOSIS — I15.2 HYPERTENSION ASSOCIATED WITH DIABETES: ICD-10-CM

## 2024-01-31 DIAGNOSIS — L97.521 NEUROPATHIC FOOT ULCER, LEFT, LIMITED TO BREAKDOWN OF SKIN: ICD-10-CM

## 2024-01-31 DIAGNOSIS — E11.9 TYPE 2 DIABETES MELLITUS WITHOUT COMPLICATION, WITHOUT LONG-TERM CURRENT USE OF INSULIN: ICD-10-CM

## 2024-01-31 DIAGNOSIS — I48.19 PERSISTENT ATRIAL FIBRILLATION: ICD-10-CM

## 2024-01-31 DIAGNOSIS — I50.30 DIASTOLIC HEART FAILURE, UNSPECIFIED HF CHRONICITY: Primary | ICD-10-CM

## 2024-01-31 DIAGNOSIS — J84.10 LUNG GRANULOMA: ICD-10-CM

## 2024-01-31 DIAGNOSIS — N18.4 CHRONIC KIDNEY DISEASE, STAGE IV (SEVERE): ICD-10-CM

## 2024-01-31 DIAGNOSIS — F33.1 MAJOR DEPRESSIVE DISORDER, RECURRENT EPISODE, MODERATE: ICD-10-CM

## 2024-01-31 DIAGNOSIS — F03.90 DEMENTIA, UNSPECIFIED DEMENTIA SEVERITY, UNSPECIFIED DEMENTIA TYPE, UNSPECIFIED WHETHER BEHAVIORAL, PSYCHOTIC, OR MOOD DISTURBANCE OR ANXIETY: Chronic | ICD-10-CM

## 2024-01-31 DIAGNOSIS — E11.59 HYPERTENSION ASSOCIATED WITH DIABETES: ICD-10-CM

## 2024-01-31 PROCEDURE — 1101F PT FALLS ASSESS-DOCD LE1/YR: CPT | Mod: HCNC,CPTII,S$GLB, | Performed by: FAMILY MEDICINE

## 2024-01-31 PROCEDURE — 1157F ADVNC CARE PLAN IN RCRD: CPT | Mod: HCNC,CPTII,S$GLB, | Performed by: FAMILY MEDICINE

## 2024-01-31 PROCEDURE — 99999 PR PBB SHADOW E&M-EST. PATIENT-LVL V: CPT | Mod: PBBFAC,HCNC,, | Performed by: FAMILY MEDICINE

## 2024-01-31 PROCEDURE — 1126F AMNT PAIN NOTED NONE PRSNT: CPT | Mod: HCNC,CPTII,S$GLB, | Performed by: FAMILY MEDICINE

## 2024-01-31 PROCEDURE — 3288F FALL RISK ASSESSMENT DOCD: CPT | Mod: HCNC,CPTII,S$GLB, | Performed by: FAMILY MEDICINE

## 2024-01-31 PROCEDURE — 99214 OFFICE O/P EST MOD 30 MIN: CPT | Mod: HCNC,S$GLB,, | Performed by: FAMILY MEDICINE

## 2024-01-31 PROCEDURE — 1159F MED LIST DOCD IN RCRD: CPT | Mod: HCNC,CPTII,S$GLB, | Performed by: FAMILY MEDICINE

## 2024-01-31 NOTE — PROGRESS NOTES
Follow-up hospitalization [facility] as below and subsequent recent discharge from SNF rehab.  Feels like she is breathing okay.  Denies chest pain.  She did see her nephrologist and they are recommending that she adjust her Lasix with her cardiologist.  Pending follow-up with her cardiologist.Her blood pressure is controlled.  Diabetes acceptable for age and condition.  Dementia slowly progressive.  Depression doing well current medication.  On anticoagulation due to previous atrial fibrillation.  She has pacemaker.  Lung granuloma asymptomatic.  She has wound care nurse seeing her regarding sores on the feet.    Discharge Summary        Patient Name: Kassi Skelton  MRN: [id number]  ZULEYKA: [id number]  Patient Class: IP- Inpatient  Admission Date: 12/10/2023  Hospital Length of Stay: 5 days  Discharge Date and Time:  12/15/2023 12:33 PM  Attending Physician: Mehrdad Schneider MD   Discharging Provider: Mehrdad Schneider MD  Primary Care Provider: Mor Cook MD     Primary Care Team: Networked reference to record PCT      HPI:   Ms. Skelton is a 89-year-old female who was brought to the ED via EMS from [facility] due to several complaints.  Patient with of history of dementia, collateral from daughter present at bedside.  Daughter states she was called by assisted living facility, [facility], told patient was coughing, producing small amount of greenish/yellowish phlegm, appeared to be more short of breath, blood pressure elevated with , EMS was told symptoms ongoing for one-week.  Daughter states last saw mother on Friday and did not have these symptoms, states last week she was seen at [facility] ED for right upper extremity swelling, workup was performed, diagnosed with hematoma.  Daughter denies reports of fever, vomiting, diarrhea, states she ambulates with a walker at baseline.  Patient does admit to dysuria during my encounter.  She follows with cardiologist Dr. Brewer and nephrologist Dr. Kang.  In  the ED afebrile, on room air, vitals stable.  Labs with WBC 18 with left shift, hemoglobin 11.8, BUN/creatinine 66/2, bicarbonate 16, anion gap 15, proBNP 9 310, troponin 0.046-->0.050, influenza and COVID negative.  EKG paced.  Chest x-ray no large effusion or consolidation.  She received Rocephin and 40 mg IV Lasix in ED.  Case discussed with ED provider who requested admission.  Plan of care discussed with daughter, states patient does have advanced directive, DNR.     * No surgery found *       Hospital Course:   Admitted with a/c diastolic HF exacerbation with volume overload, UTI and CKD.  She was placed on IV antibiotics, lasix.  She had clinical improvement, cardiology recommended PO lasix upon dc (HCTZ will be dc).  Urine cx with E coli and Aerococcus, sensitive to Levaquin. CXR with nothing acute, echo with nl EF, III DD, PA 68.  R upper ext with large bruising- arterial US with no high grade stenosis.  On anticoagulation for Afib.  CKD currently at baseline with acidosis, PO bicarb added.  Cardiology and nephrology consulted.  Plans for PO levaquin (instructed to hold donepezil while she completes Abx's).  PT/OT recs for SNF, SW consutled for dc planning.     At 12/15 doing well and ready for discharge.  She will follow with SNF to continue recovery.        Goals of Care Treatment Preferences:  Code Status: DNR        Consults:   Consults (From admission, onward)            Status Ordering Provider       Inpatient consult to Diabetes educator  Once        Provider:  (Not yet assigned)    Completed JUSTINE ENRIQUEZ       Inpatient consult to Registered Dietitian/Nutritionist  Once        Provider:  (Not yet assigned)    Completed JUSTINE ENRIQUEZ       Inpatient consult to Cardiology  Once        Provider:  Jaime Swanson MD    Completed JUSTINE ENRIQUEZ       Inpatient consult to Nephrology  Once        Provider:  Errol Kang MD    Completed PREETHI ROBBINS        Inpatient consult to Hospitalist  Once        Provider:  (Not yet assigned)    Acknowledged JASKARAN MIMSSARAH BETHANA CRISTINA A                Neuro  Dementia  Resident at assisted living facility   Nonpharmacological interventions to prevent delirium, fall precautions     Cardiac/Vascular  Combined hyperlipidemia associated with type 2 diabetes mellitus  On statin therapy        Troponin I above reference range  Elevation in troponin without any significant delta change in the setting of CKD   Dementia but denies any chest pain, EKG paced        Acute on chronic heart failure with preserved ejection fraction  Hypervolemic on examination with elevated pro BNP in the setting of CKD stage 4   Echo 2019 with EF of 65%, repeat echo with nl EF, III DD  IV diuresis with Lasix 40 mg q.12 hours and following volume status --> changed to PO, dc hctz.  Strict I/O, daily weights, oral fluid and sodium restriction  BMP was monitored.      Cardiac pacemaker in situ           History of atrial flutter  On Eliquis and metoprolol        Renal/  * UTI (urinary tract infection)  Leukocytosis, admits to dysuria, no bacteria seen but 72 WBC with positive leukocyte and nitrate   Previous urine culture with Klebsiella and Citrobacter   Empiric Levaquin and urine culture results - E coli and Aerococcus.   Complete 7 day course outpatient.   As above        High anion gap metabolic acidosis  Per nephro        Chronic kidney disease, stage IV (severe)  Known history of CKD stage 4, followed by nephrologist Dr. Kang   Plans for conservative mgmt with no dialysis  Diuresis with close monitoring of renal function   Acidosis, added exogenous base  Renally dose all medications and avoid nephrotoxin drugs   Consult Nephrology.   Follow up with nephrology outpatient.      Urinary incontinence, mixed  Chronic medical condition        Oncology  Leucocytosis  Leukocytosis unchanged  Suspect secondary to UTI +/- bronchitis  Blood culture, urine culture, respiratory  sample for Gram stain and culture   Empiric Levaquin and adjust as per culture results , dc donepezil while pt is on levaquin  Afebrile.          Endocrine  Hypothyroidism  Continue levothyroxine supplementation        Diabetes mellitus with stage 4 chronic kidney disease GFR 15-29  Insulin-dependent diabetes mellitus   Continue basal bolus insulin with Accu-Cheks, as needed hypoglycemic measures  Diet was adv.  Plans for am basal insulin and bid premeal insulin.  Monitor closely for accuchecks.     Other  Edema of right upper arm  Persistent, recent evaluation at [facility]   X-ray negative, ultrasound no DVT  US arterial with no high grade stenosis  Elevate right upper extremity and following clinically        Hearing aid worn  Hard of hearing        ACP (advance care planning)  Confirmed with daughter at bedside, has advanced directive, DNR        Major depressive disorder, recurrent episode, moderate  Chronic medical condition                     Final Active Diagnoses:     Diagnosis Date Noted POA    PRINCIPAL PROBLEM:  UTI (urinary tract infection) [N39.0] 12/10/2023 Yes    Leucocytosis [D72.829] 12/10/2023 Yes    Urinary incontinence, mixed [N39.46] 12/07/2012 Yes       Chronic    Acute on chronic heart failure with preserved ejection fraction [I50.9] 12/10/2023 Yes    Troponin I above reference range [R79.89] 12/10/2023 Yes    Cardiac pacemaker in situ [Z95.0] 12/07/2012 Yes       Chronic    History of atrial flutter [Z86.79] 04/19/2012 Not Applicable    High anion gap metabolic acidosis [E87.29] 12/10/2023 Yes    Chronic kidney disease, stage IV (severe) [N18.4] 11/30/2015 Yes    Diabetes mellitus with stage 4 chronic kidney disease GFR 15-29 [E11.22, N18.4]   Yes    Combined hyperlipidemia associated with type 2 diabetes mellitus [E11.69, E78.2] 04/19/2012 Yes    Pulmonary hypertension [I27.20] 12/11/2023 Yes    Discharge planning issues [Z02.9] 12/14/2023 Not Applicable    Dementia [F03.90] 12/10/2023 Yes        Chronic    Edema of right upper arm [R60.0] 12/10/2023 Yes    Hearing aid worn [Z97.4]   Not Applicable    ACP (advance care planning) [Z71.89] 09/28/2015 Not Applicable    Hypothyroidism [E03.9] 04/19/2012 Yes       Chronic    Major depressive disorder, recurrent episode, moderate [F33.1] 04/19/2012 Yes       Problems Resolved During this Admission:         Discharged Condition: good     Disposition: Skilled Nursing Facility        Kassi was seen today for transitional care.    Diagnoses and all orders for this visit:    Diastolic heart failure, unspecified HF chronicity    Chronic kidney disease, stage IV (severe)    Hypertension associated with diabetes    Dementia, unspecified dementia severity, unspecified dementia type, unspecified whether behavioral, psychotic, or mood disturbance or anxiety    Major depressive disorder, recurrent episode, moderate    Persistent atrial fibrillation    Lung granuloma    Neuropathic foot ulcer, left, limited to breakdown of skin      Keep follow-up with Cardiology and Nephrology.  Will check with home health to see if she can get PT 3 times weekly instead of twice per family request.  She has follow-up laboratory scheduled in March.    Transitional Care Note    Family and/or Caretaker present at visit?  Yes.  Diagnostic tests reviewed/disposition: No diagnosic tests pending after this hospitalization.  Disease/illness education: yes  Home health/community services discussion/referrals: Patient has home health established at [facility] .   Establishment or re-establishment of referral orders for community resources: No other necessary community resources.   Discussion with other health care providers: No discussion with other health care providers necessary.               Past Medical History:  Past Medical History:   Diagnosis Date    *Atrial fibrillation 4/19/2012    *Atrial flutter 4/19/2012    Acute on chronic heart failure with preserved ejection fraction  12/10/2023    Allergy     Ankle fracture 4/19/2012    Anticoagulant long-term use     Anxiety     Arthritis     Bacterial pneumonia, unspecified 12/7/2012    Breast cancer     LEFT    CKD (chronic kidney disease), stage III 5/11/2012    Followed by Dr. 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    LBBB (left bundle branch block) 10/19/2012    Mild cognitive impairment with memory loss 6/15/2017    Osteopenia 4/19/2012    Osteoporosis     Otitis media     Pacemaker 11/2012    yo/chantell    Secondary hyperparathyroidism of renal origin     Simple renal cyst     Sinus node dysfunction     Squamous cell carcinoma skin of arm     Urinary incontinence 4/19/2012     Past Surgical History:   Procedure Laterality Date    APPENDECTOMY      BREAST LUMPECTOMY Right 10/21/2015    CARDIAC PACEMAKER PLACEMENT  12/3/12    Sinus node dysfunction    CATARACT EXTRACTION W/  INTRAOCULAR LENS IMPLANT Bilateral     COLONOSCOPY      EXCISION OF LESION Left 6/3/2021    Procedure: EXCISION, LESION  forearm;  Surgeon: Saman Quintero MD;  [facility];  Service: General;  Laterality: Left;    excision of squamous cell carcinoma Right 2017    EYE SURGERY Bilateral     PHACO/IOL    FRACTURE SURGERY Right     ankle    HYSTERECTOMY      total 1970's    INSERTION, PACEMAKER, TEMPORARY TRANSVENOUS  1/26/2023    Procedure: Insertion, Pacemaker, Temporary Transvenous;  Surgeon: Jaime Swanson MD;  Location: [facility];  Service: Cardiology;;    MASTECTOMY Right 11/2015    OOPHORECTOMY      REPLACEMENT OF PACEMAKER GENERATOR N/A 1/26/2023    Procedure:  REPLACEMENT, PACEMAKER GENERATOR;  Surgeon: Jaime Swanson MD;  Location: [facility];  Service: Cardiology;  Laterality: N/A;    TONSILLECTOMY      TOTAL HIP ARTHROPLASTY Right 2007     Review of patient's allergies indicates:   Allergen Reactions    Amlodipine Edema     Pt stated this medication made her legs swell    Alendronate sodium Other (See Comments)     Reaction: Esophageal bleeding    Fosamax [alendronate] Other (See Comments)     Reaction: Esophageal bleeding  diarrhea    Sorbitol      Diarrhea     Augmentin [amoxicillin-pot clavulanate] Rash     Yeast infection, give oral anti-fungal     Current Outpatient Medications on File Prior to Visit   Medication Sig Dispense Refill    acetaminophen (TYLENOL) 500 MG tablet Take 2 tablets (1,000 mg total) by mouth every 6 (six) hours as needed for Pain.      benazepriL (LOTENSIN) 20 MG tablet TAKE 1 TABLET (20 MG TOTAL) ONE TIME DAILY. (Patient taking differently: Take 20 mg by mouth once daily.) 90 tablet 3    calcitRIOL (ROCALTROL) 0.25 MCG Cap TAKE 1 CAPSULE EVERY DAY (NEED MD APPOINTMENT) 90 capsule 1    donepeziL (ARICEPT) 5 MG tablet Take 1 tablet (5 mg total) by mouth every evening.      ELIQUIS 2.5 mg Tab TAKE 1 TABLET TWICE DAILY (Patient taking differently: Take 2.5 mg by mouth 2 (two) times daily.) 180 tablet 3    furosemide (LASIX) 20 MG tablet Take 1 tablet (20 mg total) by mouth once daily. 30 tablet 0    insulin aspart U-100 (NOVOLOG FLEXPEN U-100 INSULIN) 100 unit/mL (3 mL) InPn pen Inject subcutaneously 4 units before breakfast and 6 units before lunch and 6 units before supper. (Patient taking differently: Inject 4 Units into the skin every morning. Inject subcutaneously 4 units before breakfast and 6 units before lunch and 6 units before supper.) 15 mL 3    insulin degludec (TRESIBA FLEXTOUCH U-100) 100 unit/mL (3 mL) insulin pen Inject 13 Units into the skin once daily. 11.7 mL 3    levothyroxine (SYNTHROID) 25 MCG tablet Take 1 tablet  "(25 mcg total) by mouth before breakfast. 90 tablet 2    metoprolol tartrate (LOPRESSOR) 50 MG tablet TAKE 1/2 TABLET TWICE DAILY (Patient taking differently: Take 25 mg by mouth 2 (two) times daily.) 90 tablet 3    mirabegron (MYRBETRIQ) 25 mg Tb24 ER tablet Take 1 tablet (25 mg total) by mouth once daily. 90 tablet 3    mirtazapine (REMERON) 45 MG tablet TAKE 1 TABLET EVERY EVENING 90 tablet 1    pravastatin (PRAVACHOL) 40 MG tablet TAKE 1 TABLET EVERY DAY (Patient taking differently: Take 40 mg by mouth once daily.) 90 tablet 2    [START ON 2/1/2024] sodium bicarbonate 650 MG tablet Take 1 tablet (650 mg total) by mouth once daily. 90 tablet 3    venlafaxine (EFFEXOR-XR) 150 MG Cp24 TAKE 1 CAPSULE EVERY DAY 90 capsule 1    verapamiL (CALAN-SR) 120 MG CR tablet TAKE 1 TABLET EVERY NIGHT (Patient taking differently: Take 120 mg by mouth nightly.) 90 tablet 3    [DISCONTINUED] furosemide (LASIX) 20 MG tablet Take 1 tablet (20 mg total) by mouth once daily. 90 tablet 3    [DISCONTINUED] HYDROcodone-acetaminophen (NORCO) 5-325 mg per tablet Take 1 tablet by mouth every 8 (eight) hours as needed for Pain (Pain). 10 tablet 0    [DISCONTINUED] sodium bicarbonate 650 MG tablet Take 1 tablet (650 mg total) by mouth once daily. 30 tablet 0    alcohol swabs (BD ALCOHOL SWABS) PadM Apply 1 each topically 4 (four) times daily. 360 each 3    blood sugar diagnostic (BLOOD GLUCOSE TEST) Strp 1 strip by Misc.(Non-Drug; Combo Route) route 4 (four) times daily. 200 each 11    blood-glucose meter kit To check BG once daily, to use with insurance preferred meter 1 each 0    lancets (ACCU-CHEK SOFTCLIX LANCETS) Misc TEST BLOOD SUGAR THREE TIMES DAILY BEFORE MEALS 300 each 3    pen needle, diabetic (PEN NEEDLE) 31 gauge x 3/16" Ndle Use as directed 4 times daily 360 each 3     No current facility-administered medications on file prior to visit.     Social History     Socioeconomic History    Marital status:     Number of " children: 2   Tobacco Use    Smoking status: Never    Smokeless tobacco: Never   Substance and Sexual Activity    Alcohol use: No    Drug use: No    Sexual activity: Not Currently     Social Determinants of Health     Financial Resource Strain: Low Risk  (1/31/2024)    Overall Financial Resource Strain (CARDIA)     Difficulty of Paying Living Expenses: Not hard at all   Food Insecurity: No Food Insecurity (1/31/2024)    Hunger Vital Sign     Worried About Running Out of Food in the Last Year: Never true     Ran Out of Food in the Last Year: Never true   Transportation Needs: Unmet Transportation Needs (1/31/2024)    PRAPARE - Transportation     Lack of Transportation (Medical): Yes     Lack of Transportation (Non-Medical): No   Physical Activity: Inactive (1/31/2024)    Exercise Vital Sign     Days of Exercise per Week: 0 days     Minutes of Exercise per Session: 0 min   Stress: No Stress Concern Present (1/31/2024)    [employer] - Occupational Stress Questionnaire     Feeling of Stress : Not at all   Social Connections: Unknown (1/31/2024)    Social Connection and Isolation Panel [NHANES]     Frequency of Communication with Friends and Family: Never     Frequency of Social Gatherings with Friends and Family: Three times a week     Active Member of Clubs or Organizations: Yes     Attends Club or Organization Meetings: More than 4 times per year     Marital Status:    Housing Stability: Low Risk  (1/31/2024)    Housing Stability Vital Sign     Unable to Pay for Housing in the Last Year: No     Number of Places Lived in the Last Year: 1     Unstable Housing in the Last Year: No     Family History   Problem Relation Age of Onset    Hypertension Mother     Heart disease Father     Stroke Father         cerebral hemorrhage    Coronary artery disease Brother     Heart disease Brother     Coronary artery disease Brother     COPD Brother     Heart disease Brother     Mitral valve  "prolapse Daughter     Peripheral vascular disease Son     Cataracts Neg Hx     Glaucoma Neg Hx     Macular degeneration Neg Hx     Retinal detachment Neg Hx     Strabismus Neg Hx            ROS:  GENERAL: No fever, chills,  or significant weight changes.   CARDIOVASCULAR: Denies chest pain, PND, orthopnea or reduced exercise tolerance.  ABDOMEN: Appetite fine. Denies diarrhea, abdominal pain, hematemesis or blood in stool.  URINARY: No flank pain, dysuria or hematuria.    Vitals:    01/31/24 1415   BP: 138/65   Pulse: (!) 59   Temp: 98 °F (36.7 °C)   TempSrc: Temporal   Weight: 77.7 kg (171 lb 3.2 oz)   Height: 5' 2" (1.575 m)     Wt Readings from Last 3 Encounters:   01/31/24 77.7 kg (171 lb 3.2 oz)   12/15/23 77.6 kg (171 lb 1.2 oz)   10/17/23 74.4 kg (164 lb 0.4 oz)       OBJECTIVE:   APPEARANCE: Well nourished, well developed, in no acute distress.  Using wheelchair.  HEAD: Normocephalic.  Atraumatic.  No sinus tenderness.  EYES:   Right eye: Pupil reactive.  Conjunctiva clear.    Left eye: Pupil reactive.  Conjunctiva clear.  EOMI.    NECK: Supple. No bruits.  No JVD.  No cervical lymphadenopathy.  No thyromegaly.    CHEST: Breath sounds clear bilaterally.  Normal respiratory effort  CARDIOVASCULAR: Normal rate.  Regular rhythm.  No murmurs.  No rub.  No gallops.  MENTAL STATUS: Alert.            "

## 2024-02-02 ENCOUNTER — HOSPITAL ENCOUNTER (OUTPATIENT)
Dept: CARDIOLOGY | Facility: HOSPITAL | Age: 89
Discharge: HOME | End: 2024-02-02
Attending: INTERNAL MEDICINE
Payer: MEDICARE

## 2024-02-02 ENCOUNTER — CLINICAL SUPPORT (OUTPATIENT)
Dept: CARDIOLOGY | Facility: HOSPITAL | Age: 89
End: 2024-02-02

## 2024-02-02 DIAGNOSIS — I48.91 UNSPECIFIED ATRIAL FIBRILLATION: ICD-10-CM

## 2024-02-02 PROCEDURE — 93294 REM INTERROG EVL PM/LDLS PM: CPT | Mod: ,,, | Performed by: INTERNAL MEDICINE

## 2024-02-02 PROCEDURE — 93296 REM INTERROG EVL PM/IDS: CPT | Mod: PO | Performed by: INTERNAL MEDICINE

## 2024-02-06 ENCOUNTER — TELEPHONE (OUTPATIENT)
Dept: CARDIOLOGY | Facility: CLINIC | Age: 89
End: 2024-02-06
Payer: MEDICARE

## 2024-02-06 ENCOUNTER — TELEPHONE (OUTPATIENT)
Dept: FAMILY MEDICINE | Facility: CLINIC | Age: 89
End: 2024-02-06
Payer: MEDICARE

## 2024-02-06 RX ORDER — INSULIN ASPART 100 [IU]/ML
4 INJECTION, SOLUTION INTRAVENOUS; SUBCUTANEOUS EVERY MORNING
Qty: 3.6 ML | Refills: 3 | Status: SHIPPED | OUTPATIENT
Start: 2024-02-06 | End: 2024-06-18

## 2024-02-06 RX ORDER — INSULIN DEGLUDEC 100 U/ML
13 INJECTION, SOLUTION SUBCUTANEOUS DAILY
Qty: 11.7 ML | Refills: 3 | Status: SHIPPED | OUTPATIENT
Start: 2024-02-06 | End: 2025-02-05

## 2024-02-06 RX ORDER — PEN NEEDLE, DIABETIC 30 GX3/16"
1 NEEDLE, DISPOSABLE MISCELLANEOUS DAILY
Qty: 100 EACH | Refills: 3 | Status: SHIPPED | OUTPATIENT
Start: 2024-02-06

## 2024-02-06 NOTE — TELEPHONE ENCOUNTER
"Spoke to [facility], they were not aware that HCTZ was discontinued and replaced by lasix 20mg daily,  also informed, per Dr Cook visit note from 1/31/24 "She did see her nephrologist and they are recommending that she adjust her Lasix with her cardiologist.  Pending follow-up with her cardiologist.Her blood pressure is controlled". HH nurse will contact specialists for advise    "

## 2024-02-06 NOTE — TELEPHONE ENCOUNTER
----- Message from Diana Ramon sent at 2/6/2024  2:29 PM CST -----  Contact: self  Type: Sooner Appointment Request        Caller is requesting a sooner appointment. Caller declined first available appointment listed below. Caller will not accept being placed on the waitlist and is requesting a message be sent to doctor.        Name of Caller: Patient   Best Call Back Number: [phone] (Nurse Mercado)  Additional Information: Pt has bad swelling to both legs. Plz call nurse today if possible. Pt may need lasik as well. Thanks

## 2024-02-06 NOTE — TELEPHONE ENCOUNTER
Please advise: HH reporting pt has 3+ pitting edema bilaterality; lungs are clear. Pt taking Lasix 20mg daily

## 2024-02-06 NOTE — TELEPHONE ENCOUNTER
----- Message from Louise Chang sent at 2/6/2024  9:39 AM CST -----  Regarding: Lower Exremetry Swelling  Contact: [phone]  CONSULT/ADVISORY    Name of Caller:  Violet/ Home Health Nurse    Contact Preference:  [phone]    Nature of Call:  States pts lower extremities are swollen, pts weight has increased by 4lbs.

## 2024-02-07 NOTE — TELEPHONE ENCOUNTER
Spoke to daughter Juliann--pt will keep appt on March 5th, advised if pt is unable to wait that long to go to the emergency room per Dr Brewer; daughter expressed understanding

## 2024-02-10 DIAGNOSIS — E78.5 HYPERLIPIDEMIA, UNSPECIFIED HYPERLIPIDEMIA TYPE: ICD-10-CM

## 2024-02-10 NOTE — TELEPHONE ENCOUNTER
No care due was identified.  [facility] Embedded Care Due Messages. Reference number: [id number].   2/10/2024 1:22:57 AM CST

## 2024-02-10 NOTE — TELEPHONE ENCOUNTER
Refill Routing Note   Medication(s) are not appropriate for processing by [facility] for the following reason(s):        Required labs outdated    [facility] action(s):  Defer               Appointments  past 12m or future 3m with PCP    Date Provider   Last Visit   1/31/2024 Mor Cook MD   Next Visit   Visit date not found Mor Cook MD   ED visits in past 90 days: 0        Note composed:10:34 AM 02/10/2024

## 2024-02-12 ENCOUNTER — TELEPHONE (OUTPATIENT)
Dept: FAMILY MEDICINE | Facility: CLINIC | Age: 89
End: 2024-02-12
Payer: MEDICARE

## 2024-02-12 RX ORDER — PRAVASTATIN SODIUM 40 MG/1
TABLET ORAL
Qty: 90 TABLET | Refills: 3 | Status: SHIPPED | OUTPATIENT
Start: 2024-02-12

## 2024-02-12 NOTE — TELEPHONE ENCOUNTER
----- Message from Albaro Cerna sent at 2/12/2024  8:57 AM CST -----  Contact: Kassi/ [facility]  Kassi is calling in regards to informing the doctor that the pt had an unwitnessed fall on 02/08 and pt was taken to the ER due to not sure if she hit her head and to check her over.  If any questions please [phone]        Thanks

## 2024-02-14 ENCOUNTER — PATIENT MESSAGE (OUTPATIENT)
Dept: FAMILY MEDICINE | Facility: CLINIC | Age: 89
End: 2024-02-14
Payer: MEDICARE

## 2024-02-14 DIAGNOSIS — F03.90 DEMENTIA, UNSPECIFIED DEMENTIA SEVERITY, UNSPECIFIED DEMENTIA TYPE, UNSPECIFIED WHETHER BEHAVIORAL, PSYCHOTIC, OR MOOD DISTURBANCE OR ANXIETY: Primary | Chronic | ICD-10-CM

## 2024-02-14 DIAGNOSIS — I95.1 ORTHOSTATIC HYPOTENSION: ICD-10-CM

## 2024-02-14 DIAGNOSIS — M19.91 PRIMARY OSTEOARTHRITIS, UNSPECIFIED SITE: ICD-10-CM

## 2024-02-14 DIAGNOSIS — Z74.09 MOBILITY IMPAIRED: ICD-10-CM

## 2024-02-16 NOTE — TELEPHONE ENCOUNTER
Order signed.  Patient requires head of the bed elevated more than 30° most of the time due to CHF/COPD/aspiration and she requires frequent changes in body position.

## 2024-02-22 ENCOUNTER — DOCUMENT SCAN (OUTPATIENT)
Dept: HOME HEALTH SERVICES | Facility: HOSPITAL | Age: 89
End: 2024-02-22
Payer: MEDICARE

## 2024-02-26 ENCOUNTER — EXTERNAL HOME HEALTH (OUTPATIENT)
Dept: HOME HEALTH SERVICES | Facility: HOSPITAL | Age: 89
End: 2024-02-26
Payer: MEDICARE

## 2024-02-29 ENCOUNTER — OFFICE VISIT (OUTPATIENT)
Dept: DIABETES | Facility: CLINIC | Age: 89
End: 2024-02-29
Payer: MEDICARE

## 2024-02-29 DIAGNOSIS — E11.9 TYPE 2 DIABETES MELLITUS WITHOUT COMPLICATION, WITHOUT LONG-TERM CURRENT USE OF INSULIN: Primary | ICD-10-CM

## 2024-02-29 PROCEDURE — 99214 OFFICE O/P EST MOD 30 MIN: CPT | Mod: HCNC,95,, | Performed by: NURSE PRACTITIONER

## 2024-02-29 PROCEDURE — 1157F ADVNC CARE PLAN IN RCRD: CPT | Mod: HCNC,CPTII,95, | Performed by: NURSE PRACTITIONER

## 2024-02-29 RX ORDER — GLUCAGON INJECTION, SOLUTION 1 MG/.2ML
1 INJECTION, SOLUTION SUBCUTANEOUS
Qty: 2 EACH | Refills: 5 | Status: SHIPPED | OUTPATIENT
Start: 2024-02-29

## 2024-02-29 NOTE — PROGRESS NOTES
The patient location is: Home  The chief complaint leading to consultation is: Diabetes    Visit type: audiovisual    Face to Face time with patient: 15  30 minutes of total time spent on the encounter, which includes face to face time and non-face to face time preparing to see the patient (eg, review of tests), Obtaining and/or reviewing separately obtained history, Documenting clinical information in the electronic or other health record, Independently interpreting results (not separately reported) and communicating results to the patient/family/caregiver, or Care coordination (not separately reported).  Each patient to whom he or she provides medical services by telemedicine is:  (1) informed of the relationship between the physician and patient and the respective role of any other health care provider with respect to management of the patient; and (2) notified that he or she may decline to receive medical services by telemedicine and may withdraw from such care at any time.    Notes:    Kassi Skelton is a 89 y.o. female who presents for a follow up evaluation of Type 2 diabetes mellitus.     CHIEF COMPLAINT: Diabetes Consultation    PCP: Mor Cook MD      Initial visit with me - 6/1/2023    The patient was initially diagnosed with diabetes several years ago.   Patient lives in assisted living. Visit is completed with assistance of her daughter Tova Sánchez.      Previous failed treatments include:  Oral medications.      Social Documentation:  [facility]   Exercise: limited due to age and chronic medical conditions.   Edentulous, slightly pureed foods.   Patient is ambulatory with a walker.  Patient does have a life alert she wears and emergency buttons in bedroom and bathroom for assistance.   Has cognitive decline with memory loss at times.       Diabetes related complications:   nephropathy.   denies Pancreatitis  denies  Gastroparesis  denies DKA  denies Hx/family Hx of MEN2/MTC  denies Frequent UTIs/yeast infections     Cardiovascular Risk Factors: advanced age (>55 for men, >65 for women), dyslipidemia, hypertension, microalbuminuria, obesity (BMI >30 kg/m2), and sedentary lifestyle.    Diabetes Medications               insulin aspart U-100 (NOVOLOG FLEXPEN U-100 INSULIN) 100 unit/mL (3 mL) InPn pen Inject subcutaneously 4 units before breakfast and 6 units before lunch and 6 units before supper.    insulin degludec (TRESIBA FLEXTOUCH U-100) 100 unit/mL (3 mL) insulin pen Inject 13 Units into the skin once daily.     Current monitoring regimen: capillary blood glucose monitoring with finger sticks.      Recent hypoglycemic episodes: No.   Patient compliant with glucose checks and medication administration? Yes    DIABETES MANAGEMENT STATUS  Statin: Taking  ACE/ARB: Taking  Screening or Prevention Patient's value Goal Complete/Controlled?   HgA1C Testing and Control   Lab Results   Component Value Date    HGBA1C 8.3 (H) 12/13/2023      Annually/Less than 8% No   Lipid profile : 12/19/2023 Annually Yes   LDL control Lab Results   Component Value Date    LDLCALC 76.2 12/23/2022    Annually/Less than 100 mg/dl  Yes   Nephropathy screening Lab Results   Component Value Date    LABMICR 591.0 12/23/2022     Lab Results   Component Value Date    PROTEINUA Trace (A) 12/10/2023     Lab Results   Component Value Date    UTPCR 0.14 09/26/2023      Annually Yes   Blood pressure BP Readings from Last 1 Encounters:   01/31/24 138/65    Less than 140/90 Yes   Dilated retinal exam : 01/05/2022 Annually No   Foot exam   Most Recent Foot Exam Date: Not Found Annually No   Patient's medications, allergies, surgical, social and family histories were reviewed and updated as appropriate.     Review of Systems   Constitutional:  Negative for weight loss.   Eyes:  Negative for blurred vision and double vision.   Cardiovascular:  Negative for chest pain.  "  Gastrointestinal:  Negative for nausea and vomiting.   Genitourinary:  Negative for frequency.   Musculoskeletal:  Negative for falls.   Neurological:  Negative for dizziness and weakness.   Endo/Heme/Allergies:  Negative for polydipsia.   Psychiatric/Behavioral:  Negative for depression.    All other systems reviewed and are negative.         There were no vitals taken for this visit.  Wt Readings from Last 3 Encounters:   01/31/24 77.7 kg (171 lb 3.2 oz)   12/15/23 77.6 kg (171 lb 1.2 oz)   10/17/23 74.4 kg (164 lb 0.4 oz)       LAB REVIEW  Lab Results   Component Value Date     02/08/2024    K 3.5 (L) 02/08/2024     12/13/2023    CO2 20 (L) 02/08/2024    BUN 39 (H) 02/08/2024    CREATININE 1.68 (H) 02/08/2024    CALCIUM 8.9 02/08/2024    ANIONGAP 13 02/08/2024    EGFRNORACEVR 17 (A) 12/13/2023     No results found for: "CPEPTIDE", "GLUTAMICACID", "INSLNABS"  Hemoglobin A1C   Date Value Ref Range Status   12/13/2023 8.3 (H) 0.0 - 5.6 % Final     Comment:     Reference Interval:  5.0 - 5.6 Normal   5.7 - 6.4 High Risk   > 6.5 Diabetic      Hgb A1c results are standardized based on the (NGSP) National   Glycohemoglobin Standardization Program.      Hemoglobin A1C levels are related to mean serum/plasma glucose   during the preceding 2-3 months.        09/12/2023 8.2 (H) 4.0 - 5.6 % Final     Comment:     ADA Screening Guidelines:  5.7-6.4%  Consistent with prediabetes  >or=6.5%  Consistent with diabetes    High levels of fetal hemoglobin interfere with the HbA1C  assay. Heterozygous hemoglobin variants (HbS, HgC, etc)do  not significantly interfere with this assay.   However, presence of multiple variants may affect accuracy.     04/03/2023 9.6 (H) 4.0 - 5.6 % Final     Comment:     ADA Screening Guidelines:  5.7-6.4%  Consistent with prediabetes  >or=6.5%  Consistent with diabetes    High levels of fetal hemoglobin interfere with the HbA1C  assay. Heterozygous hemoglobin variants (HbS, HgC, " etc)do  not significantly interfere with this assay.   However, presence of multiple variants may affect accuracy.          ASSESSMENT    ICD-10-CM ICD-9-CM   1. Type 2 diabetes mellitus without complication, without long-term current use of insulin  E11.9 250.00           PLAN  Diagnoses and all orders for this visit:    Type 2 diabetes mellitus without complication, without long-term current use of insulin  -     glucagon (GVOKE HYPOPEN 2-PACK) 1 mg/0.2 mL AtIn; Inject 1 mg into the skin as needed (SEVERE HYPOGLYCEMIA).            Reviewed pathophysiology of diabetes, complications related to the disease, importance of annual dilated eye exam and daily foot examination. Explained MOA, SE, dosage of medications. Written instructions given and reviewed with patient and patient verbalizes understanding.     6/1/2023 - Will need to fax patient instructions to [facility] [phone]    6/15/2023 - Fax patient instructions to [facility] [phone]. Will increase Lantus to 13 units daily, and novolog to 4 units before breakfast. Fasting ranging between 150-200, higher during the day. Will increase breakfast novolog to 4 units. F/u 4 weeks. Lantus changed to Tresiba due to formulary changes.     10/11/2023 - A1c at 8.2 with no recorded hypoglycemia.      2/29/2024 - daughter reports patient is doing well. Had fall on 2/8/24, glucose upon arrival to ED was 69, this was after her supper, after 10 pm. Will decrease novolog before supper to 4 units. F/u 4 months. Or sooner if needed.    PATIENT INSTRUCTIONS     Fax glucose logs to [facility] [phone] prior to follow up visit.  Notify patient's daughter, Tova Sánchez, for any glucose level less than 80.      Continue Tresiba 13 units subcutaneously every morning.    Continue Novolog subcutaneously three times daily with meals   4 units before breakfast.    6 units before lunch  Decrease to 4 units before supper     Check blood sugar  4 times daily daily. Before each meal and at bedtime.    Blood Sugar Goals:       Fastin-150.       1-2 hours after a meal: Less than 200.    GVOKE Hypopen ordered for severe hypoglycemia emergencies - patient instructions provided with AVS for use and administration.  Use this link to watch instructional video on GVOKE - [email or web address]/[email or web address]?[email or web address]     Follow up in about 4 weeks (around 3/28/2024) for Virtual.    Portions of this note were prepared with [employer] Naturally Speaking voice recognition transcription software. Grammatical errors, including garbled syntax, mangle pronouns, and other bizarre constructions may be attributed to that software system.

## 2024-02-29 NOTE — PATIENT INSTRUCTIONS
PATIENT INSTRUCTIONS     Fax glucose logs to [facility] [phone] prior to follow up visit.  Notify patient's daughter, Tova Sánchez, for any glucose level less than 80.      Continue Tresiba 13 units subcutaneously every morning.    Continue Novolog subcutaneously three times daily with meals   4 units before breakfast.    6 units before lunch  Decrease to 4 units before supper     Check blood sugar 4 times daily daily. Before each meal and at bedtime.    Blood Sugar Goals:       Fastin-150.       1-2 hours after a meal: Less than 200.    GVOKE Georgie ordered for severe hypoglycemia emergencies - patient instructions provided with AVS for use and administration.  Use this link to watch instructional video on GVOKE - https://www.youtube.com/watch?v=[email or web address]

## 2024-03-01 ENCOUNTER — PATIENT MESSAGE (OUTPATIENT)
Dept: DIABETES | Facility: CLINIC | Age: 89
End: 2024-03-01
Payer: MEDICARE

## 2024-03-01 NOTE — TELEPHONE ENCOUNTER
This pt daughter says you are familiar with [facility]. We are not so can you help us with this message?

## 2024-03-01 NOTE — TELEPHONE ENCOUNTER
I actually checked with [facility] because I wasn't sure - that's the nursing home. Please fax the changes as requested. Can you double check with Tova that [facility] didn't receive the changes and I need to send for them?

## 2024-03-04 ENCOUNTER — HOSPITAL ENCOUNTER (OUTPATIENT)
Dept: CARDIOLOGY | Facility: HOSPITAL | Age: 89
Discharge: HOME | End: 2024-03-04
Attending: INTERNAL MEDICINE
Payer: MEDICARE

## 2024-03-04 ENCOUNTER — OFFICE VISIT (OUTPATIENT)
Dept: CARDIOLOGY | Facility: CLINIC | Age: 89
End: 2024-03-04
Payer: MEDICARE

## 2024-03-04 VITALS — WEIGHT: 171 LBS | BODY MASS INDEX: 31.47 KG/M2 | HEIGHT: 62 IN

## 2024-03-04 DIAGNOSIS — I44.7 LBBB (LEFT BUNDLE BRANCH BLOCK): ICD-10-CM

## 2024-03-04 DIAGNOSIS — I49.5 SINUS NODE DYSFUNCTION: ICD-10-CM

## 2024-03-04 DIAGNOSIS — Z95.0 PACEMAKER: ICD-10-CM

## 2024-03-04 DIAGNOSIS — I48.19 PERSISTENT ATRIAL FIBRILLATION: ICD-10-CM

## 2024-03-04 DIAGNOSIS — Z86.79 HISTORY OF ATRIAL FLUTTER: Primary | ICD-10-CM

## 2024-03-04 DIAGNOSIS — E11.59 HYPERTENSION ASSOCIATED WITH DIABETES: ICD-10-CM

## 2024-03-04 DIAGNOSIS — I15.2 HYPERTENSION ASSOCIATED WITH DIABETES: ICD-10-CM

## 2024-03-04 DIAGNOSIS — Z95.0 CARDIAC PACEMAKER IN SITU: Chronic | ICD-10-CM

## 2024-03-04 PROCEDURE — 93280 PM DEVICE PROGR EVAL DUAL: CPT | Mod: HCNC,PO

## 2024-03-04 PROCEDURE — 1159F MED LIST DOCD IN RCRD: CPT | Mod: HCNC,CPTII,S$GLB, | Performed by: INTERNAL MEDICINE

## 2024-03-04 PROCEDURE — 93280 PM DEVICE PROGR EVAL DUAL: CPT | Mod: 26,,, | Performed by: INTERNAL MEDICINE

## 2024-03-04 PROCEDURE — 3288F FALL RISK ASSESSMENT DOCD: CPT | Mod: HCNC,CPTII,S$GLB, | Performed by: INTERNAL MEDICINE

## 2024-03-04 PROCEDURE — 99214 OFFICE O/P EST MOD 30 MIN: CPT | Mod: HCNC,S$GLB,, | Performed by: INTERNAL MEDICINE

## 2024-03-04 PROCEDURE — 99999 PR PBB SHADOW E&M-EST. PATIENT-LVL IV: CPT | Mod: PBBFAC,HCNC,, | Performed by: INTERNAL MEDICINE

## 2024-03-04 PROCEDURE — 1101F PT FALLS ASSESS-DOCD LE1/YR: CPT | Mod: HCNC,CPTII,S$GLB, | Performed by: INTERNAL MEDICINE

## 2024-03-04 PROCEDURE — 1160F RVW MEDS BY RX/DR IN RCRD: CPT | Mod: HCNC,CPTII,S$GLB, | Performed by: INTERNAL MEDICINE

## 2024-03-04 PROCEDURE — 1157F ADVNC CARE PLAN IN RCRD: CPT | Mod: HCNC,CPTII,S$GLB, | Performed by: INTERNAL MEDICINE

## 2024-03-04 PROCEDURE — 1126F AMNT PAIN NOTED NONE PRSNT: CPT | Mod: HCNC,CPTII,S$GLB, | Performed by: INTERNAL MEDICINE

## 2024-03-04 NOTE — PROGRESS NOTES
Subjective:    Patient ID:  Kassi Skelton is a 89 y.o. female who presents for follow-up of atrial fibrillation    HPI  Admitted to [facility] last month with UTI, fluid overload (diastolic dysfunction)  Diuretic adjusted  Dr Kang following for CKD  She comes with no complaints, no chest pain, no shortness of breath      Review of Systems   Constitutional: Negative for decreased appetite, malaise/fatigue, weight gain and weight loss.   Cardiovascular:  Negative for chest pain, dyspnea on exertion, leg swelling, palpitations and syncope.   Respiratory:  Negative for cough and shortness of breath.    Gastrointestinal: Negative.    Neurological:  Negative for weakness.   All other systems reviewed and are negative.     Objective:      Physical Exam  Vitals and nursing note reviewed.   Constitutional:       Appearance: Normal appearance. She is well-developed.   HENT:      Head: Normocephalic.   Eyes:      Pupils: Pupils are equal, round, and reactive to light.   Neck:      Thyroid: No thyromegaly.      Vascular: No carotid bruit or JVD.   Cardiovascular:      Rate and Rhythm: Normal rate and regular rhythm.      Chest Wall: PMI is not displaced.      Pulses: Normal pulses and intact distal pulses.      Heart sounds: Normal heart sounds. No murmur heard.     No gallop.   Pulmonary:      Effort: Pulmonary effort is normal.      Breath sounds: Normal breath sounds.   Abdominal:      Palpations: Abdomen is soft. There is no mass.      Tenderness: There is no abdominal tenderness.   Musculoskeletal:         General: Normal range of motion.      Cervical back: Normal range of motion and neck supple.   Skin:     General: Skin is warm.   Neurological:      Mental Status: She is alert and oriented to person, place, and time.      Sensory: No sensory deficit.      Deep Tendon Reflexes: Reflexes are normal and symmetric.         Most Recent EKG Results  Results for orders placed or performed during the hospital encounter of  12/10/23   EKG 12-lead    Collection Time: 12/10/23  7:11 PM    Narrative    Test Reason : R06.02,    Vent. Rate : 060 BPM     Atrial Rate : 086 BPM     P-R Int : 000 ms          QRS Dur : 144 ms      QT Int : 482 ms       P-R-T Axes : 000 -79 099 degrees     QTc Int : 482 ms    Ventricular-paced rhythm  Abnormal ECG      Confirmed by Heather Crook MD ([id number]) on 12/11/2023 11:51:12 AM    Referred By: [email or web address]   SELF           Confirmed By:Heather Crook MD       Most Recent Echocardiogram Results  Results for orders placed during the hospital encounter of 12/10/23    Echo    Interpretation Summary    Left Ventricle: The left ventricle is normal in size. Normal wall thickness. Normal wall motion. There is normal systolic function. Ejection fraction by visual approximation is 60%. Grade III diastolic dysfunction.    Right Ventricle: Normal right ventricular cavity size. Wall thickness is normal. Right ventricle wall motion  is normal. Systolic function is normal. Pacemaker lead present in the ventricle.    Aortic Valve: There is mild stenosis. Aortic valve area by VTI is 1.67 cm². Aortic valve peak velocity is 2.0 m/s. Mean gradient is 7 mmHg. The dimensionless index is 0.44.    Tricuspid Valve: There is mild to moderate regurgitation.    IVC/SVC: IVC was not well visualized due to poor acoustic window.    Pulmonary Artery: The estimated pulmonary artery systolic pressure is at least 68 mmHg.      Most Recent Nuclear Stress Test Results  No results found for this or any previous visit.      Most Recent Cardiac PET Stress Test Results  No results found for this or any previous visit.      Most Recent Cardiovascular Angiogram results  No results found for this or any previous visit.      Other Most Recent Cardiology Results  Results for orders placed during the hospital encounter of 12/10/23    CARDIAC MONITORING STRIPS      Labs reviewed    Assessment:       1. History of atrial flutter    2. Cardiac pacemaker in  situ    3. Persistent atrial fibrillation    4. LBBB (left bundle branch block)    5. Sinus node dysfunction    6. Pacemaker    7. Hypertension associated with diabetes         Plan:   Extra lasix 20 mg if weight gain more than 3 lbs over 24 hours    Continue:  Ace inhibitor, Beta blocker, Calcium blocker, Diuretic, and DOAC  Regular exercise program  Weight loss  Low cholesterol diet    2 m f/u with MARTHA beaulieu

## 2024-03-05 ENCOUNTER — LAB VISIT (OUTPATIENT)
Dept: LAB | Facility: HOSPITAL | Age: 89
End: 2024-03-05
Attending: FAMILY MEDICINE
Payer: MEDICARE

## 2024-03-05 DIAGNOSIS — E11.69 DIABETES MELLITUS ASSOCIATED WITH HORMONAL ETIOLOGY: ICD-10-CM

## 2024-03-05 DIAGNOSIS — E78.2 MIXED HYPERLIPIDEMIA: ICD-10-CM

## 2024-03-05 DIAGNOSIS — E03.9 HYPOTHYROIDISM: ICD-10-CM

## 2024-03-05 DIAGNOSIS — N18.4 CHRONIC KIDNEY DISEASE, STAGE IV (SEVERE): ICD-10-CM

## 2024-03-05 LAB
ALBUMIN SERPL BCP-MCNC: 3.4 G/DL (ref 3.5–5.2)
ALBUMIN/CREAT UR: 1007.7 UG/MG (ref 0–30)
ALP SERPL-CCNC: 98 U/L (ref 55–135)
ALT SERPL W/O P-5'-P-CCNC: 13 U/L (ref 10–44)
ANION GAP SERPL CALC-SCNC: 16 MMOL/L (ref 8–16)
AST SERPL-CCNC: 18 U/L (ref 10–40)
BILIRUB SERPL-MCNC: 0.5 MG/DL (ref 0.1–1)
BUN SERPL-MCNC: 38 MG/DL (ref 10–30)
CALCIUM SERPL-MCNC: 9.7 MG/DL (ref 8.7–10.5)
CHLORIDE SERPL-SCNC: 108 MMOL/L (ref 95–110)
CHOLEST SERPL-MCNC: 136 MG/DL (ref 120–199)
CHOLEST/HDLC SERPL: 2.7 {RATIO} (ref 2–5)
CO2 SERPL-SCNC: 19 MMOL/L (ref 23–29)
CREAT SERPL-MCNC: 1.6 MG/DL (ref 0.5–1.4)
CREAT UR-MCNC: 39 MG/DL (ref 15–325)
EST. GFR  (NO RACE VARIABLE): 30.3 ML/MIN/1.73 M^2
ESTIMATED AVG GLUCOSE: 157 MG/DL (ref 68–131)
GLUCOSE SERPL-MCNC: 131 MG/DL (ref 70–110)
HBA1C MFR BLD: 7.1 % (ref 4–5.6)
HDLC SERPL-MCNC: 50 MG/DL (ref 40–75)
HDLC SERPL: 36.8 % (ref 20–50)
LDLC SERPL CALC-MCNC: 69.2 MG/DL (ref 63–159)
MICROALBUMIN UR DL<=1MG/L-MCNC: 393 UG/ML
NONHDLC SERPL-MCNC: 86 MG/DL
POTASSIUM SERPL-SCNC: 3.5 MMOL/L (ref 3.5–5.1)
PROT SERPL-MCNC: 7.2 G/DL (ref 6–8.4)
SODIUM SERPL-SCNC: 143 MMOL/L (ref 136–145)
TRIGL SERPL-MCNC: 84 MG/DL (ref 30–150)
TSH SERPL DL<=0.005 MIU/L-ACNC: 3.77 UIU/ML (ref 0.4–4)

## 2024-03-05 PROCEDURE — 80061 LIPID PANEL: CPT | Mod: HCNC | Performed by: FAMILY MEDICINE

## 2024-03-05 PROCEDURE — 84443 ASSAY THYROID STIM HORMONE: CPT | Mod: HCNC | Performed by: FAMILY MEDICINE

## 2024-03-05 PROCEDURE — 82043 UR ALBUMIN QUANTITATIVE: CPT | Mod: HCNC | Performed by: FAMILY MEDICINE

## 2024-03-05 PROCEDURE — 83036 HEMOGLOBIN GLYCOSYLATED A1C: CPT | Mod: HCNC | Performed by: FAMILY MEDICINE

## 2024-03-05 PROCEDURE — 80053 COMPREHEN METABOLIC PANEL: CPT | Mod: HCNC | Performed by: FAMILY MEDICINE

## 2024-03-16 ENCOUNTER — DOCUMENT SCAN (OUTPATIENT)
Dept: HOME HEALTH SERVICES | Facility: HOSPITAL | Age: 89
End: 2024-03-16
Payer: MEDICARE

## 2024-03-18 DIAGNOSIS — I15.2 HYPERTENSION ASSOCIATED WITH DIABETES: Primary | ICD-10-CM

## 2024-03-18 DIAGNOSIS — E11.59 HYPERTENSION ASSOCIATED WITH DIABETES: Primary | ICD-10-CM

## 2024-03-18 PROBLEM — N39.0 UTI (URINARY TRACT INFECTION): Status: RESOLVED | Noted: 2023-12-10 | Resolved: 2024-03-18

## 2024-03-18 RX ORDER — METOPROLOL TARTRATE 25 MG/1
25 TABLET, FILM COATED ORAL 2 TIMES DAILY
Qty: 180 TABLET | Refills: 3 | Status: SHIPPED | OUTPATIENT
Start: 2024-03-18

## 2024-04-22 ENCOUNTER — OFFICE VISIT (OUTPATIENT)
Dept: HOME HEALTH SERVICES | Facility: CLINIC | Age: 89
End: 2024-04-22
Payer: MEDICARE

## 2024-04-22 VITALS
OXYGEN SATURATION: 98 % | WEIGHT: 171 LBS | DIASTOLIC BLOOD PRESSURE: 95 MMHG | HEART RATE: 61 BPM | SYSTOLIC BLOOD PRESSURE: 170 MMHG | BODY MASS INDEX: 31.28 KG/M2

## 2024-04-22 DIAGNOSIS — J84.10 LUNG GRANULOMA: ICD-10-CM

## 2024-04-22 DIAGNOSIS — F03.90 DEMENTIA, UNSPECIFIED DEMENTIA SEVERITY, UNSPECIFIED DEMENTIA TYPE, UNSPECIFIED WHETHER BEHAVIORAL, PSYCHOTIC, OR MOOD DISTURBANCE OR ANXIETY: Chronic | ICD-10-CM

## 2024-04-22 DIAGNOSIS — E03.9 HYPOTHYROIDISM, UNSPECIFIED TYPE: Chronic | ICD-10-CM

## 2024-04-22 DIAGNOSIS — I15.2 HYPERTENSION ASSOCIATED WITH DIABETES: ICD-10-CM

## 2024-04-22 DIAGNOSIS — Z00.00 ENCOUNTER FOR PREVENTIVE HEALTH EXAMINATION: Primary | ICD-10-CM

## 2024-04-22 DIAGNOSIS — Z79.4 TYPE 2 DIABETES MELLITUS WITH DIABETIC NEUROPATHY, WITH LONG-TERM CURRENT USE OF INSULIN: ICD-10-CM

## 2024-04-22 DIAGNOSIS — N18.32 STAGE 3B CHRONIC KIDNEY DISEASE: ICD-10-CM

## 2024-04-22 DIAGNOSIS — F33.1 MAJOR DEPRESSIVE DISORDER, RECURRENT EPISODE, MODERATE: ICD-10-CM

## 2024-04-22 DIAGNOSIS — I48.19 PERSISTENT ATRIAL FIBRILLATION: ICD-10-CM

## 2024-04-22 DIAGNOSIS — N39.46 URINARY INCONTINENCE, MIXED: Chronic | ICD-10-CM

## 2024-04-22 DIAGNOSIS — N25.81 SECONDARY HYPERPARATHYROIDISM OF RENAL ORIGIN: ICD-10-CM

## 2024-04-22 DIAGNOSIS — Z99.3 DEPENDENCE ON WHEELCHAIR: ICD-10-CM

## 2024-04-22 DIAGNOSIS — E11.59 HYPERTENSION ASSOCIATED WITH DIABETES: ICD-10-CM

## 2024-04-22 DIAGNOSIS — E11.40 TYPE 2 DIABETES MELLITUS WITH DIABETIC NEUROPATHY, WITH LONG-TERM CURRENT USE OF INSULIN: ICD-10-CM

## 2024-04-22 DIAGNOSIS — I50.30 DIASTOLIC HEART FAILURE, UNSPECIFIED HF CHRONICITY: ICD-10-CM

## 2024-04-22 DIAGNOSIS — G47.00 INSOMNIA, UNSPECIFIED TYPE: ICD-10-CM

## 2024-04-22 DIAGNOSIS — E11.69 COMBINED HYPERLIPIDEMIA ASSOCIATED WITH TYPE 2 DIABETES MELLITUS: ICD-10-CM

## 2024-04-22 DIAGNOSIS — E78.2 COMBINED HYPERLIPIDEMIA ASSOCIATED WITH TYPE 2 DIABETES MELLITUS: ICD-10-CM

## 2024-04-22 PROCEDURE — 1170F FXNL STATUS ASSESSED: CPT | Mod: CPTII,S$GLB,, | Performed by: NURSE PRACTITIONER

## 2024-04-22 PROCEDURE — 1100F PTFALLS ASSESS-DOCD GE2>/YR: CPT | Mod: CPTII,S$GLB,, | Performed by: NURSE PRACTITIONER

## 2024-04-22 PROCEDURE — 1123F ACP DISCUSS/DSCN MKR DOCD: CPT | Mod: CPTII,S$GLB,, | Performed by: NURSE PRACTITIONER

## 2024-04-22 PROCEDURE — 3288F FALL RISK ASSESSMENT DOCD: CPT | Mod: CPTII,S$GLB,, | Performed by: NURSE PRACTITIONER

## 2024-04-22 PROCEDURE — 1160F RVW MEDS BY RX/DR IN RCRD: CPT | Mod: CPTII,S$GLB,, | Performed by: NURSE PRACTITIONER

## 2024-04-22 PROCEDURE — G0439 PPPS, SUBSEQ VISIT: HCPCS | Mod: S$GLB,,, | Performed by: NURSE PRACTITIONER

## 2024-04-22 PROCEDURE — 1126F AMNT PAIN NOTED NONE PRSNT: CPT | Mod: CPTII,S$GLB,, | Performed by: NURSE PRACTITIONER

## 2024-04-22 PROCEDURE — 1159F MED LIST DOCD IN RCRD: CPT | Mod: CPTII,S$GLB,, | Performed by: NURSE PRACTITIONER

## 2024-04-22 NOTE — PATIENT INSTRUCTIONS
Counseling and Referral of Other Preventative  (Italic type indicates deductible and co-insurance are waived)    Patient Name: Kassi Skelton  Today's Date: 4/22/2024    Health Maintenance       Date Due Completion Date    TETANUS VACCINE Never done ---    RSV Vaccine (Age 60+ and Pregnant patients) (1 - 1-dose 60+ series) Never done ---    Shingles Vaccine (2 of 3) 11/20/2012 9/25/2012    Eye Exam 01/05/2023 1/5/2022    Influenza Vaccine (1) 09/01/2023 10/19/2021    COVID-19 Vaccine (6 - 2023-24 season) 09/01/2023 1/17/2023    Hemoglobin A1c 09/05/2024 3/5/2024    Diabetes Urine Screening 03/05/2025 3/5/2024    Lipid Panel 03/05/2025 3/5/2024        Orders Placed This Encounter   Procedures    Ambulatory referral/consult to [facility]       The following information is provided to all patients.  This information is to help you find resources for any of the problems found today that may be affecting your health:                  Living healthy guide: www.[facility].[place].gov      Understanding Diabetes: www.diabetes.org      Eating healthy: www.cdc.gov/healthyweight      CDC home safety checklist: www.cdc.gov/steadi/patient.html      Agency on Aging: www.goea.[place].[facility]      Alcoholics anonymous (AA): www.aa.org      Physical Activity: www.ezekiel.nih.gov/zw7hpwt      Tobacco use: www.quitwithusla.org

## 2024-04-22 NOTE — PROGRESS NOTES
Kassi Skelton presented for a follow-up Medicare AWV today. The following components were reviewed and updated:    Medical history  Family History  Social history  Allergies and Current Medications  Health Risk Assessment  Health Maintenance  Care Team    **See Completed Assessments for Annual Wellness visit with in the encounter summary    The following assessments were completed:  Depression Screening  Cognitive function Screening - dementia  Timed Get Up Test - wheelchair bound  Whisper Test - hearing aids      Opioid documentation:      Patient does not have a current opioid prescription.          Vitals:    04/22/24 1036   BP: (!) 170/95   Pulse: 61   SpO2: 98%   Weight: 77.6 kg (171 lb)     Body mass index is 31.28 kg/m².       Physical Exam  HENT:      Head: Normocephalic.      Right Ear: Decreased hearing noted.      Left Ear: Decreased hearing noted.      Nose: Nose normal.   Eyes:      Pupils: Pupils are equal, round, and reactive to light.   Cardiovascular:      Pulses: Normal pulses.      Heart sounds: Normal heart sounds.      Comments: pacemaker  Pulmonary:      Effort: Pulmonary effort is normal.      Breath sounds: Normal breath sounds.   Abdominal:      General: Bowel sounds are normal.   Musculoskeletal:         General: Normal range of motion.      Cervical back: Normal range of motion.      Right lower leg: No edema.      Left lower leg: No edema.   Skin:     General: Skin is warm and dry.   Neurological:      Mental Status: She is alert and oriented to person, place, and time.      Motor: Weakness present.      Gait: Gait abnormal (wheelchair).   Psychiatric:         Cognition and Memory: Cognition is impaired. Memory is impaired.           Diagnoses and health risks identified today and associated recommendations/orders:  1. Encounter for preventive health examination  - Above assessments completed. Preventive measures and health maintenance reviewed with patient and daughter  Juliann  -discussed overdue vaccines, can have done at any pharmacy  - Ambulatory referral/consult to [facility]; Future    2. Persistent atrial fibrillation  Stable, followed by PCP and Cardiology  -on metoprolol and eliquis    3. Type 2 diabetes mellitus with diabetic neuropathy, with long-term current use of insulin  Stable, followed by PCP  -A1C 7.1, on insulin    4. Dementia, unspecified dementia severity, unspecified dementia type, unspecified whether behavioral, psychotic, or mood disturbance or anxiety  Stable, followed by PCP  -on donepezil    5. Major depressive disorder, recurrent episode, moderate  Stable, followed by PCP  -on venlafaxine    6. Stage 3b chronic kidney disease  Stable, followed by PCP and Nephrology  -hydration and avoidance of NSAIDs    7. Secondary hyperparathyroidism of renal origin  Stable, followed by PCP and Nephrology  -on calcitriol    8. Diastolic heart failure, unspecified HF chronicity  Stable, followed by PCP and Cardiology  -on furosemide    9. Lung granuloma  Stable, followed by PCP  -asymptomatic    10. Hypertension associated with diabetes  Stable, followed by PCP  -on benazapril, verapamil, metoprolol    11. Combined hyperlipidemia associated with type 2 diabetes mellitus  Stable, followed by PCP  -on statin    12. Hypothyroidism, unspecified type  Stable, followed by PCP  -on levothyroxine    13. Dependence on wheelchair    14. Urinary incontinence, mixed  Stable, followed by PCP  -on mirabegron    15. Insomnia, unspecified type  Stable, followed by PCP  -on mirtazapine      Provided Kassi with a 5-10 year written screening schedule and personal prevention plan. Recommendations were developed using the USPSTF age appropriate recommendations. Education, counseling, and referrals were provided as needed.  After Visit Summary printed and given to patient which includes a list of additional screenings\tests needed.    Follow up in about 1 year (around  4/22/2025) for your next annual wellness visit.      Mariposa Huertas, LULU  I offered to discuss advanced care planning, including how to pick a person who would make decisions for you if you were unable to make them for yourself, called a health care power of , and what kind of decisions you might make such as use of life sustaining treatments such as ventilators and tube feeding when faced with a life limiting illness recorded on a living will that they will need to know. (How you want to be cared for as you near the end of your natural life)     X  Patient has advanced directives on file, which we reviewed, and they do not wish to make changes.

## 2024-04-25 RX ORDER — MIRABEGRON 25 MG/1
25 TABLET, FILM COATED, EXTENDED RELEASE ORAL DAILY
Qty: 90 TABLET | Refills: 3 | Status: SHIPPED | OUTPATIENT
Start: 2024-04-25

## 2024-04-25 RX ORDER — LEVOTHYROXINE SODIUM 25 UG/1
25 TABLET ORAL
Qty: 90 TABLET | Refills: 3 | Status: SHIPPED | OUTPATIENT
Start: 2024-04-25

## 2024-04-25 NOTE — TELEPHONE ENCOUNTER
Refill Routing Note   Medication(s) are not appropriate for processing by [facility] for the following reason(s):        Required vitals abnormal  BP:    04/22/24 (!) 170/95       OR action(s):  Defer  Approve               Appointments  past 12m or future 3m with PCP    Date Provider   Last Visit   1/31/2024 Mor Cook MD   Next Visit   Visit date not found Mor Cook MD   ED visits in past 90 days: 0        Note composed:4:01 PM 04/25/2024

## 2024-04-25 NOTE — TELEPHONE ENCOUNTER
No care due was identified.  [facility] Embedded Care Due Messages. Reference number: [id number].   4/25/2024 3:46:45 PM CDT

## 2024-04-30 ENCOUNTER — CARE AT HOME (OUTPATIENT)
Dept: HOME HEALTH SERVICES | Facility: CLINIC | Age: 89
End: 2024-04-30
Payer: MEDICARE

## 2024-04-30 VITALS
TEMPERATURE: 97 F | OXYGEN SATURATION: 97 % | SYSTOLIC BLOOD PRESSURE: 142 MMHG | DIASTOLIC BLOOD PRESSURE: 72 MMHG | HEART RATE: 60 BPM | RESPIRATION RATE: 20 BRPM

## 2024-04-30 DIAGNOSIS — I12.9 BENIGN HYPERTENSIVE KIDNEY DISEASE WITH CHRONIC KIDNEY DISEASE STAGE I THROUGH STAGE IV, OR UNSPECIFIED: Primary | ICD-10-CM

## 2024-04-30 DIAGNOSIS — Z74.09 OTHER REDUCED MOBILITY: ICD-10-CM

## 2024-04-30 DIAGNOSIS — N18.4 DIABETES MELLITUS WITH STAGE 4 CHRONIC KIDNEY DISEASE GFR 15-29: ICD-10-CM

## 2024-04-30 DIAGNOSIS — E11.22 DIABETES MELLITUS WITH STAGE 4 CHRONIC KIDNEY DISEASE GFR 15-29: ICD-10-CM

## 2024-04-30 DIAGNOSIS — I48.19 PERSISTENT ATRIAL FIBRILLATION: ICD-10-CM

## 2024-04-30 DIAGNOSIS — E11.59 HYPERTENSION ASSOCIATED WITH DIABETES: ICD-10-CM

## 2024-04-30 DIAGNOSIS — I15.2 HYPERTENSION ASSOCIATED WITH DIABETES: ICD-10-CM

## 2024-04-30 DIAGNOSIS — Z00.00 ENCOUNTER FOR PREVENTIVE HEALTH EXAMINATION: ICD-10-CM

## 2024-04-30 PROBLEM — E87.29 HIGH ANION GAP METABOLIC ACIDOSIS: Status: RESOLVED | Noted: 2023-12-10 | Resolved: 2024-04-30

## 2024-04-30 PROBLEM — M86.9 OSTEOMYELITIS OF SECOND TOE OF LEFT FOOT: Status: RESOLVED | Noted: 2021-12-03 | Resolved: 2024-04-30

## 2024-04-30 PROBLEM — L97.521: Status: RESOLVED | Noted: 2023-01-03 | Resolved: 2024-04-30

## 2024-04-30 PROCEDURE — 1157F ADVNC CARE PLAN IN RCRD: CPT | Mod: CPTII,S$GLB,,

## 2024-04-30 PROCEDURE — 99349 HOME/RES VST EST MOD MDM 40: CPT | Mod: S$GLB,,,

## 2024-04-30 NOTE — PROGRESS NOTES
[facility]  Medical Chronic Care Home Visit    Encounter Provider: AGUSTINA GIMENEZ,   PCP: Mor Cook MD  Consult Requested By: Mariposa Huertas    HISTORY OF PRESENT ILLNESS      Patient ID: Kassi Skelton is a 89 y.o. female is being seen in the home due to physical debility that presents a taxing effort to leave the home, to mitigate high risk of hospital readmission and/or due to the limited availability of reliable or safe options for transportation to the point of access to the provider. Prior to treatment on this visit the chart was reviewed and patient verbal consent was obtained.      Chronic medical conditions synopsis:    Ms. Skelton is a 89 y.o. female who has a past medical history significant for dementia, depression, Hard of Hearing, lung granuloma, pacemaker, A. Fib, CHF, T2DM, CKD, OA, and impaired mobility      Reason for consult and visit   Establish with [facility] for chronic care medical management within the home.       Recent developments  Ms. Solitario goes by the name of Alisha. She is at her baseline health status today. Mobility is primarily via wheelchair. Able to take some steps with assistive device and transfer independently.    She reports she is sleeping well. Good appetite. Denies elimination concerns.     Spoke with daughter via telephone prior to visit. She states it is increasingly difficult to get Ms. Brito to appointments. She will see about changing upcoming cardiology visit to virtual.    Discussed care at home visits as supplementation to PCP. Will follow up in 12 weeks, sooner if needed. Care at home contact information provided.     DECISION MAKING TODAY       Assessment & Plan:  1. Benign hypertensive kidney disease with chronic kidney disease stage I through stage IV, or unspecified  Assessment & Plan:  Chronic and stable, taking verapamil, benzepril  Continue medication as prescribed  Continue current treatment plan as previously  prescribed with your PCP and nephrologist, Dr. Kang     Latest Reference Range & Units 09/26/23 10:01 12/10/23 17:17 12/11/23 05:30 12/12/23 09:27 12/13/23 08:37 03/05/24 10:41   eGFR >60 mL/min/1.73 m^2 21.8 ! 22 ! 23 ! 19 ! 17 ! 30.3 !   !: Data is abnormal      2. Hypertension associated with diabetes  Assessment & Plan:  Chronic and stable.  Continue current management.  Extra lasix PRN weight gain of 3lb or greater in 24hrs.   See med list below.   Recommend low sodium diet as allowed per facility.  Follow up with PCP and cardiologist Dr. Brewer    Hypertension Medications               benazepriL (LOTENSIN) 20 MG tablet TAKE 1 TABLET (20 MG TOTAL) ONE TIME DAILY.    furosemide (LASIX) 20 MG tablet Take 1 tablet (20 mg total) by mouth once daily.    metoprolol tartrate (LOPRESSOR) 25 MG tablet TAKE 1 TABLET BY MOUTH TWICE DAILY    metoprolol tartrate (LOPRESSOR) 50 MG tablet TAKE 1/2 TABLET TWICE DAILY    verapamiL (CALAN-SR) 120 MG CR tablet TAKE 1 TABLET EVERY NIGHT                 3. Diabetes mellitus with stage 4 chronic kidney disease GFR 15-29  Assessment & Plan:  Chronic and improving on current management. Ha1c has decreased from 8.3 to 7.1 in less than 6 months.   See med list below.  Recommend diabetic foot care.   F/u with opthalmology at least once a year.     Follow up with PCP and endocrine as instructed.    Diabetes Medications               glucagon (GVOKE HYPOPEN 2-PACK) 1 mg/0.2 mL AtIn Inject 1 mg into the skin as needed (SEVERE HYPOGLYCEMIA).    insulin aspart U-100 (NOVOLOG FLEXPEN U-100 INSULIN) 100 unit/mL (3 mL) InPn pen Inject 4 Units into the skin every morning. Inject subcutaneously 4 units before breakfast and 6 units before lunch and 6 units before supper.    insulin degludec (TRESIBA FLEXTOUCH U-100) 100 unit/mL (3 mL) insulin pen Inject 13 Units into the skin once daily.             Lab Results   Component Value Date    HGBA1C 7.1 (H) 03/05/2024    HGBA1C 8.3 (H) 12/13/2023     HGBA1C 8.2 (H) 09/12/2023            4. Persistent atrial fibrillation  Assessment & Plan:  -Chronic and stable  Rate controlled with metoprolol  Anticoag with eliquis   Continue current medication regimen.      5. Other reduced mobility    6. Encounter for preventive health examination  -     Ambulatory referral/consult to [facility]           ENVIRONMENT OF CARE      Family and/or Caregiver present at visit?  No  Name of Caregiver: Daughter Tova- spoke with via phone prior to visit  Does Caregiver have HCPoA: Yes  History provided by: patient and caregiver    Impression upon entering the home:  Physical Dwelling[place] [facility] [place]   Appearance of home environment: cleanliness: clean, walking pathways: clear, lighting: adequate, and home structure: sound structure  Functional Status: moderate assistance  Mobility: ambulatory with device; primarily wheelchair; able to transfer  Nutritional access: adequate intake and access  Home Health: No, and does not need it at this time   DME/Supplies: rolling walker and wheelchair         Disease/illness education: Diabetes and CHF  Establishment or re-establishment of referral orders for community resources: No other necessary community resources.   Discussion with other health care providers: No discussion with other health care providers necessary.   Does patient have a PCP at [facility]? Yes   Repatriation plan with PCP? [facility] at [facility] reason: mobility   Does patient have an ostomy (ileostomy, colostomy, suprapubic catheter, nephrostomy tube, tracheostomy, PEG tube, pleurex catheter, cholecystostomy, etc)? No  Were BPAs reviewed? Yes      Social History     Socioeconomic History    Marital status:     Number of children: 2   Tobacco Use    Smoking status: Never    Smokeless tobacco: Never   Substance and Sexual Activity    Alcohol use: No    Drug use: No    Sexual activity: Not Currently      Social Determinants of Health     Financial Resource Strain: Low Risk  (4/22/2024)    Overall Financial Resource Strain (CARDIA)     Difficulty of Paying Living Expenses: Not hard at all   Food Insecurity: No Food Insecurity (4/22/2024)    Hunger Vital Sign     Worried About Running Out of Food in the Last Year: Never true     Ran Out of Food in the Last Year: Never true   Transportation Needs: No Transportation Needs (4/22/2024)    PRAPARE - Transportation     Lack of Transportation (Medical): No     Lack of Transportation (Non-Medical): No   Recent Concern: Transportation Needs - Unmet Transportation Needs (1/31/2024)    PRAPARE - Transportation     Lack of Transportation (Medical): Yes     Lack of Transportation (Non-Medical): No   Physical Activity: Inactive (4/22/2024)    Exercise Vital Sign     Days of Exercise per Week: 0 days     Minutes of Exercise per Session: 0 min   Stress: No Stress Concern Present (4/22/2024)    [place] [employer] - Occupational Stress Questionnaire     Feeling of Stress : Not at all   Housing Stability: Low Risk  (4/22/2024)    Housing Stability Vital Sign     Unable to Pay for Housing in the Last Year: No     Homeless in the Last Year: No         OBJECTIVE:     Vital Signs:  Vitals:    04/30/24 1221   BP: (!) 142/72   Pulse: 60   Resp: 20   Temp: 97.3 °F (36.3 °C)       Review of Systems   Constitutional:  Negative for activity change, appetite change and fever.   HENT:  Positive for hearing loss.    Eyes: Negative.    Respiratory:  Negative for cough and shortness of breath.    Cardiovascular:  Positive for leg swelling. Negative for chest pain.   Gastrointestinal:  Negative for abdominal pain, constipation, diarrhea and nausea.   Genitourinary:  Negative for difficulty urinating and hematuria.   Musculoskeletal:  Positive for gait problem. Negative for joint swelling and neck stiffness.   Skin:  Negative for color change and wound.   Neurological:   Positive for weakness. Negative for dizziness, numbness and headaches.       Physical Exam:  Physical Exam  Vitals reviewed.   HENT:      Head: Normocephalic.      Ears:      Comments: Hearing aids  Eyes:      Pupils: Pupils are equal, round, and reactive to light.   Cardiovascular:      Rate and Rhythm: Normal rate. Rhythm irregular.      Heart sounds: Normal heart sounds.      Comments: Pacemaker; Chronic color changes to BLE   Pulmonary:      Effort: Pulmonary effort is normal.      Breath sounds: Normal breath sounds.   Abdominal:      General: Bowel sounds are normal.      Palpations: Abdomen is soft.   Musculoskeletal:         General: Normal range of motion.      Cervical back: Normal range of motion.      Right lower le+ Edema present.      Left lower le+ Edema present.   Skin:     General: Skin is warm and dry.   Neurological:      Mental Status: She is alert. Mental status is at baseline.      Motor: Weakness present.      Gait: Gait abnormal (walker).      Comments: Oriented to person, place, and immediate situation.   Unsure of current year and president.    Psychiatric:         Behavior: Behavior normal.         Cognition and Memory: Memory is impaired.         INSTRUCTIONS FOR PATIENT:     Scheduled Follow-up, Appts Reviewed with Modifications if Needed: Yes  Future Appointments   Date Time Provider Department Center   2024  2:00 PM Evette Yo PA-C [facility] CARDIO [place]   2024  3:00 PM Luzmaria Valle DPM [facility] POD [place]   2024  9:05 AM LABORATORY, [facility] LAB [place]   2024 12:00 PM Akosua Hall FNP [facility] DIABETE [place]         Current Outpatient Medications:     acetaminophen (TYLENOL) 500 MG tablet, Take 2 tablets (1,000 mg total) by mouth every 6 (six) hours as needed for Pain., Disp: , Rfl:     alcohol swabs (BD ALCOHOL SWABS) PadM, Apply 1 each topically 4 (four) times daily., Disp: 360 each, Rfl: 3    benazepriL (LOTENSIN) 20 MG tablet,  TAKE 1 TABLET (20 MG TOTAL) ONE TIME DAILY. (Patient taking differently: Take 20 mg by mouth once daily.), Disp: 90 tablet, Rfl: 3    blood sugar diagnostic (BLOOD GLUCOSE TEST) Strp, 1 strip by Misc.(Non-Drug; Combo Route) route 4 (four) times daily., Disp: 200 each, Rfl: 11    blood-glucose meter kit, To check BG once daily, to use with insurance preferred meter, Disp: 1 each, Rfl: 0    calcitRIOL (ROCALTROL) 0.25 MCG Cap, TAKE 1 CAPSULE EVERY DAY (NEED MD APPOINTMENT), Disp: 90 capsule, Rfl: 1    donepeziL (ARICEPT) 5 MG tablet, Take 1 tablet (5 mg total) by mouth every evening., Disp: , Rfl:     ELIQUIS 2.5 mg Tab, TAKE 1 TABLET TWICE DAILY (Patient taking differently: Take 2.5 mg by mouth 2 (two) times daily.), Disp: 180 tablet, Rfl: 3    furosemide (LASIX) 20 MG tablet, Take 1 tablet (20 mg total) by mouth once daily., Disp: 30 tablet, Rfl: 0    glucagon (GVOKE HYPOPEN 2-PACK) 1 mg/0.2 mL AtIn, Inject 1 mg into the skin as needed (SEVERE HYPOGLYCEMIA)., Disp: 2 each, Rfl: 5    insulin aspart U-100 (NOVOLOG FLEXPEN U-100 INSULIN) 100 unit/mL (3 mL) InPn pen, Inject 4 Units into the skin every morning. Inject subcutaneously 4 units before breakfast and 6 units before lunch and 6 units before supper., Disp: 3.6 mL, Rfl: 3    insulin degludec (TRESIBA FLEXTOUCH U-100) 100 unit/mL (3 mL) insulin pen, Inject 13 Units into the skin once daily., Disp: 11.7 mL, Rfl: 3    lancets (ACCU-CHEK SOFTCLIX LANCETS) Misc, TEST BLOOD SUGAR THREE TIMES DAILY BEFORE MEALS, Disp: 300 each, Rfl: 3    levothyroxine (SYNTHROID) 25 MCG tablet, Take 1 tablet (25 mcg total) by mouth before breakfast., Disp: 90 tablet, Rfl: 3    metoprolol tartrate (LOPRESSOR) 25 MG tablet, TAKE 1 TABLET BY MOUTH TWICE DAILY, Disp: 180 tablet, Rfl: 3    metoprolol tartrate (LOPRESSOR) 50 MG tablet, TAKE 1/2 TABLET TWICE DAILY (Patient taking differently: Take 25 mg by mouth 2 (two) times daily.), Disp: 90 tablet, Rfl: 3    mirabegron (MYRBETRIQ) 25 mg  "Tb24 ER tablet, Take 1 tablet (25 mg total) by mouth once daily., Disp: 90 tablet, Rfl: 3    mirtazapine (REMERON) 45 MG tablet, TAKE 1 TABLET EVERY EVENING, Disp: 90 tablet, Rfl: 1    pen needle, diabetic (BD ULTRA-FINE MINI PEN NEEDLE) 31 gauge x 3/16" Ndle, Inject 1 each into the skin once daily. Use, Disp: 100 each, Rfl: 3    pravastatin (PRAVACHOL) 40 MG tablet, TAKE 1 TABLET EVERY DAY, Disp: 90 tablet, Rfl: 3    sodium bicarbonate 650 MG tablet, Take 1 tablet (650 mg total) by mouth once daily., Disp: 90 tablet, Rfl: 3    venlafaxine (EFFEXOR-XR) 150 MG Cp24, TAKE 1 CAPSULE EVERY DAY, Disp: 90 capsule, Rfl: 1    verapamiL (CALAN-SR) 120 MG CR tablet, TAKE 1 TABLET EVERY NIGHT (Patient taking differently: Take 120 mg by mouth nightly.), Disp: 90 tablet, Rfl: 3    Medication Reconciliation:  Were medications changed during this appointment? No  Were routine medications in the home? Managed per facility  Is the patient taking the medications as directed? Yes  Does the patient/caregiver understand the medications and changes if any? Medications managed per facility  Does updated med list accurately reflects meds patient is currently taking? Yes    Signature:      SHIELDS NP      Time: Total face-to-face time was 60 minutes, >50% of this was spent on counseling and coordination of care. The following issues were discussed: primary and secondary diagnoses, co-morbidities, prescribed medications, treatment modalities, importance of compliance with medical advice and directives for follow-up care.  "

## 2024-05-01 NOTE — ASSESSMENT & PLAN NOTE
Chronic and stable.  Continue current management.  Extra lasix PRN weight gain of 3lb or greater in 24hrs.   See med list below.   Recommend low sodium diet as allowed per facility.  Follow up with PCP and cardiologist Dr. Brewer    Hypertension Medications               benazepriL (LOTENSIN) 20 MG tablet TAKE 1 TABLET (20 MG TOTAL) ONE TIME DAILY.    furosemide (LASIX) 20 MG tablet Take 1 tablet (20 mg total) by mouth once daily.    metoprolol tartrate (LOPRESSOR) 25 MG tablet TAKE 1 TABLET BY MOUTH TWICE DAILY    metoprolol tartrate (LOPRESSOR) 50 MG tablet TAKE 1/2 TABLET TWICE DAILY    verapamiL (CALAN-SR) 120 MG CR tablet TAKE 1 TABLET EVERY NIGHT

## 2024-05-01 NOTE — ASSESSMENT & PLAN NOTE
-Chronic and stable  Rate controlled with metoprolol  Anticoag with eliquis   Continue current medication regimen.

## 2024-05-01 NOTE — ASSESSMENT & PLAN NOTE
Chronic and stable, taking verapamil, benzepril  Continue medication as prescribed  Continue current treatment plan as previously prescribed with your PCP and nephrologist, Dr. Kang     Latest Reference Range & Units 09/26/23 10:01 12/10/23 17:17 12/11/23 05:30 12/12/23 09:27 12/13/23 08:37 03/05/24 10:41   eGFR >60 mL/min/1.73 m^2 21.8 ! 22 ! 23 ! 19 ! 17 ! 30.3 !   !: Data is abnormal

## 2024-05-01 NOTE — ASSESSMENT & PLAN NOTE
Chronic and improving on current management. Ha1c has decreased from 8.3 to 7.1 in less than 6 months.   See med list below.  Recommend diabetic foot care.   F/u with opthalmology at least once a year.     Follow up with PCP and endocrine as instructed.    Diabetes Medications               glucagon (GVOKE HYPOPEN 2-PACK) 1 mg/0.2 mL AtIn Inject 1 mg into the skin as needed (SEVERE HYPOGLYCEMIA).    insulin aspart U-100 (NOVOLOG FLEXPEN U-100 INSULIN) 100 unit/mL (3 mL) InPn pen Inject 4 Units into the skin every morning. Inject subcutaneously 4 units before breakfast and 6 units before lunch and 6 units before supper.    insulin degludec (TRESIBA FLEXTOUCH U-100) 100 unit/mL (3 mL) insulin pen Inject 13 Units into the skin once daily.             Lab Results   Component Value Date    HGBA1C 7.1 (H) 03/05/2024    HGBA1C 8.3 (H) 12/13/2023    HGBA1C 8.2 (H) 09/12/2023

## 2024-05-02 ENCOUNTER — PATIENT MESSAGE (OUTPATIENT)
Dept: CARDIOLOGY | Facility: CLINIC | Age: 89
End: 2024-05-02
Payer: MEDICARE

## 2024-05-02 LAB
OHS CV AF BURDEN PERCENT: 100
OHS CV DC REMOTE DEVICE TYPE: NORMAL
OHS CV DC REMOTE DEVICE TYPE: NORMAL
OHS CV ICD SHOCK: NO
OHS CV RV PACING PERCENT: 99 %
OHS CV RV PACING PERCENT: 99 %

## 2024-05-03 ENCOUNTER — HOSPITAL ENCOUNTER (OUTPATIENT)
Dept: CARDIOLOGY | Facility: HOSPITAL | Age: 89
Discharge: HOME | End: 2024-05-03
Attending: INTERNAL MEDICINE
Payer: MEDICARE

## 2024-05-03 ENCOUNTER — TELEPHONE (OUTPATIENT)
Dept: HOME HEALTH SERVICES | Facility: CLINIC | Age: 89
End: 2024-05-03
Payer: MEDICARE

## 2024-05-03 ENCOUNTER — CLINICAL SUPPORT (OUTPATIENT)
Dept: CARDIOLOGY | Facility: HOSPITAL | Age: 89
End: 2024-05-03
Payer: MEDICARE

## 2024-05-03 ENCOUNTER — PATIENT MESSAGE (OUTPATIENT)
Dept: FAMILY MEDICINE | Facility: CLINIC | Age: 89
End: 2024-05-03
Payer: MEDICARE

## 2024-05-03 DIAGNOSIS — I44.2 ATRIOVENTRICULAR BLOCK, COMPLETE: ICD-10-CM

## 2024-05-03 DIAGNOSIS — N39.0 URINARY TRACT INFECTION WITHOUT HEMATURIA, SITE UNSPECIFIED: Primary | ICD-10-CM

## 2024-05-03 PROCEDURE — 93294 REM INTERROG EVL PM/LDLS PM: CPT | Mod: ,,, | Performed by: INTERNAL MEDICINE

## 2024-05-03 PROCEDURE — 93296 REM INTERROG EVL PM/IDS: CPT | Mod: PO | Performed by: INTERNAL MEDICINE

## 2024-05-03 NOTE — TELEPHONE ENCOUNTER
Spoke with patient's daughter who states that she is having symptoms of UTI.  [facility], where the patient resides, reported lethargy and confusion.  Daughter states she gets UTI often and recurrently.  Notified NP Socorro Vanegas of daughter's concerns.  VO given to start Bactrim DS 2x daily X7days and to schedule the patient to be seen by Socorro on 5/7.  Patient scheduled and medication called in to [facility] in [place], [place]  to be filled.  Patient's daughter called and notified and states she will  medication.  Attempted to call [facility] to notify them of the order and left messages for director to call me back.  Also left message for [employer] who is the company that contracts the nurses to pass medications.  Left voicemail to inform them of new order.  Waiting for call back.

## 2024-05-03 NOTE — TELEPHONE ENCOUNTER
Order placed and faxed to [employer] to start antibiotics ASAP.  Attempted to call [employer] but no answer or call back.  Fax confirmation received.

## 2024-05-03 NOTE — TELEPHONE ENCOUNTER
Spoke with Debra, patient's med pass nurse.  States she received the orders for the Bactrim DS and patient will get the first dose this evening.

## 2024-05-06 ENCOUNTER — TELEPHONE (OUTPATIENT)
Dept: CARDIOLOGY | Facility: CLINIC | Age: 89
End: 2024-05-06
Payer: MEDICARE

## 2024-05-06 ENCOUNTER — PATIENT MESSAGE (OUTPATIENT)
Dept: CARDIOLOGY | Facility: CLINIC | Age: 89
End: 2024-05-06
Payer: MEDICARE

## 2024-05-06 NOTE — TELEPHONE ENCOUNTER
----- Message from Jolene Aguirre sent at 5/6/2024  2:51 PM CDT -----  Regarding: Needs orders sent to assisted living facility  Type: Needs Medical Advice  Who Called:  Pt daughter- Tova Renee Call Back Number:  [phone]      Additional Information: Pt daughter was concerned regarding her weight gain and was told to give her more lasix if she gained a certain amount of weight within a certain time frame then she needs to take another lasix. The facility the patient is living in needs to have orders that say that as well or they will not give her the extra lasix please call to advise      Asked to have the directions sent to the facility at [phone]. Please call daughter if any questions please mary

## 2024-05-07 ENCOUNTER — CARE AT HOME (OUTPATIENT)
Dept: HOME HEALTH SERVICES | Facility: CLINIC | Age: 89
End: 2024-05-07
Payer: MEDICARE

## 2024-05-07 VITALS
DIASTOLIC BLOOD PRESSURE: 62 MMHG | HEART RATE: 64 BPM | TEMPERATURE: 98 F | RESPIRATION RATE: 22 BRPM | OXYGEN SATURATION: 97 % | SYSTOLIC BLOOD PRESSURE: 122 MMHG

## 2024-05-07 DIAGNOSIS — R06.02 SHORTNESS OF BREATH: Primary | ICD-10-CM

## 2024-05-07 DIAGNOSIS — I12.9 BENIGN HYPERTENSIVE KIDNEY DISEASE WITH CHRONIC KIDNEY DISEASE STAGE I THROUGH STAGE IV, OR UNSPECIFIED: ICD-10-CM

## 2024-05-07 DIAGNOSIS — R06.09 DYSPNEA ON MINIMAL EXERTION: ICD-10-CM

## 2024-05-07 DIAGNOSIS — I50.33 ACUTE ON CHRONIC DIASTOLIC HEART FAILURE: ICD-10-CM

## 2024-05-07 DIAGNOSIS — I48.19 PERSISTENT ATRIAL FIBRILLATION: ICD-10-CM

## 2024-05-07 PROCEDURE — 99350 HOME/RES VST EST HIGH MDM 60: CPT | Mod: S$GLB,,,

## 2024-05-07 PROCEDURE — 1157F ADVNC CARE PLAN IN RCRD: CPT | Mod: CPTII,S$GLB,,

## 2024-05-07 RX ORDER — FUROSEMIDE 20 MG/1
20 TABLET ORAL DAILY PRN
Qty: 30 TABLET | Refills: 2 | Status: SHIPPED | OUTPATIENT
Start: 2024-05-07 | End: 2024-08-05

## 2024-05-07 NOTE — PROGRESS NOTES
[facility]  Medical Chronic Care Home Visit    Encounter Provider: AGUSTINA GIMENEZ,   PCP: Mor Cook MD  Consult Requested By: No ref. provider found    HISTORY OF PRESENT ILLNESS      Patient ID: Kassi Skelton is a 89 y.o. female is being seen in the home due to physical debility that presents a taxing effort to leave the home, to mitigate high risk of hospital readmission and/or due to the limited availability of reliable or safe options for transportation to the point of access to the provider. Prior to treatment on this visit the chart was reviewed and patient verbal consent was obtained.      Chronic medical conditions synopsis:    Ms. Skelton is a 89 y.o. female who has a past medical history significant for dementia, depression, Hard of Hearing, lung granuloma, pacemaker, A. Fib, CHF, T2DM, CKD, OA, and impaired mobility      Reason for consult and visit   F/u UTI      Recent developments  Ms. Brito is significantly short of breath with minimal activity. Audible wheezing. Vitals WNL at rest. SaO2 96-97%. Attempted to lay down in bed, unable to tolerate at all. Extremely SOB. Pitting edema to bilateral lower extremities up to hip level, open blistering to left lower extremity.     Staff reports shortness of breath has been slowly increasing for the past two weeks. They note she was uncharacteristically lethargic and more confused on Friday. They reported last time she had these symptoms she had a UTI. Antibiotics were sent in with instructions for ED evaluation if there was no improvement. She is currently at baseline mental status however shortness of breath is worsened. Facility did not have an order for PRN Lasix administration until yesterday. An extra dose of Lasix was given today for a total of 40 mg.    I recommended Ms. Brito be evaluated in the ED for fluid overload. EMS called per staff. I spoke with her daughter Tova on the phone during visit. She is aware patient is  being brought to the ED. She is unsure if she is able to make it in person due to her current medical situation. She is however, available by phone to answer any questions or provide any information. [phone]     Report given to EMS per myself.    DECISION MAKING TODAY       Assessment & Plan:  1. Shortness of breath  On bare minimum exertion.  Recommend ED evaluation    2. Dyspnea on minimal exertion  Fluid overload  Recommend ED evaluation    3. Acute on chronic diastolic heart failure  Unstable, sent to ED for eval and treat  Facility needs a PRN order for lasix.     4. Persistent atrial fibrillation  Staff giving eliquis once per day after last hospitalization, however cardiology notes states eliquis BID- They would like order clarified per cardiology.    5. Benign hypertensive kidney disease with chronic kidney disease stage I through stage IV, or unspecified  Recommend ED evaluation         ENVIRONMENT OF CARE      Family and/or Caregiver present at visit?  No  Name of Caregiver: Daughter Tova- spoke with via phone prior to visit  Does Caregiver have HCPoA: Yes  History provided by: patient and caregiver    Impression upon entering the home:  Physical Dwelling: assisted living facility (name of facility: [facility])   Appearance of home environment: cleanliness: clean, walking pathways: clear, lighting: adequate, and home structure: sound structure  Functional Status: moderate assistance  Mobility: ambulatory with device; primarily wheelchair; able to transfer  Nutritional access: adequate intake and access  Home Health: No, and does not need it at this time   DME/Supplies: rolling walker and wheelchair         Disease/illness education: CHF  Establishment or re-establishment of referral orders for community resources: No other necessary community resources.   Discussion with other health care providers: \Report given to EMS, spoke with facility staff  Does patient have a PCP at  [facility]? Yes   Repatriation plan with PCP? [facility] reason: mobility   Does patient have an ostomy (ileostomy, colostomy, suprapubic catheter, nephrostomy tube, tracheostomy, PEG tube, pleurex catheter, cholecystostomy, etc)? No  Were BPAs reviewed? Yes      Social History     Socioeconomic History    Marital status:     Number of children: 2   Tobacco Use    Smoking status: Never    Smokeless tobacco: Never   Substance and Sexual Activity    Alcohol use: No    Drug use: No    Sexual activity: Not Currently     Social Determinants of Health     Financial Resource Strain: Low Risk  (4/22/2024)    Overall Financial Resource Strain (CARDIA)     Difficulty of Paying Living Expenses: Not hard at all   Food Insecurity: No Food Insecurity (4/22/2024)    Hunger Vital Sign     Worried About Running Out of Food in the Last Year: Never true     Ran Out of Food in the Last Year: Never true   Transportation Needs: No Transportation Needs (4/22/2024)    PRAPARE - Transportation     Lack of Transportation (Medical): No     Lack of Transportation (Non-Medical): No   Recent Concern: Transportation Needs - Unmet Transportation Needs (1/31/2024)    PRAPARE - Transportation     Lack of Transportation (Medical): Yes     Lack of Transportation (Non-Medical): No   Physical Activity: Inactive (4/22/2024)    Exercise Vital Sign     Days of Exercise per Week: 0 days     Minutes of Exercise per Session: 0 min   Stress: No Stress Concern Present (4/22/2024)    [employer] - Occupational Stress Questionnaire     Feeling of Stress : Not at all   Housing Stability: Low Risk  (4/22/2024)    Housing Stability Vital Sign     Unable to Pay for Housing in the Last Year: No     Homeless in the Last Year: No         OBJECTIVE:     Vital Signs:  Vitals:    05/07/24 1307   BP: 122/62   Pulse: 64   Resp: (!) 22   Temp: 97.6 °F (36.4 °C)       Review of Systems   Constitutional:  Negative for activity change, appetite  change and fever.   HENT:  Positive for hearing loss.    Eyes: Negative.    Respiratory:  Negative for cough and shortness of breath.    Cardiovascular:  Positive for leg swelling. Negative for chest pain.   Gastrointestinal:  Negative for abdominal pain, constipation, diarrhea and nausea.   Genitourinary:  Negative for difficulty urinating and hematuria.   Musculoskeletal:  Positive for gait problem. Negative for joint swelling and neck stiffness.   Skin:  Negative for color change and wound.   Neurological:  Positive for weakness. Negative for dizziness, numbness and headaches.       Physical Exam:  Physical Exam  Vitals reviewed.   HENT:      Head: Normocephalic.      Ears:      Comments: Hearing aids  Eyes:      Pupils: Pupils are equal, round, and reactive to light.   Cardiovascular:      Rate and Rhythm: Normal rate. Rhythm irregular.      Heart sounds: Normal heart sounds.      Comments: Pacemaker   Pulmonary:      Effort: Tachypnea and accessory muscle usage present.      Breath sounds: Decreased air movement present. Decreased breath sounds and wheezing (audible with exertion) present.   Abdominal:      General: Bowel sounds are normal.      Palpations: Abdomen is soft.   Musculoskeletal:         General: Normal range of motion.      Cervical back: Normal range of motion.      Right lower le+ Pitting Edema present.      Left lower leg: 3+ Pitting Edema present.   Skin:     General: Skin is warm and dry.      Comments: Open blisters to left lower extemity   Neurological:      Mental Status: She is alert. Mental status is at baseline.      Motor: Weakness present.      Gait: Gait abnormal (walker).      Comments: Oriented to person, place, and immediate situation.   Unsure of current year and president.    Psychiatric:         Behavior: Behavior normal.         Cognition and Memory: Memory is impaired.         INSTRUCTIONS FOR PATIENT:     Scheduled Follow-up, Appts Reviewed with Modifications if Needed:  Yes  Future Appointments   Date Time Provider Department Center   5/9/2024  2:00 PM Evette Yo PA-C [facility] CARDIO [place]   5/21/2024  3:00 PM Luzmaria Valle DPM [facility] POD [place]   5/27/2024  9:05 AM LABORATORY, BRYN [facility] LAB [place]   7/9/2024  8:00 AM Socorro Vanegas NP [facility] C3HV [place]   7/11/2024 12:00 PM Akosua Hall, CRISTINAP [facility] DIABETE [place]         Current Outpatient Medications:     acetaminophen (TYLENOL) 500 MG tablet, Take 2 tablets (1,000 mg total) by mouth every 6 (six) hours as needed for Pain., Disp: , Rfl:     alcohol swabs (BD ALCOHOL SWABS) PadM, Apply 1 each topically 4 (four) times daily., Disp: 360 each, Rfl: 3    benazepriL (LOTENSIN) 20 MG tablet, TAKE 1 TABLET (20 MG TOTAL) ONE TIME DAILY. (Patient taking differently: Take 20 mg by mouth once daily.), Disp: 90 tablet, Rfl: 3    blood sugar diagnostic (BLOOD GLUCOSE TEST) Strp, 1 strip by Misc.(Non-Drug; Combo Route) route 4 (four) times daily., Disp: 200 each, Rfl: 11    blood-glucose meter kit, To check BG once daily, to use with insurance preferred meter, Disp: 1 each, Rfl: 0    calcitRIOL (ROCALTROL) 0.25 MCG Cap, TAKE 1 CAPSULE EVERY DAY (NEED MD APPOINTMENT), Disp: 90 capsule, Rfl: 1    donepeziL (ARICEPT) 5 MG tablet, Take 1 tablet (5 mg total) by mouth every evening., Disp: , Rfl:     ELIQUIS 2.5 mg Tab, TAKE 1 TABLET TWICE DAILY (Patient taking differently: Take 2.5 mg by mouth 2 (two) times daily.), Disp: 180 tablet, Rfl: 3    furosemide (LASIX) 20 MG tablet, Take 1 tablet (20 mg total) by mouth once daily., Disp: 30 tablet, Rfl: 0    glucagon (GVOKE HYPOPEN 2-PACK) 1 mg/0.2 mL AtIn, Inject 1 mg into the skin as needed (SEVERE HYPOGLYCEMIA)., Disp: 2 each, Rfl: 5    insulin aspart U-100 (NOVOLOG FLEXPEN U-100 INSULIN) 100 unit/mL (3 mL) InPn pen, Inject 4 Units into the skin every morning. Inject subcutaneously 4 units before breakfast and 6 units before lunch and 6 units before supper., Disp: 3.6 mL,  "Rfl: 3    insulin degludec (TRESIBA FLEXTOUCH U-100) 100 unit/mL (3 mL) insulin pen, Inject 13 Units into the skin once daily., Disp: 11.7 mL, Rfl: 3    lancets (ACCU-CHEK SOFTCLIX LANCETS) Misc, TEST BLOOD SUGAR THREE TIMES DAILY BEFORE MEALS, Disp: 300 each, Rfl: 3    levothyroxine (SYNTHROID) 25 MCG tablet, Take 1 tablet (25 mcg total) by mouth before breakfast., Disp: 90 tablet, Rfl: 3    metoprolol tartrate (LOPRESSOR) 25 MG tablet, TAKE 1 TABLET BY MOUTH TWICE DAILY, Disp: 180 tablet, Rfl: 3    metoprolol tartrate (LOPRESSOR) 50 MG tablet, TAKE 1/2 TABLET TWICE DAILY (Patient taking differently: Take 25 mg by mouth 2 (two) times daily.), Disp: 90 tablet, Rfl: 3    mirabegron (MYRBETRIQ) 25 mg Tb24 ER tablet, Take 1 tablet (25 mg total) by mouth once daily., Disp: 90 tablet, Rfl: 3    mirtazapine (REMERON) 45 MG tablet, TAKE 1 TABLET EVERY EVENING, Disp: 90 tablet, Rfl: 1    pen needle, diabetic (BD ULTRA-FINE MINI PEN NEEDLE) 31 gauge x 3/16" Ndle, Inject 1 each into the skin once daily. Use, Disp: 100 each, Rfl: 3    pravastatin (PRAVACHOL) 40 MG tablet, TAKE 1 TABLET EVERY DAY, Disp: 90 tablet, Rfl: 3    sodium bicarbonate 650 MG tablet, Take 1 tablet (650 mg total) by mouth once daily., Disp: 90 tablet, Rfl: 3    venlafaxine (EFFEXOR-XR) 150 MG Cp24, TAKE 1 CAPSULE EVERY DAY, Disp: 90 capsule, Rfl: 1    verapamiL (CALAN-SR) 120 MG CR tablet, TAKE 1 TABLET EVERY NIGHT (Patient taking differently: Take 120 mg by mouth nightly.), Disp: 90 tablet, Rfl: 3    Medication Reconciliation:  Were medications changed during this appointment? No  Were routine medications in the home? Managed per facility  Is the patient taking the medications as directed? Yes  Does the patient/caregiver understand the medications and changes if any? Medications managed per facility  Does updated med list accurately reflects meds patient is currently taking? Yes    Signature:      AGUSTINA L GENEVAY,, NP      Time: Total face-to-face time " was 60 minutes, >50% of this was spent on counseling and coordination of care. The following issues were discussed: primary and secondary diagnoses, co-morbidities, prescribed medications, treatment modalities, importance of compliance with medical advice and directives for follow-up care.

## 2024-05-09 ENCOUNTER — HOSPITAL ENCOUNTER (OUTPATIENT)
Dept: CARDIOLOGY | Facility: HOSPITAL | Age: 89
Discharge: HOME | End: 2024-05-09
Attending: INTERNAL MEDICINE

## 2024-05-09 LAB
OHS CV AF BURDEN PERCENT: 99
OHS CV DC REMOTE DEVICE TYPE: NORMAL
OHS CV ICD SHOCK: NO
OHS CV RV PACING PERCENT: 99 %

## 2024-05-21 ENCOUNTER — CARE AT HOME (OUTPATIENT)
Dept: HOME HEALTH SERVICES | Facility: CLINIC | Age: 89
End: 2024-05-21
Payer: MEDICARE

## 2024-05-21 VITALS
HEART RATE: 61 BPM | OXYGEN SATURATION: 98 % | RESPIRATION RATE: 16 BRPM | SYSTOLIC BLOOD PRESSURE: 122 MMHG | DIASTOLIC BLOOD PRESSURE: 72 MMHG

## 2024-05-21 DIAGNOSIS — Z74.09 OTHER REDUCED MOBILITY: ICD-10-CM

## 2024-05-21 DIAGNOSIS — I50.32 CHRONIC DIASTOLIC HEART FAILURE: Primary | ICD-10-CM

## 2024-05-21 DIAGNOSIS — N18.4 CHRONIC KIDNEY DISEASE, STAGE IV (SEVERE): ICD-10-CM

## 2024-05-21 DIAGNOSIS — R13.10 DYSPHAGIA, UNSPECIFIED TYPE: ICD-10-CM

## 2024-05-21 PROCEDURE — 1157F ADVNC CARE PLAN IN RCRD: CPT | Mod: CPTII,S$GLB,,

## 2024-05-21 PROCEDURE — 1160F RVW MEDS BY RX/DR IN RCRD: CPT | Mod: CPTII,S$GLB,,

## 2024-05-21 PROCEDURE — 99350 HOME/RES VST EST HIGH MDM 60: CPT | Mod: S$GLB,,,

## 2024-05-21 PROCEDURE — 1159F MED LIST DOCD IN RCRD: CPT | Mod: CPTII,S$GLB,,

## 2024-05-21 NOTE — ASSESSMENT & PLAN NOTE
-As reported per [facility]. Frequent coughing during meals.   -Unable to speak with RN today during visit as she is not working today.   -Lunch not being served at time of visit; unable to evaluate in person.   -Continue to monitor.   -Daughter has [facility] information, notify with any questions/concerns.

## 2024-05-21 NOTE — PROGRESS NOTES
[facility]  Transitional Care Management (TCM) Home Visit    Encounter Provider: AGUSTINA GIMENEZ,   PCP: Mor Cook MD  Consult Requested By: Mariposa Huertas  Admit Date: 5/07/24  IP Discharge Date: 5/13/23  Hospital Length of Stay: 6 days  Days since discharge (from IP or SNF): 8 days  Hospital Diagnosis: Encounter for preventive health examination [Z00.00]     HISTORY OF PRESENT ILLNESS      Patient ID: Kassi Skelton is a 89 y.o. female was recently admitted to the hospital, this is their TCM encounter.    Hospital Course Synopsis:  Admitted for CHF exacerbation and RUFINO.     Developments since hospitalization and current needs:   Doing much better post discharge. Found sitting in wheelchair in sitting room with others. Denies SOB. I spoke with daughter Tova yesterday and she reports shelter is stating she is coughing a lot while eating. She reports speech eval was done and recommended small bites. MIGUE is able to puree or provide a liquid diet if needed. Ms. Brito's nurse not working today to discuss.     Ms. Brito is receiving HH PT only. Ms. Bernardo will check to see if they can get HH to draw labs as pt has upcoming labs. She will check with home health to see if they need order for RN visit.    Otherwise doing much better. Good appetite, sleeping well, no elimination concerns/complaints.     DECISION MAKING TODAY       Assessment & Plan:  1. Chronic diastolic heart failure  Assessment & Plan:  S/p recent hospitalization   -5/8/2024 cardiac echo grade 1 diastolic dysfunction and EF 55-60%. No regional wall motion abnormalities.  -5/7 CXR minimum findings of pulmonary edema.  -5/10 chest x-ray lungs are clear. Cardiomegaly   - On BB, statin, CCB, GDMT limited by CKD.   - Improved from HF exacerbation, Negative 3.9L while admitted  - Follow up with cardiology       2. Chronic kidney disease, stage IV (severe)  Assessment & Plan:  Chronic and stable.   Continue current management.   Recommend avoid  nsaids and other nephrotoxic medications.   Follow up with PCP and/or nephrologist.      Latest Reference Range & Units 05/10/24 04:05 05/11/24 03:58 05/12/24 03:49 05/13/24 04:05   Sodium 136 - 144 mmol/L 140 (E) 143 (E) 142 (E) 145 (H) (E)   Potassium 3.6 - 5.1 mmol/L 4.1 (E) 3.8 (E) 4.2 (E) 4.0 (E)   Chloride 101 - 111 mmol/L 109 (E) 107 (E) 107 (E) 109 (E)   CO2 22 - 32 mmol/L 22 (E) 22 (E) 24 (E) 25 (E)   Anion Gap 7 - 16 mmol/L 9 (E) 14 (E) 11 (E) 11 (E)   BUN 8 - 20 mg/dL 49 (H) (E) 47 (H) (E) 43 (H) (E) 39 (H) (E)   Creatinine 0.60 - 1.10 mg/dL 2.22 (H) (E) 2.43 (H) (E) 2.31 (H) (E) 2.04 (H) (E)   Glucose 65 - 99 mg/dL 147 (H) (E) 173 (H) (E) 200 (H) (E) 111 (H) (E)   Calcium 8.9 - 10.3 mg/dL 9.4 (E) 9.4 (E) 9.7 (E) 9.4 (E)   Magnesium  1.8 - 2.5 mg/dL 2.3 (E) 2.3 (E) 2.3 (E) 2.3 (E)   ALP 28 - 116 U/L 108 (E) 105 (E) 115 (E) 102 (E)   Total Protein 6.1 - 7.9 g/dL 7.0 (E) 7.0 (E) 7.0 (E) 7.0 (E)   Albumin 3.5 - 4.8 g/dL 3.6 (E) 3.6 (E) 3.7 (E) 3.4 (L) (E)   BILIRUBIN TOTAL 0.4 - 2.0 mg/dL 0.3 (L) (E) 0.4 (E) 0.4 (E) 0.5 (E)   AST 10 - 34 U/L 30 (E) 26 (E) 32 (E) 29 (E)   ALT 5 - 41 U/L 17 (E) 16 (E) 22 (E) 17 (E)   (H): Data is abnormally high  (L): Data is abnormally low  (E): External lab result      3. Other reduced mobility  Assessment & Plan:  Would like to minimize clinic visits. Hard for daughter to take to appointments.   Care at home will continue to follow q 8-12 weeks, sooner if needed.       4. Dysphagia, unspecified type  Assessment & Plan:  -As reported per long-term. Frequent coughing during meals.   -Unable to speak with RN today during visit as she is not working today.   -Lunch not being served at time of visit; unable to evaluate in person.   -Continue to monitor.   -Daughter has [facility] information, notify with any questions/concerns.             ENVIRONMENT OF CARE      Family and/or Caregiver present at visit?  No  Name of Caregiver: wei Bernardo  History provided by: patient and  caregiver vis phone day prior to visit.     Advance Care Planning   Advanced Care Planning Status:  Patient has had an ACP conversation  Living Will: No  Power of : No  [place]: Yes    Does Caregiver have HCPoA: Yes  Changes today: n/a       Impression upon entering the home:  Physical Dwelling: assisted living facility (name of facility: [facility])   Appearance of home environment: cleanliness: clean, walking pathways: clear, lighting: adequate, and home structure: sound structure  Functional Status: moderate assistance  Mobility: ambulatory with device; primarily wheelchair; able to transfer  Nutritional access: adequate intake and access  Home Health: No, and does not need it at this time   DME/Supplies: rolling walker and wheelchair     Diagnostic tests reviewed/disposition: No diagnosic tests pending after this hospitalization.  Disease/illness education: CHF  Establishment or re-establishment of referral orders for community resources: No other necessary community resources.   Discussion with other health care providers: No discussion with other health care providers necessary.   Does patient have a PCP at [place]? Yes   Repatriation plan with PCP? [facility] reason: mobility   Does patient have an ostomy (ileostomy, colostomy, suprapubic catheter, nephrostomy tube, tracheostomy, PEG tube, pleurex catheter, cholecystostomy, etc)? No  Were BPAs reviewed? Yes    Social History     Socioeconomic History    Marital status:     Number of children: 2   Tobacco Use    Smoking status: Never    Smokeless tobacco: Never   Substance and Sexual Activity    Alcohol use: No    Drug use: No    Sexual activity: Not Currently     Social Determinants of Health     Financial Resource Strain: Low Risk  (4/22/2024)    Overall Financial Resource Strain (CARDIA)     Difficulty of Paying Living Expenses: Not hard at all   Food Insecurity: Unknown (5/7/2024)    [facility] from [facility]    Hunger Vital Sign     Ran Out of Food in the Last Year: Never true   Transportation Needs: Unknown (5/7/2024)    Received from [facility]    PRAPARE - Transportation     Lack of Transportation (Medical): No   Physical Activity: Inactive (4/22/2024)    Exercise Vital Sign     Days of Exercise per Week: 0 days     Minutes of Exercise per Session: 0 min   Stress: No Stress Concern Present (4/22/2024)    [place] [employer] - Occupational Stress Questionnaire     Feeling of Stress : Not at all   Housing Stability: Unknown (5/7/2024)    Received from [facility]    Housing Stability Vital Sign     Homeless in the Last Year: No         OBJECTIVE:     Vital Signs:  Vitals:    05/21/24 1045   BP: 122/72   Pulse: 61   Resp: 16       Review of Systems   Constitutional:  Negative for activity change, appetite change and fever.   HENT:  Positive for hearing loss.    Eyes: Negative.    Respiratory:  Negative for cough and shortness of breath.    Cardiovascular:  Positive for leg swelling. Negative for chest pain.   Gastrointestinal:  Negative for abdominal pain, constipation, diarrhea and nausea.   Genitourinary:  Negative for difficulty urinating and hematuria.   Musculoskeletal:  Positive for gait problem. Negative for joint swelling and neck stiffness.   Skin:  Negative for color change and wound.   Neurological:  Positive for weakness. Negative for dizziness, numbness and headaches.       Physical Exam:  Physical Exam  Vitals reviewed.   HENT:      Head: Normocephalic.      Ears:      Comments: Hearing aids  Eyes:      Pupils: Pupils are equal, round, and reactive to light.   Cardiovascular:      Rate and Rhythm: Normal rate. Rhythm irregular.      Heart sounds: Normal heart sounds.      Comments: Pacemaker; Chronic color changes to BLE   Pulmonary:      Effort: Pulmonary effort is normal.      Breath sounds: Normal breath sounds.   Abdominal:      General: Bowel  sounds are normal.      Palpations: Abdomen is soft.   Musculoskeletal:         General: Normal range of motion.      Cervical back: Normal range of motion.      Right lower le+ Edema (2+) present.      Left lower le+ Edema (trace) present.   Skin:     General: Skin is warm and dry.   Neurological:      Mental Status: She is alert. Mental status is at baseline.      Motor: Weakness present.      Gait: Gait abnormal (walker).      Comments: Oriented to person, place, and immediate situation.   Unsure of current year and president.    Psychiatric:         Behavior: Behavior normal.         Cognition and Memory: Memory is impaired.         INSTRUCTIONS FOR PATIENT:   Medication List on Discharge:     Medication List            Accurate as of May 21, 2024  2:17 PM. If you have any questions, ask your nurse or doctor.                CHANGE how you take these medications      benazepriL 20 MG tablet  Commonly known as: LOTENSIN  TAKE 1 TABLET (20 MG TOTAL) ONE TIME DAILY.  What changed: See the new instructions.     ELIQUIS 2.5 mg Tab  Generic drug: apixaban  TAKE 1 TABLET TWICE DAILY  What changed: how much to take     verapamiL 120 MG CR tablet  Commonly known as: CALAN-SR  TAKE 1 TABLET EVERY NIGHT  What changed: when to take this            CONTINUE taking these medications      acetaminophen 500 MG tablet  Commonly known as: TYLENOL  Take 2 tablets (1,000 mg total) by mouth every 6 (six) hours as needed for Pain.     alcohol swabs Padm  Commonly known as: ALCOHOL PREP  Apply 1 each topically 4 (four) times daily.     blood sugar diagnostic Strp  Commonly known as: BLOOD GLUCOSE TEST  1 strip by Misc.(Non-Drug; Combo Route) route 4 (four) times daily.     blood-glucose meter kit  To check BG once daily, to use with insurance preferred meter     calcitRIOL 0.25 MCG Cap  Commonly known as: ROCALTROL  TAKE 1 CAPSULE EVERY DAY (NEED MD APPOINTMENT)     donepeziL 5 MG tablet  Commonly known as: ARICEPT  Take 1  "tablet (5 mg total) by mouth every evening.     * furosemide 20 MG tablet  Commonly known as: LASIX  Take 1 tablet (20 mg total) by mouth once daily.     * furosemide 20 MG tablet  Commonly known as: LASIX  Take 1 tablet (20 mg total) by mouth daily as needed (SOB, edema, or weight gain of 3lb or greater in 24 hours.).     GVOKE HYPOPEN 2-PACK 1 mg/0.2 mL Atin  Generic drug: glucagon  Inject 1 mg into the skin as needed (SEVERE HYPOGLYCEMIA).     insulin aspart U-100 100 unit/mL (3 mL) Inpn pen  Commonly known as: NovoLOG Flexpen U-100 Insulin  Inject 4 Units into the skin every morning. Inject subcutaneously 4 units before breakfast and 6 units before lunch and 6 units before supper.     insulin degludec 100 unit/mL (3 mL) insulin pen  Commonly known as: TRESIBA FLEXTOUCH U-100  Inject 13 Units into the skin once daily.     lancets Misc  Commonly known as: ACCU-CHEK SOFTCLIX LANCETS  TEST BLOOD SUGAR THREE TIMES DAILY BEFORE MEALS     levothyroxine 25 MCG tablet  Commonly known as: SYNTHROID  Take 1 tablet (25 mcg total) by mouth before breakfast.     * metoprolol tartrate 50 MG tablet  Commonly known as: LOPRESSOR  TAKE 1/2 TABLET TWICE DAILY     * metoprolol tartrate 25 MG tablet  Commonly known as: LOPRESSOR  TAKE 1 TABLET BY MOUTH TWICE DAILY     mirtazapine 45 MG tablet  Commonly known as: REMERON  TAKE 1 TABLET EVERY EVENING     MYRBETRIQ 25 mg Tb24 ER tablet  Generic drug: mirabegron  Take 1 tablet (25 mg total) by mouth once daily.     pen needle, diabetic 31 gauge x 3/16" Ndle  Commonly known as: BD ULTRA-FINE MINI PEN NEEDLE  Inject 1 each into the skin once daily. Use     pravastatin 40 MG tablet  Commonly known as: PRAVACHOL  TAKE 1 TABLET EVERY DAY     sodium bicarbonate 650 MG tablet  Take 1 tablet (650 mg total) by mouth once daily.     venlafaxine 150 MG Cp24  Commonly known as: EFFEXOR-XR  TAKE 1 CAPSULE EVERY DAY           * This list has 4 medication(s) that are the same as other medications " prescribed for you. Read the directions carefully, and ask your doctor or other care provider to review them with you.                  Medication Reconciliation:  Were medications changed on discharge? Yes  Were medications in the home? Yes  Is the patient taking the medications as directed? Yes  Does the patient/caregiver understand the medications and changes? Yes  Does updated med list accurately reflects meds patient is currently taking? Unable to verify med list with staff RN today.      Scheduled Follow-up, Appts Reviewed with Modifications if Needed: Yes  Future Appointments   Date Time Provider Department [place]   5/27/2024  9:05 AM [facility] [place]   7/9/2024  8:00 AM Socorro Gimenez, NP [address]   7/11/2024 12:00 PM [facility], [facility] [place]         Signature:      SOCORRO GIMENEZ,, LULU    Transition of Care Visit:  I have reviewed and updated the history and problem list.  I have reconciled the medication list.  I have discussed the hospitalization and current medical issues, prognosis and plans with the patient/family.      Total face-to-face time was 60 minutes, >50% of this was spent on counseling and coordination of care. The following issues were discussed: primary and secondary diagnoses, co-morbidities, prescribed medications, treatment modalities, importance of compliance with medical advice and directives for follow-up care.

## 2024-05-21 NOTE — ASSESSMENT & PLAN NOTE
Chronic and stable.   Continue current management.   Recommend avoid nsaids and other nephrotoxic medications.   Follow up with PCP and/or nephrologist.      Latest Reference Range & Units 05/10/24 04:05 05/11/24 03:58 05/12/24 03:49 05/13/24 04:05   Sodium 136 - 144 mmol/L 140 (E) 143 (E) 142 (E) 145 (H) (E)   Potassium 3.6 - 5.1 mmol/L 4.1 (E) 3.8 (E) 4.2 (E) 4.0 (E)   Chloride 101 - 111 mmol/L 109 (E) 107 (E) 107 (E) 109 (E)   CO2 22 - 32 mmol/L 22 (E) 22 (E) 24 (E) 25 (E)   Anion Gap 7 - 16 mmol/L 9 (E) 14 (E) 11 (E) 11 (E)   BUN 8 - 20 mg/dL 49 (H) (E) 47 (H) (E) 43 (H) (E) 39 (H) (E)   Creatinine 0.60 - 1.10 mg/dL 2.22 (H) (E) 2.43 (H) (E) 2.31 (H) (E) 2.04 (H) (E)   Glucose 65 - 99 mg/dL 147 (H) (E) 173 (H) (E) 200 (H) (E) 111 (H) (E)   Calcium 8.9 - 10.3 mg/dL 9.4 (E) 9.4 (E) 9.7 (E) 9.4 (E)   Magnesium  1.8 - 2.5 mg/dL 2.3 (E) 2.3 (E) 2.3 (E) 2.3 (E)   ALP 28 - 116 U/L 108 (E) 105 (E) 115 (E) 102 (E)   Total Protein 6.1 - 7.9 g/dL 7.0 (E) 7.0 (E) 7.0 (E) 7.0 (E)   Albumin 3.5 - 4.8 g/dL 3.6 (E) 3.6 (E) 3.7 (E) 3.4 (L) (E)   BILIRUBIN TOTAL 0.4 - 2.0 mg/dL 0.3 (L) (E) 0.4 (E) 0.4 (E) 0.5 (E)   AST 10 - 34 U/L 30 (E) 26 (E) 32 (E) 29 (E)   ALT 5 - 41 U/L 17 (E) 16 (E) 22 (E) 17 (E)   (H): Data is abnormally high  (L): Data is abnormally low  (E): External lab result

## 2024-05-21 NOTE — ASSESSMENT & PLAN NOTE
Would like to minimize clinic visits. Hard for daughter to take to appointments.   Care at home will continue to follow q 8-12 weeks, sooner if needed.

## 2024-05-21 NOTE — ASSESSMENT & PLAN NOTE
S/p recent hospitalization   -5/8/2024 cardiac echo grade 1 diastolic dysfunction and EF 55-60%. No regional wall motion abnormalities.  -5/7 CXR minimum findings of pulmonary edema.  -5/10 chest x-ray lungs are clear. Cardiomegaly   - On BB, statin, CCB, GDMT limited by CKD.   - Improved from HF exacerbation, Negative 3.9L while admitted  - Follow up with cardiology

## 2024-05-22 ENCOUNTER — TELEPHONE (OUTPATIENT)
Dept: FAMILY MEDICINE | Facility: CLINIC | Age: 89
End: 2024-05-22
Payer: MEDICARE

## 2024-05-22 NOTE — TELEPHONE ENCOUNTER
Rec'd fax from  about medications upon discharge (fax in MD box), daughter supplies medications but has not provided MV with Folic acid and vit b12, asking if patient is suppose to be on these.  Daughter cancelled TCC appt and has not rescheduled, was in [place].

## 2024-05-22 NOTE — TELEPHONE ENCOUNTER
She had some mild anemia in the hospital.  It looks like they were trying to give her some folic acid and vitamin B12   as apparently both levels were in the low-normal range in the hospital.  She would probably need further evaluation to determine if these are actually necessary.  More likely anemia would be related to her chronic kidney disease.  She can hold these for now if she is not taking can check this when she comes in.  Okay with me if she wants to take it.

## 2024-05-31 RX ORDER — VENLAFAXINE HYDROCHLORIDE 150 MG/1
CAPSULE, EXTENDED RELEASE ORAL
Qty: 90 CAPSULE | Refills: 1 | Status: SHIPPED | OUTPATIENT
Start: 2024-05-31

## 2024-05-31 RX ORDER — MIRTAZAPINE 45 MG/1
TABLET, FILM COATED ORAL
Qty: 90 TABLET | Refills: 1 | Status: SHIPPED | OUTPATIENT
Start: 2024-05-31

## 2024-05-31 NOTE — TELEPHONE ENCOUNTER
Refill Routing Note   Medication(s) are not appropriate for processing by [facility] for the following reason(s):        Required labs abnormal    [facility] action(s):  Defer  Approve               Appointments  past 12m or future 3m with PCP    Date Provider   Last Visit   1/31/2024 Mor Cook MD   Next Visit   Visit date not found Mor Cook MD   ED visits in past 90 days: 0        Note composed:6:33 AM 05/31/2024

## 2024-05-31 NOTE — TELEPHONE ENCOUNTER
No care due was identified.  [facility] Embedded Care Due Messages. Reference number: [id number].   5/31/2024 1:40:46 AM CDT

## 2024-06-01 ENCOUNTER — HOSPITAL ENCOUNTER (OUTPATIENT)
Dept: CARDIOLOGY | Facility: HOSPITAL | Age: 89
Discharge: HOME | End: 2024-06-01
Attending: INTERNAL MEDICINE

## 2024-06-02 RX ORDER — CALCITRIOL 0.25 UG/1
0.25 CAPSULE ORAL DAILY
Qty: 90 CAPSULE | Refills: 1 | Status: SHIPPED | OUTPATIENT
Start: 2024-06-02 | End: 2024-11-29

## 2024-06-11 ENCOUNTER — PATIENT MESSAGE (OUTPATIENT)
Dept: CARDIOLOGY | Facility: CLINIC | Age: 89
End: 2024-06-11
Payer: MEDICARE

## 2024-06-12 RX ORDER — VERAPAMIL HYDROCHLORIDE 120 MG/1
TABLET, FILM COATED, EXTENDED RELEASE ORAL
Qty: 90 TABLET | Refills: 3 | Status: SHIPPED | OUTPATIENT
Start: 2024-06-12

## 2024-06-18 ENCOUNTER — TELEPHONE (OUTPATIENT)
Dept: HOME HEALTH SERVICES | Facility: CLINIC | Age: 89
End: 2024-06-18
Payer: MEDICARE

## 2024-06-18 ENCOUNTER — CARE AT HOME (OUTPATIENT)
Dept: HOME HEALTH SERVICES | Facility: CLINIC | Age: 89
End: 2024-06-18
Payer: MEDICARE

## 2024-06-18 VITALS
DIASTOLIC BLOOD PRESSURE: 72 MMHG | RESPIRATION RATE: 22 BRPM | OXYGEN SATURATION: 90 % | TEMPERATURE: 98 F | SYSTOLIC BLOOD PRESSURE: 122 MMHG | HEART RATE: 88 BPM

## 2024-06-18 DIAGNOSIS — N18.4 CHRONIC KIDNEY DISEASE, STAGE IV (SEVERE): ICD-10-CM

## 2024-06-18 DIAGNOSIS — G30.8 OTHER ALZHEIMER'S DISEASE (CODE): ICD-10-CM

## 2024-06-18 DIAGNOSIS — E11.40 TYPE 2 DIABETES MELLITUS WITH DIABETIC NEUROPATHY, WITH LONG-TERM CURRENT USE OF INSULIN: ICD-10-CM

## 2024-06-18 DIAGNOSIS — I50.33 ACUTE ON CHRONIC DIASTOLIC HEART FAILURE: ICD-10-CM

## 2024-06-18 DIAGNOSIS — Z79.4 TYPE 2 DIABETES MELLITUS WITH DIABETIC NEUROPATHY, WITH LONG-TERM CURRENT USE OF INSULIN: ICD-10-CM

## 2024-06-18 DIAGNOSIS — G30.0 ALZHEIMER'S DISEASE WITH EARLY ONSET (CODE): ICD-10-CM

## 2024-06-18 DIAGNOSIS — I50.32 CHRONIC DIASTOLIC HEART FAILURE: Primary | ICD-10-CM

## 2024-06-18 DIAGNOSIS — E11.9 TYPE 2 DIABETES MELLITUS WITHOUT COMPLICATION, WITHOUT LONG-TERM CURRENT USE OF INSULIN: ICD-10-CM

## 2024-06-18 DIAGNOSIS — F03.90 DEMENTIA, UNSPECIFIED DEMENTIA SEVERITY, UNSPECIFIED DEMENTIA TYPE, UNSPECIFIED WHETHER BEHAVIORAL, PSYCHOTIC, OR MOOD DISTURBANCE OR ANXIETY: Chronic | ICD-10-CM

## 2024-06-18 DIAGNOSIS — I48.19 PERSISTENT ATRIAL FIBRILLATION: ICD-10-CM

## 2024-06-18 PROCEDURE — 1157F ADVNC CARE PLAN IN RCRD: CPT | Mod: CPTII,S$GLB,,

## 2024-06-18 PROCEDURE — 1160F RVW MEDS BY RX/DR IN RCRD: CPT | Mod: CPTII,S$GLB,,

## 2024-06-18 PROCEDURE — 99350 HOME/RES VST EST HIGH MDM 60: CPT | Mod: S$GLB,,,

## 2024-06-18 PROCEDURE — 1159F MED LIST DOCD IN RCRD: CPT | Mod: CPTII,S$GLB,,

## 2024-06-18 RX ORDER — INSULIN ASPART 100 [IU]/ML
INJECTION, SOLUTION INTRAVENOUS; SUBCUTANEOUS
Qty: 15 ML | Refills: 3 | Status: SHIPPED | OUTPATIENT
Start: 2024-06-18

## 2024-06-18 RX ORDER — ISOPROPYL ALCOHOL 70 ML/100ML
SWAB TOPICAL
Qty: 100 EACH | Refills: 3 | Status: SHIPPED | OUTPATIENT
Start: 2024-06-18

## 2024-06-18 NOTE — PROGRESS NOTES
"[facility]  Medical Chronic Care Home Visit    Encounter Provider: AGUSTINA GIMENEZ,   PCP: Mor Cook MD  Consult Requested By: No ref. provider found    HISTORY OF PRESENT ILLNESS      Patient ID: Kassi Skelton is a 89 y.o. female is being seen in the home due to physical debility that presents a taxing effort to leave the home, to mitigate high risk of hospital readmission and/or due to the limited availability of reliable or safe options for transportation to the point of access to the provider. Prior to treatment on this visit the chart was reviewed and patient verbal consent was obtained.      Chronic medical conditions synopsis:    Ms. Skelton is a 89 y.o. female who has a past medical history significant for dementia, depression, Hard of Hearing, lung granuloma, pacemaker, A. Fib, CHF, T2DM, CKD, OA, and impaired mobility      Reason for consult and visit   Routine complex care management. Mild confusion, intermittent SOB.      Recent developments  Spoke with daughter Tova via phone before visit. She reports recent confusion where she asked for her  to live with her at assisted living facility,  passed away years ago, patient thought son was . Daughter concerned for UTI and some observed SOB. Daughter will be out of town for 1 week, Richard Kline (son) will be primary contact during this time.     Ms Brito found in wheelchair in lobby with fellow residents. On initial assessment she appears to be doing well. She states she was "just confused at the time" when spoke with daughter about . Appears to be at baseline mental status today during visit. Discussed potential sundowning with daughter via phone.    Ms. Brito is short of breath intermittently throughout visit with audible wheezing at times. She has 2+ swelling to LLE and 3+ swelling to RLE. Left lower pant leg is saturated with weeping fluid from blister. Blister does not appear to be infected however " there is constant clear fluid drainage from this site. Dressing applied.    Spoke with daughter via phone after visit. I would like to increase Lasix to 40mg  x 7 days. I would also like to add skilled nursing visits to current home health care services (PT only) to address wounds/weeping in bilateral lower legs as well as closer CHF surveillance, weight checks, and monitoring of shortness of breath. She also needs labs drawn from previous hospital follow up, will add additional labs as well as UA reflex culture to rule out UTI d/t recent confusion.     Daughter is agreeable to plan.       [facility] will continue to follow up in 2-4 weeks, sooner if needed.       DECISION MAKING TODAY       Assessment & Plan:  1. Chronic diastolic heart failure  Assessment & Plan:  -Unstable  -increase lasix to 40mg per day x 7 days  -Add RN HH visits for additional monitoring  -Labs per HH RN  See HPI      2. Chronic kidney disease, stage IV (severe)  Assessment & Plan:  -Labs due, to be drawn per HH RN  Continue current management.   Recommend avoid nsaids and other nephrotoxic medications.   Follow up with PCP and/or nephrologist.          Latest Reference Range & Units 05/10/24 04:05 05/11/24 03:58 05/12/24 03:49 05/13/24 04:05   Sodium 136 - 144 mmol/L 140 (E) 143 (E) 142 (E) 145 (H) (E)   Potassium 3.6 - 5.1 mmol/L 4.1 (E) 3.8 (E) 4.2 (E) 4.0 (E)   Chloride 101 - 111 mmol/L 109 (E) 107 (E) 107 (E) 109 (E)   CO2 22 - 32 mmol/L 22 (E) 22 (E) 24 (E) 25 (E)   Anion Gap 7 - 16 mmol/L 9 (E) 14 (E) 11 (E) 11 (E)   BUN 8 - 20 mg/dL 49 (H) (E) 47 (H) (E) 43 (H) (E) 39 (H) (E)   Creatinine 0.60 - 1.10 mg/dL 2.22 (H) (E) 2.43 (H) (E) 2.31 (H) (E) 2.04 (H) (E)   Glucose 65 - 99 mg/dL 147 (H) (E) 173 (H) (E) 200 (H) (E) 111 (H) (E)   Calcium 8.9 - 10.3 mg/dL 9.4 (E) 9.4 (E) 9.7 (E) 9.4 (E)   Magnesium  1.8 - 2.5 mg/dL 2.3 (E) 2.3 (E) 2.3 (E) 2.3 (E)   ALP 28 - 116 U/L 108 (E) 105 (E) 115 (E) 102 (E)   Total Protein 6.1 - 7.9 g/dL 7.0  (E) 7.0 (E) 7.0 (E) 7.0 (E)   Albumin 3.5 - 4.8 g/dL 3.6 (E) 3.6 (E) 3.7 (E) 3.4 (L) (E)   BILIRUBIN TOTAL 0.4 - 2.0 mg/dL 0.3 (L) (E) 0.4 (E) 0.4 (E) 0.5 (E)   AST 10 - 34 U/L 30 (E) 26 (E) 32 (E) 29 (E)   ALT 5 - 41 U/L 17 (E) 16 (E) 22 (E) 17 (E)   (H): Data is abnormally high  (L): Data is abnormally low  (E): External lab result      3. Dementia, unspecified dementia severity, unspecified dementia type, unspecified whether behavioral, psychotic, or mood disturbance or anxiety  Assessment & Plan:  -Recent episode of confusion, worse than baseline  -Sundowning vs UTI  -UA reflex culture to r/o UTI  -To be collected by HH RN            ENVIRONMENT OF CARE      Family and/or Caregiver present at visit?  No  Name of Caregiver: Daughter Tova- spoke with via phone prior to visit  Does Caregiver have HCPoA: Yes  History provided by: patient and caregiver    Impression upon entering the home:  Physical Dwelling: assisted living facility (name of facility: [facility])   Appearance of home environment: cleanliness: clean, walking pathways: clear, lighting: adequate, and home structure: sound structure  Functional Status: moderate assistance  Mobility: ambulatory with device; primarily wheelchair; able to transfer  Nutritional access: adequate intake and access  Home Health: No, and does not need it at this time   DME/Supplies: rolling walker and wheelchair         Disease/illness education: CHF  Establishment or re-establishment of referral orders for community resources: No other necessary community resources.   Discussion with other health care providers: \Report given to EMS, spoke with facility staff  Does patient have a PCP at [facility]? Yes   Repatriation plan with PCP? [facility] reason: mobility   Does patient have an ostomy (ileostomy, colostomy, suprapubic catheter, nephrostomy tube, tracheostomy, PEG tube, pleurex catheter, cholecystostomy, etc)? No  Were BPAs reviewed? Yes      Social  History     Socioeconomic History    Marital status:     Number of children: 2   Tobacco Use    Smoking status: Never    Smokeless tobacco: Never   Substance and Sexual Activity    Alcohol use: No    Drug use: No    Sexual activity: Not Currently     Social Determinants of Health     Financial Resource Strain: Low Risk  (4/22/2024)    Overall Financial Resource Strain (CARDIA)     Difficulty of Paying Living Expenses: Not hard at all   Food Insecurity: Unknown (5/7/2024)    Received from [facility]    Hunger Vital Sign     Ran Out of Food in the Last Year: Never true   Transportation Needs: Unknown (5/7/2024)    Received from [facility]    PRAPARE - Transportation     Lack of Transportation (Medical): No   Physical Activity: Inactive (4/22/2024)    Exercise Vital Sign     Days of Exercise per Week: 0 days     Minutes of Exercise per Session: 0 min   Stress: No Stress Concern Present (4/22/2024)    [employer] - Occupational Stress Questionnaire     Feeling of Stress : Not at all   Housing Stability: Unknown (5/7/2024)    Received from [facility]    Housing Stability Vital Sign     Homeless in the Last Year: No         OBJECTIVE:     Vital Signs:  Vitals:    06/18/24 1508   BP: 122/72   Pulse: 88   Resp: (!) 22   Temp: 97.6 °F (36.4 °C)       Review of Systems   Constitutional:  Negative for activity change, appetite change and fever.   HENT:  Positive for hearing loss.    Eyes: Negative.    Respiratory:  Negative for cough and shortness of breath.    Cardiovascular:  Positive for leg swelling. Negative for chest pain.   Gastrointestinal:  Negative for abdominal pain, constipation, diarrhea and nausea.   Genitourinary:  Negative for difficulty urinating and hematuria.   Musculoskeletal:  Positive for gait problem. Negative for joint swelling and neck stiffness.   Skin:  Negative for color change and wound.   Neurological:  Positive for  weakness. Negative for dizziness, numbness and headaches.       Physical Exam:  Physical Exam  Vitals reviewed.   HENT:      Head: Normocephalic.      Ears:      Comments: Hearing aids  Eyes:      Pupils: Pupils are equal, round, and reactive to light.   Cardiovascular:      Rate and Rhythm: Normal rate. Rhythm irregular.      Heart sounds: Normal heart sounds.      Comments: Pacemaker   Pulmonary:      Effort: No tachypnea or accessory muscle usage.      Breath sounds: No decreased air movement. Wheezing (intermittent throughout visit) present. No decreased breath sounds or rales.   Abdominal:      General: Bowel sounds are normal.      Palpations: Abdomen is soft.   Musculoskeletal:         General: Normal range of motion.      Cervical back: Normal range of motion.      Right lower leg: 3+ Pitting Edema present.      Left lower le+ Pitting Edema (continuous weeping from blister- lateral aspect of L lower leg) present.   Skin:     General: Skin is warm and dry.      Comments: Open blisters to left lower extemity   Neurological:      Mental Status: She is alert. Mental status is at baseline.      Motor: Weakness present.      Gait: Gait abnormal (walker).      Comments: Oriented to person, place, and immediate situation.   Unsure of current year and president.    Psychiatric:         Behavior: Behavior normal.         Cognition and Memory: Memory is impaired.         INSTRUCTIONS FOR PATIENT:     Scheduled Follow-up, Appts Reviewed with Modifications if Needed: Yes  Future Appointments   Date Time Provider Department Center   2024  8:00 AM Socorro Vanegas NP [facility] C[facility] [place]   2024 12:00 PM [facility] [place]         Current Outpatient Medications:     acetaminophen (TYLENOL) 500 MG tablet, Take 2 tablets (1,000 mg total) by mouth every 6 (six) hours as needed for Pain., Disp: , Rfl:     alcohol swabs (DROPSAFE ALCOHOL PREP PADS) PadM, USE AS DIRECTED FOUR TIMES  DAILY, Disp: 100 each, Rfl: 3    benazepriL (LOTENSIN) 20 MG tablet, TAKE 1 TABLET (20 MG TOTAL) ONE TIME DAILY. (Patient taking differently: Take 20 mg by mouth once daily.), Disp: 90 tablet, Rfl: 3    blood sugar diagnostic (BLOOD GLUCOSE TEST) Strp, 1 strip by Misc.(Non-Drug; Combo Route) route 4 (four) times daily., Disp: 200 each, Rfl: 11    blood-glucose meter kit, To check BG once daily, to use with insurance preferred meter, Disp: 1 each, Rfl: 0    calcitRIOL (ROCALTROL) 0.25 MCG Cap, Take 1 capsule (0.25 mcg total) by mouth once daily., Disp: 90 capsule, Rfl: 1    donepeziL (ARICEPT) 5 MG tablet, Take 1 tablet (5 mg total) by mouth every evening., Disp: , Rfl:     ELIQUIS 2.5 mg Tab, TAKE 1 TABLET TWICE DAILY (Patient taking differently: Take 2.5 mg by mouth 2 (two) times daily.), Disp: 180 tablet, Rfl: 3    furosemide (LASIX) 20 MG tablet, Take 1 tablet (20 mg total) by mouth once daily., Disp: 30 tablet, Rfl: 0    furosemide (LASIX) 20 MG tablet, Take 1 tablet (20 mg total) by mouth daily as needed (SOB, edema, or weight gain of 3lb or greater in 24 hours.)., Disp: 30 tablet, Rfl: 2    glucagon (GVOKE HYPOPEN 2-PACK) 1 mg/0.2 mL AtIn, Inject 1 mg into the skin as needed (SEVERE HYPOGLYCEMIA)., Disp: 2 each, Rfl: 5    insulin degludec (TRESIBA FLEXTOUCH U-100) 100 unit/mL (3 mL) insulin pen, Inject 13 Units into the skin once daily., Disp: 11.7 mL, Rfl: 3    lancets (ACCU-CHEK SOFTCLIX LANCETS) Misc, TEST BLOOD SUGAR THREE TIMES DAILY BEFORE MEALS, Disp: 300 each, Rfl: 3    levothyroxine (SYNTHROID) 25 MCG tablet, Take 1 tablet (25 mcg total) by mouth before breakfast., Disp: 90 tablet, Rfl: 3    metoprolol tartrate (LOPRESSOR) 25 MG tablet, TAKE 1 TABLET BY MOUTH TWICE DAILY, Disp: 180 tablet, Rfl: 3    metoprolol tartrate (LOPRESSOR) 50 MG tablet, TAKE 1/2 TABLET TWICE DAILY (Patient taking differently: Take 25 mg by mouth 2 (two) times daily.), Disp: 90 tablet, Rfl: 3    mirabegron (MYRBETRIQ) 25 mg  "Tb24 ER tablet, Take 1 tablet (25 mg total) by mouth once daily., Disp: 90 tablet, Rfl: 3    mirtazapine (REMERON) 45 MG tablet, TAKE 1 TABLET EVERY EVENING, Disp: 90 tablet, Rfl: 1    NOVOLOG FLEXPEN U-100 INSULIN 100 unit/mL (3 mL) InPn pen, INJECT 4 UNITS UNDER THE SKIN BEFORE BREAKFAST AND 6 UNITS BEFORE LUNCH AND 6 UNITS BEFORE SUPPER., Disp: 15 mL, Rfl: 3    pen needle, diabetic (BD ULTRA-FINE MINI PEN NEEDLE) 31 gauge x 3/16" Ndle, Inject 1 each into the skin once daily. Use, Disp: 100 each, Rfl: 3    pravastatin (PRAVACHOL) 40 MG tablet, TAKE 1 TABLET EVERY DAY, Disp: 90 tablet, Rfl: 3    sodium bicarbonate 650 MG tablet, Take 1 tablet (650 mg total) by mouth once daily., Disp: 90 tablet, Rfl: 3    venlafaxine (EFFEXOR-XR) 150 MG Cp24, TAKE 1 CAPSULE EVERY DAY, Disp: 90 capsule, Rfl: 1    verapamiL (CALAN-SR) 120 MG CR tablet, TAKE 1 TABLET EVERY NIGHT, Disp: 90 tablet, Rfl: 3    Medication Reconciliation:  Were medications changed during this appointment? No  Were routine medications in the home? Managed per facility  Is the patient taking the medications as directed? Yes  Does the patient/caregiver understand the medications and changes if any? Medications managed per facility  Does updated med list accurately reflects meds patient is currently taking? Yes    Signature:      AGUSTINA GIMENEZ,LULU      Time: Total face-to-face time was 75 minutes, >50% of this was spent on counseling and coordination of care. The following issues were discussed: primary and secondary diagnoses, co-morbidities, prescribed medications, treatment modalities, importance of compliance with medical advice and directives for follow-up care.  "

## 2024-06-18 NOTE — TELEPHONE ENCOUNTER
Orders placed per NP request for Nurse to give patient Lasix 40mg daily for 7 days.  Also add nurse to home health visits. Nurse to monitor weeping of fluid to bilateral lower extremities.  Wound care as follows: Clean weeping blisters to bilateral lower legs with normal saline, pat dry, and apply absorbent dressings daily and PRN.  Monitor for s/s of infection.  Monitor and document daily weights and monitor patient's fluid status. Labs to be drawn at next nursing visit: Protein/creatinine ratio (Urine), Microalbumin/Creatinine ratio (urine), Uric Acid, Urinalysis (Reflex to urine culture), CBC, BNP, BMP, Folate (Folic Acid), Vitamin B12, and PTH (intact).  Please report any significant changes to patient's status. Report lab results to Socorro Vanegas NP @ fax #: [phone]. Socorro Vanegas NP/ Mao Llanos LPN .  Orders faxed to [facility].  Fax confirmation received.  [facility] [place]  Their medical care coordinator was unable to take my phone call but will call me back tomorrow to get medical changes for patient.

## 2024-06-19 ENCOUNTER — TELEPHONE (OUTPATIENT)
Dept: HOME HEALTH SERVICES | Facility: CLINIC | Age: 89
End: 2024-06-19
Payer: MEDICARE

## 2024-06-19 NOTE — TELEPHONE ENCOUNTER
Faxed orders to [facility] @ [phone] for Med. Pass changes per NP request. Fax confirmation received.  Nurse stated that she has been giving patient 40mg of Lasix since Monday of this week for Edema.  She has been giving the patient the PRN dose.  Instructed patient per NP that she wants it given an additional 7 days.  Verbalized understanding.

## 2024-06-20 NOTE — ASSESSMENT & PLAN NOTE
-Unstable  -increase lasix to 40mg per day x 7 days  -Add RN HH visits for additional monitoring  -Labs per HH RN  See HPI

## 2024-06-20 NOTE — ASSESSMENT & PLAN NOTE
-Labs due, to be drawn per  RN  Continue current management.   Recommend avoid nsaids and other nephrotoxic medications.   Follow up with PCP and/or nephrologist.          Latest Reference Range & Units 05/10/24 04:05 05/11/24 03:58 05/12/24 03:49 05/13/24 04:05   Sodium 136 - 144 mmol/L 140 (E) 143 (E) 142 (E) 145 (H) (E)   Potassium 3.6 - 5.1 mmol/L 4.1 (E) 3.8 (E) 4.2 (E) 4.0 (E)   Chloride 101 - 111 mmol/L 109 (E) 107 (E) 107 (E) 109 (E)   CO2 22 - 32 mmol/L 22 (E) 22 (E) 24 (E) 25 (E)   Anion Gap 7 - 16 mmol/L 9 (E) 14 (E) 11 (E) 11 (E)   BUN 8 - 20 mg/dL 49 (H) (E) 47 (H) (E) 43 (H) (E) 39 (H) (E)   Creatinine 0.60 - 1.10 mg/dL 2.22 (H) (E) 2.43 (H) (E) 2.31 (H) (E) 2.04 (H) (E)   Glucose 65 - 99 mg/dL 147 (H) (E) 173 (H) (E) 200 (H) (E) 111 (H) (E)   Calcium 8.9 - 10.3 mg/dL 9.4 (E) 9.4 (E) 9.7 (E) 9.4 (E)   Magnesium  1.8 - 2.5 mg/dL 2.3 (E) 2.3 (E) 2.3 (E) 2.3 (E)   ALP 28 - 116 U/L 108 (E) 105 (E) 115 (E) 102 (E)   Total Protein 6.1 - 7.9 g/dL 7.0 (E) 7.0 (E) 7.0 (E) 7.0 (E)   Albumin 3.5 - 4.8 g/dL 3.6 (E) 3.6 (E) 3.7 (E) 3.4 (L) (E)   BILIRUBIN TOTAL 0.4 - 2.0 mg/dL 0.3 (L) (E) 0.4 (E) 0.4 (E) 0.5 (E)   AST 10 - 34 U/L 30 (E) 26 (E) 32 (E) 29 (E)   ALT 5 - 41 U/L 17 (E) 16 (E) 22 (E) 17 (E)   (H): Data is abnormally high  (L): Data is abnormally low  (E): External lab result

## 2024-06-20 NOTE — ASSESSMENT & PLAN NOTE
-Recent episode of confusion, worse than baseline  -Sundowning vs UTI  -UA reflex culture to r/o UTI  -To be collected by HH RN    -Maintain a safe environment  -Continue to closely monitor

## 2024-06-21 ENCOUNTER — TELEPHONE (OUTPATIENT)
Dept: HOME HEALTH SERVICES | Facility: CLINIC | Age: 89
End: 2024-06-21
Payer: MEDICARE

## 2024-06-21 DIAGNOSIS — Z79.4 TYPE 2 DIABETES MELLITUS WITH DIABETIC NEUROPATHY, WITH LONG-TERM CURRENT USE OF INSULIN: ICD-10-CM

## 2024-06-21 DIAGNOSIS — E11.40 TYPE 2 DIABETES MELLITUS WITH DIABETIC NEUROPATHY, WITH LONG-TERM CURRENT USE OF INSULIN: ICD-10-CM

## 2024-06-21 DIAGNOSIS — I50.32 CHRONIC DIASTOLIC HEART FAILURE: Primary | ICD-10-CM

## 2024-06-21 NOTE — TELEPHONE ENCOUNTER
Received incoming phone call from Yolande Fitzgerald (Nurse with [facility]).  Reason for the call was request 2 layer compression wraps be added to patient's wound care orders to assist with edema.  Also requesting us to increase visits to 3x weekly.  Wanted to notify NP that they were unsuccessful with getting patient's labs drawn today and the urine wasn't collected because patient is incontinent of urine.  Notified CRUZ Harding NP for the following... Nurse to clean BLE's with N.S. and apply absorbant pads.  Apply 2 layer compression wraps over absorbant pads to assist with decreasing edema.  SN to change dressings 3x week.  Draw labs on next nursing visit. Labs to be drawn: Protein/creatinine ratio (Urine), Microalbumin/Creatinine ratio (urine), Uric Acid, Urinalysis (Reflex to urine culture), CBC, BNP, BMP, Folate (Folic Acid), Vitamin B12, and PTH (intact).  For labs needing urine, in and out cath on next nursing visit due to patient being incontinent.     Notified Yolande and Verbalized understanding.   Orders placed per NP request for verbal order above.  Orders faxed to [facility].  Fax confirmation received.

## 2024-06-28 ENCOUNTER — LAB VISIT (OUTPATIENT)
Dept: LAB | Facility: HOSPITAL | Age: 89
End: 2024-06-28
Attending: PODIATRIST
Payer: MEDICARE

## 2024-06-28 DIAGNOSIS — N18.4 CHRONIC KIDNEY DISEASE, STAGE IV (SEVERE): ICD-10-CM

## 2024-06-28 DIAGNOSIS — F02.80 FOCAL ONSET ALZHEIMER'S DISEASE: ICD-10-CM

## 2024-06-28 DIAGNOSIS — N30.00 ACUTE CYSTITIS WITHOUT HEMATURIA: ICD-10-CM

## 2024-06-28 DIAGNOSIS — I50.32 CHRONIC DIASTOLIC HEART FAILURE: Primary | ICD-10-CM

## 2024-06-28 DIAGNOSIS — E11.40 DIABETIC NEUROPATHY: ICD-10-CM

## 2024-06-28 DIAGNOSIS — I50.32 CHRONIC DIASTOLIC HEART FAILURE: ICD-10-CM

## 2024-06-28 DIAGNOSIS — G30.8 FOCAL ONSET ALZHEIMER'S DISEASE: ICD-10-CM

## 2024-06-28 DIAGNOSIS — Z79.4 ENCOUNTER FOR LONG-TERM (CURRENT) USE OF INSULIN: ICD-10-CM

## 2024-06-28 DIAGNOSIS — N30.00 ACUTE CYSTITIS WITHOUT HEMATURIA: Primary | ICD-10-CM

## 2024-06-28 LAB
ALBUMIN/CREAT UR: 536.5 UG/MG (ref 0–30)
ANION GAP SERPL CALC-SCNC: 14 MMOL/L (ref 8–16)
ANION GAP SERPL CALC-SCNC: 14 MMOL/L (ref 8–16)
BACTERIA #/AREA URNS HPF: ABNORMAL /HPF
BASOPHILS # BLD AUTO: 0.11 K/UL (ref 0–0.2)
BASOPHILS # BLD AUTO: 0.11 K/UL (ref 0–0.2)
BASOPHILS NFR BLD: 1.3 % (ref 0–1.9)
BASOPHILS NFR BLD: 1.3 % (ref 0–1.9)
BILIRUB UR QL STRIP: NEGATIVE
BNP SERPL-MCNC: 1937 PG/ML (ref 0–99)
BNP SERPL-MCNC: 1937 PG/ML (ref 0–99)
BUN SERPL-MCNC: 39 MG/DL (ref 10–30)
BUN SERPL-MCNC: 39 MG/DL (ref 10–30)
CALCIUM SERPL-MCNC: 9.4 MG/DL (ref 8.7–10.5)
CALCIUM SERPL-MCNC: 9.4 MG/DL (ref 8.7–10.5)
CHLORIDE SERPL-SCNC: 105 MMOL/L (ref 95–110)
CHLORIDE SERPL-SCNC: 105 MMOL/L (ref 95–110)
CLARITY UR: ABNORMAL
CO2 SERPL-SCNC: 24 MMOL/L (ref 23–29)
CO2 SERPL-SCNC: 24 MMOL/L (ref 23–29)
COLOR UR: YELLOW
CREAT SERPL-MCNC: 2 MG/DL (ref 0.5–1.4)
CREAT SERPL-MCNC: 2 MG/DL (ref 0.5–1.4)
CREAT UR-MCNC: 126 MG/DL (ref 15–325)
CREAT UR-MCNC: 126 MG/DL (ref 15–325)
DIFFERENTIAL METHOD BLD: ABNORMAL
DIFFERENTIAL METHOD BLD: ABNORMAL
EOSINOPHIL # BLD AUTO: 0.2 K/UL (ref 0–0.5)
EOSINOPHIL # BLD AUTO: 0.2 K/UL (ref 0–0.5)
EOSINOPHIL NFR BLD: 2.3 % (ref 0–8)
EOSINOPHIL NFR BLD: 2.3 % (ref 0–8)
ERYTHROCYTE [DISTWIDTH] IN BLOOD BY AUTOMATED COUNT: 19.2 % (ref 11.5–14.5)
ERYTHROCYTE [DISTWIDTH] IN BLOOD BY AUTOMATED COUNT: 19.2 % (ref 11.5–14.5)
EST. GFR  (NO RACE VARIABLE): 23 ML/MIN/1.73 M^2
EST. GFR  (NO RACE VARIABLE): 23 ML/MIN/1.73 M^2
FOLATE SERPL-MCNC: 11 NG/ML (ref 4–24)
GLUCOSE SERPL-MCNC: 118 MG/DL (ref 70–110)
GLUCOSE SERPL-MCNC: 118 MG/DL (ref 70–110)
GLUCOSE UR QL STRIP: NEGATIVE
HCT VFR BLD AUTO: 37 % (ref 37–48.5)
HCT VFR BLD AUTO: 37 % (ref 37–48.5)
HGB BLD-MCNC: 11.3 G/DL (ref 12–16)
HGB BLD-MCNC: 11.3 G/DL (ref 12–16)
HGB UR QL STRIP: ABNORMAL
HYALINE CASTS #/AREA URNS LPF: 3 /LPF
IMM GRANULOCYTES # BLD AUTO: 0.03 K/UL (ref 0–0.04)
IMM GRANULOCYTES # BLD AUTO: 0.03 K/UL (ref 0–0.04)
IMM GRANULOCYTES NFR BLD AUTO: 0.4 % (ref 0–0.5)
IMM GRANULOCYTES NFR BLD AUTO: 0.4 % (ref 0–0.5)
KETONES UR QL STRIP: NEGATIVE
LEUKOCYTE ESTERASE UR QL STRIP: ABNORMAL
LYMPHOCYTES # BLD AUTO: 1.5 K/UL (ref 1–4.8)
LYMPHOCYTES # BLD AUTO: 1.5 K/UL (ref 1–4.8)
LYMPHOCYTES NFR BLD: 18.1 % (ref 18–48)
LYMPHOCYTES NFR BLD: 18.1 % (ref 18–48)
MCH RBC QN AUTO: 24.3 PG (ref 27–31)
MCH RBC QN AUTO: 24.3 PG (ref 27–31)
MCHC RBC AUTO-ENTMCNC: 30.5 G/DL (ref 32–36)
MCHC RBC AUTO-ENTMCNC: 30.5 G/DL (ref 32–36)
MCV RBC AUTO: 80 FL (ref 82–98)
MCV RBC AUTO: 80 FL (ref 82–98)
MICROALBUMIN UR DL<=1MG/L-MCNC: 676 UG/ML
MICROSCOPIC COMMENT: ABNORMAL
MONOCYTES # BLD AUTO: 1.2 K/UL (ref 0.3–1)
MONOCYTES # BLD AUTO: 1.2 K/UL (ref 0.3–1)
MONOCYTES NFR BLD: 14.2 % (ref 4–15)
MONOCYTES NFR BLD: 14.2 % (ref 4–15)
NEUTROPHILS # BLD AUTO: 5.2 K/UL (ref 1.8–7.7)
NEUTROPHILS # BLD AUTO: 5.2 K/UL (ref 1.8–7.7)
NEUTROPHILS NFR BLD: 63.7 % (ref 38–73)
NEUTROPHILS NFR BLD: 63.7 % (ref 38–73)
NITRITE UR QL STRIP: NEGATIVE
NRBC BLD-RTO: 0 /100 WBC
NRBC BLD-RTO: 0 /100 WBC
PH UR STRIP: 6 [PH] (ref 5–8)
PLATELET # BLD AUTO: 299 K/UL (ref 150–450)
PLATELET # BLD AUTO: 299 K/UL (ref 150–450)
PMV BLD AUTO: 10.2 FL (ref 9.2–12.9)
PMV BLD AUTO: 10.2 FL (ref 9.2–12.9)
POTASSIUM SERPL-SCNC: 3.4 MMOL/L (ref 3.5–5.1)
POTASSIUM SERPL-SCNC: 3.4 MMOL/L (ref 3.5–5.1)
PROT UR QL STRIP: ABNORMAL
PROT UR-MCNC: 135 MG/DL (ref 0–15)
PROT/CREAT UR: 1.07 MG/G{CREAT} (ref 0–0.2)
PTH-INTACT SERPL-MCNC: 199.8 PG/ML (ref 9–77)
RBC # BLD AUTO: 4.65 M/UL (ref 4–5.4)
RBC # BLD AUTO: 4.65 M/UL (ref 4–5.4)
RBC #/AREA URNS HPF: 10 /HPF (ref 0–4)
SODIUM SERPL-SCNC: 143 MMOL/L (ref 136–145)
SODIUM SERPL-SCNC: 143 MMOL/L (ref 136–145)
SP GR UR STRIP: 1.02 (ref 1–1.03)
URATE SERPL-MCNC: 11.5 MG/DL (ref 2.4–5.7)
URN SPEC COLLECT METH UR: ABNORMAL
VIT B12 SERPL-MCNC: 408 PG/ML (ref 210–950)
WBC # BLD AUTO: 8.22 K/UL (ref 3.9–12.7)
WBC # BLD AUTO: 8.22 K/UL (ref 3.9–12.7)
WBC #/AREA URNS HPF: >100 /HPF (ref 0–5)
WBC CLUMPS URNS QL MICRO: ABNORMAL

## 2024-06-28 PROCEDURE — 85025 COMPLETE CBC W/AUTO DIFF WBC: CPT | Mod: HCNC

## 2024-06-28 PROCEDURE — 83970 ASSAY OF PARATHORMONE: CPT | Mod: HCNC

## 2024-06-28 PROCEDURE — 82607 VITAMIN B-12: CPT | Mod: HCNC

## 2024-06-28 PROCEDURE — 87077 CULTURE AEROBIC IDENTIFY: CPT | Mod: HCNC

## 2024-06-28 PROCEDURE — 83880 ASSAY OF NATRIURETIC PEPTIDE: CPT | Mod: HCNC

## 2024-06-28 PROCEDURE — 87186 SC STD MICRODIL/AGAR DIL: CPT | Mod: HCNC

## 2024-06-28 PROCEDURE — 82043 UR ALBUMIN QUANTITATIVE: CPT | Mod: HCNC

## 2024-06-28 PROCEDURE — 81000 URINALYSIS NONAUTO W/SCOPE: CPT | Mod: HCNC,PO

## 2024-06-28 PROCEDURE — 82570 ASSAY OF URINE CREATININE: CPT | Mod: HCNC

## 2024-06-28 PROCEDURE — 87086 URINE CULTURE/COLONY COUNT: CPT | Mod: HCNC

## 2024-06-28 PROCEDURE — 82746 ASSAY OF FOLIC ACID SERUM: CPT | Mod: HCNC

## 2024-06-28 PROCEDURE — 87088 URINE BACTERIA CULTURE: CPT | Mod: HCNC

## 2024-06-28 PROCEDURE — 80048 BASIC METABOLIC PNL TOTAL CA: CPT | Mod: HCNC

## 2024-06-28 PROCEDURE — 84550 ASSAY OF BLOOD/URIC ACID: CPT | Mod: HCNC

## 2024-06-28 PROCEDURE — 84156 ASSAY OF PROTEIN URINE: CPT | Mod: HCNC

## 2024-07-01 LAB — BACTERIA UR CULT: ABNORMAL

## 2024-07-02 ENCOUNTER — PATIENT MESSAGE (OUTPATIENT)
Dept: HOME HEALTH SERVICES | Facility: CLINIC | Age: 89
End: 2024-07-02
Payer: MEDICARE

## 2024-07-02 RX ORDER — FLUCONAZOLE 150 MG/1
150 TABLET ORAL DAILY
Qty: 1 TABLET | Refills: 0 | Status: SHIPPED | OUTPATIENT
Start: 2024-07-02 | End: 2024-07-03

## 2024-07-02 RX ORDER — AMOXICILLIN AND CLAVULANATE POTASSIUM 500; 125 MG/1; MG/1
1 TABLET, FILM COATED ORAL 2 TIMES DAILY
Qty: 14 TABLET | Refills: 0 | Status: SHIPPED | OUTPATIENT
Start: 2024-07-02 | End: 2024-07-09

## 2024-07-03 ENCOUNTER — TELEPHONE (OUTPATIENT)
Dept: HOME HEALTH SERVICES | Facility: CLINIC | Age: 89
End: 2024-07-03
Payer: MEDICARE

## 2024-07-03 ENCOUNTER — PATIENT MESSAGE (OUTPATIENT)
Dept: CARDIOLOGY | Facility: CLINIC | Age: 89
End: 2024-07-03
Payer: MEDICARE

## 2024-07-03 ENCOUNTER — PATIENT MESSAGE (OUTPATIENT)
Dept: NEPHROLOGY | Facility: CLINIC | Age: 89
End: 2024-07-03
Payer: MEDICARE

## 2024-07-03 DIAGNOSIS — N39.0 URINARY TRACT INFECTION WITHOUT HEMATURIA, SITE UNSPECIFIED: Primary | ICD-10-CM

## 2024-07-03 NOTE — PROGRESS NOTES
Labs reviewed. Positive for UTI, sensitive to Augmentin; will renally dose. Avoid Bactrim due to CKD. Will send to [facility] pharmacy along with diflucan (yeast infection reported allergy.) Recommend to daughter that she follow up with cardiologist and nephrologist on ongoing plan of care for CHF and CKD4. PCP for 6 month follow up.      1. Chronic diastolic heart failure  Schedule follow up with Dr. Brewer    2. Chronic kidney disease, stage IV (severe)  Schedule follow up with Dr. Kang    3. Acute cystitis without hematuria  -     fluconazole (DIFLUCAN) 150 MG Tab; Take 1 tablet (150 mg total) by mouth once daily. for 1 day  Dispense: 1 tablet; Refill: 0  -     amoxicillin-clavulanate 500-125mg (AUGMENTIN) 500-125 mg Tab; Take 1 tablet (500 mg total) by mouth 2 (two) times daily. for 7 days  Dispense: 14 tablet; Refill: 0      Will follow up as scheduled in 1 week.

## 2024-07-03 NOTE — TELEPHONE ENCOUNTER
Spoke with daughter Tova in regard to recent lab results. She will  antibiotics and diflucan from [facility] and bring to [facility]. Mao DELVALLE faxed orders to [facility], notified [facility] staff.     We had an extensive conversation today about worsening CHF and CKD. Ms. Brito has been fluid overloaded at all recent visits, needing additional lasix. She needs additional diuretic support as 20mg lasix per day is insufficient at this point. We discussed the renal implications in this, including eventual dialysis. She is followed by Dr. Kang who notes that Ms. Brito has refused the option of dialysis in the past. Tova and her brother have POA, Tova is under the impression her brother is going to want dialysis for Ms. Brito.     I encouraged her to speak with Dr. Brewer about options to increase diuretics in light of advanced CKD. Speak with brother and Ms. Brito about current wishes.    If Ms. Brito does not wish for dialysis, will consult Palliative care for symptom management with bridge to hospice when eligible. If dialysis will be an option, will need to schedule with Dr. Kang for next steps.      Continue current medications for now, continue with Home Health nurse for CHF symptom management and compression wraps to bilateral legs.     I plan to follow up with Ms. Brito in 6 days at her facility as scheduled.

## 2024-07-03 NOTE — TELEPHONE ENCOUNTER
Faxed new medication orders to [facility] and also notified [facility] of medication change per NP request.  Fax confirmation received.

## 2024-07-09 ENCOUNTER — TELEPHONE (OUTPATIENT)
Dept: DIABETES | Facility: CLINIC | Age: 89
End: 2024-07-09
Payer: MEDICARE

## 2024-07-09 NOTE — TELEPHONE ENCOUNTER
----- Message from Phuong Cerda sent at 7/9/2024  2:05 PM CDT -----  Contact: Tova/daughter  Type:  Patient Returning Call    Who Called:Tova/daughter  Who Left Message for Patient:Asia  Does the patient know what this is regarding?: virtual appt on 7/11  Would the patient rather a call back or a response via [facility]?  Call back  Best Call Back Number:[phone]  Additional Information:     Thanks  Am

## 2024-07-09 NOTE — TELEPHONE ENCOUNTER
Spoke with patients daughter and she states patient is currently in the hospital. Informed her that if the patient is still in the hospital on the day of the appointment then the appointment will need to be rescheduled She voiced understanding

## 2024-07-12 ENCOUNTER — DOCUMENT SCAN (OUTPATIENT)
Dept: HOME HEALTH SERVICES | Facility: HOSPITAL | Age: 89
End: 2024-07-12
Payer: MEDICARE

## 2024-07-16 ENCOUNTER — PATIENT OUTREACH (OUTPATIENT)
Dept: ADMINISTRATIVE | Facility: CLINIC | Age: 89
End: 2024-07-16
Payer: MEDICARE

## 2024-07-25 ENCOUNTER — TELEPHONE (OUTPATIENT)
Dept: HOME HEALTH SERVICES | Facility: CLINIC | Age: 89
End: 2024-07-25
Payer: MEDICARE

## 2024-07-25 DIAGNOSIS — I50.33 ACUTE ON CHRONIC DIASTOLIC HEART FAILURE: Primary | ICD-10-CM

## 2024-07-25 NOTE — TELEPHONE ENCOUNTER
Nurse at [facility] contacted regarding low sat, edema and wheezing.  Pt with recent admit for CHF, diuresed 5L. Staff is currently giving Lasix 20mg daily and the extra 20mg prn dose for 40mg total  She is having CHF symptoms   Will have home health collect BNP, BMP tomorrow at visit.  Spoke with pt's daughter, pt has CKD 5, will not have dialysis and she prefers to increase Lasix to avoid SOB and breathing issues and to avoid hospital visit.  Will increase Lasix 80mg x1 dose then give 40mg daily thru weekend.  Will reeval when labs return  In coverage for AFIA Vanegas NP

## 2024-07-25 NOTE — TELEPHONE ENCOUNTER
Spoke with daughter who states that patient was recently in the hospital.  States she was told by [facility] (assisted living facility) that her O2 sats were 88% on room air.  When she got to the hospital, her O2 was normal.  States that it has been a discussion with NP, Socorro Guilherme, that the lasix be increased from 20mg to at least 40mg.  States she received a call from [facility] stating that her o2 sats are low and she is wheezing but daughter asked them to check again because of the last time.  She is still waiting for a phone call back from [facility].  This nurse spoke with [facility].  They are going out to see patient tomorrow.  Instructed Lex to make sure nurse does good vitals, checks patient's lung sounds, gets a weight, checks for edema, and O2 sats.  Given number to report findings.  Socorro is currently out of office.  Covering NP, Teagan Ramirez, notified and she is calling daughter back to discuss options.

## 2024-07-25 NOTE — TELEPHONE ENCOUNTER
Orders placed per NP request for increase in lasix to 80mg on 7/26 and 50mg on 7/27 and 7/28.  Also for home health to collect labs (BNP and BMP) at visit tomorrow.  Orders faxed to [facility] and [facility].  On call nurse with [facility] notified of labs.  Fax confirmation received.

## 2024-07-26 ENCOUNTER — TELEPHONE (OUTPATIENT)
Dept: HOME HEALTH SERVICES | Facility: CLINIC | Age: 89
End: 2024-07-26
Payer: MEDICARE

## 2024-07-26 NOTE — TELEPHONE ENCOUNTER
Spoke with home health nurse, Jinny.  She states that patient is not in an respiratory distress but O2 sats are at 77%.  States that patient has a little wheezing but denies any SOB or difficulty breathing.  Also lung sounds are normal at this time.  Patient did receive 80mg of lasix today.  It was reported that she didn't put out a lot of urine yesterday so instructed facility to monitor patient's output today.  NP notified of patient status.  Labs being drawn for today.  NP states patient has a history of abnormal o2 readings from the hand.  States as long as the patient is not in any respiratory distress, to continue to monitor for changes.  Nurse notified.

## 2024-07-31 ENCOUNTER — LAB VISIT (OUTPATIENT)
Dept: LAB | Facility: HOSPITAL | Age: 89
End: 2024-07-31
Attending: NURSE PRACTITIONER
Payer: MEDICARE

## 2024-07-31 DIAGNOSIS — I13.0 HYPERTENSIVE HEART AND RENAL DISEASE WITH CONGESTIVE HEART FAILURE: ICD-10-CM

## 2024-07-31 DIAGNOSIS — I50.33 ACUTE ON CHRONIC DIASTOLIC HEART FAILURE: ICD-10-CM

## 2024-07-31 LAB
ANION GAP SERPL CALC-SCNC: 13 MMOL/L (ref 8–16)
BNP SERPL-MCNC: 2159 PG/ML (ref 0–99)
BUN SERPL-MCNC: 38 MG/DL (ref 10–30)
CALCIUM SERPL-MCNC: 9.1 MG/DL (ref 8.7–10.5)
CHLORIDE SERPL-SCNC: 108 MMOL/L (ref 95–110)
CO2 SERPL-SCNC: 25 MMOL/L (ref 23–29)
CREAT SERPL-MCNC: 2 MG/DL (ref 0.5–1.4)
EST. GFR  (NO RACE VARIABLE): 23 ML/MIN/1.73 M^2
GLUCOSE SERPL-MCNC: 115 MG/DL (ref 70–110)
POTASSIUM SERPL-SCNC: 3.3 MMOL/L (ref 3.5–5.1)
SODIUM SERPL-SCNC: 146 MMOL/L (ref 136–145)

## 2024-07-31 PROCEDURE — 80048 BASIC METABOLIC PNL TOTAL CA: CPT | Mod: HCNC | Performed by: NURSE PRACTITIONER

## 2024-07-31 PROCEDURE — 83880 ASSAY OF NATRIURETIC PEPTIDE: CPT | Mod: HCNC | Performed by: NURSE PRACTITIONER

## 2024-08-01 ENCOUNTER — TELEPHONE (OUTPATIENT)
Dept: DIABETES | Facility: CLINIC | Age: 89
End: 2024-08-01
Payer: MEDICARE

## 2024-08-01 NOTE — LETTER
Fax Transmission                                                                                                                                                       Date: August 2, 2024       To:  [place], Amplience From: Akosua Hall NP   Fax:  [phone] Fax: [phone]   Phone:  [phone] Phone: [phone]     Special Instructions:     2ND Returned Fax  For: Kassi Costa                            IF THERE ARE ANY PROBLEMS WITH THIS TRANSMISSION, PLEASE CALL IMMEDIATELY. THANK YOU    CONFIDENTIALITY NOTICE: The accompanying facsimile is intended solely for the use of the recipient designated above. Document(s) transmitted herewith may contain information that is confidential and privileged. Delivery, distribution of dissemination of this communication other than to the intended recipient is strictly prohibited. If you are another healthcare provider and have received this facsimile in error, please properly dispose and notify the sender. If you are NOT a healthcare provider and have received this facsimile in error, please notify [facility]s Compliance & Privacy Department immediately by email at [email or web address]

## 2024-08-01 NOTE — TELEPHONE ENCOUNTER
Uploaded sign home health form to [facility]. Faxed back to [facility] [phone]

## 2024-08-02 ENCOUNTER — CLINICAL SUPPORT (OUTPATIENT)
Dept: CARDIOLOGY | Facility: HOSPITAL | Age: 89
End: 2024-08-02
Payer: MEDICARE

## 2024-08-02 ENCOUNTER — TELEPHONE (OUTPATIENT)
Dept: HOME HEALTH SERVICES | Facility: CLINIC | Age: 89
End: 2024-08-02
Payer: MEDICARE

## 2024-08-02 ENCOUNTER — HOSPITAL ENCOUNTER (OUTPATIENT)
Dept: CARDIOLOGY | Facility: HOSPITAL | Age: 89
Discharge: HOME | End: 2024-08-02
Attending: INTERNAL MEDICINE

## 2024-08-02 DIAGNOSIS — I44.2 ATRIOVENTRICULAR BLOCK, COMPLETE: ICD-10-CM

## 2024-08-02 DIAGNOSIS — I50.32 CHRONIC DIASTOLIC HEART FAILURE: Primary | ICD-10-CM

## 2024-08-02 PROCEDURE — 93296 REM INTERROG EVL PM/IDS: CPT | Mod: PO | Performed by: INTERNAL MEDICINE

## 2024-08-02 PROCEDURE — 93294 REM INTERROG EVL PM/LDLS PM: CPT | Mod: ,,, | Performed by: INTERNAL MEDICINE

## 2024-08-02 NOTE — TELEPHONE ENCOUNTER
Patient's daughter called concerned about the Lasix and wanted to talk to the NP about increasing to 40mg.  States that last week assisted living reported that patient only urinated 1-2 times the whole day but she had several pairs of clothes that she urinated in.  After speaking with the NP, we are going to increase the patient's Lasix to 40mg daily with another 40mg PRN dose needed for fluid overload.  Repeat BMP and BNP on 8/9/24.  Orders placed per NP request for BMP and BNP and changes to Lasix.  Orders faxed to [facility] and [facility].  Fax confirmation received.

## 2024-08-06 ENCOUNTER — TELEPHONE (OUTPATIENT)
Dept: DIABETES | Facility: CLINIC | Age: 89
End: 2024-08-06
Payer: MEDICARE

## 2024-08-09 ENCOUNTER — LAB VISIT (OUTPATIENT)
Dept: LAB | Facility: HOSPITAL | Age: 89
End: 2024-08-09
Attending: FAMILY MEDICINE
Payer: MEDICARE

## 2024-08-09 DIAGNOSIS — I50.33 ACUTE ON CHRONIC DIASTOLIC HEART FAILURE: ICD-10-CM

## 2024-08-09 LAB
ANION GAP SERPL CALC-SCNC: 16 MMOL/L (ref 8–16)
BNP SERPL-MCNC: 2212 PG/ML (ref 0–99)
BUN SERPL-MCNC: 40 MG/DL (ref 10–30)
CALCIUM SERPL-MCNC: 8.6 MG/DL (ref 8.7–10.5)
CHLORIDE SERPL-SCNC: 106 MMOL/L (ref 95–110)
CO2 SERPL-SCNC: 26 MMOL/L (ref 23–29)
CREAT SERPL-MCNC: 1.8 MG/DL (ref 0.5–1.4)
EST. GFR  (NO RACE VARIABLE): 26.1 ML/MIN/1.73 M^2
GLUCOSE SERPL-MCNC: 124 MG/DL (ref 70–110)
POTASSIUM SERPL-SCNC: 3.4 MMOL/L (ref 3.5–5.1)
SODIUM SERPL-SCNC: 148 MMOL/L (ref 136–145)

## 2024-08-09 PROCEDURE — 83880 ASSAY OF NATRIURETIC PEPTIDE: CPT | Mod: HCNC | Performed by: FAMILY MEDICINE

## 2024-08-09 PROCEDURE — 80048 BASIC METABOLIC PNL TOTAL CA: CPT | Mod: HCNC | Performed by: FAMILY MEDICINE

## 2024-08-12 ENCOUNTER — PATIENT MESSAGE (OUTPATIENT)
Dept: FAMILY MEDICINE | Facility: CLINIC | Age: 89
End: 2024-08-12
Payer: MEDICARE

## 2024-08-14 ENCOUNTER — PATIENT MESSAGE (OUTPATIENT)
Dept: FAMILY MEDICINE | Facility: CLINIC | Age: 89
End: 2024-08-14
Payer: MEDICARE

## 2024-08-14 ENCOUNTER — EXTERNAL HOME HEALTH (OUTPATIENT)
Dept: HOME HEALTH SERVICES | Facility: HOSPITAL | Age: 89
End: 2024-08-14
Payer: MEDICARE

## 2024-08-15 ENCOUNTER — TELEPHONE (OUTPATIENT)
Dept: FAMILY MEDICINE | Facility: CLINIC | Age: 89
End: 2024-08-15
Payer: MEDICARE

## 2024-08-15 DIAGNOSIS — N18.4 CHRONIC KIDNEY DISEASE, STAGE IV (SEVERE): Primary | ICD-10-CM

## 2024-08-15 NOTE — TELEPHONE ENCOUNTER
----- Message from Mor Cook MD sent at 8/12/2024 12:03 PM CDT -----  It looks like this was ordered through home health as I have not seen her since January.  Consistent with known chronic kidney disease stage 4 and diastolic heart failure as seen previously.  Looks like they just increased her Lasix recently.  Potassium is mildly decreased.  Recommend start potassium chloride 10 mEq daily.  Recheck BMP in 4 weeks.  My nurse will contact you to arrange. Recommend continue follow-up with her cardiologist and nephrologist.    Thanks,  Dr. Cook

## 2024-08-16 ENCOUNTER — DOCUMENT SCAN (OUTPATIENT)
Dept: HOME HEALTH SERVICES | Facility: HOSPITAL | Age: 89
End: 2024-08-16
Payer: MEDICARE

## 2024-08-20 ENCOUNTER — CARE AT HOME (OUTPATIENT)
Dept: HOME HEALTH SERVICES | Facility: CLINIC | Age: 89
End: 2024-08-20
Payer: MEDICARE

## 2024-08-20 VITALS
OXYGEN SATURATION: 98 % | TEMPERATURE: 98 F | SYSTOLIC BLOOD PRESSURE: 118 MMHG | RESPIRATION RATE: 22 BRPM | HEART RATE: 88 BPM | DIASTOLIC BLOOD PRESSURE: 78 MMHG

## 2024-08-20 DIAGNOSIS — G30.1 MODERATE LATE ONSET ALZHEIMER'S DEMENTIA WITH AGITATION: ICD-10-CM

## 2024-08-20 DIAGNOSIS — F03.B4 MODERATE DEMENTIA WITH ANXIETY, UNSPECIFIED DEMENTIA TYPE: Chronic | ICD-10-CM

## 2024-08-20 DIAGNOSIS — F03.B4 MODERATE DEMENTIA WITH ANXIETY, UNSPECIFIED DEMENTIA TYPE: Primary | Chronic | ICD-10-CM

## 2024-08-20 DIAGNOSIS — I50.31 ACUTE DIASTOLIC CONGESTIVE HEART FAILURE: ICD-10-CM

## 2024-08-20 DIAGNOSIS — E87.6 HYPOKALEMIA: ICD-10-CM

## 2024-08-20 DIAGNOSIS — F02.B11 MODERATE LATE ONSET ALZHEIMER'S DEMENTIA WITH AGITATION: ICD-10-CM

## 2024-08-20 DIAGNOSIS — R06.2 DIFFUSE WHEEZING: ICD-10-CM

## 2024-08-20 PROCEDURE — 1157F ADVNC CARE PLAN IN RCRD: CPT | Mod: CPTII,S$GLB,,

## 2024-08-20 PROCEDURE — 99350 HOME/RES VST EST HIGH MDM 60: CPT | Mod: S$GLB,,,

## 2024-08-20 RX ORDER — FLUTICASONE FUROATE, UMECLIDINIUM BROMIDE AND VILANTEROL TRIFENATATE 100; 62.5; 25 UG/1; UG/1; UG/1
1 POWDER RESPIRATORY (INHALATION) DAILY
Qty: 90 EACH | Refills: 3 | Status: SHIPPED | OUTPATIENT
Start: 2024-09-20 | End: 2025-09-20

## 2024-08-20 RX ORDER — QUETIAPINE FUMARATE 25 MG/1
25 TABLET, FILM COATED ORAL NIGHTLY
Qty: 30 TABLET | Refills: 0 | Status: SHIPPED | OUTPATIENT
Start: 2024-08-20

## 2024-08-20 RX ORDER — POTASSIUM CHLORIDE 750 MG/1
30 TABLET, EXTENDED RELEASE ORAL ONCE
Qty: 3 TABLET | Refills: 0 | Status: SHIPPED | OUTPATIENT
Start: 2024-08-20 | End: 2024-08-20

## 2024-08-20 RX ORDER — FUROSEMIDE 20 MG/1
40 TABLET ORAL DAILY
Start: 2024-08-20 | End: 2025-08-20

## 2024-08-20 RX ORDER — FLUTICASONE FUROATE, UMECLIDINIUM BROMIDE AND VILANTEROL TRIFENATATE 100; 62.5; 25 UG/1; UG/1; UG/1
1 POWDER RESPIRATORY (INHALATION) DAILY
Qty: 30 EACH | Refills: 0 | Status: SHIPPED | OUTPATIENT
Start: 2024-08-20 | End: 2024-09-19

## 2024-08-20 RX ORDER — QUETIAPINE FUMARATE 25 MG/1
25 TABLET, FILM COATED ORAL NIGHTLY
Qty: 30 TABLET | Refills: 0 | Status: SHIPPED | OUTPATIENT
Start: 2024-08-20 | End: 2024-08-20

## 2024-08-20 RX ORDER — POTASSIUM CHLORIDE 750 MG/1
10 TABLET, EXTENDED RELEASE ORAL DAILY
Qty: 90 TABLET | Refills: 3 | Status: SHIPPED | OUTPATIENT
Start: 2024-09-20

## 2024-08-20 RX ORDER — QUETIAPINE FUMARATE 25 MG/1
25 TABLET, FILM COATED ORAL NIGHTLY
Qty: 90 TABLET | Refills: 3 | Status: SHIPPED | OUTPATIENT
Start: 2024-09-20 | End: 2025-09-20

## 2024-08-20 NOTE — PROGRESS NOTES
[facility]  Medical Chronic Care Home Visit    Encounter Provider: AGUSTINA GIMENEZ,   PCP: Mor Cook MD  Consult Requested By: Mariposa Huertas    HISTORY OF PRESENT ILLNESS      Patient ID: Kassi Skelton is a 89 y.o. female is being seen in the home due to physical debility that presents a taxing effort to leave the home, to mitigate high risk of hospital readmission and/or due to the limited availability of reliable or safe options for transportation to the point of access to the provider. Prior to treatment on this visit the chart was reviewed and patient verbal consent was obtained.      Chronic medical conditions synopsis:    Ms. Skelton is a 89 y.o. female who has a past medical history significant for dementia, depression, Hard of Hearing, lung granuloma, pacemaker, A. Fib, CHF, T2DM, CKD, OA, and impaired mobility      Reason for consult and visit   Routine complex care management. Shortness of breath with agitation in the evenings.       Recent developments  Ms. Brito is doing somewhat better since our last visit. Legs are still weeping. Currently wrapped via HH RN. Her current dose of lasix is 40mg. May need to up titrate if lower leg weeping and SOB with severe orthopnea do not improve.    She is found to have mild wheezing in all lobes today on auscultation. Will start on daily inhaler.     Mid hypokalemia on latest labs. Lasix recently increased. Will start potassium replacement daily.    Daughter reports increased sundowning agitation and agression in the evenings. Becomes SOB with any position changes and is hard for staff to settle her. Daughter reports patient was on anxiety medication in the evenings in the hospital and they were supposed to prescribe it at discharge but they never did.    Current ADL functionality -  Mobility- wheelchair, needs assistance with all transfers and poistion changes.   Appetite is good  Elimination - baseline- incontint of bowel and bladder     Sleep- reports sleeping well- daughter reports anxiety with position changes and lying flat.   Medication Management - facility staff.   Transportation to and from appointments via daughter        For new medications- first month was sent to local pharmacy per daughter request. Future refills from [facility], scheduled as such.       DECISION MAKING TODAY       Assessment & Plan:  1. Moderate dementia with anxiety, unspecified dementia type  -     Discontinue: QUEtiapine (SEROQUEL) 25 MG Tab; Take 1 tablet (25 mg total) by mouth every evening.  Dispense: 30 tablet; Refill: 0  -     QUEtiapine (SEROQUEL) 25 MG Tab; Take 1 tablet (25 mg total) by mouth every evening.  Dispense: 90 tablet; Refill: 3    2. Acute diastolic congestive heart failure  -     furosemide (LASIX) 20 MG tablet; Take 2 tablets (40 mg total) by mouth once daily.    3. Diffuse wheezing  -     fluticasone-umeclidin-vilanter (TRELEGY ELLIPTA) 100-62.5-25 mcg DsDv; Inhale 1 puff into the lungs once daily.  Dispense: 30 each; Refill: 0  -     fluticasone-umeclidin-vilanter (TRELEGY ELLIPTA) 100-62.5-25 mcg DsDv; Inhale 1 puff into the lungs once daily.  Dispense: 90 each; Refill: 3    4. Hypokalemia  -     potassium chloride (KLOR-CON) 10 MEQ TbSR; Take 3 tablets (30 mEq total) by mouth once. for 1 dose  Dispense: 3 tablet; Refill: 0  -     potassium chloride (KLOR-CON) 10 MEQ TbSR; Take 1 tablet (10 mEq total) by mouth once daily.  Dispense: 90 tablet; Refill: 3    5. Moderate late onset Alzheimer's dementia with agitation  -     QUEtiapine (SEROQUEL) 25 MG Tab; Take 1 tablet (25 mg total) by mouth every evening.  Dispense: 90 tablet; Refill: 3          ENVIRONMENT OF CARE      Family and/or Caregiver present at visit?  No  Name of Caregiver: Daughter Tova- spoke with via phone prior to visit  Does Caregiver have HCPoA: Yes  History provided by: patient and caregiver    Impression upon entering the home:  Physical Dwelling: assisted living  facility (name of facility: [facility])   Appearance of home environment: cleanliness: clean, walking pathways: clear, lighting: adequate, and home structure: sound structure  Functional Status: moderate assistance  Mobility: ambulatory with device; primarily wheelchair; able to transfer  Nutritional access: adequate intake and access  Home Health: No, and does not need it at this time   DME/Supplies: rolling walker and wheelchair         Disease/illness education: CHF  Establishment or re-establishment of referral orders for community resources: No other necessary community resources.   Discussion with other health care providers: \Report given to EMS, spoke with facility staff  Does patient have a PCP at [facility]? Yes   Repatriation plan with PCP? [facility] reason: mobility   Does patient have an ostomy (ileostomy, colostomy, suprapubic catheter, nephrostomy tube, tracheostomy, PEG tube, pleurex catheter, cholecystostomy, etc)? No  Were BPAs reviewed? Yes      Social History     Socioeconomic History    Marital status:     Number of children: 2   Tobacco Use    Smoking status: Never    Smokeless tobacco: Never   Substance and Sexual Activity    Alcohol use: No    Drug use: No    Sexual activity: Not Currently     Social Determinants of Health     Financial Resource Strain: Low Risk  (4/22/2024)    Overall Financial Resource Strain (CARDIA)     Difficulty of Paying Living Expenses: Not hard at all   Food Insecurity: Unknown (7/6/2024)    Received from [facility]    Hunger Vital Sign     Ran Out of Food in the Last Year: Never true   Transportation Needs: Unknown (7/6/2024)    Received from [facility]    PRAPARE - Transportation     Lack of Transportation (Medical): No   Physical Activity: Inactive (4/22/2024)    Exercise Vital Sign     Days of Exercise per Week: 0 days     Minutes of Exercise per Session: 0 min   Stress: No Stress Concern Present (4/22/2024)     [place] [employer] - Occupational Stress Questionnaire     Feeling of Stress : Not at all   Housing Stability: Unknown (5/7/2024)    Received from [facility]    Housing Stability Vital Sign     Homeless in the Last Year: No         OBJECTIVE:     Vital Signs:  Vitals:    08/20/24 1132   BP: 118/78   Pulse: 88   Resp: (!) 22   Temp: 97.5 °F (36.4 °C)       Review of Systems   Constitutional:  Negative for activity change, appetite change and fever.   HENT:  Positive for hearing loss.    Eyes: Negative.    Respiratory:  Negative for cough and shortness of breath.    Cardiovascular:  Positive for leg swelling. Negative for chest pain.   Gastrointestinal:  Negative for abdominal pain, constipation, diarrhea and nausea.   Genitourinary:  Negative for difficulty urinating and hematuria.   Musculoskeletal:  Positive for gait problem. Negative for joint swelling and neck stiffness.   Skin:  Negative for color change and wound.   Neurological:  Positive for weakness. Negative for dizziness, numbness and headaches.       Physical Exam:  Physical Exam  Vitals reviewed.   HENT:      Head: Normocephalic.      Ears:      Comments: Hearing aids  Eyes:      Pupils: Pupils are equal, round, and reactive to light.   Cardiovascular:      Rate and Rhythm: Normal rate. Rhythm irregular.      Heart sounds: Normal heart sounds.      Comments: Pacemaker   Pulmonary:      Effort: No tachypnea or accessory muscle usage.      Breath sounds: No decreased air movement. Wheezing (throughout; mild) present. No decreased breath sounds or rales.   Abdominal:      General: Bowel sounds are normal.      Palpations: Abdomen is soft.   Musculoskeletal:         General: Normal range of motion.      Cervical back: Normal range of motion.      Right lower leg: 3+ Pitting Edema present.      Left lower leg: 3+ Pitting Edema present.      Comments: BLE compression wrapped per DIMITRI RN   Skin:     General: Skin is warm and  dry.      Comments: Open blisters to left lower extemity   Neurological:      Mental Status: She is alert. Mental status is at baseline.      Motor: Weakness present.      Gait: Gait abnormal (walker).      Comments: Oriented to person, place, and immediate situation.   Unsure of current year and president.    Psychiatric:         Behavior: Behavior normal.         Cognition and Memory: Memory is impaired.         INSTRUCTIONS FOR PATIENT:     Scheduled Follow-up, Appts Reviewed with Modifications if Needed: Yes  Future Appointments   Date Time Provider Department Center   10/2/2024  8:00 AM Socorro Vanegas NP [facility] [place]           Current Outpatient Medications:     acetaminophen (TYLENOL) 500 MG tablet, Take 2 tablets (1,000 mg total) by mouth every 6 (six) hours as needed for Pain., Disp: , Rfl:     alcohol swabs (DROPSAFE ALCOHOL PREP PADS) PadM, USE AS DIRECTED FOUR TIMES DAILY, Disp: 100 each, Rfl: 3    benazepriL (LOTENSIN) 20 MG tablet, TAKE 1 TABLET (20 MG TOTAL) ONE TIME DAILY. (Patient taking differently: Take 20 mg by mouth once daily.), Disp: 90 tablet, Rfl: 3    blood sugar diagnostic (BLOOD GLUCOSE TEST) Strp, 1 strip by Misc.(Non-Drug; Combo Route) route 4 (four) times daily., Disp: 200 each, Rfl: 11    blood-glucose meter kit, To check BG once daily, to use with insurance preferred meter, Disp: 1 each, Rfl: 0    calcitRIOL (ROCALTROL) 0.25 MCG Cap, Take 1 capsule (0.25 mcg total) by mouth once daily., Disp: 90 capsule, Rfl: 1    ELIQUIS 2.5 mg Tab, TAKE 1 TABLET TWICE DAILY (Patient taking differently: Take 2.5 mg by mouth 2 (two) times daily.), Disp: 180 tablet, Rfl: 3    fluticasone-umeclidin-vilanter (TRELEGY ELLIPTA) 100-62.5-25 mcg DsDv, Inhale 1 puff into the lungs once daily., Disp: 30 each, Rfl: 0    [START ON 9/20/2024] fluticasone-umeclidin-vilanter (TRELEGY ELLIPTA) 100-62.5-25 mcg DsDv, Inhale 1 puff into the lungs once daily., Disp: 90 each, Rfl: 3    furosemide (LASIX) 20  "MG tablet, Take 2 tablets (40 mg total) by mouth once daily., Disp: , Rfl:     glucagon (GVOKE HYPOPEN 2-PACK) 1 mg/0.2 mL AtIn, Inject 1 mg into the skin as needed (SEVERE HYPOGLYCEMIA)., Disp: 2 each, Rfl: 5    insulin degludec (TRESIBA FLEXTOUCH U-100) 100 unit/mL (3 mL) insulin pen, Inject 13 Units into the skin once daily., Disp: 11.7 mL, Rfl: 3    lancets (ACCU-CHEK SOFTCLIX LANCETS) Misc, TEST BLOOD SUGAR THREE TIMES DAILY BEFORE MEALS, Disp: 300 each, Rfl: 3    levothyroxine (SYNTHROID) 25 MCG tablet, Take 1 tablet (25 mcg total) by mouth before breakfast., Disp: 90 tablet, Rfl: 3    metoprolol tartrate (LOPRESSOR) 25 MG tablet, TAKE 1 TABLET BY MOUTH TWICE DAILY, Disp: 180 tablet, Rfl: 3    metoprolol tartrate (LOPRESSOR) 50 MG tablet, TAKE 1/2 TABLET TWICE DAILY (Patient taking differently: Take 25 mg by mouth 2 (two) times daily.), Disp: 90 tablet, Rfl: 3    mirabegron (MYRBETRIQ) 25 mg Tb24 ER tablet, Take 1 tablet (25 mg total) by mouth once daily., Disp: 90 tablet, Rfl: 3    mirtazapine (REMERON) 45 MG tablet, TAKE 1 TABLET EVERY EVENING, Disp: 90 tablet, Rfl: 1    NOVOLOG FLEXPEN U-100 INSULIN 100 unit/mL (3 mL) InPn pen, INJECT 4 UNITS UNDER THE SKIN BEFORE BREAKFAST AND 6 UNITS BEFORE LUNCH AND 6 UNITS BEFORE SUPPER., Disp: 15 mL, Rfl: 3    pen needle, diabetic (BD ULTRA-FINE MINI PEN NEEDLE) 31 gauge x 3/16" Ndle, Inject 1 each into the skin once daily. Use, Disp: 100 each, Rfl: 3    potassium chloride (KLOR-CON) 10 MEQ TbSR, Take 3 tablets (30 mEq total) by mouth once. for 1 dose, Disp: 3 tablet, Rfl: 0    [START ON 9/20/2024] potassium chloride (KLOR-CON) 10 MEQ TbSR, Take 1 tablet (10 mEq total) by mouth once daily., Disp: 90 tablet, Rfl: 3    pravastatin (PRAVACHOL) 40 MG tablet, TAKE 1 TABLET EVERY DAY, Disp: 90 tablet, Rfl: 3    [START ON 9/20/2024] QUEtiapine (SEROQUEL) 25 MG Tab, Take 1 tablet (25 mg total) by mouth every evening., Disp: 90 tablet, Rfl: 3    QUEtiapine (SEROQUEL) 25 MG Tab, " TAKE 1 TABLET BY MOUTH EVERY EVENING, Disp: 30 tablet, Rfl: 0    sodium bicarbonate 650 MG tablet, Take 1 tablet (650 mg total) by mouth once daily., Disp: 90 tablet, Rfl: 3    venlafaxine (EFFEXOR-XR) 150 MG Cp24, TAKE 1 CAPSULE EVERY DAY, Disp: 90 capsule, Rfl: 1    verapamiL (CALAN-SR) 120 MG CR tablet, TAKE 1 TABLET EVERY NIGHT, Disp: 90 tablet, Rfl: 3    Medication Reconciliation:  Were medications changed during this appointment? No  Were routine medications in the home? Managed per facility  Is the patient taking the medications as directed? Yes  Does the patient/caregiver understand the medications and changes if any? Medications managed per facility  Does updated med list accurately reflects meds patient is currently taking? Yes    Signature:      SHIELDS NP      Time: Total face-to-face time was 60 minutes, >50% of this was spent on counseling and coordination of care. The following issues were discussed: primary and secondary diagnoses, co-morbidities, prescribed medications, treatment modalities, importance of compliance with medical advice and directives for follow-up care.

## 2024-08-21 ENCOUNTER — TELEPHONE (OUTPATIENT)
Dept: HOME HEALTH SERVICES | Facility: CLINIC | Age: 89
End: 2024-08-21
Payer: MEDICARE

## 2024-08-21 DIAGNOSIS — F03.B4 MODERATE DEMENTIA WITH ANXIETY, UNSPECIFIED DEMENTIA TYPE: Chronic | ICD-10-CM

## 2024-08-21 DIAGNOSIS — G30.1 MODERATE LATE ONSET ALZHEIMER'S DEMENTIA WITH AGITATION: ICD-10-CM

## 2024-08-21 DIAGNOSIS — I50.31 ACUTE DIASTOLIC CONGESTIVE HEART FAILURE: Primary | ICD-10-CM

## 2024-08-21 DIAGNOSIS — F02.B11 MODERATE LATE ONSET ALZHEIMER'S DEMENTIA WITH AGITATION: ICD-10-CM

## 2024-08-21 NOTE — TELEPHONE ENCOUNTER
Faxed orders and note for medication changes to [facility] per NP request.  Fax confirmation received.

## 2024-08-28 ENCOUNTER — PATIENT MESSAGE (OUTPATIENT)
Dept: HOME HEALTH SERVICES | Facility: CLINIC | Age: 89
End: 2024-08-28
Payer: MEDICARE

## 2024-09-05 ENCOUNTER — PATIENT OUTREACH (OUTPATIENT)
Dept: ADMINISTRATIVE | Facility: CLINIC | Age: 89
End: 2024-09-05
Payer: MEDICARE

## 2024-11-01 ENCOUNTER — HOSPITAL ENCOUNTER (OUTPATIENT)
Dept: CARDIOLOGY | Facility: HOSPITAL | Age: 89
Discharge: HOME | End: 2024-11-01
Attending: INTERNAL MEDICINE
Payer: MEDICARE

## 2024-11-01 ENCOUNTER — CLINICAL SUPPORT (OUTPATIENT)
Dept: CARDIOLOGY | Facility: HOSPITAL | Age: 89
End: 2024-11-01
Payer: MEDICARE

## 2024-11-01 DIAGNOSIS — I44.2 ATRIOVENTRICULAR BLOCK, COMPLETE: ICD-10-CM

## 2024-11-01 PROCEDURE — 93296 REM INTERROG EVL PM/IDS: CPT | Mod: PO | Performed by: INTERNAL MEDICINE

## 2024-11-01 PROCEDURE — 93294 REM INTERROG EVL PM/LDLS PM: CPT | Mod: ,,, | Performed by: INTERNAL MEDICINE

## 2024-11-05 LAB
OHS CV AF BURDEN PERCENT: 70
OHS CV DC REMOTE DEVICE TYPE: NORMAL
OHS CV ICD SHOCK: NO
OHS CV RV PACING PERCENT: 99 %

## 2024-11-26 ENCOUNTER — PATIENT MESSAGE (OUTPATIENT)
Dept: CARDIOLOGY | Facility: CLINIC | Age: 89
End: 2024-11-26
Payer: MEDICARE

## (undated) DEVICE — SUT 2-0 NYLON D/A

## (undated) DEVICE — APPLICATOR CHLORAPREP ORN 26ML

## (undated) DEVICE — DRESSING XEROFORM STRL 2X2

## (undated) DEVICE — SPONGE SUPER KERLIX 6X6.75IN

## (undated) DEVICE — DISCONTINUED HS CLEANUP

## (undated) DEVICE — SEE MEDLINE ITEM 157148

## (undated) DEVICE — GLOVE SURG BIOGEL LATEX SZ 7.5

## (undated) DEVICE — SEE L#120831

## (undated) DEVICE — SEE MEDLINE ITEM 157128

## (undated) DEVICE — Device